# Patient Record
Sex: FEMALE | Race: WHITE | NOT HISPANIC OR LATINO | Employment: OTHER | RURAL
[De-identification: names, ages, dates, MRNs, and addresses within clinical notes are randomized per-mention and may not be internally consistent; named-entity substitution may affect disease eponyms.]

---

## 2020-06-30 ENCOUNTER — HISTORICAL (OUTPATIENT)
Dept: ADMINISTRATIVE | Facility: HOSPITAL | Age: 50
End: 2020-06-30

## 2020-06-30 LAB
ALBUMIN SERPL BCP-MCNC: 3.4 G/DL (ref 3.5–5)
ALBUMIN/GLOB SERPL: 1.1 {RATIO}
ALP SERPL-CCNC: 93 U/L (ref 39–100)
ALT SERPL W P-5'-P-CCNC: 33 U/L (ref 13–56)
AST SERPL W P-5'-P-CCNC: 15 U/L (ref 15–37)
BASOPHILS # BLD AUTO: 0.05 X10E3/UL (ref 0–0.2)
BASOPHILS NFR BLD AUTO: 0.6 % (ref 0–1)
BILIRUB SERPL-MCNC: 0.3 MG/DL (ref 0–1.2)
BILIRUB UR QL STRIP: NEGATIVE MG/DL
BUN SERPL-MCNC: 12 MG/DL (ref 7–18)
BUN/CREAT SERPL: 12.6
CALCIUM SERPL-MCNC: 8.4 MG/DL (ref 8.5–10.1)
CHLORIDE SERPL-SCNC: 98 MMOL/L (ref 98–107)
CLARITY UR: CLEAR
CO2 SERPL-SCNC: 27 MMOL/L (ref 21–32)
COLOR UR: ABNORMAL
CREAT SERPL-MCNC: 0.95 MG/DL (ref 0.55–1.02)
EOSINOPHIL # BLD AUTO: 0.02 X10E3/UL (ref 0–0.5)
EOSINOPHIL NFR BLD AUTO: 0.2 % (ref 1–4)
ERYTHROCYTE [DISTWIDTH] IN BLOOD BY AUTOMATED COUNT: 12.7 % (ref 11.5–14.5)
GLOBULIN SER-MCNC: 3.2 G/DL (ref 2–4)
GLUCOSE SERPL-MCNC: 255 MG/DL (ref 70–105)
GLUCOSE SERPL-MCNC: 407 MG/DL (ref 60–95)
GLUCOSE SERPL-MCNC: 447 MG/DL (ref 74–106)
GLUCOSE SERPL-MCNC: 459 MG/DL (ref 70–105)
GLUCOSE UR STRIP-MCNC: >=1000 MG/DL
HCT VFR BLD AUTO: 37.6 % (ref 38–47)
HGB BLD-MCNC: 12.9 G/DL (ref 12–16)
IMM GRANULOCYTES # BLD AUTO: 0.02 X10E3/UL (ref 0–0.04)
IMM GRANULOCYTES NFR BLD: 0.2 % (ref 0–0.4)
KETONES UR STRIP-SCNC: NEGATIVE MG/DL
KETONES UR STRIP-SCNC: NEGATIVE MMOL/L
LDH SERPL L TO P-CCNC: 1.8 MMOL/L (ref 0.3–1.2)
LEUKOCYTE ESTERASE UR QL STRIP: NEGATIVE LEU/UL
LYMPHOCYTES # BLD AUTO: 2.72 X10E3/UL (ref 1–4.8)
LYMPHOCYTES NFR BLD AUTO: 33.5 % (ref 27–41)
MCH RBC QN AUTO: 30.2 PG (ref 27–31)
MCHC RBC AUTO-ENTMCNC: 34.3 G/DL (ref 32–36)
MCV RBC AUTO: 88.1 FL (ref 80–96)
MONOCYTES # BLD AUTO: 0.43 X10E3/UL (ref 0–0.8)
MONOCYTES NFR BLD AUTO: 5.3 % (ref 2–6)
MPC BLD CALC-MCNC: 10.6 FL (ref 9.4–12.4)
NEUTROPHILS # BLD AUTO: 4.88 X10E3/UL (ref 1.8–7.7)
NEUTROPHILS NFR BLD AUTO: 60.2 % (ref 53–65)
NITRITE UR QL STRIP: NEGATIVE
NRBC # BLD AUTO: 0 X10E3/UL (ref 0–0)
NRBC, AUTO (.00): 0 /100 (ref 0–0)
PH UR STRIP: 6 PH UNITS (ref 5–8)
PLATELET # BLD AUTO: 179 X10E3/UL (ref 150–400)
POC BASE EXCESS: 1.3 MMOL/L (ref -2–3)
POC HCO3 VENOUS: 26 MMOL/L (ref 24–28)
POC HCT: 38 % (ref 35–51)
POC IONIZED CALCIUM: 1.17 MMOL/L (ref 1.15–1.35)
POC PCO2 VENOUS: 40 MM HG (ref 41–51)
POC PH VENOUS: 7.42 PH UNITS (ref 7.32–7.42)
POC PO2 VENOUS: 70 MM HG (ref 25–40)
POC SATURATED O2 VENOUS: 94 % (ref 40–70)
POTASSIUM SERPL-SCNC: 3.8 MMOL/L (ref 3.4–4.5)
POTASSIUM SERPL-SCNC: 3.8 MMOL/L (ref 3.5–5.1)
PROT SERPL-MCNC: 6.6 G/DL (ref 6.4–8.2)
PROT UR QL STRIP: NEGATIVE MG/DL
RBC # BLD AUTO: 4.27 X10E6/UL (ref 4.2–5.4)
RBC # UR STRIP: ABNORMAL ERY/UL
SODIUM SERPL-SCNC: 132 MMOL/L (ref 136–145)
SODIUM SERPL-SCNC: 133 MMOL/L (ref 136–145)
SP GR UR STRIP: 1.01 (ref 1–1.03)
UROBILINOGEN UR STRIP-ACNC: 0.2 EU/DL
WBC # BLD AUTO: 8.12 X10E3/UL (ref 4.5–11)

## 2020-09-18 ENCOUNTER — HISTORICAL (OUTPATIENT)
Dept: ADMINISTRATIVE | Facility: HOSPITAL | Age: 50
End: 2020-09-18

## 2020-09-18 LAB
ALBUMIN SERPL BCP-MCNC: 3.7 G/DL (ref 3.4–5)
ALBUMIN/GLOB SERPL: 1 {RATIO}
ALP SERPL-CCNC: 84 U/L (ref 50–136)
ALT SERPL W P-5'-P-CCNC: 31 U/L (ref 12–78)
ANION GAP SERPL CALCULATED.3IONS-SCNC: 12 MMOL/L
AST SERPL W P-5'-P-CCNC: 24 U/L (ref 15–37)
BASOPHILS # BLD AUTO: 0.05 X10E3/UL (ref 0–0.2)
BASOPHILS NFR BLD AUTO: 0.5 % (ref 0–1)
BILIRUB SERPL-MCNC: 0.2 MG/DL (ref 0.2–1)
BUN SERPL-MCNC: 12 MG/DL (ref 7–18)
CALCIUM SERPL-MCNC: 9.4 MG/DL (ref 8.5–10.1)
CHLORIDE SERPL-SCNC: 104 MMOL/L (ref 101–111)
CO2 SERPL-SCNC: 30 MMOL/L (ref 21–32)
CREAT SERPL-MCNC: 1.2 MG/DL (ref 0.6–1.3)
D DIMER PPP FEU-MCNC: 0.37 UG/ML (ref 0–0.47)
EOSINOPHIL # BLD AUTO: 0.16 X10E3/UL (ref 0–0.5)
EOSINOPHIL NFR BLD AUTO: 1.5 % (ref 1–4)
ERYTHROCYTE [DISTWIDTH] IN BLOOD BY AUTOMATED COUNT: 13.5 % (ref 11.5–14.5)
ETHANOL SERPL-MCNC: 0 MG/DL (ref 0–10)
GLOBULIN SER-MCNC: 3.6 G/DL
GLUCOSE SERPL-MCNC: 159 MG/DL (ref 74–106)
HCT VFR BLD AUTO: 40.6 % (ref 38–47)
HGB BLD-MCNC: 13.6 G/DL (ref 12–16)
LYMPHOCYTES # BLD AUTO: 3.28 X10E3/UL (ref 1–4.8)
LYMPHOCYTES NFR BLD AUTO: 31.5 % (ref 27–41)
MAGNESIUM SERPL-MCNC: 2.1 MG/DL (ref 1.8–2.4)
MCH RBC QN AUTO: 30.4 PG (ref 27–31)
MCHC RBC AUTO-ENTMCNC: 33.5 G/DL (ref 32–36)
MCV RBC AUTO: 91 FL (ref 80–96)
MONOCYTES # BLD AUTO: 0.57 X10E3/UL (ref 0–0.8)
MONOCYTES NFR BLD AUTO: 5.5 % (ref 2–6)
MPC BLD CALC-MCNC: 10.3 FL (ref 9.4–12.4)
NEUTROPHILS # BLD AUTO: 6.34 X10E3/UL (ref 1.8–7.7)
NEUTROPHILS NFR BLD AUTO: 61 % (ref 53–65)
NT-PROBNP SERPL-MCNC: 133 PG/ML (ref 5–125)
PLATELET # BLD AUTO: 209 X10E3/UL (ref 150–400)
POTASSIUM SERPL-SCNC: 3.6 MMOL/L (ref 3.6–5)
PROT SERPL-MCNC: 7.3 G/DL (ref 6.4–8.2)
RBC # BLD AUTO: 4.47 X10E6/UL (ref 4.2–5.4)
SARS-COV+SARS-COV-2 AG RESP QL IA.RAPID: NEGATIVE
SODIUM SERPL-SCNC: 142 MMOL/L (ref 135–145)
TROPONIN I SERPL-MCNC: 0 NG/ML (ref 0–0.06)
WBC # BLD AUTO: 10.4 X10E3/UL (ref 4.5–11)

## 2020-09-19 ENCOUNTER — HISTORICAL (OUTPATIENT)
Dept: ADMINISTRATIVE | Facility: HOSPITAL | Age: 50
End: 2020-09-19

## 2020-09-19 LAB
ANION GAP SERPL CALCULATED.3IONS-SCNC: 9 MMOL/L
APTT PPP: 25.8 SECONDS (ref 25.2–37.3)
BUN SERPL-MCNC: 15 MG/DL (ref 7–18)
CALCIUM SERPL-MCNC: 8.9 MG/DL (ref 8.5–10.1)
CHLORIDE SERPL-SCNC: 104 MMOL/L (ref 98–107)
CK MB SERPL-MCNC: 1.5 NG/ML (ref 1–3.6)
CK SERPL-CCNC: 121 U/L (ref 26–192)
CO2 SERPL-SCNC: 32 MMOL/L (ref 21–32)
CREAT SERPL-MCNC: 1.03 MG/DL (ref 0.55–1.02)
GLUCOSE SERPL-MCNC: 178 MG/DL (ref 74–106)
HCT VFR BLD AUTO: 41 % (ref 38–47)
HGB BLD-MCNC: 13.6 G/DL (ref 12–16)
INR BLD: 0.99 (ref 0–3.3)
POTASSIUM SERPL-SCNC: 3.7 MMOL/L (ref 3.5–5.1)
PROTHROMBIN TIME: 12.6 SECONDS (ref 11.7–14.7)
SODIUM SERPL-SCNC: 141 MMOL/L (ref 136–145)
T4 SERPL-MCNC: 7.4 UG/DL (ref 4.8–13.9)
TROPONIN I SERPL-MCNC: <0.017 NG/ML (ref 0–0.06)
TSH SERPL DL<=0.005 MIU/L-ACNC: 1.12 UIU/ML (ref 0.36–3.74)

## 2020-09-20 ENCOUNTER — HISTORICAL (OUTPATIENT)
Dept: ADMINISTRATIVE | Facility: HOSPITAL | Age: 50
End: 2020-09-20

## 2020-09-20 LAB
CHOLEST SERPL-MCNC: 192 MG/DL
HDLC SERPL-MCNC: 21 MG/DL
LDLC SERPL CALC-MCNC: NORMAL MG/DL
TRIGL SERPL-MCNC: 728 MG/DL
TROPONIN I SERPL-MCNC: 0.03 NG/ML (ref 0–0.06)
VLDLC SERPL-MCNC: NORMAL MG/DL

## 2021-01-18 ENCOUNTER — HISTORICAL (OUTPATIENT)
Dept: ADMINISTRATIVE | Facility: HOSPITAL | Age: 51
End: 2021-01-18

## 2021-01-18 LAB
ALBUMIN SERPL BCP-MCNC: 3.6 G/DL (ref 3.4–5)
ALBUMIN/GLOB SERPL: 1 {RATIO}
ALP SERPL-CCNC: 145 U/L (ref 50–136)
ALT SERPL W P-5'-P-CCNC: 38 U/L (ref 12–78)
AMPHET UR QL SCN: NEGATIVE
AMYLASE SERPL-CCNC: 31 U/L (ref 25–115)
ANION GAP SERPL CALCULATED.3IONS-SCNC: 12 MMOL/L
APTT PPP: 23.8 SECONDS (ref 25.2–37.3)
AST SERPL W P-5'-P-CCNC: 17 U/L (ref 15–37)
BARBITURATES UR QL SCN: NEGATIVE
BASOPHILS # BLD AUTO: 0.05 X10E3/UL (ref 0–0.2)
BASOPHILS NFR BLD AUTO: 0.6 % (ref 0–1)
BENZODIAZ METAB UR QL SCN: NEGATIVE
BILIRUB SERPL-MCNC: 0.3 MG/DL (ref 0.2–1)
BILIRUB UR QL STRIP: NEGATIVE MG/DL
BUN SERPL-MCNC: 18 MG/DL (ref 7–18)
CALCIUM SERPL-MCNC: 8.6 MG/DL (ref 8.5–10.1)
CANNABINOIDS UR QL SCN: NEGATIVE
CHLORIDE SERPL-SCNC: 93 MMOL/L (ref 101–111)
CLARITY UR: CLEAR
CO2 SERPL-SCNC: 30 MMOL/L (ref 21–32)
COCAINE UR QL SCN: NEGATIVE
COLOR UR: YELLOW
CREAT SERPL-MCNC: 1.2 MG/DL (ref 0.6–1.3)
EOSINOPHIL # BLD AUTO: 0.33 X10E3/UL (ref 0–0.5)
EOSINOPHIL NFR BLD AUTO: 3.7 % (ref 1–4)
ERYTHROCYTE [DISTWIDTH] IN BLOOD BY AUTOMATED COUNT: 13.5 % (ref 11.5–14.5)
FLUAV AG UPPER RESP QL IA.RAPID: NEGATIVE
FLUBV AG UPPER RESP QL IA.RAPID: NEGATIVE
GLOBULIN SER-MCNC: 3.6 G/DL
GLUCOSE SERPL-MCNC: 226 MG/DL (ref 70–105)
GLUCOSE SERPL-MCNC: 569 MG/DL (ref 74–106)
GLUCOSE UR STRIP-MCNC: 500 MG/DL
HCG UR QL IA.RAPID: NEGATIVE
HCT VFR BLD AUTO: 39.5 % (ref 38–47)
HGB BLD-MCNC: 13.4 G/DL (ref 12–16)
INR BLD: 0.85 (ref 0–3.3)
KETONES UR STRIP-SCNC: NEGATIVE MG/DL
LACTATE SERPL-SCNC: 1.9 MMOL/L (ref 0.4–2)
LEUKOCYTE ESTERASE UR QL STRIP: NEGATIVE LEU/UL
LIPASE SERPL-CCNC: 180 U/L (ref 73–393)
LYMPHOCYTES # BLD AUTO: 2.53 X10E3/UL (ref 1–4.8)
LYMPHOCYTES NFR BLD AUTO: 28.7 % (ref 27–41)
MCH RBC QN AUTO: 30.2 PG (ref 27–31)
MCHC RBC AUTO-ENTMCNC: 33.9 G/DL (ref 32–36)
MCV RBC AUTO: 89 FL (ref 80–96)
MONOCYTES # BLD AUTO: 0.5 X10E3/UL (ref 0–0.8)
MONOCYTES NFR BLD AUTO: 5.7 % (ref 2–6)
MPC BLD CALC-MCNC: 10.2 FL (ref 9.4–12.4)
NEUTROPHILS # BLD AUTO: 5.41 X10E3/UL (ref 1.8–7.7)
NEUTROPHILS NFR BLD AUTO: 61.3 % (ref 53–65)
NITRITE UR QL STRIP: NEGATIVE
OPIATES UR QL SCN: NEGATIVE
PCP UR QL SCN: NEGATIVE
PH UR STRIP: 6 PH UNITS (ref 5–8)
PLATELET # BLD AUTO: 228 X10E3/UL (ref 150–400)
POTASSIUM SERPL-SCNC: 4.4 MMOL/L (ref 3.6–5)
PROT SERPL-MCNC: 7.2 G/DL (ref 6.4–8.2)
PROT UR QL STRIP: NEGATIVE MG/DL
PROTHROMBIN TIME: 11.8 SECONDS (ref 11.7–14.7)
RAPID GROUP A STREP: NEGATIVE
RBC # BLD AUTO: 4.44 X10E6/UL (ref 4.2–5.4)
RBC # UR STRIP: ABNORMAL ERY/UL
SARS-COV+SARS-COV-2 AG RESP QL IA.RAPID: NEGATIVE
SODIUM SERPL-SCNC: 131 MMOL/L (ref 135–145)
SP GR UR STRIP: 1.01 (ref 1–1.03)
TROPONIN I SERPL-MCNC: 0 NG/ML (ref 0–0.06)
UROBILINOGEN UR STRIP-ACNC: 0.2 MG/DL
WBC # BLD AUTO: 8.82 X10E3/UL (ref 4.5–11)

## 2021-01-20 LAB
HCO3 UR-SCNC: 29 MMOL/L (ref 21–28)
PCO2 BLDA: 41 MM HG (ref 35–48)
PH SMN: 7.45 PH UNITS (ref 7.35–7.45)
PO2 BLDA: 73 MM HG (ref 83–108)
POC BASE EXCESS ARTERIAL: 4 MMOL/L (ref -2–3)
POC SATURATED O2: 95 % (ref 95–98)

## 2021-01-21 LAB
REPORT: NORMAL

## 2021-01-22 LAB — GLUCOSE SERPL-MCNC: >600 MG/DL (ref 70–105)

## 2021-04-15 ENCOUNTER — OFFICE VISIT (OUTPATIENT)
Dept: CARDIOLOGY | Facility: CLINIC | Age: 51
End: 2021-04-15
Payer: MEDICAID

## 2021-04-15 ENCOUNTER — HOSPITAL ENCOUNTER (OUTPATIENT)
Dept: RADIOLOGY | Facility: HOSPITAL | Age: 51
Discharge: HOME | End: 2021-04-15
Attending: INTERNAL MEDICINE
Payer: MEDICAID

## 2021-04-15 VITALS
SYSTOLIC BLOOD PRESSURE: 110 MMHG | RESPIRATION RATE: 16 BRPM | OXYGEN SATURATION: 96 % | WEIGHT: 173 LBS | HEART RATE: 96 BPM | BODY MASS INDEX: 35.34 KG/M2 | TEMPERATURE: 97 F | OXYGEN SATURATION: 99 % | BODY MASS INDEX: 33.96 KG/M2 | DIASTOLIC BLOOD PRESSURE: 80 MMHG | WEIGHT: 180 LBS | SYSTOLIC BLOOD PRESSURE: 110 MMHG | HEIGHT: 60 IN | HEIGHT: 60 IN | DIASTOLIC BLOOD PRESSURE: 70 MMHG | HEART RATE: 84 BPM

## 2021-04-15 DIAGNOSIS — R07.89 STERNAL PAIN: ICD-10-CM

## 2021-04-15 DIAGNOSIS — I73.9 CLAUDICATION: ICD-10-CM

## 2021-04-15 DIAGNOSIS — I10 HYPERTENSION, UNSPECIFIED TYPE: Primary | ICD-10-CM

## 2021-04-15 DIAGNOSIS — I25.110 ATHEROSCLEROSIS OF NATIVE CORONARY ARTERY OF NATIVE HEART WITH UNSTABLE ANGINA PECTORIS: ICD-10-CM

## 2021-04-15 DIAGNOSIS — R07.9 CHEST PAIN, UNSPECIFIED TYPE: ICD-10-CM

## 2021-04-15 PROCEDURE — 93005 ELECTROCARDIOGRAM TRACING: CPT | Mod: PBBFAC | Performed by: INTERNAL MEDICINE

## 2021-04-15 PROCEDURE — 71130 X-RAY STRENOCLAVIC JT 3/>VWS: CPT | Mod: TC

## 2021-04-15 PROCEDURE — 71130 XR STERNOCLAVICULAR JOINTS MIN 3 VIEW: ICD-10-PCS | Mod: 26,,, | Performed by: RADIOLOGY

## 2021-04-15 PROCEDURE — 99215 OFFICE O/P EST HI 40 MIN: CPT | Mod: PBBFAC,25 | Performed by: INTERNAL MEDICINE

## 2021-04-15 PROCEDURE — 99214 OFFICE O/P EST MOD 30 MIN: CPT | Mod: S$PBB,,, | Performed by: INTERNAL MEDICINE

## 2021-04-15 PROCEDURE — 93010 EKG 12-LEAD: ICD-10-PCS | Mod: S$PBB,,, | Performed by: INTERNAL MEDICINE

## 2021-04-15 PROCEDURE — 99999 PR PBB SHADOW E&M-EST. PATIENT-LVL V: CPT | Mod: PBBFAC,,, | Performed by: INTERNAL MEDICINE

## 2021-04-15 PROCEDURE — 99214 PR OFFICE/OUTPT VISIT, EST, LEVL IV, 30-39 MIN: ICD-10-PCS | Mod: S$PBB,,, | Performed by: INTERNAL MEDICINE

## 2021-04-15 PROCEDURE — 93010 ELECTROCARDIOGRAM REPORT: CPT | Mod: S$PBB,,, | Performed by: INTERNAL MEDICINE

## 2021-04-15 PROCEDURE — 71130 X-RAY STRENOCLAVIC JT 3/>VWS: CPT | Mod: 26,,, | Performed by: RADIOLOGY

## 2021-04-15 PROCEDURE — 99999 PR PBB SHADOW E&M-EST. PATIENT-LVL V: ICD-10-PCS | Mod: PBBFAC,,, | Performed by: INTERNAL MEDICINE

## 2021-04-15 RX ORDER — INSULIN GLARGINE 100 [IU]/ML
INJECTION, SOLUTION SUBCUTANEOUS
COMMUNITY
Start: 2021-04-06 | End: 2021-07-07 | Stop reason: SDUPTHER

## 2021-04-15 RX ORDER — BUSPIRONE HYDROCHLORIDE 15 MG/1
TABLET ORAL
COMMUNITY
Start: 2021-03-17 | End: 2021-10-04 | Stop reason: SDUPTHER

## 2021-04-15 RX ORDER — TOPIRAMATE 25 MG/1
25 TABLET ORAL DAILY
COMMUNITY
Start: 2021-02-01 | End: 2021-08-02 | Stop reason: SDUPTHER

## 2021-04-15 RX ORDER — METOPROLOL TARTRATE 25 MG/1
12.5 TABLET, FILM COATED ORAL DAILY
COMMUNITY
Start: 2021-04-01 | End: 2021-04-15 | Stop reason: ALTCHOICE

## 2021-04-15 RX ORDER — ATORVASTATIN CALCIUM 40 MG/1
40 TABLET, FILM COATED ORAL DAILY
Qty: 90 TABLET | Refills: 3 | Status: SHIPPED | OUTPATIENT
Start: 2021-04-15 | End: 2021-07-23 | Stop reason: SDUPTHER

## 2021-04-15 RX ORDER — GABAPENTIN 300 MG/1
CAPSULE ORAL
COMMUNITY
Start: 2021-03-04 | End: 2021-07-14 | Stop reason: SDUPTHER

## 2021-04-15 RX ORDER — ATORVASTATIN CALCIUM 40 MG/1
40 TABLET, FILM COATED ORAL DAILY
COMMUNITY
End: 2021-04-15 | Stop reason: ALTCHOICE

## 2021-04-15 RX ORDER — IBUPROFEN 200 MG
TABLET ORAL
COMMUNITY
Start: 2021-03-04 | End: 2021-10-04 | Stop reason: SDUPTHER

## 2021-04-15 RX ORDER — METOPROLOL SUCCINATE 25 MG/1
25 TABLET, EXTENDED RELEASE ORAL DAILY
Qty: 30 TABLET | Refills: 11 | Status: SHIPPED | OUTPATIENT
Start: 2021-04-15 | End: 2021-05-11

## 2021-04-15 RX ORDER — DAPAGLIFLOZIN 10 MG/1
10 TABLET, FILM COATED ORAL DAILY
COMMUNITY
Start: 2021-04-01 | End: 2021-08-03 | Stop reason: SDUPTHER

## 2021-04-15 RX ORDER — MIRTAZAPINE 30 MG/1
30 TABLET, FILM COATED ORAL DAILY
COMMUNITY
Start: 2021-02-10 | End: 2021-05-20

## 2021-04-15 RX ORDER — HYDROXYCHLOROQUINE SULFATE 200 MG/1
200 TABLET, FILM COATED ORAL 2 TIMES DAILY
COMMUNITY
Start: 2021-03-04 | End: 2021-07-06 | Stop reason: SDUPTHER

## 2021-04-15 RX ORDER — ARFORMOTEROL TARTRATE 15 UG/2ML
SOLUTION RESPIRATORY (INHALATION)
Status: ON HOLD | COMMUNITY
Start: 2021-02-11 | End: 2021-11-09

## 2021-04-15 RX ORDER — PROMETHAZINE HYDROCHLORIDE 12.5 MG/1
12.5 TABLET ORAL EVERY 6 HOURS PRN
COMMUNITY
Start: 2021-01-20 | End: 2021-09-10 | Stop reason: SDUPTHER

## 2021-04-19 ENCOUNTER — PATIENT MESSAGE (OUTPATIENT)
Dept: CARDIOLOGY | Facility: CLINIC | Age: 51
End: 2021-04-19

## 2021-04-28 ENCOUNTER — HOSPITAL ENCOUNTER (OUTPATIENT)
Dept: RADIOLOGY | Facility: HOSPITAL | Age: 51
Discharge: HOME | End: 2021-04-28
Attending: INTERNAL MEDICINE
Payer: MEDICAID

## 2021-04-28 DIAGNOSIS — I73.9 CLAUDICATION: ICD-10-CM

## 2021-04-28 PROCEDURE — 93924 LWR XTR VASC STDY BILAT: CPT | Mod: TC

## 2021-04-30 ENCOUNTER — HOSPITAL ENCOUNTER (OUTPATIENT)
Dept: RADIOLOGY | Facility: HOSPITAL | Age: 51
Discharge: HOME | End: 2021-04-30
Attending: INTERNAL MEDICINE
Payer: MEDICAID

## 2021-04-30 ENCOUNTER — HOSPITAL ENCOUNTER (OUTPATIENT)
Dept: CARDIOLOGY | Facility: HOSPITAL | Age: 51
Discharge: HOME | End: 2021-04-30
Attending: INTERNAL MEDICINE
Payer: MEDICAID

## 2021-04-30 DIAGNOSIS — R07.9 CHEST PAIN, UNSPECIFIED TYPE: ICD-10-CM

## 2021-04-30 PROCEDURE — 78452 HT MUSCLE IMAGE SPECT MULT: CPT | Mod: 26,,, | Performed by: INTERNAL MEDICINE

## 2021-04-30 PROCEDURE — A9500 TC99M SESTAMIBI: HCPCS

## 2021-04-30 PROCEDURE — 78452 HT MUSCLE IMAGE SPECT MULT: CPT | Mod: TC

## 2021-04-30 PROCEDURE — 93018 CV STRESS TEST I&R ONLY: CPT | Mod: ,,, | Performed by: INTERNAL MEDICINE

## 2021-04-30 PROCEDURE — 93017 CV STRESS TEST TRACING ONLY: CPT

## 2021-04-30 PROCEDURE — 78452 NM MYOCARDIAL PERFUSION SPECT MULTI PHARM: ICD-10-PCS | Mod: 26,,, | Performed by: INTERNAL MEDICINE

## 2021-04-30 PROCEDURE — 93018 NUCLEAR STRESS TEST (CUPID ONLY): ICD-10-PCS | Mod: ,,, | Performed by: INTERNAL MEDICINE

## 2021-04-30 PROCEDURE — 93016 CV STRESS TEST SUPVJ ONLY: CPT | Mod: ,,, | Performed by: NURSE PRACTITIONER

## 2021-04-30 PROCEDURE — 93016 NUCLEAR STRESS TEST (CUPID ONLY): ICD-10-PCS | Mod: ,,, | Performed by: NURSE PRACTITIONER

## 2021-04-30 PROCEDURE — 63600175 PHARM REV CODE 636 W HCPCS: Performed by: INTERNAL MEDICINE

## 2021-04-30 RX ORDER — ALBUTEROL SULFATE 1.25 MG/3ML
1.25 SOLUTION RESPIRATORY (INHALATION) EVERY 6 HOURS PRN
COMMUNITY
End: 2021-09-08 | Stop reason: SDUPTHER

## 2021-04-30 RX ORDER — ASPIRIN 81 MG/1
1 TABLET ORAL DAILY
COMMUNITY
Start: 2020-11-30

## 2021-04-30 RX ORDER — ZIPRASIDONE HYDROCHLORIDE 20 MG/1
20 CAPSULE ORAL 2 TIMES DAILY
COMMUNITY
Start: 2020-05-14 | End: 2021-10-04 | Stop reason: ALTCHOICE

## 2021-04-30 RX ORDER — ONDANSETRON 4 MG/1
4 TABLET, ORALLY DISINTEGRATING ORAL
COMMUNITY
End: 2021-05-20

## 2021-04-30 RX ORDER — REGADENOSON 0.08 MG/ML
0.4 INJECTION, SOLUTION INTRAVENOUS ONCE
Status: COMPLETED | OUTPATIENT
Start: 2021-04-30 | End: 2021-04-30

## 2021-04-30 RX ADMIN — REGADENOSON 0.4 MG: 0.08 INJECTION, SOLUTION INTRAVENOUS at 10:04

## 2021-05-03 ENCOUNTER — PATIENT MESSAGE (OUTPATIENT)
Dept: CARDIOLOGY | Facility: CLINIC | Age: 51
End: 2021-05-03

## 2021-05-05 LAB
CV STRESS BASE HR: 83 BPM
DIASTOLIC BLOOD PRESSURE: 74 MMHG
OHS CV CPX 1 MINUTE RECOVERY HEART RATE: 110 BPM
OHS CV CPX 85 PERCENT MAX PREDICTED HEART RATE MALE: 138
OHS CV CPX MAX PREDICTED HEART RATE: 163
OHS CV CPX PATIENT IS FEMALE: 1
OHS CV CPX PATIENT IS MALE: 0
OHS CV CPX PEAK DIASTOLIC BLOOD PRESSURE: 82 MMHG
OHS CV CPX PEAK HEAR RATE: 112 BPM
OHS CV CPX PEAK RATE PRESSURE PRODUCT: NORMAL
OHS CV CPX PEAK SYSTOLIC BLOOD PRESSURE: 129 MMHG
OHS CV CPX PERCENT MAX PREDICTED HEART RATE ACHIEVED: 69
OHS CV CPX RATE PRESSURE PRODUCT PRESENTING: 9628
SYSTOLIC BLOOD PRESSURE: 116 MMHG

## 2021-05-06 ENCOUNTER — PATIENT MESSAGE (OUTPATIENT)
Dept: CARDIOLOGY | Facility: CLINIC | Age: 51
End: 2021-05-06

## 2021-05-07 ENCOUNTER — PATIENT MESSAGE (OUTPATIENT)
Dept: CARDIOLOGY | Facility: CLINIC | Age: 51
End: 2021-05-07

## 2021-05-11 ENCOUNTER — OFFICE VISIT (OUTPATIENT)
Dept: CARDIOLOGY | Facility: CLINIC | Age: 51
End: 2021-05-11
Payer: MEDICAID

## 2021-05-11 VITALS
DIASTOLIC BLOOD PRESSURE: 75 MMHG | OXYGEN SATURATION: 96 % | HEIGHT: 60 IN | WEIGHT: 186 LBS | HEART RATE: 94 BPM | BODY MASS INDEX: 36.52 KG/M2 | SYSTOLIC BLOOD PRESSURE: 139 MMHG

## 2021-05-11 DIAGNOSIS — I25.110 ATHEROSCLEROSIS OF NATIVE CORONARY ARTERY OF NATIVE HEART WITH UNSTABLE ANGINA PECTORIS: Primary | ICD-10-CM

## 2021-05-11 DIAGNOSIS — R00.2 PALPITATIONS: ICD-10-CM

## 2021-05-11 PROCEDURE — 99215 OFFICE O/P EST HI 40 MIN: CPT | Mod: PBBFAC,,, | Performed by: INTERNAL MEDICINE

## 2021-05-11 PROCEDURE — 99215 OFFICE O/P EST HI 40 MIN: CPT | Mod: PBBFAC | Performed by: INTERNAL MEDICINE

## 2021-05-11 PROCEDURE — 99214 OFFICE O/P EST MOD 30 MIN: CPT | Mod: S$PBB,,, | Performed by: INTERNAL MEDICINE

## 2021-05-11 PROCEDURE — 99214 PR OFFICE/OUTPT VISIT, EST, LEVL IV, 30-39 MIN: ICD-10-PCS | Mod: S$PBB,,, | Performed by: INTERNAL MEDICINE

## 2021-05-11 PROCEDURE — 99215 HC OFFICE/OUTPT VISIT, EST, LEVL V, 40-54 MIN: ICD-10-PCS | Mod: PBBFAC,,, | Performed by: INTERNAL MEDICINE

## 2021-05-11 RX ORDER — METOPROLOL SUCCINATE 50 MG/1
50 TABLET, EXTENDED RELEASE ORAL DAILY
Qty: 30 TABLET | Refills: 11 | Status: SHIPPED | OUTPATIENT
Start: 2021-05-11 | End: 2021-07-23 | Stop reason: SDUPTHER

## 2021-05-12 ENCOUNTER — PATIENT MESSAGE (OUTPATIENT)
Dept: CARDIOLOGY | Facility: CLINIC | Age: 51
End: 2021-05-12

## 2021-05-12 DIAGNOSIS — R00.0 SINUS TACHYCARDIA: Primary | ICD-10-CM

## 2021-05-13 ENCOUNTER — HOSPITAL ENCOUNTER (OUTPATIENT)
Dept: CARDIOLOGY | Facility: HOSPITAL | Age: 51
Discharge: HOME | End: 2021-05-13
Attending: INTERNAL MEDICINE
Payer: MEDICAID

## 2021-05-13 DIAGNOSIS — R00.0 SINUS TACHYCARDIA: ICD-10-CM

## 2021-05-13 PROCEDURE — 93226 XTRNL ECG REC<48 HR SCAN A/R: CPT

## 2021-05-20 ENCOUNTER — OFFICE VISIT (OUTPATIENT)
Dept: FAMILY MEDICINE | Facility: CLINIC | Age: 51
End: 2021-05-20
Payer: MEDICAID

## 2021-05-20 VITALS
HEIGHT: 60 IN | DIASTOLIC BLOOD PRESSURE: 76 MMHG | HEART RATE: 91 BPM | TEMPERATURE: 97 F | BODY MASS INDEX: 33.77 KG/M2 | WEIGHT: 172 LBS | RESPIRATION RATE: 20 BRPM | SYSTOLIC BLOOD PRESSURE: 122 MMHG | OXYGEN SATURATION: 87 %

## 2021-05-20 DIAGNOSIS — R06.02 SHORTNESS OF BREATH: ICD-10-CM

## 2021-05-20 DIAGNOSIS — J06.9 UPPER RESPIRATORY TRACT INFECTION, UNSPECIFIED TYPE: ICD-10-CM

## 2021-05-20 DIAGNOSIS — R05.9 COUGH: Primary | ICD-10-CM

## 2021-05-20 DIAGNOSIS — E11.65 TYPE 2 DIABETES MELLITUS WITH HYPERGLYCEMIA, WITHOUT LONG-TERM CURRENT USE OF INSULIN: ICD-10-CM

## 2021-05-20 DIAGNOSIS — J44.9 CHRONIC OBSTRUCTIVE PULMONARY DISEASE, UNSPECIFIED COPD TYPE: ICD-10-CM

## 2021-05-20 DIAGNOSIS — R00.2 PALPITATIONS: ICD-10-CM

## 2021-05-20 DIAGNOSIS — I25.110 ATHEROSCLEROSIS OF NATIVE CORONARY ARTERY OF NATIVE HEART WITH UNSTABLE ANGINA PECTORIS: ICD-10-CM

## 2021-05-20 LAB
CTP QC/QA: YES
CTP QC/QA: YES
FLUAV AG NPH QL: NEGATIVE
FLUBV AG NPH QL: NEGATIVE
S PYO RRNA THROAT QL PROBE: NEGATIVE
SARS-COV-2 AG RESP QL IA.RAPID: NEGATIVE

## 2021-05-20 PROCEDURE — 87880 STREP A ASSAY W/OPTIC: CPT | Mod: RHCUB | Performed by: FAMILY MEDICINE

## 2021-05-20 PROCEDURE — 87428 SARSCOV & INF VIR A&B AG IA: CPT | Mod: RHCUB | Performed by: FAMILY MEDICINE

## 2021-05-20 PROCEDURE — 99214 PR OFFICE/OUTPT VISIT, EST, LEVL IV, 30-39 MIN: ICD-10-PCS | Mod: ,,, | Performed by: FAMILY MEDICINE

## 2021-05-20 PROCEDURE — 99214 OFFICE O/P EST MOD 30 MIN: CPT | Mod: ,,, | Performed by: FAMILY MEDICINE

## 2021-05-20 RX ORDER — IPRATROPIUM BROMIDE 21 UG/1
2 SPRAY, METERED NASAL 3 TIMES DAILY
Qty: 30 ML | Refills: 1 | Status: SHIPPED | OUTPATIENT
Start: 2021-05-20 | End: 2022-05-02

## 2021-05-20 RX ORDER — MIRTAZAPINE 15 MG/1
15 TABLET, FILM COATED ORAL DAILY
COMMUNITY
Start: 2021-05-12 | End: 2022-03-15 | Stop reason: SDUPTHER

## 2021-05-20 RX ORDER — GUAIFENESIN AND DEXTROMETHORPHAN HYDROBROMIDE 600; 30 MG/1; MG/1
1 TABLET, EXTENDED RELEASE ORAL 2 TIMES DAILY
Qty: 20 TABLET | Refills: 0 | Status: SHIPPED | OUTPATIENT
Start: 2021-05-20 | End: 2021-05-30

## 2021-05-20 RX ORDER — DOXYCYCLINE 100 MG/1
100 CAPSULE ORAL EVERY 12 HOURS
Qty: 14 CAPSULE | Refills: 0 | Status: SHIPPED | OUTPATIENT
Start: 2021-05-20 | End: 2021-05-27

## 2021-05-21 ENCOUNTER — TELEPHONE (OUTPATIENT)
Dept: FAMILY MEDICINE | Facility: CLINIC | Age: 51
End: 2021-05-21

## 2021-06-02 LAB
OHS CV EVENT MONITOR DAY: 0
OHS CV HOLTER LENGTH DECIMAL HOURS: 24
OHS CV HOLTER LENGTH HOURS: 24
OHS CV HOLTER LENGTH MINUTES: 0

## 2021-06-02 PROCEDURE — 93227 XTRNL ECG REC<48 HR R&I: CPT | Mod: ,,, | Performed by: INTERNAL MEDICINE

## 2021-06-02 PROCEDURE — 93227 HOLTER MONITOR - 24 HOUR (CUPID ONLY): ICD-10-PCS | Mod: ,,, | Performed by: INTERNAL MEDICINE

## 2021-06-14 ENCOUNTER — OFFICE VISIT (OUTPATIENT)
Dept: FAMILY MEDICINE | Facility: CLINIC | Age: 51
End: 2021-06-14
Payer: MEDICAID

## 2021-06-14 VITALS
HEIGHT: 60 IN | TEMPERATURE: 97 F | BODY MASS INDEX: 36.16 KG/M2 | OXYGEN SATURATION: 95 % | WEIGHT: 184.19 LBS | DIASTOLIC BLOOD PRESSURE: 74 MMHG | HEART RATE: 74 BPM | SYSTOLIC BLOOD PRESSURE: 111 MMHG | RESPIRATION RATE: 20 BRPM

## 2021-06-14 DIAGNOSIS — E11.9 TYPE 2 DIABETES MELLITUS WITHOUT COMPLICATION, WITHOUT LONG-TERM CURRENT USE OF INSULIN: Primary | ICD-10-CM

## 2021-06-14 PROCEDURE — 99214 PR OFFICE/OUTPT VISIT, EST, LEVL IV, 30-39 MIN: ICD-10-PCS | Mod: ,,, | Performed by: FAMILY MEDICINE

## 2021-06-14 PROCEDURE — 99214 OFFICE O/P EST MOD 30 MIN: CPT | Mod: ,,, | Performed by: FAMILY MEDICINE

## 2021-07-06 RX ORDER — HYDROXYCHLOROQUINE SULFATE 200 MG/1
200 TABLET, FILM COATED ORAL 2 TIMES DAILY
Qty: 180 TABLET | Refills: 1 | Status: SHIPPED | OUTPATIENT
Start: 2021-07-06 | End: 2021-12-27 | Stop reason: SDUPTHER

## 2021-07-12 RX ORDER — INSULIN GLARGINE 100 [IU]/ML
20 INJECTION, SOLUTION SUBCUTANEOUS 2 TIMES DAILY
Qty: 12 ML | Refills: 5 | Status: SHIPPED | OUTPATIENT
Start: 2021-07-12 | End: 2021-07-14 | Stop reason: SDUPTHER

## 2021-07-14 ENCOUNTER — OFFICE VISIT (OUTPATIENT)
Dept: FAMILY MEDICINE | Facility: CLINIC | Age: 51
End: 2021-07-14
Payer: MEDICAID

## 2021-07-14 VITALS
RESPIRATION RATE: 20 BRPM | HEIGHT: 60 IN | DIASTOLIC BLOOD PRESSURE: 74 MMHG | WEIGHT: 188.81 LBS | BODY MASS INDEX: 37.07 KG/M2 | OXYGEN SATURATION: 98 % | HEART RATE: 81 BPM | TEMPERATURE: 98 F | SYSTOLIC BLOOD PRESSURE: 118 MMHG

## 2021-07-14 DIAGNOSIS — E11.59 TYPE 2 DIABETES MELLITUS WITH OTHER CIRCULATORY COMPLICATION, WITHOUT LONG-TERM CURRENT USE OF INSULIN: ICD-10-CM

## 2021-07-14 DIAGNOSIS — K59.00 CONSTIPATION, UNSPECIFIED CONSTIPATION TYPE: Primary | ICD-10-CM

## 2021-07-14 DIAGNOSIS — Z79.4 TYPE 2 DIABETES MELLITUS WITH HYPERGLYCEMIA, WITH LONG-TERM CURRENT USE OF INSULIN: ICD-10-CM

## 2021-07-14 DIAGNOSIS — F31.81 BIPOLAR 2 DISORDER, MAJOR DEPRESSIVE EPISODE: ICD-10-CM

## 2021-07-14 DIAGNOSIS — D64.9 ANEMIA, UNSPECIFIED TYPE: ICD-10-CM

## 2021-07-14 DIAGNOSIS — E11.65 TYPE 2 DIABETES MELLITUS WITH HYPERGLYCEMIA, WITH LONG-TERM CURRENT USE OF INSULIN: ICD-10-CM

## 2021-07-14 LAB
ALBUMIN SERPL BCP-MCNC: 4 G/DL (ref 3.5–5)
ALBUMIN/GLOB SERPL: 1 {RATIO}
ALP SERPL-CCNC: 107 U/L (ref 39–100)
ALT SERPL W P-5'-P-CCNC: 46 U/L (ref 13–56)
ANION GAP SERPL CALCULATED.3IONS-SCNC: 7 MMOL/L (ref 7–16)
AST SERPL W P-5'-P-CCNC: 21 U/L (ref 15–37)
BASOPHILS # BLD AUTO: 0.09 K/UL (ref 0–0.2)
BASOPHILS NFR BLD AUTO: 0.9 % (ref 0–1)
BILIRUB SERPL-MCNC: 0.4 MG/DL (ref 0–1.2)
BUN SERPL-MCNC: 13 MG/DL (ref 7–18)
BUN/CREAT SERPL: 15 (ref 6–20)
CALCIUM SERPL-MCNC: 9.4 MG/DL (ref 8.5–10.1)
CHLORIDE SERPL-SCNC: 104 MMOL/L (ref 98–107)
CHOLEST SERPL-MCNC: 128 MG/DL (ref 0–200)
CHOLEST/HDLC SERPL: 3.8 {RATIO}
CO2 SERPL-SCNC: 30 MMOL/L (ref 21–32)
CREAT SERPL-MCNC: 0.87 MG/DL (ref 0.55–1.02)
CRP SERPL-MCNC: 0.87 MG/DL (ref 0–0.8)
DIFFERENTIAL METHOD BLD: NORMAL
EOSINOPHIL # BLD AUTO: 0.1 K/UL (ref 0–0.5)
EOSINOPHIL NFR BLD AUTO: 1 % (ref 1–4)
ERYTHROCYTE [DISTWIDTH] IN BLOOD BY AUTOMATED COUNT: 13.8 % (ref 11.5–14.5)
ERYTHROCYTE [SEDIMENTATION RATE] IN BLOOD BY WESTERGREN METHOD: 12 MM/HR (ref 0–20)
EST. AVERAGE GLUCOSE BLD GHB EST-MCNC: 234 MG/DL
GLOBULIN SER-MCNC: 4 G/DL (ref 2–4)
GLUCOSE SERPL-MCNC: 139 MG/DL (ref 74–106)
HBA1C MFR BLD HPLC: 9.6 % (ref 4.5–6.6)
HCT VFR BLD AUTO: 44.9 % (ref 38–47)
HDLC SERPL-MCNC: 34 MG/DL (ref 40–60)
HGB BLD-MCNC: 14.8 G/DL (ref 12–16)
IMM GRANULOCYTES # BLD AUTO: 0.04 K/UL (ref 0–0.04)
IMM GRANULOCYTES NFR BLD: 0.4 % (ref 0–0.4)
LDLC SERPL CALC-MCNC: 42 MG/DL
LDLC/HDLC SERPL: 1.2 {RATIO}
LYMPHOCYTES # BLD AUTO: 4.01 K/UL (ref 1–4.8)
LYMPHOCYTES NFR BLD AUTO: 38.4 % (ref 27–41)
MCH RBC QN AUTO: 29.7 PG (ref 27–31)
MCHC RBC AUTO-ENTMCNC: 33 G/DL (ref 32–36)
MCV RBC AUTO: 90.2 FL (ref 80–96)
MONOCYTES # BLD AUTO: 0.56 K/UL (ref 0–0.8)
MONOCYTES NFR BLD AUTO: 5.4 % (ref 2–6)
MPC BLD CALC-MCNC: 10.8 FL (ref 9.4–12.4)
NEUTROPHILS # BLD AUTO: 5.63 K/UL (ref 1.8–7.7)
NEUTROPHILS NFR BLD AUTO: 53.9 % (ref 53–65)
NONHDLC SERPL-MCNC: 94 MG/DL
NRBC # BLD AUTO: 0 X10E3/UL
NRBC, AUTO (.00): 0 %
PLATELET # BLD AUTO: 222 K/UL (ref 150–400)
POTASSIUM SERPL-SCNC: 4 MMOL/L (ref 3.5–5.1)
PROT SERPL-MCNC: 8 G/DL (ref 6.4–8.2)
RBC # BLD AUTO: 4.98 M/UL (ref 4.2–5.4)
SODIUM SERPL-SCNC: 137 MMOL/L (ref 136–145)
T4 SERPL-MCNC: 10 ΜG/DL (ref 4.8–13.9)
TRIGL SERPL-MCNC: 259 MG/DL (ref 35–150)
TSH SERPL DL<=0.005 MIU/L-ACNC: 1.16 UIU/ML (ref 0.36–3.74)
VLDLC SERPL-MCNC: 52 MG/DL
WBC # BLD AUTO: 10.43 K/UL (ref 4.5–11)

## 2021-07-14 PROCEDURE — 83036 HEMOGLOBIN GLYCOSYLATED A1C: CPT | Mod: ,,, | Performed by: CLINICAL MEDICAL LABORATORY

## 2021-07-14 PROCEDURE — 86140 C-REACTIVE PROTEIN: ICD-10-PCS | Mod: ,,, | Performed by: CLINICAL MEDICAL LABORATORY

## 2021-07-14 PROCEDURE — 85025 COMPLETE CBC W/AUTO DIFF WBC: CPT | Mod: ,,, | Performed by: CLINICAL MEDICAL LABORATORY

## 2021-07-14 PROCEDURE — 86140 C-REACTIVE PROTEIN: CPT | Mod: ,,, | Performed by: CLINICAL MEDICAL LABORATORY

## 2021-07-14 PROCEDURE — 85651 SEDIMENTATION RATE, AUTOMATED: ICD-10-PCS | Mod: ,,, | Performed by: CLINICAL MEDICAL LABORATORY

## 2021-07-14 PROCEDURE — 80061 LIPID PANEL: ICD-10-PCS | Mod: ,,, | Performed by: CLINICAL MEDICAL LABORATORY

## 2021-07-14 PROCEDURE — 84436 T4: ICD-10-PCS | Mod: ,,, | Performed by: CLINICAL MEDICAL LABORATORY

## 2021-07-14 PROCEDURE — 99214 PR OFFICE/OUTPT VISIT, EST, LEVL IV, 30-39 MIN: ICD-10-PCS | Mod: ,,, | Performed by: FAMILY MEDICINE

## 2021-07-14 PROCEDURE — 83036 HEMOGLOBIN A1C: ICD-10-PCS | Mod: ,,, | Performed by: CLINICAL MEDICAL LABORATORY

## 2021-07-14 PROCEDURE — 84436 ASSAY OF TOTAL THYROXINE: CPT | Mod: ,,, | Performed by: CLINICAL MEDICAL LABORATORY

## 2021-07-14 PROCEDURE — 99214 OFFICE O/P EST MOD 30 MIN: CPT | Mod: ,,, | Performed by: FAMILY MEDICINE

## 2021-07-14 PROCEDURE — 85025 CBC WITH DIFFERENTIAL: ICD-10-PCS | Mod: ,,, | Performed by: CLINICAL MEDICAL LABORATORY

## 2021-07-14 PROCEDURE — 80061 LIPID PANEL: CPT | Mod: ,,, | Performed by: CLINICAL MEDICAL LABORATORY

## 2021-07-14 PROCEDURE — 85651 RBC SED RATE NONAUTOMATED: CPT | Mod: ,,, | Performed by: CLINICAL MEDICAL LABORATORY

## 2021-07-14 RX ORDER — ALBUTEROL SULFATE 0.83 MG/ML
SOLUTION RESPIRATORY (INHALATION)
Status: ON HOLD | COMMUNITY
Start: 2021-07-06 | End: 2021-11-09

## 2021-07-14 RX ORDER — INSULIN GLARGINE 100 [IU]/ML
25 INJECTION, SOLUTION SUBCUTANEOUS 2 TIMES DAILY
Qty: 12 ML | Refills: 5 | Status: ON HOLD | OUTPATIENT
Start: 2021-07-14 | End: 2021-11-09

## 2021-07-14 RX ORDER — UBIDECARENONE 75 MG
500 CAPSULE ORAL DAILY
Status: ON HOLD | COMMUNITY
End: 2021-11-09

## 2021-07-14 RX ORDER — GABAPENTIN 300 MG/1
CAPSULE ORAL
Qty: 90 CAPSULE | Refills: 5 | Status: ON HOLD | OUTPATIENT
Start: 2021-07-14 | End: 2021-11-09

## 2021-07-23 RX ORDER — METOPROLOL SUCCINATE 50 MG/1
50 TABLET, EXTENDED RELEASE ORAL DAILY
Qty: 30 TABLET | Refills: 11 | Status: CANCELLED | OUTPATIENT
Start: 2021-07-23 | End: 2022-07-23

## 2021-07-26 ENCOUNTER — PATIENT MESSAGE (OUTPATIENT)
Dept: CARDIOLOGY | Facility: CLINIC | Age: 51
End: 2021-07-26

## 2021-07-28 ENCOUNTER — OFFICE VISIT (OUTPATIENT)
Dept: FAMILY MEDICINE | Facility: CLINIC | Age: 51
End: 2021-07-28
Payer: MEDICAID

## 2021-07-28 VITALS
HEART RATE: 92 BPM | HEIGHT: 60 IN | WEIGHT: 185.38 LBS | RESPIRATION RATE: 20 BRPM | OXYGEN SATURATION: 97 % | DIASTOLIC BLOOD PRESSURE: 72 MMHG | SYSTOLIC BLOOD PRESSURE: 108 MMHG | BODY MASS INDEX: 36.4 KG/M2 | TEMPERATURE: 98 F

## 2021-07-28 DIAGNOSIS — J44.9 CHRONIC OBSTRUCTIVE PULMONARY DISEASE, UNSPECIFIED COPD TYPE: ICD-10-CM

## 2021-07-28 DIAGNOSIS — F31.81 BIPOLAR 2 DISORDER, MAJOR DEPRESSIVE EPISODE: Primary | ICD-10-CM

## 2021-07-28 DIAGNOSIS — E11.65 TYPE 2 DIABETES MELLITUS WITH HYPERGLYCEMIA, WITHOUT LONG-TERM CURRENT USE OF INSULIN: ICD-10-CM

## 2021-07-28 PROCEDURE — 99214 OFFICE O/P EST MOD 30 MIN: CPT | Mod: ,,, | Performed by: FAMILY MEDICINE

## 2021-07-28 PROCEDURE — 99214 PR OFFICE/OUTPT VISIT, EST, LEVL IV, 30-39 MIN: ICD-10-PCS | Mod: ,,, | Performed by: FAMILY MEDICINE

## 2021-08-02 DIAGNOSIS — E11.65 TYPE 2 DIABETES MELLITUS WITH HYPERGLYCEMIA, WITHOUT LONG-TERM CURRENT USE OF INSULIN: ICD-10-CM

## 2021-08-02 DIAGNOSIS — G43.909 MIGRAINE WITHOUT STATUS MIGRAINOSUS, NOT INTRACTABLE, UNSPECIFIED MIGRAINE TYPE: Primary | ICD-10-CM

## 2021-08-03 RX ORDER — TOPIRAMATE 25 MG/1
25 TABLET ORAL DAILY
Qty: 90 TABLET | Refills: 1 | Status: SHIPPED | OUTPATIENT
Start: 2021-08-03 | End: 2021-12-27 | Stop reason: SDUPTHER

## 2021-08-03 RX ORDER — DAPAGLIFLOZIN 10 MG/1
10 TABLET, FILM COATED ORAL DAILY
Qty: 30 TABLET | Refills: 3 | Status: SHIPPED | OUTPATIENT
Start: 2021-08-03 | End: 2021-10-03 | Stop reason: SDUPTHER

## 2021-09-08 DIAGNOSIS — F31.81 BIPOLAR 2 DISORDER, MAJOR DEPRESSIVE EPISODE: Primary | ICD-10-CM

## 2021-09-08 DIAGNOSIS — J44.9 CHRONIC OBSTRUCTIVE PULMONARY DISEASE, UNSPECIFIED COPD TYPE: ICD-10-CM

## 2021-09-08 RX ORDER — ALBUTEROL SULFATE 1.25 MG/3ML
1.25 SOLUTION RESPIRATORY (INHALATION) EVERY 6 HOURS PRN
Qty: 1 BOX | Refills: 1 | Status: SHIPPED | OUTPATIENT
Start: 2021-09-08 | End: 2021-09-17 | Stop reason: SDUPTHER

## 2021-09-10 DIAGNOSIS — R11.0 NAUSEA: Primary | ICD-10-CM

## 2021-09-10 RX ORDER — PROMETHAZINE HYDROCHLORIDE 12.5 MG/1
12.5 TABLET ORAL EVERY 6 HOURS PRN
Qty: 30 TABLET | Refills: 0 | Status: SHIPPED | OUTPATIENT
Start: 2021-09-10 | End: 2021-10-29 | Stop reason: SDUPTHER

## 2021-09-17 DIAGNOSIS — J44.9 CHRONIC OBSTRUCTIVE PULMONARY DISEASE, UNSPECIFIED COPD TYPE: ICD-10-CM

## 2021-09-20 RX ORDER — ALBUTEROL SULFATE 1.25 MG/3ML
1.25 SOLUTION RESPIRATORY (INHALATION) EVERY 6 HOURS PRN
Qty: 12 BOX | Refills: 3 | Status: ON HOLD | OUTPATIENT
Start: 2021-09-20 | End: 2021-11-09

## 2021-10-03 DIAGNOSIS — E11.65 TYPE 2 DIABETES MELLITUS WITH HYPERGLYCEMIA, WITHOUT LONG-TERM CURRENT USE OF INSULIN: ICD-10-CM

## 2021-10-04 ENCOUNTER — OFFICE VISIT (OUTPATIENT)
Dept: FAMILY MEDICINE | Facility: CLINIC | Age: 51
End: 2021-10-04
Payer: MEDICAID

## 2021-10-04 VITALS
DIASTOLIC BLOOD PRESSURE: 74 MMHG | HEIGHT: 60 IN | HEART RATE: 78 BPM | TEMPERATURE: 98 F | RESPIRATION RATE: 20 BRPM | OXYGEN SATURATION: 97 % | SYSTOLIC BLOOD PRESSURE: 98 MMHG | WEIGHT: 181 LBS | BODY MASS INDEX: 35.53 KG/M2

## 2021-10-04 DIAGNOSIS — F41.9 ANXIETY: Primary | ICD-10-CM

## 2021-10-04 DIAGNOSIS — F31.81 BIPOLAR 2 DISORDER, MAJOR DEPRESSIVE EPISODE: ICD-10-CM

## 2021-10-04 DIAGNOSIS — J44.9 CHRONIC OBSTRUCTIVE PULMONARY DISEASE, UNSPECIFIED COPD TYPE: ICD-10-CM

## 2021-10-04 DIAGNOSIS — F17.200 TOBACCO DEPENDENCE: ICD-10-CM

## 2021-10-04 DIAGNOSIS — E11.65 TYPE 2 DIABETES MELLITUS WITH HYPERGLYCEMIA, WITHOUT LONG-TERM CURRENT USE OF INSULIN: ICD-10-CM

## 2021-10-04 LAB
ANION GAP SERPL CALCULATED.3IONS-SCNC: 9 MMOL/L (ref 7–16)
BUN SERPL-MCNC: 12 MG/DL (ref 7–18)
BUN/CREAT SERPL: 12 (ref 6–20)
CALCIUM SERPL-MCNC: 10.2 MG/DL (ref 8.5–10.1)
CHLORIDE SERPL-SCNC: 106 MMOL/L (ref 98–107)
CO2 SERPL-SCNC: 27 MMOL/L (ref 21–32)
CREAT SERPL-MCNC: 1.04 MG/DL (ref 0.55–1.02)
EST. AVERAGE GLUCOSE BLD GHB EST-MCNC: 234 MG/DL
GLUCOSE SERPL-MCNC: 133 MG/DL (ref 74–106)
HBA1C MFR BLD HPLC: 9.6 % (ref 4.5–6.6)
POTASSIUM SERPL-SCNC: 4 MMOL/L (ref 3.5–5.1)
SODIUM SERPL-SCNC: 138 MMOL/L (ref 136–145)

## 2021-10-04 PROCEDURE — 80048 BASIC METABOLIC PNL TOTAL CA: CPT | Mod: ,,, | Performed by: CLINICAL MEDICAL LABORATORY

## 2021-10-04 PROCEDURE — 83036 HEMOGLOBIN A1C: ICD-10-PCS | Mod: ,,, | Performed by: CLINICAL MEDICAL LABORATORY

## 2021-10-04 PROCEDURE — 80048 BASIC METABOLIC PANEL: ICD-10-PCS | Mod: ,,, | Performed by: CLINICAL MEDICAL LABORATORY

## 2021-10-04 PROCEDURE — 83036 HEMOGLOBIN GLYCOSYLATED A1C: CPT | Mod: ,,, | Performed by: CLINICAL MEDICAL LABORATORY

## 2021-10-04 PROCEDURE — 99214 OFFICE O/P EST MOD 30 MIN: CPT | Mod: ,,, | Performed by: FAMILY MEDICINE

## 2021-10-04 PROCEDURE — 99214 PR OFFICE/OUTPT VISIT, EST, LEVL IV, 30-39 MIN: ICD-10-PCS | Mod: ,,, | Performed by: FAMILY MEDICINE

## 2021-10-04 RX ORDER — BUSPIRONE HYDROCHLORIDE 15 MG/1
TABLET ORAL
Qty: 180 TABLET | Refills: 1 | Status: ON HOLD | OUTPATIENT
Start: 2021-10-04 | End: 2021-11-09

## 2021-10-04 RX ORDER — DAPAGLIFLOZIN 10 MG/1
10 TABLET, FILM COATED ORAL DAILY
Qty: 90 TABLET | Refills: 1 | Status: ON HOLD | OUTPATIENT
Start: 2021-10-04 | End: 2021-11-09

## 2021-10-04 RX ORDER — DAPAGLIFLOZIN 10 MG/1
10 TABLET, FILM COATED ORAL DAILY
Qty: 30 TABLET | Refills: 3 | Status: SHIPPED | OUTPATIENT
Start: 2021-10-04 | End: 2021-10-04 | Stop reason: SDUPTHER

## 2021-10-04 RX ORDER — IBUPROFEN 200 MG
TABLET ORAL
Qty: 90 PATCH | Refills: 1 | Status: SHIPPED | OUTPATIENT
Start: 2021-10-04 | End: 2021-11-16 | Stop reason: SDUPTHER

## 2021-10-29 DIAGNOSIS — R11.0 NAUSEA: ICD-10-CM

## 2021-10-29 RX ORDER — PROMETHAZINE HYDROCHLORIDE 12.5 MG/1
12.5 TABLET ORAL EVERY 6 HOURS PRN
Qty: 30 TABLET | Refills: 0 | Status: SHIPPED | OUTPATIENT
Start: 2021-10-29 | End: 2021-11-01 | Stop reason: SDUPTHER

## 2021-11-01 ENCOUNTER — OFFICE VISIT (OUTPATIENT)
Dept: FAMILY MEDICINE | Facility: CLINIC | Age: 51
End: 2021-11-01
Payer: MEDICAID

## 2021-11-01 VITALS
WEIGHT: 179.81 LBS | RESPIRATION RATE: 20 BRPM | HEIGHT: 60 IN | HEART RATE: 84 BPM | OXYGEN SATURATION: 97 % | BODY MASS INDEX: 35.3 KG/M2 | TEMPERATURE: 98 F | DIASTOLIC BLOOD PRESSURE: 68 MMHG | SYSTOLIC BLOOD PRESSURE: 132 MMHG

## 2021-11-01 DIAGNOSIS — G25.81 RESTLESS LEG SYNDROME: Primary | ICD-10-CM

## 2021-11-01 DIAGNOSIS — R11.0 NAUSEA: ICD-10-CM

## 2021-11-01 PROCEDURE — 99214 OFFICE O/P EST MOD 30 MIN: CPT | Mod: ,,, | Performed by: FAMILY MEDICINE

## 2021-11-01 PROCEDURE — 99214 PR OFFICE/OUTPT VISIT, EST, LEVL IV, 30-39 MIN: ICD-10-PCS | Mod: ,,, | Performed by: FAMILY MEDICINE

## 2021-11-01 RX ORDER — PROMETHAZINE HYDROCHLORIDE 12.5 MG/1
12.5 TABLET ORAL EVERY 6 HOURS PRN
Qty: 30 TABLET | Refills: 0 | Status: SHIPPED | OUTPATIENT
Start: 2021-11-01 | End: 2021-12-27 | Stop reason: SDUPTHER

## 2021-11-01 RX ORDER — ROPINIROLE 0.25 MG/1
0.25 TABLET, FILM COATED ORAL 3 TIMES DAILY
Qty: 90 TABLET | Refills: 11 | Status: SHIPPED | OUTPATIENT
Start: 2021-11-01 | End: 2022-05-02 | Stop reason: SDUPTHER

## 2021-11-03 ENCOUNTER — OFFICE VISIT (OUTPATIENT)
Dept: CARDIOLOGY | Facility: CLINIC | Age: 51
End: 2021-11-03
Payer: MEDICAID

## 2021-11-03 VITALS
BODY MASS INDEX: 35.73 KG/M2 | OXYGEN SATURATION: 98 % | HEART RATE: 82 BPM | SYSTOLIC BLOOD PRESSURE: 98 MMHG | HEIGHT: 60 IN | WEIGHT: 182 LBS | DIASTOLIC BLOOD PRESSURE: 60 MMHG

## 2021-11-03 DIAGNOSIS — R00.2 PALPITATIONS: ICD-10-CM

## 2021-11-03 DIAGNOSIS — I25.110 ATHEROSCLEROSIS OF NATIVE CORONARY ARTERY OF NATIVE HEART WITH UNSTABLE ANGINA PECTORIS: ICD-10-CM

## 2021-11-03 DIAGNOSIS — R07.9 CHEST PAIN, UNSPECIFIED TYPE: Primary | ICD-10-CM

## 2021-11-03 PROCEDURE — 99214 PR OFFICE/OUTPT VISIT, EST, LEVL IV, 30-39 MIN: ICD-10-PCS | Mod: S$PBB,,, | Performed by: INTERNAL MEDICINE

## 2021-11-03 PROCEDURE — 93005 ELECTROCARDIOGRAM TRACING: CPT | Mod: PBBFAC | Performed by: INTERNAL MEDICINE

## 2021-11-03 PROCEDURE — 93010 EKG 12-LEAD: ICD-10-PCS | Mod: S$PBB,,, | Performed by: INTERNAL MEDICINE

## 2021-11-03 PROCEDURE — 99214 OFFICE O/P EST MOD 30 MIN: CPT | Mod: S$PBB,,, | Performed by: INTERNAL MEDICINE

## 2021-11-03 PROCEDURE — 93010 ELECTROCARDIOGRAM REPORT: CPT | Mod: S$PBB,,, | Performed by: INTERNAL MEDICINE

## 2021-11-03 PROCEDURE — 99215 OFFICE O/P EST HI 40 MIN: CPT | Mod: PBBFAC | Performed by: INTERNAL MEDICINE

## 2021-11-03 RX ORDER — RANOLAZINE 500 MG/1
500 TABLET, EXTENDED RELEASE ORAL 2 TIMES DAILY
Qty: 60 TABLET | Refills: 11 | Status: SHIPPED | OUTPATIENT
Start: 2021-11-03 | End: 2022-03-15

## 2021-11-05 DIAGNOSIS — R07.9 CHEST PAIN, UNSPECIFIED TYPE: Primary | ICD-10-CM

## 2021-11-05 DIAGNOSIS — Z11.52 ENCOUNTER FOR PREPROCEDURE SCREENING LABORATORY TESTING FOR COVID-19: ICD-10-CM

## 2021-11-05 DIAGNOSIS — Z01.812 ENCOUNTER FOR PREPROCEDURAL LABORATORY EXAMINATION: ICD-10-CM

## 2021-11-05 DIAGNOSIS — R94.39 ABNORMAL CARDIOVASCULAR STRESS TEST: ICD-10-CM

## 2021-11-05 DIAGNOSIS — Z01.812 ENCOUNTER FOR PREPROCEDURE SCREENING LABORATORY TESTING FOR COVID-19: ICD-10-CM

## 2021-11-05 RX ORDER — SODIUM CHLORIDE 9 MG/ML
INJECTION, SOLUTION INTRAVENOUS ONCE
Status: CANCELLED | OUTPATIENT
Start: 2021-11-09

## 2021-11-05 RX ORDER — SODIUM CHLORIDE 0.9 % (FLUSH) 0.9 %
10 SYRINGE (ML) INJECTION
Status: CANCELLED | OUTPATIENT
Start: 2021-11-09

## 2021-11-09 ENCOUNTER — HOSPITAL ENCOUNTER (OUTPATIENT)
Facility: HOSPITAL | Age: 51
Discharge: HOME | End: 2021-11-10
Attending: INTERNAL MEDICINE | Admitting: INTERNAL MEDICINE
Payer: MEDICAID

## 2021-11-09 DIAGNOSIS — R94.39 ABNORMAL CARDIOVASCULAR STRESS TEST: ICD-10-CM

## 2021-11-09 DIAGNOSIS — F41.9 ANXIETY: ICD-10-CM

## 2021-11-09 DIAGNOSIS — I25.110 ATHEROSCLEROSIS OF NATIVE CORONARY ARTERY OF NATIVE HEART WITH UNSTABLE ANGINA PECTORIS: Primary | ICD-10-CM

## 2021-11-09 DIAGNOSIS — J44.9 CHRONIC OBSTRUCTIVE PULMONARY DISEASE, UNSPECIFIED COPD TYPE: ICD-10-CM

## 2021-11-09 DIAGNOSIS — F31.81 BIPOLAR 2 DISORDER, MAJOR DEPRESSIVE EPISODE: ICD-10-CM

## 2021-11-09 DIAGNOSIS — R07.9 CHEST PAIN, UNSPECIFIED TYPE: ICD-10-CM

## 2021-11-09 DIAGNOSIS — E11.65 TYPE 2 DIABETES MELLITUS WITH HYPERGLYCEMIA, WITH LONG-TERM CURRENT USE OF INSULIN: ICD-10-CM

## 2021-11-09 DIAGNOSIS — Z98.890 STATUS POST LEFT HEART CATHETERIZATION: ICD-10-CM

## 2021-11-09 DIAGNOSIS — E11.65 TYPE 2 DIABETES MELLITUS WITH HYPERGLYCEMIA, WITHOUT LONG-TERM CURRENT USE OF INSULIN: ICD-10-CM

## 2021-11-09 DIAGNOSIS — K59.00 CONSTIPATION, UNSPECIFIED CONSTIPATION TYPE: ICD-10-CM

## 2021-11-09 DIAGNOSIS — Z79.4 TYPE 2 DIABETES MELLITUS WITH HYPERGLYCEMIA, WITH LONG-TERM CURRENT USE OF INSULIN: ICD-10-CM

## 2021-11-09 LAB
GLUCOSE SERPL-MCNC: 120 MG/DL (ref 70–105)
GLUCOSE SERPL-MCNC: 151 MG/DL (ref 70–105)
GLUCOSE SERPL-MCNC: 189 MG/DL (ref 70–105)
HCT VFR BLD AUTO: 37.5 % (ref 38–47)
HGB BLD-MCNC: 13.1 G/DL (ref 12–16)

## 2021-11-09 PROCEDURE — 93455 CORONARY ART/GRFT ANGIO S&I: CPT | Mod: 59 | Performed by: INTERNAL MEDICINE

## 2021-11-09 PROCEDURE — C1874 STENT, COATED/COV W/DEL SYS: HCPCS | Performed by: INTERNAL MEDICINE

## 2021-11-09 PROCEDURE — 92928 PRQ TCAT PLMT NTRAC ST 1 LES: CPT | Mod: LD,,, | Performed by: INTERNAL MEDICINE

## 2021-11-09 PROCEDURE — 82962 GLUCOSE BLOOD TEST: CPT

## 2021-11-09 PROCEDURE — G0378 HOSPITAL OBSERVATION PER HR: HCPCS

## 2021-11-09 PROCEDURE — 92928 PR STENT: ICD-10-PCS | Mod: LD,,, | Performed by: INTERNAL MEDICINE

## 2021-11-09 PROCEDURE — C1769 GUIDE WIRE: HCPCS | Performed by: INTERNAL MEDICINE

## 2021-11-09 PROCEDURE — C9600 PERC DRUG-EL COR STENT SING: HCPCS | Mod: LD | Performed by: INTERNAL MEDICINE

## 2021-11-09 PROCEDURE — 99153 MOD SED SAME PHYS/QHP EA: CPT | Performed by: INTERNAL MEDICINE

## 2021-11-09 PROCEDURE — 27800903 OPTIME MED/SURG SUP & DEVICES OTHER IMPLANTS: Performed by: INTERNAL MEDICINE

## 2021-11-09 PROCEDURE — 63600175 PHARM REV CODE 636 W HCPCS: Performed by: INTERNAL MEDICINE

## 2021-11-09 PROCEDURE — 93455 PR CATH PLACE/CORONARY ANGIO, IMG SUPER/INTERP, BYPASS ANGIO: ICD-10-PCS | Mod: 26,XU,, | Performed by: INTERNAL MEDICINE

## 2021-11-09 PROCEDURE — S4991 NICOTINE PATCH NONLEGEND: HCPCS | Performed by: INTERNAL MEDICINE

## 2021-11-09 PROCEDURE — 94761 N-INVAS EAR/PLS OXIMETRY MLT: CPT

## 2021-11-09 PROCEDURE — 36415 COLL VENOUS BLD VENIPUNCTURE: CPT | Performed by: INTERNAL MEDICINE

## 2021-11-09 PROCEDURE — 93455 CORONARY ART/GRFT ANGIO S&I: CPT | Mod: 26,XU,, | Performed by: INTERNAL MEDICINE

## 2021-11-09 PROCEDURE — C1894 INTRO/SHEATH, NON-LASER: HCPCS | Performed by: INTERNAL MEDICINE

## 2021-11-09 PROCEDURE — 25000003 PHARM REV CODE 250: Performed by: INTERNAL MEDICINE

## 2021-11-09 PROCEDURE — 27000080 OPTIME MED/SURG SUP & DEVICES GENERAL CLASSIFICATION: Performed by: INTERNAL MEDICINE

## 2021-11-09 PROCEDURE — 25500020 PHARM REV CODE 255: Performed by: INTERNAL MEDICINE

## 2021-11-09 PROCEDURE — 99152 MOD SED SAME PHYS/QHP 5/>YRS: CPT | Performed by: INTERNAL MEDICINE

## 2021-11-09 PROCEDURE — 27201423 OPTIME MED/SURG SUP & DEVICES STERILE SUPPLY: Performed by: INTERNAL MEDICINE

## 2021-11-09 PROCEDURE — C1760 CLOSURE DEV, VASC: HCPCS | Performed by: INTERNAL MEDICINE

## 2021-11-09 PROCEDURE — C1887 CATHETER, GUIDING: HCPCS | Performed by: INTERNAL MEDICINE

## 2021-11-09 PROCEDURE — 85018 HEMOGLOBIN: CPT | Performed by: INTERNAL MEDICINE

## 2021-11-09 PROCEDURE — 27100168 OPTIME MED/SURG SUP & DEVICES NON-STERILE SUPPLY: Performed by: INTERNAL MEDICINE

## 2021-11-09 PROCEDURE — 85014 HEMATOCRIT: CPT | Performed by: INTERNAL MEDICINE

## 2021-11-09 PROCEDURE — C1725 CATH, TRANSLUMIN NON-LASER: HCPCS | Performed by: INTERNAL MEDICINE

## 2021-11-09 DEVICE — XIENCE SKYPOINT™ EVEROLIMUS ELUTING CORONARY STENT SYSTEM 2.25 MM X 15 MM / RAPID-EXCHANGE
Type: IMPLANTABLE DEVICE | Site: CORONARY | Status: FUNCTIONAL
Brand: XIENCE SKYPOINT™

## 2021-11-09 RX ORDER — TOPIRAMATE 25 MG/1
25 TABLET ORAL DAILY
Status: DISCONTINUED | OUTPATIENT
Start: 2021-11-09 | End: 2021-11-10 | Stop reason: HOSPADM

## 2021-11-09 RX ORDER — HEPARIN SOD,PORCINE/0.9 % NACL 1000/500ML
INTRAVENOUS SOLUTION INTRAVENOUS
Status: DISCONTINUED | OUTPATIENT
Start: 2021-11-09 | End: 2021-11-09 | Stop reason: HOSPADM

## 2021-11-09 RX ORDER — HEPARIN SODIUM 1000 [USP'U]/ML
INJECTION, SOLUTION INTRAVENOUS; SUBCUTANEOUS
Status: DISCONTINUED | OUTPATIENT
Start: 2021-11-09 | End: 2021-11-09 | Stop reason: HOSPADM

## 2021-11-09 RX ORDER — ATORVASTATIN CALCIUM 40 MG/1
40 TABLET, FILM COATED ORAL DAILY
Status: DISCONTINUED | OUTPATIENT
Start: 2021-11-09 | End: 2021-11-10 | Stop reason: HOSPADM

## 2021-11-09 RX ORDER — PROMETHAZINE HYDROCHLORIDE 12.5 MG/1
12.5 TABLET ORAL EVERY 6 HOURS PRN
Status: DISCONTINUED | OUTPATIENT
Start: 2021-11-09 | End: 2021-11-10 | Stop reason: HOSPADM

## 2021-11-09 RX ORDER — MIRTAZAPINE 15 MG/1
15 TABLET, FILM COATED ORAL DAILY
Status: DISCONTINUED | OUTPATIENT
Start: 2021-11-09 | End: 2021-11-10 | Stop reason: HOSPADM

## 2021-11-09 RX ORDER — GLUCAGON 1 MG
1 KIT INJECTION
Status: DISCONTINUED | OUTPATIENT
Start: 2021-11-09 | End: 2021-11-10 | Stop reason: HOSPADM

## 2021-11-09 RX ORDER — ASPIRIN 81 MG/1
81 TABLET ORAL DAILY
Status: DISCONTINUED | OUTPATIENT
Start: 2021-11-09 | End: 2021-11-10 | Stop reason: HOSPADM

## 2021-11-09 RX ORDER — SODIUM CHLORIDE 9 MG/ML
INJECTION, SOLUTION INTRAVENOUS ONCE
Status: DISCONTINUED | OUTPATIENT
Start: 2021-11-09 | End: 2021-11-09 | Stop reason: HOSPADM

## 2021-11-09 RX ORDER — RANOLAZINE 500 MG/1
500 TABLET, EXTENDED RELEASE ORAL 2 TIMES DAILY
Status: DISCONTINUED | OUTPATIENT
Start: 2021-11-09 | End: 2021-11-10 | Stop reason: HOSPADM

## 2021-11-09 RX ORDER — ACETAMINOPHEN 325 MG/1
650 TABLET ORAL EVERY 4 HOURS PRN
Status: DISCONTINUED | OUTPATIENT
Start: 2021-11-09 | End: 2021-11-10 | Stop reason: HOSPADM

## 2021-11-09 RX ORDER — ROPINIROLE 0.25 MG/1
0.25 TABLET, FILM COATED ORAL 3 TIMES DAILY
Status: DISCONTINUED | OUTPATIENT
Start: 2021-11-09 | End: 2021-11-10 | Stop reason: HOSPADM

## 2021-11-09 RX ORDER — IPRATROPIUM BROMIDE 21 UG/1
2 SPRAY, METERED NASAL 3 TIMES DAILY
Status: DISCONTINUED | OUTPATIENT
Start: 2021-11-09 | End: 2021-11-09 | Stop reason: RX

## 2021-11-09 RX ORDER — INSULIN ASPART 100 [IU]/ML
0-5 INJECTION, SOLUTION INTRAVENOUS; SUBCUTANEOUS
Status: DISCONTINUED | OUTPATIENT
Start: 2021-11-09 | End: 2021-11-10 | Stop reason: HOSPADM

## 2021-11-09 RX ORDER — SODIUM CHLORIDE 0.9 % (FLUSH) 0.9 %
10 SYRINGE (ML) INJECTION
Status: DISCONTINUED | OUTPATIENT
Start: 2021-11-09 | End: 2021-11-10 | Stop reason: HOSPADM

## 2021-11-09 RX ORDER — FENTANYL CITRATE 50 UG/ML
INJECTION, SOLUTION INTRAMUSCULAR; INTRAVENOUS
Status: DISCONTINUED | OUTPATIENT
Start: 2021-11-09 | End: 2021-11-09 | Stop reason: HOSPADM

## 2021-11-09 RX ORDER — HYDROXYCHLOROQUINE SULFATE 200 MG/1
200 TABLET, FILM COATED ORAL 2 TIMES DAILY
Status: DISCONTINUED | OUTPATIENT
Start: 2021-11-09 | End: 2021-11-10 | Stop reason: HOSPADM

## 2021-11-09 RX ORDER — SODIUM CHLORIDE 450 MG/100ML
INJECTION, SOLUTION INTRAVENOUS
Status: DISCONTINUED | OUTPATIENT
Start: 2021-11-09 | End: 2021-11-10 | Stop reason: HOSPADM

## 2021-11-09 RX ORDER — ONDANSETRON 2 MG/ML
INJECTION INTRAMUSCULAR; INTRAVENOUS
Status: DISCONTINUED | OUTPATIENT
Start: 2021-11-09 | End: 2021-11-09 | Stop reason: HOSPADM

## 2021-11-09 RX ORDER — IBUPROFEN 200 MG
1 TABLET ORAL DAILY
Status: DISCONTINUED | OUTPATIENT
Start: 2021-11-09 | End: 2021-11-10 | Stop reason: HOSPADM

## 2021-11-09 RX ORDER — DIAZEPAM 5 MG/1
5 TABLET ORAL ONCE
Status: COMPLETED | OUTPATIENT
Start: 2021-11-09 | End: 2021-11-09

## 2021-11-09 RX ORDER — ASPIRIN 81 MG/1
81 TABLET ORAL DAILY
Status: DISCONTINUED | OUTPATIENT
Start: 2021-11-10 | End: 2021-11-09

## 2021-11-09 RX ORDER — CLOPIDOGREL 300 MG/1
TABLET, FILM COATED ORAL
Status: DISCONTINUED | OUTPATIENT
Start: 2021-11-09 | End: 2021-11-09 | Stop reason: HOSPADM

## 2021-11-09 RX ORDER — CLOPIDOGREL BISULFATE 75 MG/1
75 TABLET ORAL DAILY
Status: DISCONTINUED | OUTPATIENT
Start: 2021-11-09 | End: 2021-11-10

## 2021-11-09 RX ORDER — SODIUM CHLORIDE 9 MG/ML
INJECTION, SOLUTION INTRAVENOUS CONTINUOUS
Status: DISCONTINUED | OUTPATIENT
Start: 2021-11-09 | End: 2021-11-09

## 2021-11-09 RX ORDER — CLOPIDOGREL 300 MG/1
600 TABLET, FILM COATED ORAL ONCE
Status: DISCONTINUED | OUTPATIENT
Start: 2021-11-09 | End: 2021-11-10 | Stop reason: HOSPADM

## 2021-11-09 RX ORDER — ONDANSETRON 4 MG/1
8 TABLET, ORALLY DISINTEGRATING ORAL EVERY 8 HOURS PRN
Status: DISCONTINUED | OUTPATIENT
Start: 2021-11-09 | End: 2021-11-10 | Stop reason: HOSPADM

## 2021-11-09 RX ORDER — LIDOCAINE HYDROCHLORIDE 10 MG/ML
INJECTION INFILTRATION; PERINEURAL
Status: DISCONTINUED | OUTPATIENT
Start: 2021-11-09 | End: 2021-11-09 | Stop reason: HOSPADM

## 2021-11-09 RX ORDER — MIDAZOLAM HYDROCHLORIDE 1 MG/ML
INJECTION INTRAMUSCULAR; INTRAVENOUS
Status: DISCONTINUED | OUTPATIENT
Start: 2021-11-09 | End: 2021-11-09 | Stop reason: HOSPADM

## 2021-11-09 RX ORDER — IBUPROFEN 200 MG
16 TABLET ORAL
Status: DISCONTINUED | OUTPATIENT
Start: 2021-11-09 | End: 2021-11-10 | Stop reason: HOSPADM

## 2021-11-09 RX ADMIN — ROPINIROLE 0.25 MG: 0.25 TABLET, FILM COATED ORAL at 03:11

## 2021-11-09 RX ADMIN — HYDROXYCHLOROQUINE SULFATE 200 MG: 200 TABLET, FILM COATED ORAL at 10:11

## 2021-11-09 RX ADMIN — ROPINIROLE 0.25 MG: 0.25 TABLET, FILM COATED ORAL at 10:11

## 2021-11-09 RX ADMIN — ONDANSETRON 8 MG: 4 TABLET, ORALLY DISINTEGRATING ORAL at 03:11

## 2021-11-09 RX ADMIN — MIRTAZAPINE 15 MG: 15 TABLET, FILM COATED ORAL at 03:11

## 2021-11-09 RX ADMIN — TOPIRAMATE 25 MG: 25 TABLET, FILM COATED ORAL at 03:11

## 2021-11-09 RX ADMIN — ATORVASTATIN CALCIUM 40 MG: 40 TABLET, FILM COATED ORAL at 03:11

## 2021-11-09 RX ADMIN — CLOPIDOGREL 75 MG: 75 TABLET, FILM COATED ORAL at 03:11

## 2021-11-09 RX ADMIN — RANOLAZINE 500 MG: 500 TABLET, EXTENDED RELEASE ORAL at 10:11

## 2021-11-09 RX ADMIN — NICOTINE 1 PATCH: 14 PATCH, EXTENDED RELEASE TRANSDERMAL at 03:11

## 2021-11-09 RX ADMIN — SODIUM CHLORIDE: 9 INJECTION, SOLUTION INTRAVENOUS at 01:11

## 2021-11-09 RX ADMIN — DIAZEPAM 5 MG: 5 TABLET ORAL at 09:11

## 2021-11-10 VITALS
BODY MASS INDEX: 34.71 KG/M2 | WEIGHT: 176.81 LBS | SYSTOLIC BLOOD PRESSURE: 110 MMHG | TEMPERATURE: 98 F | DIASTOLIC BLOOD PRESSURE: 60 MMHG | HEIGHT: 60 IN | RESPIRATION RATE: 18 BRPM | HEART RATE: 77 BPM | OXYGEN SATURATION: 97 %

## 2021-11-10 LAB
ANION GAP SERPL CALCULATED.3IONS-SCNC: 10 MMOL/L (ref 7–16)
BASOPHILS # BLD AUTO: 0.05 K/UL (ref 0–0.2)
BASOPHILS NFR BLD AUTO: 0.7 % (ref 0–1)
BSA FOR ECHO PROCEDURE: 1.84 M2
BUN SERPL-MCNC: 14 MG/DL (ref 7–18)
BUN/CREAT SERPL: 15 (ref 6–20)
CALCIUM SERPL-MCNC: 7.9 MG/DL (ref 8.5–10.1)
CHLORIDE SERPL-SCNC: 108 MMOL/L (ref 98–107)
CO2 SERPL-SCNC: 28 MMOL/L (ref 21–32)
CREAT SERPL-MCNC: 0.95 MG/DL (ref 0.55–1.02)
DIFFERENTIAL METHOD BLD: ABNORMAL
EJECTION FRACTION: 50 %
EOSINOPHIL # BLD AUTO: 0.17 K/UL (ref 0–0.5)
EOSINOPHIL NFR BLD AUTO: 2.5 % (ref 1–4)
ERYTHROCYTE [DISTWIDTH] IN BLOOD BY AUTOMATED COUNT: 13.3 % (ref 11.5–14.5)
GLUCOSE SERPL-MCNC: 130 MG/DL (ref 70–105)
GLUCOSE SERPL-MCNC: 143 MG/DL (ref 74–106)
GLUCOSE SERPL-MCNC: 207 MG/DL (ref 70–105)
HCT VFR BLD AUTO: 35.8 % (ref 38–47)
HGB BLD-MCNC: 12.5 G/DL (ref 12–16)
IMM GRANULOCYTES # BLD AUTO: 0.02 K/UL (ref 0–0.04)
IMM GRANULOCYTES NFR BLD: 0.3 % (ref 0–0.4)
LYMPHOCYTES # BLD AUTO: 2.43 K/UL (ref 1–4.8)
LYMPHOCYTES NFR BLD AUTO: 36.2 % (ref 27–41)
MCH RBC QN AUTO: 30 PG (ref 27–31)
MCHC RBC AUTO-ENTMCNC: 34.9 G/DL (ref 32–36)
MCV RBC AUTO: 86.1 FL (ref 80–96)
MONOCYTES # BLD AUTO: 0.49 K/UL (ref 0–0.8)
MONOCYTES NFR BLD AUTO: 7.3 % (ref 2–6)
MPC BLD CALC-MCNC: 10.7 FL (ref 9.4–12.4)
NEUTROPHILS # BLD AUTO: 3.56 K/UL (ref 1.8–7.7)
NEUTROPHILS NFR BLD AUTO: 53 % (ref 53–65)
NRBC # BLD AUTO: 0 X10E3/UL
NRBC, AUTO (.00): 0 %
PLATELET # BLD AUTO: 167 K/UL (ref 150–400)
POTASSIUM SERPL-SCNC: 3.7 MMOL/L (ref 3.5–5.1)
RBC # BLD AUTO: 4.16 M/UL (ref 4.2–5.4)
SODIUM SERPL-SCNC: 142 MMOL/L (ref 136–145)
WBC # BLD AUTO: 6.72 K/UL (ref 4.5–11)

## 2021-11-10 PROCEDURE — 36415 COLL VENOUS BLD VENIPUNCTURE: CPT | Performed by: INTERNAL MEDICINE

## 2021-11-10 PROCEDURE — 25000003 PHARM REV CODE 250: Performed by: INTERNAL MEDICINE

## 2021-11-10 PROCEDURE — G0378 HOSPITAL OBSERVATION PER HR: HCPCS

## 2021-11-10 PROCEDURE — 99213 OFFICE O/P EST LOW 20 MIN: CPT | Mod: ,,, | Performed by: STUDENT IN AN ORGANIZED HEALTH CARE EDUCATION/TRAINING PROGRAM

## 2021-11-10 PROCEDURE — S4991 NICOTINE PATCH NONLEGEND: HCPCS | Performed by: INTERNAL MEDICINE

## 2021-11-10 PROCEDURE — 85025 COMPLETE CBC W/AUTO DIFF WBC: CPT | Performed by: INTERNAL MEDICINE

## 2021-11-10 PROCEDURE — 80048 BASIC METABOLIC PNL TOTAL CA: CPT | Performed by: INTERNAL MEDICINE

## 2021-11-10 PROCEDURE — 82962 GLUCOSE BLOOD TEST: CPT

## 2021-11-10 PROCEDURE — 99213 PR OFFICE/OUTPT VISIT, EST, LEVL III, 20-29 MIN: ICD-10-PCS | Mod: ,,, | Performed by: STUDENT IN AN ORGANIZED HEALTH CARE EDUCATION/TRAINING PROGRAM

## 2021-11-10 RX ORDER — GABAPENTIN 300 MG/1
CAPSULE ORAL
Qty: 90 CAPSULE | Refills: 5 | Status: SHIPPED | OUTPATIENT
Start: 2021-11-10 | End: 2022-05-02 | Stop reason: SDUPTHER

## 2021-11-10 RX ORDER — METOPROLOL SUCCINATE 50 MG/1
50 TABLET, EXTENDED RELEASE ORAL DAILY
Qty: 90 TABLET | Refills: 3 | Status: SHIPPED | OUTPATIENT
Start: 2021-11-10 | End: 2022-05-24 | Stop reason: SDUPTHER

## 2021-11-10 RX ORDER — ALBUTEROL SULFATE 1.25 MG/3ML
1.25 SOLUTION RESPIRATORY (INHALATION) EVERY 6 HOURS PRN
Qty: 12 EACH | Refills: 3 | Status: SHIPPED | OUTPATIENT
Start: 2021-11-10 | End: 2022-02-09 | Stop reason: SDUPTHER

## 2021-11-10 RX ORDER — DAPAGLIFLOZIN 10 MG/1
10 TABLET, FILM COATED ORAL DAILY
Qty: 90 TABLET | Refills: 1 | Status: SHIPPED | OUTPATIENT
Start: 2021-11-10 | End: 2022-05-02 | Stop reason: SDUPTHER

## 2021-11-10 RX ORDER — INSULIN GLARGINE 100 [IU]/ML
25 INJECTION, SOLUTION SUBCUTANEOUS 2 TIMES DAILY
Qty: 12 ML | Refills: 5 | Status: SHIPPED | OUTPATIENT
Start: 2021-11-10 | End: 2022-05-02 | Stop reason: SDUPTHER

## 2021-11-10 RX ORDER — BUSPIRONE HYDROCHLORIDE 15 MG/1
TABLET ORAL
Qty: 180 TABLET | Refills: 1 | Status: SHIPPED | OUTPATIENT
Start: 2021-11-10 | End: 2022-05-02 | Stop reason: SDUPTHER

## 2021-11-10 RX ORDER — UBIDECARENONE 75 MG
500 CAPSULE ORAL DAILY
Qty: 30 TABLET | Refills: 0 | Status: SHIPPED | OUTPATIENT
Start: 2021-11-10 | End: 2022-03-15

## 2021-11-10 RX ORDER — METOPROLOL SUCCINATE 25 MG/1
25 TABLET, EXTENDED RELEASE ORAL DAILY
Status: DISCONTINUED | OUTPATIENT
Start: 2021-11-10 | End: 2021-11-10 | Stop reason: HOSPADM

## 2021-11-10 RX ORDER — ARFORMOTEROL TARTRATE 15 UG/2ML
SOLUTION RESPIRATORY (INHALATION)
Qty: 1 EACH | Refills: 0 | Status: SHIPPED | OUTPATIENT
Start: 2021-11-10 | End: 2022-02-09 | Stop reason: SDUPTHER

## 2021-11-10 RX ORDER — CLOPIDOGREL BISULFATE 75 MG/1
75 TABLET ORAL DAILY
Qty: 30 TABLET | Refills: 11 | Status: SHIPPED | OUTPATIENT
Start: 2021-11-11 | End: 2022-05-24 | Stop reason: SDUPTHER

## 2021-11-10 RX ORDER — ALBUTEROL SULFATE 0.83 MG/ML
SOLUTION RESPIRATORY (INHALATION)
Qty: 1 EACH | Refills: 0 | Status: SHIPPED | OUTPATIENT
Start: 2021-11-10 | End: 2022-05-02 | Stop reason: SDUPTHER

## 2021-11-10 RX ORDER — CLOPIDOGREL BISULFATE 75 MG/1
75 TABLET ORAL DAILY
Status: DISCONTINUED | OUTPATIENT
Start: 2021-11-10 | End: 2021-11-10 | Stop reason: HOSPADM

## 2021-11-10 RX ADMIN — NICOTINE 1 PATCH: 14 PATCH, EXTENDED RELEASE TRANSDERMAL at 08:11

## 2021-11-10 RX ADMIN — HYDROXYCHLOROQUINE SULFATE 200 MG: 200 TABLET, FILM COATED ORAL at 08:11

## 2021-11-10 RX ADMIN — ACETAMINOPHEN 650 MG: 325 TABLET ORAL at 08:11

## 2021-11-10 RX ADMIN — CLOPIDOGREL 75 MG: 75 TABLET, FILM COATED ORAL at 08:11

## 2021-11-10 RX ADMIN — ASPIRIN 81 MG: 81 TABLET, COATED ORAL at 08:11

## 2021-11-10 RX ADMIN — ATORVASTATIN CALCIUM 40 MG: 40 TABLET, FILM COATED ORAL at 08:11

## 2021-11-10 RX ADMIN — ROPINIROLE 0.25 MG: 0.25 TABLET, FILM COATED ORAL at 08:11

## 2021-11-10 RX ADMIN — RANOLAZINE 500 MG: 500 TABLET, EXTENDED RELEASE ORAL at 08:11

## 2021-11-10 RX ADMIN — TOPIRAMATE 25 MG: 25 TABLET, FILM COATED ORAL at 08:11

## 2021-11-11 ENCOUNTER — TELEPHONE (OUTPATIENT)
Dept: FAMILY MEDICINE | Facility: CLINIC | Age: 51
End: 2021-11-11
Payer: MEDICAID

## 2021-11-11 ENCOUNTER — PATIENT MESSAGE (OUTPATIENT)
Dept: FAMILY MEDICINE | Facility: CLINIC | Age: 51
End: 2021-11-11
Payer: MEDICAID

## 2021-11-16 ENCOUNTER — PATIENT MESSAGE (OUTPATIENT)
Dept: CARDIOLOGY | Facility: CLINIC | Age: 51
End: 2021-11-16
Payer: MEDICAID

## 2021-11-16 DIAGNOSIS — F17.200 TOBACCO DEPENDENCE: ICD-10-CM

## 2021-11-16 RX ORDER — IBUPROFEN 200 MG
TABLET ORAL
Qty: 90 PATCH | Refills: 1 | Status: SHIPPED | OUTPATIENT
Start: 2021-11-16 | End: 2021-12-09

## 2021-11-19 ENCOUNTER — PATIENT MESSAGE (OUTPATIENT)
Dept: CARDIOLOGY | Facility: CLINIC | Age: 51
End: 2021-11-19
Payer: MEDICAID

## 2021-12-09 ENCOUNTER — OFFICE VISIT (OUTPATIENT)
Dept: CARDIOLOGY | Facility: CLINIC | Age: 51
End: 2021-12-09
Payer: MEDICAID

## 2021-12-09 VITALS
WEIGHT: 180 LBS | BODY MASS INDEX: 35.34 KG/M2 | DIASTOLIC BLOOD PRESSURE: 70 MMHG | HEART RATE: 96 BPM | HEIGHT: 60 IN | SYSTOLIC BLOOD PRESSURE: 110 MMHG | OXYGEN SATURATION: 95 %

## 2021-12-09 DIAGNOSIS — I25.110 ATHEROSCLEROSIS OF NATIVE CORONARY ARTERY OF NATIVE HEART WITH UNSTABLE ANGINA PECTORIS: Primary | ICD-10-CM

## 2021-12-09 PROCEDURE — 99215 OFFICE O/P EST HI 40 MIN: CPT | Mod: PBBFAC | Performed by: INTERNAL MEDICINE

## 2021-12-09 PROCEDURE — 99214 OFFICE O/P EST MOD 30 MIN: CPT | Mod: S$PBB,,, | Performed by: INTERNAL MEDICINE

## 2021-12-09 PROCEDURE — 99214 PR OFFICE/OUTPT VISIT, EST, LEVL IV, 30-39 MIN: ICD-10-PCS | Mod: S$PBB,,, | Performed by: INTERNAL MEDICINE

## 2021-12-09 RX ORDER — IBUPROFEN 200 MG
1 TABLET ORAL DAILY
Qty: 30 PATCH | Refills: 1 | Status: SHIPPED | OUTPATIENT
Start: 2021-12-09 | End: 2022-05-02

## 2021-12-17 ENCOUNTER — OFFICE VISIT (OUTPATIENT)
Dept: FAMILY MEDICINE | Facility: CLINIC | Age: 51
End: 2021-12-17
Payer: MEDICAID

## 2021-12-17 VITALS
SYSTOLIC BLOOD PRESSURE: 122 MMHG | DIASTOLIC BLOOD PRESSURE: 82 MMHG | TEMPERATURE: 97 F | RESPIRATION RATE: 20 BRPM | OXYGEN SATURATION: 100 % | BODY MASS INDEX: 35.37 KG/M2 | WEIGHT: 180.19 LBS | HEART RATE: 78 BPM | HEIGHT: 60 IN

## 2021-12-17 DIAGNOSIS — N23 RENAL COLIC, BILATERAL: ICD-10-CM

## 2021-12-17 DIAGNOSIS — R10.31 RIGHT LOWER QUADRANT ABDOMINAL PAIN: ICD-10-CM

## 2021-12-17 DIAGNOSIS — R30.0 DYSURIA: Primary | ICD-10-CM

## 2021-12-17 LAB
ALBUMIN SERPL BCP-MCNC: 4.1 G/DL (ref 3.5–5)
ALBUMIN/GLOB SERPL: 1 {RATIO}
ALP SERPL-CCNC: 100 U/L (ref 39–100)
ALT SERPL W P-5'-P-CCNC: 40 U/L (ref 13–56)
ANION GAP SERPL CALCULATED.3IONS-SCNC: 6 MMOL/L (ref 7–16)
AST SERPL W P-5'-P-CCNC: 23 U/L (ref 15–37)
BASOPHILS # BLD AUTO: 0.07 K/UL (ref 0–0.2)
BASOPHILS NFR BLD AUTO: 0.6 % (ref 0–1)
BILIRUB SERPL-MCNC: 0.3 MG/DL (ref 0–1.2)
BILIRUB SERPL-MCNC: NEGATIVE MG/DL
BLOOD URINE, POC: NORMAL
BUN SERPL-MCNC: 13 MG/DL (ref 7–18)
BUN/CREAT SERPL: 13 (ref 6–20)
CALCIUM SERPL-MCNC: 9.7 MG/DL (ref 8.5–10.1)
CHLORIDE SERPL-SCNC: 104 MMOL/L (ref 98–107)
CO2 SERPL-SCNC: 32 MMOL/L (ref 21–32)
COLOR, POC UA: YELLOW
CREAT SERPL-MCNC: 0.98 MG/DL (ref 0.55–1.02)
DIFFERENTIAL METHOD BLD: ABNORMAL
EOSINOPHIL # BLD AUTO: 0.08 K/UL (ref 0–0.5)
EOSINOPHIL NFR BLD AUTO: 0.7 % (ref 1–4)
ERYTHROCYTE [DISTWIDTH] IN BLOOD BY AUTOMATED COUNT: 13.7 % (ref 11.5–14.5)
GLOBULIN SER-MCNC: 4 G/DL (ref 2–4)
GLUCOSE SERPL-MCNC: 130 MG/DL (ref 74–106)
GLUCOSE UR QL STRIP: 500
HCT VFR BLD AUTO: 45.8 % (ref 38–47)
HGB BLD-MCNC: 15 G/DL (ref 12–16)
IMM GRANULOCYTES # BLD AUTO: 0.03 K/UL (ref 0–0.04)
IMM GRANULOCYTES NFR BLD: 0.3 % (ref 0–0.4)
KETONES UR QL STRIP: NEGATIVE
LEUKOCYTE ESTERASE URINE, POC: NEGATIVE
LYMPHOCYTES # BLD AUTO: 3.72 K/UL (ref 1–4.8)
LYMPHOCYTES NFR BLD AUTO: 32.9 % (ref 27–41)
MCH RBC QN AUTO: 30.3 PG (ref 27–31)
MCHC RBC AUTO-ENTMCNC: 32.8 G/DL (ref 32–36)
MCV RBC AUTO: 92.5 FL (ref 80–96)
MONOCYTES # BLD AUTO: 0.69 K/UL (ref 0–0.8)
MONOCYTES NFR BLD AUTO: 6.1 % (ref 2–6)
MPC BLD CALC-MCNC: 10.5 FL (ref 9.4–12.4)
NEUTROPHILS # BLD AUTO: 6.72 K/UL (ref 1.8–7.7)
NEUTROPHILS NFR BLD AUTO: 59.4 % (ref 53–65)
NITRITE, POC UA: NEGATIVE
NRBC # BLD AUTO: 0 X10E3/UL
NRBC, AUTO (.00): 0 %
PH, POC UA: 6.5
PLATELET # BLD AUTO: 252 K/UL (ref 150–400)
POTASSIUM SERPL-SCNC: 4.6 MMOL/L (ref 3.5–5.1)
PROT SERPL-MCNC: 8.1 G/DL (ref 6.4–8.2)
PROTEIN, POC: NEGATIVE
RBC # BLD AUTO: 4.95 M/UL (ref 4.2–5.4)
SODIUM SERPL-SCNC: 137 MMOL/L (ref 136–145)
SPECIFIC GRAVITY, POC UA: 1.03
UROBILINOGEN, POC UA: 0.2
WBC # BLD AUTO: 11.31 K/UL (ref 4.5–11)

## 2021-12-17 PROCEDURE — 81003 URINALYSIS AUTO W/O SCOPE: CPT | Mod: RHCUB | Performed by: FAMILY MEDICINE

## 2021-12-17 PROCEDURE — 80053 COMPREHEN METABOLIC PANEL: CPT | Mod: ,,, | Performed by: CLINICAL MEDICAL LABORATORY

## 2021-12-17 PROCEDURE — 80053 COMPREHENSIVE METABOLIC PANEL: ICD-10-PCS | Mod: ,,, | Performed by: CLINICAL MEDICAL LABORATORY

## 2021-12-17 PROCEDURE — 85025 CBC WITH DIFFERENTIAL: ICD-10-PCS | Mod: ,,, | Performed by: CLINICAL MEDICAL LABORATORY

## 2021-12-17 PROCEDURE — 99214 OFFICE O/P EST MOD 30 MIN: CPT | Mod: ,,, | Performed by: FAMILY MEDICINE

## 2021-12-17 PROCEDURE — 99214 PR OFFICE/OUTPT VISIT, EST, LEVL IV, 30-39 MIN: ICD-10-PCS | Mod: ,,, | Performed by: FAMILY MEDICINE

## 2021-12-17 PROCEDURE — 85025 COMPLETE CBC W/AUTO DIFF WBC: CPT | Mod: ,,, | Performed by: CLINICAL MEDICAL LABORATORY

## 2021-12-17 RX ORDER — HYDROMORPHONE HYDROCHLORIDE 2 MG/1
2 TABLET ORAL EVERY 4 HOURS PRN
Qty: 10 TABLET | Refills: 0 | Status: SHIPPED | OUTPATIENT
Start: 2021-12-17 | End: 2022-03-15

## 2021-12-27 DIAGNOSIS — G43.909 MIGRAINE WITHOUT STATUS MIGRAINOSUS, NOT INTRACTABLE, UNSPECIFIED MIGRAINE TYPE: ICD-10-CM

## 2021-12-27 DIAGNOSIS — R11.0 NAUSEA: ICD-10-CM

## 2021-12-27 RX ORDER — PROMETHAZINE HYDROCHLORIDE 12.5 MG/1
12.5 TABLET ORAL EVERY 6 HOURS PRN
Qty: 30 TABLET | Refills: 0 | Status: SHIPPED | OUTPATIENT
Start: 2021-12-27 | End: 2021-12-28 | Stop reason: SDUPTHER

## 2021-12-27 RX ORDER — TOPIRAMATE 25 MG/1
25 TABLET ORAL DAILY
Qty: 90 TABLET | Refills: 1 | Status: SHIPPED | OUTPATIENT
Start: 2021-12-27 | End: 2021-12-28 | Stop reason: SDUPTHER

## 2021-12-28 DIAGNOSIS — G43.909 MIGRAINE WITHOUT STATUS MIGRAINOSUS, NOT INTRACTABLE, UNSPECIFIED MIGRAINE TYPE: ICD-10-CM

## 2021-12-28 DIAGNOSIS — R11.0 NAUSEA: ICD-10-CM

## 2021-12-28 RX ORDER — TOPIRAMATE 25 MG/1
25 TABLET ORAL DAILY
Qty: 90 TABLET | Refills: 1 | Status: SHIPPED | OUTPATIENT
Start: 2021-12-28 | End: 2022-05-02 | Stop reason: SDUPTHER

## 2021-12-28 RX ORDER — PROMETHAZINE HYDROCHLORIDE 12.5 MG/1
12.5 TABLET ORAL EVERY 6 HOURS PRN
Qty: 30 TABLET | Refills: 0 | Status: SHIPPED | OUTPATIENT
Start: 2021-12-28 | End: 2022-10-03 | Stop reason: SDUPTHER

## 2021-12-29 RX ORDER — HYDROXYCHLOROQUINE SULFATE 200 MG/1
200 TABLET, FILM COATED ORAL 2 TIMES DAILY
Qty: 180 TABLET | Refills: 1 | OUTPATIENT
Start: 2021-12-29 | End: 2022-06-27

## 2021-12-29 RX ORDER — HYDROXYCHLOROQUINE SULFATE 200 MG/1
200 TABLET, FILM COATED ORAL 2 TIMES DAILY
Qty: 180 TABLET | Refills: 1 | Status: SHIPPED | OUTPATIENT
Start: 2021-12-29 | End: 2022-03-15 | Stop reason: SDUPTHER

## 2021-12-29 RX ORDER — HYDROXYCHLOROQUINE SULFATE 200 MG/1
200 TABLET, FILM COATED ORAL 2 TIMES DAILY
Qty: 180 TABLET | Refills: 1 | Status: SHIPPED | OUTPATIENT
Start: 2021-12-29 | End: 2022-04-29 | Stop reason: SDUPTHER

## 2022-02-09 DIAGNOSIS — J44.9 CHRONIC OBSTRUCTIVE PULMONARY DISEASE, UNSPECIFIED COPD TYPE: ICD-10-CM

## 2022-02-09 RX ORDER — ARFORMOTEROL TARTRATE 15 UG/2ML
15 SOLUTION RESPIRATORY (INHALATION) 2 TIMES DAILY
Qty: 60 EACH | Refills: 2 | Status: SHIPPED | OUTPATIENT
Start: 2022-02-09 | End: 2022-02-16 | Stop reason: SDUPTHER

## 2022-02-09 RX ORDER — ALBUTEROL SULFATE 1.25 MG/3ML
1.25 SOLUTION RESPIRATORY (INHALATION) EVERY 6 HOURS PRN
Qty: 120 EACH | Refills: 2 | Status: SHIPPED | OUTPATIENT
Start: 2022-02-09 | End: 2022-02-16 | Stop reason: SDUPTHER

## 2022-02-16 DIAGNOSIS — J44.9 CHRONIC OBSTRUCTIVE PULMONARY DISEASE, UNSPECIFIED COPD TYPE: ICD-10-CM

## 2022-02-17 RX ORDER — ARFORMOTEROL TARTRATE 15 UG/2ML
15 SOLUTION RESPIRATORY (INHALATION) 2 TIMES DAILY
Qty: 60 EACH | Refills: 2 | Status: SHIPPED | OUTPATIENT
Start: 2022-02-17 | End: 2022-04-29 | Stop reason: SDUPTHER

## 2022-02-17 RX ORDER — ALBUTEROL SULFATE 1.25 MG/3ML
1.25 SOLUTION RESPIRATORY (INHALATION) EVERY 6 HOURS PRN
Qty: 120 EACH | Refills: 2 | Status: SHIPPED | OUTPATIENT
Start: 2022-02-17 | End: 2022-05-02 | Stop reason: ALTCHOICE

## 2022-03-15 ENCOUNTER — OFFICE VISIT (OUTPATIENT)
Dept: FAMILY MEDICINE | Facility: CLINIC | Age: 52
End: 2022-03-15
Payer: MEDICAID

## 2022-03-15 VITALS
TEMPERATURE: 99 F | HEART RATE: 76 BPM | HEIGHT: 60 IN | OXYGEN SATURATION: 96 % | BODY MASS INDEX: 38.28 KG/M2 | WEIGHT: 195 LBS | RESPIRATION RATE: 18 BRPM | DIASTOLIC BLOOD PRESSURE: 71 MMHG | SYSTOLIC BLOOD PRESSURE: 112 MMHG

## 2022-03-15 DIAGNOSIS — F41.9 ANXIETY: Primary | ICD-10-CM

## 2022-03-15 DIAGNOSIS — F31.81 BIPOLAR 2 DISORDER, MAJOR DEPRESSIVE EPISODE: ICD-10-CM

## 2022-03-15 DIAGNOSIS — G47.00 INSOMNIA, UNSPECIFIED TYPE: ICD-10-CM

## 2022-03-15 PROCEDURE — 3074F PR MOST RECENT SYSTOLIC BLOOD PRESSURE < 130 MM HG: ICD-10-PCS | Mod: CPTII,,, | Performed by: NURSE PRACTITIONER

## 2022-03-15 PROCEDURE — 1159F PR MEDICATION LIST DOCUMENTED IN MEDICAL RECORD: ICD-10-PCS | Mod: CPTII,,, | Performed by: NURSE PRACTITIONER

## 2022-03-15 PROCEDURE — 1159F MED LIST DOCD IN RCRD: CPT | Mod: CPTII,,, | Performed by: NURSE PRACTITIONER

## 2022-03-15 PROCEDURE — 3078F DIAST BP <80 MM HG: CPT | Mod: CPTII,,, | Performed by: NURSE PRACTITIONER

## 2022-03-15 PROCEDURE — 3078F PR MOST RECENT DIASTOLIC BLOOD PRESSURE < 80 MM HG: ICD-10-PCS | Mod: CPTII,,, | Performed by: NURSE PRACTITIONER

## 2022-03-15 PROCEDURE — 99214 PR OFFICE/OUTPT VISIT, EST, LEVL IV, 30-39 MIN: ICD-10-PCS | Mod: ,,, | Performed by: NURSE PRACTITIONER

## 2022-03-15 PROCEDURE — 1160F PR REVIEW ALL MEDS BY PRESCRIBER/CLIN PHARMACIST DOCUMENTED: ICD-10-PCS | Mod: CPTII,,, | Performed by: NURSE PRACTITIONER

## 2022-03-15 PROCEDURE — 99214 OFFICE O/P EST MOD 30 MIN: CPT | Mod: ,,, | Performed by: NURSE PRACTITIONER

## 2022-03-15 PROCEDURE — 3074F SYST BP LT 130 MM HG: CPT | Mod: CPTII,,, | Performed by: NURSE PRACTITIONER

## 2022-03-15 PROCEDURE — 3008F BODY MASS INDEX DOCD: CPT | Mod: CPTII,,, | Performed by: NURSE PRACTITIONER

## 2022-03-15 PROCEDURE — 3008F PR BODY MASS INDEX (BMI) DOCUMENTED: ICD-10-PCS | Mod: CPTII,,, | Performed by: NURSE PRACTITIONER

## 2022-03-15 PROCEDURE — 1160F RVW MEDS BY RX/DR IN RCRD: CPT | Mod: CPTII,,, | Performed by: NURSE PRACTITIONER

## 2022-03-15 RX ORDER — MIRTAZAPINE 15 MG/1
30 TABLET, FILM COATED ORAL NIGHTLY
Qty: 30 TABLET | Refills: 0 | Status: SHIPPED | OUTPATIENT
Start: 2022-03-15 | End: 2022-05-02 | Stop reason: SDUPTHER

## 2022-03-15 NOTE — PATIENT INSTRUCTIONS
Take Remeron 30mg QHS as needed while experiencing insomnia and anxiety at night, situational due to recent loss of her mother. She is in close contact with her  at [place]. RTC if meds over sedating or other issues arise.

## 2022-03-15 NOTE — PROGRESS NOTES
"  Billing Provider: OPAL Levin  Level of Service: [place] OFFICE/OUTPT VISIT, EST, LEVL IV, 30-39 MIN  Patient PCP Information     Provider PCP Type    Oren Gage DO General          Reason for Visit / Chief Complaint: Establish Care and Anxiety (Having anxiety due to mother passsing away couldn't get in with [facility] till April . States her nerves are shot)       Update PCP  Update Chief Complaint         History of Present Illness / Problem Focused Workflow     Rosemarie Lane presents to the clinic with Establish Care and Anxiety (Having anxiety due to mother passsing away couldn't get in with [facility] till April . States her nerves are shot)     Ms. Lane presents for further evaluation of depression she is experiencing do to the recent loss of her mother. She reports issues with sleep. She states she has not been sleeping well because "every time I close my eyes I see my mom's face." She is followed at [facility] due to her h/o bipolar. She attempted to get an appointment there, but was told she could could not get an appointment until June. She reports taking Xanax in the past and it made her "sleep all the time." She reports being switched from Xanax to Buspar and has done well on her current regimen until her mother passed.       Review of Systems     Review of Systems   Constitutional: Negative for activity change and unexpected weight change.   HENT: Negative for hearing loss, rhinorrhea and trouble swallowing.    Eyes: Negative for discharge and visual disturbance.   Respiratory: Negative for chest tightness and wheezing.    Cardiovascular: Negative for chest pain and palpitations.   Gastrointestinal: Negative for blood in stool, constipation, diarrhea and vomiting.   Endocrine: Negative for polydipsia and polyuria.   Genitourinary: Negative for difficulty urinating, dysuria, hematuria and menstrual problem.   Musculoskeletal: Negative for arthralgias, joint swelling and neck pain.   Neurological: " Negative for weakness and headaches.   Psychiatric/Behavioral: Negative for confusion, dysphoric mood and suicidal ideas.        Medical / Social / Family History     Past Medical History:   Diagnosis Date    Anxiety     Arthritis     Asthma     Cancer     CHF (congestive heart failure)     Chronic obstructive lung disease     Coronary artery disease     Diabetes mellitus     Encounter for blood transfusion     History of kidney stones     Hyperlipidemia     Hypertension     Lupus     Renal disorder     Seizures     Thyroid disease     Transfusion reaction        Past Surgical History:   Procedure Laterality Date    CARDIAC CATHETERIZATION Left 09/19/2020    Proximal left main stenosis; IVUS in the LAD and left main    CORONARY ARTERY BYPASS GRAFT  09/30/2020    INCISION AND DRAINAGE  06/02/2020    LEFT HEART CATHETERIZATION Left 11/9/2021    Procedure: Left heart cath;  Surgeon: Aureliano Giron MD;  Location: [facility] CATH LAB;  Service: Cardiology;  Laterality: Left;    TOTAL ABDOMINAL HYSTERECTOMY W/ BILATERAL SALPINGOOPHORECTOMY  2008    KIRSTY Lala        Social History  Ms.  reports that she has been smoking cigarettes. She has a 15.00 pack-year smoking history. She has never used smokeless tobacco. She reports previous alcohol use. She reports that she does not use drugs.    Family History  Ms.'s family history includes Bone cancer in her father; Cancer in her mother; Diabetes in her brother, mother, and sister; Heart disease in her mother; Hypertension in her mother; Lung cancer in her father.    Medications and Allergies     Medications  Outpatient Medications Marked as Taking for the 3/15/22 encounter (Office Visit) with OPAL Levin   Medication Sig Dispense Refill    albuterol (ACCUNEB) 1.25 mg/3 mL Nebu Take 3 mLs (1.25 mg total) by nebulization every 6 (six) hours as needed (shortness of breath). Rescue 120 each 2    albuterol (PROVENTIL) 2.5 mg /3 mL (0.083 %)  nebulizer solution USE 1 VIAL IN NEBULIZER EVERY 4 TO 6 HOURS AS NEEDED 1 each 0    arformoteroL (BROVANA) 15 mcg/2 mL Nebu Take 2 mLs (15 mcg total) by nebulization 2 (two) times a day. 60 each 2    aspirin (ECOTRIN) 81 MG EC tablet Take 1 tablet by mouth once daily.      atorvastatin (LIPITOR) 40 MG tablet Take 1 tablet (40 mg total) by mouth once daily. 90 tablet 3    busPIRone (BUSPAR) 15 MG tablet TAKE 1 TABLET BY MOUTH 2 TIMES A DAY AS DIRECTED 180 tablet 1    cariprazine (VRAYLAR) 3 mg Cap Take 1 capsule (3 mg total) by mouth once daily. 30 capsule 3    clopidogreL (PLAVIX) 75 mg tablet Take 1 tablet (75 mg total) by mouth once daily. 30 tablet 11    FARXIGA 10 mg tablet Take 1 tablet (10 mg total) by mouth once daily. 90 tablet 1    gabapentin (NEURONTIN) 300 MG capsule TAKE ONE CAPSULE BY MOUTH EVERY MORNING & TAKE TWO CAPSULES BY MOUTH DAILY AT BEDTIME 90 capsule 5    hydrOXYchloroQUINE (PLAQUENIL) 200 mg tablet Take 1 tablet (200 mg total) by mouth 2 (two) times daily. 180 tablet 1    ipratropium (ATROVENT) 21 mcg (0.03 %) nasal spray 2 sprays by Nasal route 3 (three) times daily. 30 mL 1    LANTUS SOLOSTAR U-100 INSULIN glargine 100 units/mL (3mL) SubQ pen Inject 25 Units into the skin 2 (two) times a day. 12 mL 5    linaCLOtide (LINZESS) 145 mcg Cap capsule Take 1 capsule (145 mcg total) by mouth daily as needed. 90 capsule 1    metoprolol succinate (TOPROL-XL) 50 MG 24 hr tablet Take 1 tablet (50 mg total) by mouth once daily. 90 tablet 3    nicotine (NICODERM CQ) 14 mg/24 hr Place 1 patch onto the skin once daily. 30 patch 1    promethazine (PHENERGAN) 12.5 MG Tab Take 1 tablet (12.5 mg total) by mouth every 6 (six) hours as needed (nausea). 30 tablet 0    rOPINIRole (REQUIP) 0.25 MG tablet Take 1 tablet (0.25 mg total) by mouth 3 (three) times daily. 90 tablet 11    topiramate (TOPAMAX) 25 MG tablet Take 1 tablet (25 mg total) by mouth once daily. 90 tablet 1    [DISCONTINUED]  hydrOXYchloroQUINE (PLAQUENIL) 200 mg tablet Take 1 tablet (200 mg total) by mouth 2 (two) times daily. 180 tablet 1    [DISCONTINUED] mirtazapine (REMERON) 15 MG tablet Take 15 mg by mouth once daily. 1 tablet every evening         Allergies  Review of patient's allergies indicates:   Allergen Reactions    Bee pollens     Fish containing products     Opioids - morphine analogues      Change in behavior    Penicillins     Toradol [ketorolac] Hives    Tramadol Hives    Wasp venom     Zithromax [azithromycin]     Bactrim ds [sulfamethoxazole-trimethoprim] Nausea And Vomiting     Facial flush    Latex, natural rubber Rash       Physical Examination     Vitals:    03/15/22 1034   BP: 112/71   Pulse: 76   Resp: 18   Temp: 98.6 °F (37 °C)     Physical Exam  Constitutional:       Appearance: She is obese.   HENT:      Head: Normocephalic and atraumatic.      Right Ear: External ear normal.      Left Ear: External ear normal.      Nose: Nose normal.      Mouth/Throat:      Mouth: Mucous membranes are moist.   Eyes:      Pupils: Pupils are equal, round, and reactive to light.   Cardiovascular:      Rate and Rhythm: Normal rate and regular rhythm.      Pulses: Normal pulses.      Heart sounds: Normal heart sounds.   Pulmonary:      Effort: Pulmonary effort is normal.      Breath sounds: Normal breath sounds.   Abdominal:      General: Bowel sounds are normal.      Palpations: Abdomen is soft.   Musculoskeletal:         General: Normal range of motion.      Cervical back: Normal range of motion and neck supple.   Skin:     General: Skin is warm and dry.      Capillary Refill: Capillary refill takes less than 2 seconds.   Neurological:      Mental Status: She is alert and oriented to person, place, and time.   Psychiatric:         Attention and Perception: Attention and perception normal.         Mood and Affect: Mood is depressed. Affect is tearful.         Speech: Speech normal.         Behavior: Behavior normal.  Behavior is cooperative.         Thought Content: Thought content normal. Thought content does not include suicidal ideation.          Assessment and Plan (including Health Maintenance)      Problem List  Smart Sets  Document Outside HM   :    Plan:   Anxiety    Bipolar 2 disorder, major depressive episode    Insomnia, unspecified type    Other orders  -     mirtazapine (REMERON) 15 MG tablet; Take 2 tablets (30 mg total) by mouth every evening. 1 tablet every evening  Dispense: 30 tablet; Refill: 0           Health Maintenance Due   Topic Date Due    Hepatitis C Screening  Never done    Mammogram  Never done    Colorectal Cancer Screening  Never done    Hemoglobin A1c  01/04/2022       Problem List Items Addressed This Visit        Psychiatric    Bipolar 2 disorder, major depressive episode    Anxiety - Primary      Other Visit Diagnoses     Insomnia, unspecified type              Health Maintenance Topics with due status: Not Due       Topic Last Completion Date    Lipid Panel 07/14/2021    High Dose Statin 03/15/2022       Future Appointments   Date Time Provider Department Center   4/14/2022  9:15 AM OPAL Levin [facility]   6/14/2022  3:30 PM Aureliano Giron MD [facility]        There are no Patient Instructions on file for this visit.  No follow-ups on file.     Signature:  OPAL Levin      Date of encounter: 3/15/22

## 2022-03-17 DIAGNOSIS — G43.909 MIGRAINE WITHOUT STATUS MIGRAINOSUS, NOT INTRACTABLE, UNSPECIFIED MIGRAINE TYPE: ICD-10-CM

## 2022-03-22 RX ORDER — TOPIRAMATE 25 MG/1
25 TABLET ORAL DAILY
Qty: 90 TABLET | Refills: 1 | OUTPATIENT
Start: 2022-03-22

## 2022-03-28 ENCOUNTER — OFFICE VISIT (OUTPATIENT)
Dept: FAMILY MEDICINE | Facility: CLINIC | Age: 52
End: 2022-03-28
Payer: MEDICAID

## 2022-03-28 ENCOUNTER — PATIENT MESSAGE (OUTPATIENT)
Dept: CARDIOLOGY | Facility: CLINIC | Age: 52
End: 2022-03-28
Payer: MEDICAID

## 2022-03-28 VITALS
HEIGHT: 60 IN | SYSTOLIC BLOOD PRESSURE: 130 MMHG | HEART RATE: 84 BPM | RESPIRATION RATE: 22 BRPM | DIASTOLIC BLOOD PRESSURE: 80 MMHG | TEMPERATURE: 98 F | WEIGHT: 175 LBS | BODY MASS INDEX: 34.36 KG/M2 | OXYGEN SATURATION: 97 %

## 2022-03-28 DIAGNOSIS — J44.9 CHRONIC OBSTRUCTIVE PULMONARY DISEASE, UNSPECIFIED COPD TYPE: Primary | ICD-10-CM

## 2022-03-28 DIAGNOSIS — E11.65 TYPE 2 DIABETES MELLITUS WITH HYPERGLYCEMIA, WITHOUT LONG-TERM CURRENT USE OF INSULIN: ICD-10-CM

## 2022-03-28 DIAGNOSIS — F31.81 BIPOLAR 2 DISORDER, MAJOR DEPRESSIVE EPISODE: ICD-10-CM

## 2022-03-28 LAB
ANION GAP SERPL CALCULATED.3IONS-SCNC: 7 MMOL/L (ref 7–16)
BUN SERPL-MCNC: 12 MG/DL (ref 7–18)
BUN/CREAT SERPL: 12 (ref 6–20)
CALCIUM SERPL-MCNC: 9.5 MG/DL (ref 8.5–10.1)
CHLORIDE SERPL-SCNC: 106 MMOL/L (ref 98–107)
CHOLEST SERPL-MCNC: 123 MG/DL (ref 0–200)
CHOLEST/HDLC SERPL: 3.5 {RATIO}
CO2 SERPL-SCNC: 29 MMOL/L (ref 21–32)
CREAT SERPL-MCNC: 1.04 MG/DL (ref 0.55–1.02)
CREAT UR-MCNC: <13 MG/DL (ref 28–219)
EST. AVERAGE GLUCOSE BLD GHB EST-MCNC: 120 MG/DL
GLUCOSE SERPL-MCNC: 87 MG/DL (ref 74–106)
HBA1C MFR BLD HPLC: 6.2 % (ref 4.5–6.6)
HDLC SERPL-MCNC: 35 MG/DL (ref 40–60)
LDLC SERPL CALC-MCNC: 41 MG/DL
LDLC/HDLC SERPL: 1.2 {RATIO}
MICROALBUMIN UR-MCNC: <0.5 MG/DL (ref 0–2.8)
MICROALBUMIN/CREAT RATIO PNL UR: ABNORMAL
NONHDLC SERPL-MCNC: 88 MG/DL
POTASSIUM SERPL-SCNC: 4 MMOL/L (ref 3.5–5.1)
SODIUM SERPL-SCNC: 138 MMOL/L (ref 136–145)
TRIGL SERPL-MCNC: 236 MG/DL (ref 35–150)
VLDLC SERPL-MCNC: 47 MG/DL

## 2022-03-28 PROCEDURE — 80061 LIPID PANEL: CPT | Mod: ,,, | Performed by: CLINICAL MEDICAL LABORATORY

## 2022-03-28 PROCEDURE — 80048 BASIC METABOLIC PNL TOTAL CA: CPT | Mod: ,,, | Performed by: CLINICAL MEDICAL LABORATORY

## 2022-03-28 PROCEDURE — 3079F PR MOST RECENT DIASTOLIC BLOOD PRESSURE 80-89 MM HG: ICD-10-PCS | Mod: CPTII,,, | Performed by: FAMILY MEDICINE

## 2022-03-28 PROCEDURE — 82043 MICROALBUMIN / CREATININE RATIO URINE: ICD-10-PCS | Mod: ,,, | Performed by: CLINICAL MEDICAL LABORATORY

## 2022-03-28 PROCEDURE — 1159F PR MEDICATION LIST DOCUMENTED IN MEDICAL RECORD: ICD-10-PCS | Mod: CPTII,,, | Performed by: FAMILY MEDICINE

## 2022-03-28 PROCEDURE — 82043 UR ALBUMIN QUANTITATIVE: CPT | Mod: ,,, | Performed by: CLINICAL MEDICAL LABORATORY

## 2022-03-28 PROCEDURE — 3075F PR MOST RECENT SYSTOLIC BLOOD PRESS GE 130-139MM HG: ICD-10-PCS | Mod: CPTII,,, | Performed by: FAMILY MEDICINE

## 2022-03-28 PROCEDURE — 82570 ASSAY OF URINE CREATININE: CPT | Mod: ,,, | Performed by: CLINICAL MEDICAL LABORATORY

## 2022-03-28 PROCEDURE — 82570 MICROALBUMIN / CREATININE RATIO URINE: ICD-10-PCS | Mod: ,,, | Performed by: CLINICAL MEDICAL LABORATORY

## 2022-03-28 PROCEDURE — 3044F HG A1C LEVEL LT 7.0%: CPT | Mod: CPTII,,, | Performed by: FAMILY MEDICINE

## 2022-03-28 PROCEDURE — 3008F BODY MASS INDEX DOCD: CPT | Mod: CPTII,,, | Performed by: FAMILY MEDICINE

## 2022-03-28 PROCEDURE — 3044F PR MOST RECENT HEMOGLOBIN A1C LEVEL <7.0%: ICD-10-PCS | Mod: CPTII,,, | Performed by: FAMILY MEDICINE

## 2022-03-28 PROCEDURE — 3008F PR BODY MASS INDEX (BMI) DOCUMENTED: ICD-10-PCS | Mod: CPTII,,, | Performed by: FAMILY MEDICINE

## 2022-03-28 PROCEDURE — 99214 PR OFFICE/OUTPT VISIT, EST, LEVL IV, 30-39 MIN: ICD-10-PCS | Mod: ,,, | Performed by: FAMILY MEDICINE

## 2022-03-28 PROCEDURE — 3079F DIAST BP 80-89 MM HG: CPT | Mod: CPTII,,, | Performed by: FAMILY MEDICINE

## 2022-03-28 PROCEDURE — 99214 OFFICE O/P EST MOD 30 MIN: CPT | Mod: ,,, | Performed by: FAMILY MEDICINE

## 2022-03-28 PROCEDURE — 1159F MED LIST DOCD IN RCRD: CPT | Mod: CPTII,,, | Performed by: FAMILY MEDICINE

## 2022-03-28 PROCEDURE — 80048 BASIC METABOLIC PANEL: ICD-10-PCS | Mod: ,,, | Performed by: CLINICAL MEDICAL LABORATORY

## 2022-03-28 PROCEDURE — 83036 HEMOGLOBIN A1C: ICD-10-PCS | Mod: ,,, | Performed by: CLINICAL MEDICAL LABORATORY

## 2022-03-28 PROCEDURE — 3075F SYST BP GE 130 - 139MM HG: CPT | Mod: CPTII,,, | Performed by: FAMILY MEDICINE

## 2022-03-28 PROCEDURE — 80061 LIPID PANEL: ICD-10-PCS | Mod: ,,, | Performed by: CLINICAL MEDICAL LABORATORY

## 2022-03-28 PROCEDURE — 83036 HEMOGLOBIN GLYCOSYLATED A1C: CPT | Mod: ,,, | Performed by: CLINICAL MEDICAL LABORATORY

## 2022-03-28 RX ORDER — ATORVASTATIN CALCIUM 40 MG/1
40 TABLET, FILM COATED ORAL DAILY
Qty: 90 TABLET | Refills: 1 | Status: CANCELLED | OUTPATIENT
Start: 2022-03-28 | End: 2022-09-24

## 2022-03-30 NOTE — PROGRESS NOTES
Oren Gage DO   [address] [place]  [address]      PATIENT NAME: Rosemarie Lane  : 1970  DATE: 3/28/22  MRN: [id number]      Billing Provider: Oren Gage DO  Level of Service:   Patient PCP Information     Provider PCP Type    Oren Gage DO General          Reason for Visit / Chief Complaint: Depression (Patient requesting something for depression. ) and Letter for School/Work (Patient is requesting letter for her . She is requesting someone to continue to live with her for help with ADL's.)       Update PCP  Update Chief Complaint         History of Present Illness / Problem Focused Workflow     Rosemarie Lane presents to the clinic with Depression (Patient requesting something for depression. ) and Letter for School/Work (Patient is requesting letter for her . She is requesting someone to continue to live with her for help with ADL's.)     Patient has had some worsening of her depression recently.  She states she is more anxious than she normally is.  She denies any suicidal or homicidal ideation.  Patient is taking her medicines as prescribed.  She states that she is more agitated than usual.  She also has noticed her blood sugars have been elevated recently.  She denies any chest pain shortness of breath palpitations PND or orthopnea.    DepressionPatient is not experiencing: confusion, decreased concentration, nervousness/anxiety, palpitations, shortness of breath and suicidal ideas.        Review of Systems     Review of Systems   Constitutional: Positive for appetite change. Negative for activity change, chills, fatigue, fever and unexpected weight change.   HENT: Negative for nasal congestion, ear discharge, ear pain, hearing loss, mouth dryness, mouth sores, postnasal drip, rhinorrhea, sinus pressure/congestion, sore throat, trouble swallowing and voice change.    Eyes: Negative for pain, discharge, redness,  itching and visual disturbance.   Respiratory: Negative for apnea, cough, chest tightness, shortness of breath and wheezing.    Cardiovascular: Negative for chest pain, palpitations and leg swelling.   Gastrointestinal: Negative for abdominal distention, abdominal pain, anal bleeding, blood in stool, change in bowel habit, constipation, diarrhea, nausea, vomiting, reflux and change in bowel habit.   Endocrine: Negative for cold intolerance, heat intolerance, polydipsia, polyphagia and polyuria.   Genitourinary: Negative for difficulty urinating, dysuria, enuresis, frequency, genital sores, hematuria, hot flashes, menstrual irregularity, menstrual problem, urgency and vaginal dryness.   Musculoskeletal: Negative for arthralgias, back pain, gait problem, joint swelling, leg pain, myalgias and neck pain.   Integumentary:  Negative for rash, mole/lesion, breast mass, breast discharge and breast tenderness.   Allergic/Immunologic: Negative for environmental allergies and food allergies.   Neurological: Negative for dizziness, vertigo, tremors, seizures, syncope, facial asymmetry, speech difficulty, weakness, light-headedness, numbness, headaches, disturbances in coordination, memory loss and coordination difficulties.   Hematological: Negative for adenopathy. Does not bruise/bleed easily.   Psychiatric/Behavioral: Positive for depression and dysphoric mood. Negative for agitation, behavioral problems, confusion, decreased concentration, hallucinations, self-injury, sleep disturbance and suicidal ideas. The patient is not nervous/anxious and is not hyperactive.    Breast: Negative for mass and tenderness      Medical / Social / Family History     Past Medical History:   Diagnosis Date    Anxiety     Arthritis     Asthma     Cancer     CHF (congestive heart failure)     Chronic obstructive lung disease     Coronary artery disease     Depression     Diabetes mellitus     Diabetes mellitus, type 2     Encounter  for blood transfusion     History of kidney stones     Hyperlipidemia     Hypertension     Lupus     Renal disorder     Seizures     Thyroid disease     Transfusion reaction        Past Surgical History:   Procedure Laterality Date    CARDIAC CATHETERIZATION Left 09/19/2020    Proximal left main stenosis; IVUS in the LAD and left main    CORONARY ARTERY BYPASS GRAFT  09/30/2020    CORONARY STENT PLACEMENT  11/09/2021    Everolimus Eluting Coronary Stent-MLAD;LAD    INCISION AND DRAINAGE  06/02/2020    LEFT HEART CATHETERIZATION Left 11/9/2021    Procedure: Left heart cath;  Surgeon: Aureliano Giron MD;  Location: [facility] CATH LAB;  Service: Cardiology;  Laterality: Left;    TOTAL ABDOMINAL HYSTERECTOMY W/ BILATERAL SALPINGOOPHORECTOMY  2008    KIRSTY Lala        Social History  Ms.  reports that she has been smoking cigarettes. She started smoking about 35 years ago. She has been smoking about 1.00 pack per day. She has never used smokeless tobacco. She reports previous alcohol use. She reports that she does not use drugs.    Family History  Ms.'s family history includes Bone cancer in her father; Cancer in her mother; Diabetes in her brother, mother, and sister; Heart disease in her mother; Hypertension in her mother; Lung cancer in her father.    Medications and Allergies     Medications  Outpatient Medications Marked as Taking for the 3/28/22 encounter (Office Visit) with Oren Gage, DO   Medication Sig Dispense Refill    albuterol (PROVENTIL) 2.5 mg /3 mL (0.083 %) nebulizer solution USE 1 VIAL IN NEBULIZER EVERY 4 TO 6 HOURS AS NEEDED 1 each 0    arformoteroL (BROVANA) 15 mcg/2 mL Nebu Take 2 mLs (15 mcg total) by nebulization 2 (two) times a day. 60 each 2    aspirin (ECOTRIN) 81 MG EC tablet Take 1 tablet by mouth once daily.      atorvastatin (LIPITOR) 40 MG tablet Take 1 tablet (40 mg total) by mouth once daily. 90 tablet 1    busPIRone (BUSPAR) 15 MG tablet TAKE 1 TABLET BY  MOUTH 2 TIMES A DAY AS DIRECTED 180 tablet 1    cariprazine (VRAYLAR) 3 mg Cap Take 1 capsule (3 mg total) by mouth once daily. 30 capsule 3    clopidogreL (PLAVIX) 75 mg tablet Take 1 tablet (75 mg total) by mouth once daily. 30 tablet 11    FARXIGA 10 mg tablet Take 1 tablet (10 mg total) by mouth once daily. 90 tablet 1    gabapentin (NEURONTIN) 300 MG capsule TAKE ONE CAPSULE BY MOUTH EVERY MORNING & TAKE TWO CAPSULES BY MOUTH DAILY AT BEDTIME 90 capsule 5    hydrOXYchloroQUINE (PLAQUENIL) 200 mg tablet Take 1 tablet (200 mg total) by mouth 2 (two) times daily. 180 tablet 1    LANTUS SOLOSTAR U-100 INSULIN glargine 100 units/mL (3mL) SubQ pen Inject 25 Units into the skin 2 (two) times a day. 12 mL 5    linaCLOtide (LINZESS) 145 mcg Cap capsule Take 1 capsule (145 mcg total) by mouth daily as needed. 90 capsule 1    metoprolol succinate (TOPROL-XL) 50 MG 24 hr tablet Take 1 tablet (50 mg total) by mouth once daily. 90 tablet 3    mirtazapine (REMERON) 15 MG tablet Take 2 tablets (30 mg total) by mouth every evening. 1 tablet every evening (Patient taking differently: Take 30 mg by mouth every evening.) 30 tablet 0    promethazine (PHENERGAN) 12.5 MG Tab Take 1 tablet (12.5 mg total) by mouth every 6 (six) hours as needed (nausea). 30 tablet 0    rOPINIRole (REQUIP) 0.25 MG tablet Take 1 tablet (0.25 mg total) by mouth 3 (three) times daily. 90 tablet 11    topiramate (TOPAMAX) 25 MG tablet Take 1 tablet (25 mg total) by mouth once daily. 90 tablet 1       Allergies  Review of patient's allergies indicates:   Allergen Reactions    Fish containing products Hives and Swelling    Penicillins Swelling     Localized swelling     Wasp venom Anaphylaxis    Opioids - morphine analogues      Change in behavior    Toradol [ketorolac] Hives    Tramadol Hives    Zithromax [azithromycin] Other (See Comments)     Patient cannot remember what kind of reaction she had to this medication    Bactrim ds  [sulfamethoxazole-trimethoprim] Nausea And Vomiting     Facial flush    Latex, natural rubber Rash       Physical Examination     Vitals:    03/28/22 1318   BP: 130/80   Pulse: 84   Resp: (!) 22   Temp: 98.3 °F (36.8 °C)     Physical Exam  Vitals reviewed.   Constitutional:       General: She is not in acute distress.     Appearance: Normal appearance. She is obese. She is not toxic-appearing.   HENT:      Head: Normocephalic and atraumatic.      Nose: Nose normal.      Mouth/Throat:      Mouth: Mucous membranes are moist.      Pharynx: Oropharynx is clear. No oropharyngeal exudate or posterior oropharyngeal erythema.   Eyes:      General: No scleral icterus.     Extraocular Movements: Extraocular movements intact.      Conjunctiva/sclera: Conjunctivae normal.      Pupils: Pupils are equal, round, and reactive to light.   Neck:      Vascular: No carotid bruit.   Cardiovascular:      Rate and Rhythm: Normal rate and regular rhythm.      Pulses: Normal pulses.      Heart sounds: Normal heart sounds.     No gallop.   Pulmonary:      Effort: Pulmonary effort is normal.      Breath sounds: Normal breath sounds. No wheezing.   Abdominal:      General: Abdomen is flat. Bowel sounds are normal.      Palpations: Abdomen is soft.      Tenderness: There is no abdominal tenderness.   Musculoskeletal:         General: Normal range of motion.      Cervical back: Normal range of motion and neck supple.      Right lower leg: No edema.      Left lower leg: No edema.   Lymphadenopathy:      Cervical: No cervical adenopathy.   Skin:     General: Skin is warm and dry.      Capillary Refill: Capillary refill takes less than 2 seconds.      Coloration: Skin is not jaundiced.      Findings: No lesion or rash.   Neurological:      General: No focal deficit present.      Mental Status: She is alert and oriented to person, place, and time. Mental status is at baseline.      Cranial Nerves: No cranial nerve deficit.      Sensory: No sensory  deficit.      Motor: No weakness.      Coordination: Coordination normal.      Gait: Gait normal.      Deep Tendon Reflexes: Reflexes normal.   Psychiatric:         Mood and Affect: Mood normal.         Behavior: Behavior normal.         Thought Content: Thought content normal.         Judgment: Judgment normal.               Lab Results   Component Value Date    WBC 11.31 (H) 12/17/2021    HGB 15.0 12/17/2021    HCT 45.8 12/17/2021    MCV 92.5 12/17/2021     12/17/2021          Sodium   Date Value Ref Range Status   03/28/2022 138 136 - 145 mmol/L Final     Potassium   Date Value Ref Range Status   03/28/2022 4.0 3.5 - 5.1 mmol/L Final     Chloride   Date Value Ref Range Status   03/28/2022 106 98 - 107 mmol/L Final     CO2   Date Value Ref Range Status   03/28/2022 29 21 - 32 mmol/L Final     Glucose   Date Value Ref Range Status   03/28/2022 87 74 - 106 mg/dL Final     BUN   Date Value Ref Range Status   03/28/2022 12 7 - 18 mg/dL Final     Creatinine   Date Value Ref Range Status   03/28/2022 1.04 (H) 0.55 - 1.02 mg/dL Final     Calcium   Date Value Ref Range Status   03/28/2022 9.5 8.5 - 10.1 mg/dL Final     Total Protein   Date Value Ref Range Status   12/17/2021 8.1 6.4 - 8.2 g/dL Final     Albumin   Date Value Ref Range Status   12/17/2021 4.1 3.5 - 5.0 g/dL Final     Bilirubin, Total   Date Value Ref Range Status   12/17/2021 0.3 0.0 - 1.2 mg/dL Final     Alk Phos   Date Value Ref Range Status   12/17/2021 100 39 - 100 U/L Final     AST   Date Value Ref Range Status   12/17/2021 23 15 - 37 U/L Final     ALT   Date Value Ref Range Status   12/17/2021 40 13 - 56 U/L Final     Anion Gap   Date Value Ref Range Status   03/28/2022 7 7 - 16 mmol/L Final     eGFR    Date Value Ref Range Status   01/18/2021 62       eGFR   Date Value Ref Range Status   03/28/2022 60 >=60 mL/min/1.73m² Final      Echo  · Trivial pericardial effusion.  · Low normal systolic function.  · The estimated ejection  fraction is 50%.  · Normal left ventricular diastolic function.  · Normal right ventricular size with normal right ventricular systolic   function.     Limited study to rule out pericardial effusion post procedure.   Cardiac catheterization  · The Mid LM lesion was 50% stenosed.  · The Mid LAD lesion was 70% stenosed.  · The Dist LAD lesion was 90% stenosed with 0% stenosis post-intervention.  · The Mid Cx to Dist Cx lesion was 90% stenosed.  · The Prox Cx lesion was 90% stenosed.  · The estimated blood loss was none.  · There was three vessel coronary artery disease. There was left main   disease.  · A STENT PAVITHRA XIENCE SKYPOINT 2.25 X 15 stent was successfully placed at 8   WILIAN for 19 sec in the distal LAD, distal to the LIMA-LAD anastamosis.  · Patent LIMA to LAD  · Patent SVG to OM  · Native RCA has mild disease     The procedure log was documented by Documenter: RT Heriberto and   verified by Aureliano Giron MD.    Date: 11/9/2021  Time: 11:54 AM    Right CFA access under US guidance. Noted to have right SFA ostial 70%.  Used an RENEA catheter to engage the LIMA. Next used a JL4 and JR4. Next we   had to perform at aortogram to find the SVG to OM. We engaged it later   with a JR4. We then used an XB 3.5 guide with sideholes to engage the LM   which had significant dampening with other catheters. WE then used a run   through to wire the distal LAD. 2.0 balloon was used for predilatation of   the distal LAD stenosis. Next we used a [id number] Xience stent in the   distal LAD. We also predilated the prox LAD with a 2.0 balloon to allow   stent advancement. We then removed the sheath and used a Mynx for closure   given proximity to SFA stenosis and high bifurcation.     Procedures   Assessment and Plan (including Health Maintenance)      Problem List  Smart Sets  Document Outside HM   :    Plan:         Health Maintenance Due   Topic Date Due    Hepatitis C Screening  Never done    Mammogram  Never done     Colorectal Cancer Screening  Never done       Problem List Items Addressed This Visit        Psychiatric    Bipolar 2 disorder, major depressive episode    Current Assessment & Plan     Bipolar disease that is poorly controlled.  She states her anxiety is the worsening.  We will increase her Vraylar to 6 mg daily.  She is to follow-up in 2 weeks or p.r.n..              Pulmonary    Chronic obstructive lung disease - Primary    Current Assessment & Plan     COPD is currently well controlled.  No change in medication at this time.  Follow-up every 3 months.              Endocrine    Diabetes mellitus    Current Assessment & Plan     Diabetes mellitus that the has not been well controlled.  Will check a he had hemoglobin A1c BMP lipid panel as well as a albumin creatinine ratio.  She is to follow-up every 3 months.  No change in her insulin at this time but will get her A1c back.  Continue the Lantus as is.           Relevant Orders    Basic Metabolic Panel (Completed)    Hemoglobin A1C (Completed)    Lipid Panel (Completed)    Microalbumin/Creatinine Ratio, Urine (Completed)          Health Maintenance Topics with due status: Not Due       Topic Last Completion Date    High Dose Statin 03/28/2022    Lipid Panel 03/28/2022    Hemoglobin A1c 03/28/2022       Future Appointments   Date Time Provider Department Center   4/26/2022  8:45 AM Aureliano Giron MD [facility]   6/14/2022  3:30 PM Aureliano Giron MD [facility]   6[facility]30[facility]2022 10:30 [facility]        Follow up in about 4 weeks (around 4/25/2022).     Signature:  DO Joesph Moul[place]atur [address] [place]  [address]    Date of encounter: 3/28/22

## 2022-03-30 NOTE — ASSESSMENT & PLAN NOTE
Bipolar disease that is poorly controlled.  She states her anxiety is the worsening.  We will increase her Vraylar to 6 mg daily.  She is to follow-up in 2 weeks or p.r.n..

## 2022-03-30 NOTE — ASSESSMENT & PLAN NOTE
Diabetes mellitus that the has not been well controlled.  Will check a he had hemoglobin A1c BMP lipid panel as well as a albumin creatinine ratio.  She is to follow-up every 3 months.  No change in her insulin at this time but will get her A1c back.  Continue the Lantus as is.

## 2022-03-30 NOTE — ASSESSMENT & PLAN NOTE
COPD is currently well controlled.  No change in medication at this time.  Follow-up every 3 months.

## 2022-04-02 DIAGNOSIS — J44.9 CHRONIC OBSTRUCTIVE PULMONARY DISEASE, UNSPECIFIED COPD TYPE: ICD-10-CM

## 2022-04-04 DIAGNOSIS — R11.0 NAUSEA: ICD-10-CM

## 2022-04-04 DIAGNOSIS — J44.9 CHRONIC OBSTRUCTIVE PULMONARY DISEASE, UNSPECIFIED COPD TYPE: ICD-10-CM

## 2022-04-05 ENCOUNTER — TELEPHONE (OUTPATIENT)
Dept: CARDIOLOGY | Facility: CLINIC | Age: 52
End: 2022-04-05
Payer: MEDICAID

## 2022-04-05 RX ORDER — ALBUTEROL SULFATE 1.25 MG/3ML
1.25 SOLUTION RESPIRATORY (INHALATION) EVERY 6 HOURS PRN
Qty: 120 EACH | Refills: 2 | OUTPATIENT
Start: 2022-04-05

## 2022-04-05 RX ORDER — PROMETHAZINE HYDROCHLORIDE 12.5 MG/1
12.5 TABLET ORAL EVERY 6 HOURS PRN
Qty: 30 TABLET | Refills: 0 | OUTPATIENT
Start: 2022-04-05

## 2022-04-05 NOTE — TELEPHONE ENCOUNTER
Called left a message for Ms Lane to call clinic. I received a message she wanted help with the number of slots she had for her medicaid.   I called medicaid and they show no claims of medications for this year for her.  Need clarification on her insurance. Did she possibly have a recent change?

## 2022-04-26 DIAGNOSIS — R11.0 NAUSEA: ICD-10-CM

## 2022-04-26 DIAGNOSIS — F31.81 BIPOLAR 2 DISORDER, MAJOR DEPRESSIVE EPISODE: ICD-10-CM

## 2022-04-26 RX ORDER — MIRTAZAPINE 15 MG/1
30 TABLET, FILM COATED ORAL NIGHTLY
Qty: 30 TABLET | Refills: 0 | OUTPATIENT
Start: 2022-04-26

## 2022-04-29 DIAGNOSIS — R11.0 NAUSEA: ICD-10-CM

## 2022-04-29 DIAGNOSIS — G43.909 MIGRAINE WITHOUT STATUS MIGRAINOSUS, NOT INTRACTABLE, UNSPECIFIED MIGRAINE TYPE: ICD-10-CM

## 2022-04-29 RX ORDER — PROMETHAZINE HYDROCHLORIDE 12.5 MG/1
12.5 TABLET ORAL EVERY 6 HOURS PRN
Qty: 30 TABLET | Refills: 0 | Status: CANCELLED | OUTPATIENT
Start: 2022-04-29

## 2022-05-02 RX ORDER — TOPIRAMATE 25 MG/1
25 TABLET ORAL DAILY
Qty: 90 TABLET | Refills: 1 | OUTPATIENT
Start: 2022-05-02

## 2022-05-02 RX ORDER — PROMETHAZINE HYDROCHLORIDE 12.5 MG/1
12.5 TABLET ORAL EVERY 6 HOURS PRN
Qty: 30 TABLET | Refills: 0 | OUTPATIENT
Start: 2022-05-02

## 2022-05-02 RX ORDER — MIRTAZAPINE 15 MG/1
30 TABLET, FILM COATED ORAL NIGHTLY
Qty: 30 TABLET | Refills: 5 | OUTPATIENT
Start: 2022-05-02

## 2022-05-02 RX ORDER — ATORVASTATIN CALCIUM 40 MG/1
40 TABLET, FILM COATED ORAL DAILY
Qty: 90 TABLET | Refills: 1 | Status: SHIPPED | OUTPATIENT
Start: 2022-05-02 | End: 2022-05-24 | Stop reason: SDUPTHER

## 2022-05-03 ENCOUNTER — PATIENT MESSAGE (OUTPATIENT)
Dept: FAMILY MEDICINE | Facility: CLINIC | Age: 52
End: 2022-05-03
Payer: MEDICAID

## 2022-05-08 DIAGNOSIS — R11.0 NAUSEA: ICD-10-CM

## 2022-05-09 RX ORDER — PROMETHAZINE HYDROCHLORIDE 12.5 MG/1
12.5 TABLET ORAL EVERY 6 HOURS PRN
Qty: 30 TABLET | Refills: 0 | OUTPATIENT
Start: 2022-05-09

## 2022-05-11 DIAGNOSIS — R11.0 NAUSEA: ICD-10-CM

## 2022-05-11 RX ORDER — PROMETHAZINE HYDROCHLORIDE 12.5 MG/1
12.5 TABLET ORAL EVERY 6 HOURS PRN
Qty: 30 TABLET | Refills: 0 | OUTPATIENT
Start: 2022-05-11

## 2022-05-16 DIAGNOSIS — R11.0 NAUSEA: ICD-10-CM

## 2022-05-16 RX ORDER — PROMETHAZINE HYDROCHLORIDE 12.5 MG/1
12.5 TABLET ORAL EVERY 6 HOURS PRN
Qty: 30 TABLET | Refills: 0 | OUTPATIENT
Start: 2022-05-16

## 2022-05-24 ENCOUNTER — PATIENT MESSAGE (OUTPATIENT)
Dept: FAMILY MEDICINE | Facility: CLINIC | Age: 52
End: 2022-05-24
Payer: MEDICAID

## 2022-05-24 DIAGNOSIS — I25.10 CORONARY ARTERY DISEASE, UNSPECIFIED VESSEL OR LESION TYPE, UNSPECIFIED WHETHER ANGINA PRESENT, UNSPECIFIED WHETHER NATIVE OR TRANSPLANTED HEART: ICD-10-CM

## 2022-05-24 DIAGNOSIS — I10 HYPERTENSION, UNSPECIFIED TYPE: ICD-10-CM

## 2022-05-24 DIAGNOSIS — E78.5 HYPERLIPIDEMIA, UNSPECIFIED HYPERLIPIDEMIA TYPE: Primary | ICD-10-CM

## 2022-05-25 RX ORDER — ATORVASTATIN CALCIUM 40 MG/1
40 TABLET, FILM COATED ORAL DAILY
Qty: 90 TABLET | Refills: 1 | Status: SHIPPED | OUTPATIENT
Start: 2022-05-25 | End: 2022-12-29 | Stop reason: SDUPTHER

## 2022-05-25 RX ORDER — METOPROLOL SUCCINATE 50 MG/1
50 TABLET, EXTENDED RELEASE ORAL DAILY
Qty: 90 TABLET | Refills: 3 | Status: SHIPPED | OUTPATIENT
Start: 2022-05-25 | End: 2023-07-03 | Stop reason: SDUPTHER

## 2022-05-25 RX ORDER — CLOPIDOGREL BISULFATE 75 MG/1
75 TABLET ORAL DAILY
Qty: 90 TABLET | Refills: 1 | Status: SHIPPED | OUTPATIENT
Start: 2022-05-25 | End: 2022-12-29 | Stop reason: SDUPTHER

## 2022-06-01 ENCOUNTER — TELEPHONE (OUTPATIENT)
Dept: CARDIOLOGY | Facility: CLINIC | Age: 52
End: 2022-06-01
Payer: MEDICAID

## 2022-07-08 ENCOUNTER — OFFICE VISIT (OUTPATIENT)
Dept: FAMILY MEDICINE | Facility: CLINIC | Age: 52
End: 2022-07-08
Payer: MEDICAID

## 2022-07-08 VITALS
RESPIRATION RATE: 18 BRPM | HEIGHT: 60 IN | BODY MASS INDEX: 33.38 KG/M2 | OXYGEN SATURATION: 97 % | SYSTOLIC BLOOD PRESSURE: 108 MMHG | HEART RATE: 74 BPM | DIASTOLIC BLOOD PRESSURE: 70 MMHG | WEIGHT: 170 LBS | TEMPERATURE: 98 F

## 2022-07-08 DIAGNOSIS — Z12.11 COLON CANCER SCREENING: ICD-10-CM

## 2022-07-08 DIAGNOSIS — K59.04 CHRONIC IDIOPATHIC CONSTIPATION: ICD-10-CM

## 2022-07-08 DIAGNOSIS — J44.9 CHRONIC OBSTRUCTIVE PULMONARY DISEASE, UNSPECIFIED COPD TYPE: ICD-10-CM

## 2022-07-08 DIAGNOSIS — F31.81 BIPOLAR 2 DISORDER, MAJOR DEPRESSIVE EPISODE: ICD-10-CM

## 2022-07-08 DIAGNOSIS — E11.65 TYPE 2 DIABETES MELLITUS WITH HYPERGLYCEMIA, WITHOUT LONG-TERM CURRENT USE OF INSULIN: ICD-10-CM

## 2022-07-08 DIAGNOSIS — Z79.899 LONG-TERM USE OF PLAQUENIL: ICD-10-CM

## 2022-07-08 DIAGNOSIS — Z12.39 ENCOUNTER FOR SCREENING FOR MALIGNANT NEOPLASM OF BREAST, UNSPECIFIED SCREENING MODALITY: ICD-10-CM

## 2022-07-08 DIAGNOSIS — Z79.4 TYPE 2 DIABETES MELLITUS WITH HYPERGLYCEMIA, WITH LONG-TERM CURRENT USE OF INSULIN: ICD-10-CM

## 2022-07-08 DIAGNOSIS — Z00.00 ROUTINE GENERAL MEDICAL EXAMINATION AT A HEALTH CARE FACILITY: Primary | ICD-10-CM

## 2022-07-08 DIAGNOSIS — M32.9 LUPUS: ICD-10-CM

## 2022-07-08 DIAGNOSIS — E11.65 TYPE 2 DIABETES MELLITUS WITH HYPERGLYCEMIA, WITH LONG-TERM CURRENT USE OF INSULIN: ICD-10-CM

## 2022-07-08 LAB
ALBUMIN SERPL BCP-MCNC: 4.1 G/DL (ref 3.5–5)
ALBUMIN/GLOB SERPL: 1.1 {RATIO}
ALP SERPL-CCNC: 109 U/L (ref 41–108)
ALT SERPL W P-5'-P-CCNC: 26 U/L (ref 13–56)
ANION GAP SERPL CALCULATED.3IONS-SCNC: 13 MMOL/L (ref 7–16)
AST SERPL W P-5'-P-CCNC: 17 U/L (ref 15–37)
BASOPHILS # BLD AUTO: 0.07 K/UL (ref 0–0.2)
BASOPHILS NFR BLD AUTO: 0.7 % (ref 0–1)
BILIRUB SERPL-MCNC: 0.3 MG/DL (ref 0–1.2)
BUN SERPL-MCNC: 10 MG/DL (ref 7–18)
BUN/CREAT SERPL: 11 (ref 6–20)
CALCIUM SERPL-MCNC: 9.8 MG/DL (ref 8.5–10.1)
CHLORIDE SERPL-SCNC: 103 MMOL/L (ref 98–107)
CHOLEST SERPL-MCNC: 118 MG/DL (ref 0–200)
CHOLEST/HDLC SERPL: 3.1 {RATIO}
CO2 SERPL-SCNC: 29 MMOL/L (ref 21–32)
CREAT SERPL-MCNC: 0.9 MG/DL (ref 0.55–1.02)
CRP SERPL-MCNC: 0.77 MG/DL (ref 0–0.8)
DIFFERENTIAL METHOD BLD: ABNORMAL
EOSINOPHIL # BLD AUTO: 0 K/UL (ref 0–0.5)
EOSINOPHIL NFR BLD AUTO: 0 % (ref 1–4)
ERYTHROCYTE [DISTWIDTH] IN BLOOD BY AUTOMATED COUNT: 13.3 % (ref 11.5–14.5)
ERYTHROCYTE [SEDIMENTATION RATE] IN BLOOD BY WESTERGREN METHOD: 22 MM/HR (ref 0–30)
EST. AVERAGE GLUCOSE BLD GHB EST-MCNC: 110 MG/DL
GLOBULIN SER-MCNC: 3.9 G/DL (ref 2–4)
GLUCOSE SERPL-MCNC: 113 MG/DL (ref 70–110)
GLUCOSE SERPL-MCNC: 89 MG/DL (ref 74–106)
HBA1C MFR BLD HPLC: 5.9 % (ref 4.5–6.6)
HCT VFR BLD AUTO: 44 % (ref 38–47)
HDLC SERPL-MCNC: 38 MG/DL (ref 40–60)
HGB BLD-MCNC: 14.7 G/DL (ref 12–16)
IMM GRANULOCYTES # BLD AUTO: 0.02 K/UL (ref 0–0.04)
IMM GRANULOCYTES NFR BLD: 0.2 % (ref 0–0.4)
LDLC SERPL CALC-MCNC: 41 MG/DL
LDLC/HDLC SERPL: 1.1 {RATIO}
LYMPHOCYTES # BLD AUTO: 3.03 K/UL (ref 1–4.8)
LYMPHOCYTES NFR BLD AUTO: 29.2 % (ref 27–41)
MCH RBC QN AUTO: 30.1 PG (ref 27–31)
MCHC RBC AUTO-ENTMCNC: 33.4 G/DL (ref 32–36)
MCV RBC AUTO: 90.2 FL (ref 80–96)
MONOCYTES # BLD AUTO: 0.51 K/UL (ref 0–0.8)
MONOCYTES NFR BLD AUTO: 4.9 % (ref 2–6)
MPC BLD CALC-MCNC: 10.7 FL (ref 9.4–12.4)
NEUTROPHILS # BLD AUTO: 6.76 K/UL (ref 1.8–7.7)
NEUTROPHILS NFR BLD AUTO: 65 % (ref 53–65)
NONHDLC SERPL-MCNC: 80 MG/DL
NRBC # BLD AUTO: 0 X10E3/UL
NRBC, AUTO (.00): 0 %
PLATELET # BLD AUTO: 216 K/UL (ref 150–400)
POTASSIUM SERPL-SCNC: 4.5 MMOL/L (ref 3.5–5.1)
PROT SERPL-MCNC: 8 G/DL (ref 6.4–8.2)
RBC # BLD AUTO: 4.88 M/UL (ref 4.2–5.4)
RHEUMATOID FACT SER NEPH-ACNC: <10 IU/ML (ref 0–15)
SODIUM SERPL-SCNC: 140 MMOL/L (ref 136–145)
T4 SERPL-MCNC: 9.6 ΜG/DL (ref 4.8–13.9)
TRIGL SERPL-MCNC: 194 MG/DL (ref 35–150)
TSH SERPL DL<=0.005 MIU/L-ACNC: 1.45 UIU/ML (ref 0.36–3.74)
VLDLC SERPL-MCNC: 39 MG/DL
WBC # BLD AUTO: 10.39 K/UL (ref 4.5–11)

## 2022-07-08 PROCEDURE — 84436 T4: ICD-10-PCS | Mod: ,,, | Performed by: CLINICAL MEDICAL LABORATORY

## 2022-07-08 PROCEDURE — 86431 RHEUMATOID QUANTITATIVE: ICD-10-PCS | Mod: ,,, | Performed by: CLINICAL MEDICAL LABORATORY

## 2022-07-08 PROCEDURE — 84443 ASSAY THYROID STIM HORMONE: CPT | Mod: ,,, | Performed by: CLINICAL MEDICAL LABORATORY

## 2022-07-08 PROCEDURE — 86038 ANA EIA W/REFLEX DSDNA/ENA: ICD-10-PCS | Mod: ,,, | Performed by: CLINICAL MEDICAL LABORATORY

## 2022-07-08 PROCEDURE — 85651 SEDIMENTATION RATE, AUTOMATED: ICD-10-PCS | Mod: ,,, | Performed by: CLINICAL MEDICAL LABORATORY

## 2022-07-08 PROCEDURE — 3008F PR BODY MASS INDEX (BMI) DOCUMENTED: ICD-10-PCS | Mod: CPTII,,, | Performed by: FAMILY MEDICINE

## 2022-07-08 PROCEDURE — 86140 C-REACTIVE PROTEIN: CPT | Mod: ,,, | Performed by: CLINICAL MEDICAL LABORATORY

## 2022-07-08 PROCEDURE — 85025 CBC WITH DIFFERENTIAL: ICD-10-PCS | Mod: ,,, | Performed by: CLINICAL MEDICAL LABORATORY

## 2022-07-08 PROCEDURE — 1160F RVW MEDS BY RX/DR IN RCRD: CPT | Mod: CPTII,,, | Performed by: FAMILY MEDICINE

## 2022-07-08 PROCEDURE — 80061 LIPID PANEL: CPT | Mod: ,,, | Performed by: CLINICAL MEDICAL LABORATORY

## 2022-07-08 PROCEDURE — 99396 PR PREVENTIVE VISIT,EST,40-64: ICD-10-PCS | Mod: ,,, | Performed by: FAMILY MEDICINE

## 2022-07-08 PROCEDURE — 99396 PREV VISIT EST AGE 40-64: CPT | Mod: ,,, | Performed by: FAMILY MEDICINE

## 2022-07-08 PROCEDURE — 3074F PR MOST RECENT SYSTOLIC BLOOD PRESSURE < 130 MM HG: ICD-10-PCS | Mod: CPTII,,, | Performed by: FAMILY MEDICINE

## 2022-07-08 PROCEDURE — 80061 LIPID PANEL: ICD-10-PCS | Mod: ,,, | Performed by: CLINICAL MEDICAL LABORATORY

## 2022-07-08 PROCEDURE — 1159F PR MEDICATION LIST DOCUMENTED IN MEDICAL RECORD: ICD-10-PCS | Mod: CPTII,,, | Performed by: FAMILY MEDICINE

## 2022-07-08 PROCEDURE — 1159F MED LIST DOCD IN RCRD: CPT | Mod: CPTII,,, | Performed by: FAMILY MEDICINE

## 2022-07-08 PROCEDURE — 3078F PR MOST RECENT DIASTOLIC BLOOD PRESSURE < 80 MM HG: ICD-10-PCS | Mod: CPTII,,, | Performed by: FAMILY MEDICINE

## 2022-07-08 PROCEDURE — 85651 RBC SED RATE NONAUTOMATED: CPT | Mod: ,,, | Performed by: CLINICAL MEDICAL LABORATORY

## 2022-07-08 PROCEDURE — 3044F PR MOST RECENT HEMOGLOBIN A1C LEVEL <7.0%: ICD-10-PCS | Mod: CPTII,,, | Performed by: FAMILY MEDICINE

## 2022-07-08 PROCEDURE — 80053 COMPREHEN METABOLIC PANEL: CPT | Mod: ,,, | Performed by: CLINICAL MEDICAL LABORATORY

## 2022-07-08 PROCEDURE — 86431 RHEUMATOID FACTOR QUANT: CPT | Mod: ,,, | Performed by: CLINICAL MEDICAL LABORATORY

## 2022-07-08 PROCEDURE — 86038 ANTINUCLEAR ANTIBODIES: CPT | Mod: ,,, | Performed by: CLINICAL MEDICAL LABORATORY

## 2022-07-08 PROCEDURE — 85025 COMPLETE CBC W/AUTO DIFF WBC: CPT | Mod: ,,, | Performed by: CLINICAL MEDICAL LABORATORY

## 2022-07-08 PROCEDURE — 1160F PR REVIEW ALL MEDS BY PRESCRIBER/CLIN PHARMACIST DOCUMENTED: ICD-10-PCS | Mod: CPTII,,, | Performed by: FAMILY MEDICINE

## 2022-07-08 PROCEDURE — 80053 COMPREHENSIVE METABOLIC PANEL: ICD-10-PCS | Mod: ,,, | Performed by: CLINICAL MEDICAL LABORATORY

## 2022-07-08 PROCEDURE — 83036 HEMOGLOBIN GLYCOSYLATED A1C: CPT | Mod: ,,, | Performed by: CLINICAL MEDICAL LABORATORY

## 2022-07-08 PROCEDURE — 83036 HEMOGLOBIN A1C: ICD-10-PCS | Mod: ,,, | Performed by: CLINICAL MEDICAL LABORATORY

## 2022-07-08 PROCEDURE — 3078F DIAST BP <80 MM HG: CPT | Mod: CPTII,,, | Performed by: FAMILY MEDICINE

## 2022-07-08 PROCEDURE — 86140 C-REACTIVE PROTEIN: ICD-10-PCS | Mod: ,,, | Performed by: CLINICAL MEDICAL LABORATORY

## 2022-07-08 PROCEDURE — 3074F SYST BP LT 130 MM HG: CPT | Mod: CPTII,,, | Performed by: FAMILY MEDICINE

## 2022-07-08 PROCEDURE — 84436 ASSAY OF TOTAL THYROXINE: CPT | Mod: ,,, | Performed by: CLINICAL MEDICAL LABORATORY

## 2022-07-08 PROCEDURE — 3044F HG A1C LEVEL LT 7.0%: CPT | Mod: CPTII,,, | Performed by: FAMILY MEDICINE

## 2022-07-08 PROCEDURE — 3008F BODY MASS INDEX DOCD: CPT | Mod: CPTII,,, | Performed by: FAMILY MEDICINE

## 2022-07-08 PROCEDURE — 84443 TSH: ICD-10-PCS | Mod: ,,, | Performed by: CLINICAL MEDICAL LABORATORY

## 2022-07-08 RX ORDER — MIRTAZAPINE 30 MG/1
30 TABLET, FILM COATED ORAL DAILY
COMMUNITY
Start: 2022-05-25 | End: 2023-08-24 | Stop reason: SDUPTHER

## 2022-07-08 RX ORDER — PREDNISONE 10 MG/1
10 TABLET ORAL DAILY
Qty: 30 TABLET | Refills: 0 | Status: SHIPPED | OUTPATIENT
Start: 2022-07-08 | End: 2022-09-22 | Stop reason: ALTCHOICE

## 2022-07-08 RX ORDER — INSULIN GLARGINE 100 [IU]/ML
30 INJECTION, SOLUTION SUBCUTANEOUS 2 TIMES DAILY
Qty: 15 ML | Refills: 2
Start: 2022-07-08 | End: 2022-12-29 | Stop reason: SDUPTHER

## 2022-07-08 NOTE — ASSESSMENT & PLAN NOTE
History of chronic constipation.  Will prescribed Linzess 145 mg daily as needed for constipation.  Recommend that she take it daily.  Follow-up in 3 months.  GI referral for colonoscopy.

## 2022-07-08 NOTE — PROGRESS NOTES
Oren Gage DO   [place] [address] [facility] [address]  [place]  [address]      PATIENT NAME: Rosemarie Lane  : 1970  DATE: 22  MRN: [id number]      Billing Provider: Oren Gage DO  Level of Service: [place] PREVENTIVE VISIT,EST,40-64  Patient PCP Information     Provider PCP Type    Oren Gage DO General          Reason for Visit / Chief Complaint: Annual Exam ([employer] wellness )       Update PCP  Update Chief Complaint         History of Present Illness / Problem Focused Workflow     Rosemarie Lane presents to the clinic with Annual Exam ([employer] wellness )     Patient presents today for her wellness examination.  She has history of lupus COPD as well as the thyroid disease.  She also has history of diabetes bipolar disease and atherosclerotic right ache coronary artery disease.  She denies any chest pain shortness of breath palpitations.  She does have worsening over arthritis symptoms.  She has not had an eye exam in a while.  She has continued to take Plaquenil in states that her symptoms of lupus are worse.  She has not seen rheumatology and well over 2 years.  Patient also has not seen an eye doctor in over 2 years.  Patient's blood sugars are well controlled per her but she does not check them frequently.      Review of Systems     Review of Systems   Constitutional: Positive for activity change and fatigue. Negative for appetite change, chills and fever.   HENT: Negative for nasal congestion, ear discharge, ear pain, mouth dryness, mouth sores, postnasal drip, sinus pressure/congestion, sore throat and voice change.    Eyes: Negative for pain, discharge, redness, itching and visual disturbance.   Respiratory: Positive for shortness of breath. Negative for apnea, cough, chest tightness and wheezing.    Cardiovascular: Negative for chest pain, palpitations and leg swelling.   Gastrointestinal: Positive for constipation. Negative for abdominal distention, abdominal pain,  anal bleeding, blood in stool, change in bowel habit, diarrhea, nausea, vomiting, reflux and change in bowel habit.   Endocrine: Negative for cold intolerance, heat intolerance, polydipsia, polyphagia and polyuria.   Genitourinary: Negative for difficulty urinating, enuresis, frequency, genital sores, hematuria, hot flashes, menstrual irregularity, urgency and vaginal dryness.   Musculoskeletal: Positive for arthralgias, joint swelling and joint deformity. Negative for back pain, gait problem, leg pain, myalgias and neck pain.   Integumentary:  Negative for rash, mole/lesion, breast mass, breast discharge and breast tenderness.   Allergic/Immunologic: Negative for environmental allergies and food allergies.   Neurological: Negative for dizziness, vertigo, tremors, seizures, syncope, facial asymmetry, speech difficulty, weakness, light-headedness, numbness, headaches, disturbances in coordination, memory loss and coordination difficulties.   Hematological: Negative for adenopathy. Does not bruise/bleed easily.   Psychiatric/Behavioral: Positive for decreased concentration and sleep disturbance. Negative for agitation, behavioral problems, confusion, dysphoric mood, hallucinations, self-injury and suicidal ideas. The patient is nervous/anxious. The patient is not hyperactive.    Breast: Negative for mass and tenderness      Medical / Social / Family History     Past Medical History:   Diagnosis Date    Anxiety     Arthritis     Asthma     Cancer     CHF (congestive heart failure)     Chronic obstructive lung disease     Coronary artery disease     Depression     Diabetes mellitus     Diabetes mellitus, type 2     Encounter for blood transfusion     History of kidney stones     Hyperlipidemia     Hypertension     Lupus     Renal disorder     Seizures     Thyroid disease     Transfusion reaction        Past Surgical History:   Procedure Laterality Date    CARDIAC CATHETERIZATION Left 09/19/2020     Proximal left main stenosis; IVUS in the LAD and left main    CORONARY ARTERY BYPASS GRAFT  09/30/2020    CORONARY STENT PLACEMENT  11/09/2021    Everolimus Eluting Coronary Stent-MLAD;LAD    INCISION AND DRAINAGE  06/02/2020    LEFT HEART CATHETERIZATION Left 11/9/2021    Procedure: Left heart cath;  Surgeon: Aureliano Giron MD;  Location: [facility];  Service: Cardiology;  Laterality: Left;    TOTAL ABDOMINAL HYSTERECTOMY W/ BILATERAL SALPINGOOPHORECTOMY  2008    KIRSTY Lala        Social History  Ms.  reports that she has been smoking cigarettes. She started smoking about 35 years ago. She has been smoking about 1.00 pack per day. She has never used smokeless tobacco. She reports previous alcohol use. She reports that she does not use drugs.    Family History  Ms.'s family history includes Bone cancer in her father; Cancer in her mother; Diabetes in her brother, mother, and sister; Heart disease in her mother; Hypertension in her mother; Lung cancer in her father.    Medications and Allergies     Medications  Outpatient Medications Marked as Taking for the 7/8/22 encounter (Office Visit) with Oren Gage, DO   Medication Sig Dispense Refill    albuterol (PROVENTIL) 2.5 mg /3 mL (0.083 %) nebulizer solution USE 1 VIAL IN NEBULIZER EVERY 4 TO 6 HOURS AS NEEDED 120 mL 2    arformoteroL (BROVANA) 15 mcg/2 mL Nebu Take 2 mLs (15 mcg total) by nebulization 2 (two) times a day. 60 each 2    aspirin (ECOTRIN) 81 MG EC tablet Take 1 tablet by mouth once daily.      atorvastatin (LIPITOR) 40 MG tablet Take 1 tablet (40 mg total) by mouth once daily. 90 tablet 1    busPIRone (BUSPAR) 15 MG tablet TAKE 1 TABLET BY MOUTH 2 TIMES A DAY AS DIRECTED 60 tablet 2    cariprazine (VRAYLAR) 3 mg Cap Take 1 capsule (3 mg total) by mouth once daily. 30 capsule 2    clopidogreL (PLAVIX) 75 mg tablet Take 1 tablet (75 mg total) by mouth once daily. 90 tablet 1    FARXIGA 10 mg tablet Take 1 tablet (10 mg  total) by mouth once daily. 30 tablet 2    gabapentin (NEURONTIN) 300 MG capsule TAKE ONE CAPSULE BY MOUTH EVERY MORNING & TAKE TWO CAPSULES BY MOUTH DAILY AT BEDTIME 90 capsule 2    hydrOXYchloroQUINE (PLAQUENIL) 200 mg tablet Take 1 tablet (200 mg total) by mouth 2 (two) times daily. 60 tablet 2    linaCLOtide (LINZESS) 145 mcg Cap capsule Take 1 capsule (145 mcg total) by mouth daily as needed (constipation). 30 capsule 2    metoprolol succinate (TOPROL-XL) 50 MG 24 hr tablet Take 1 tablet (50 mg total) by mouth once daily. 90 tablet 3    mirtazapine (REMERON) 30 MG tablet Take 30 mg by mouth once daily.      promethazine (PHENERGAN) 12.5 MG Tab Take 1 tablet (12.5 mg total) by mouth every 6 (six) hours as needed (nausea). 30 tablet 0    rOPINIRole (REQUIP) 0.25 MG tablet Take 1 tablet (0.25 mg total) by mouth 3 (three) times daily. 90 tablet 2    topiramate (TOPAMAX) 25 MG tablet Take 1 tablet (25 mg total) by mouth once daily. 30 tablet 2    [DISCONTINUED] LANTUS SOLOSTAR U-100 INSULIN glargine 100 units/mL (3mL) SubQ pen Inject 25 Units into the skin 2 (two) times a day. 15 mL 2    [DISCONTINUED] mirtazapine (REMERON) 15 MG tablet Take 2 tablets (30 mg total) by mouth every evening. 60 tablet 2       Allergies  Review of patient's allergies indicates:   Allergen Reactions    Fish containing products Hives and Swelling    Penicillins Swelling     Localized swelling     Wasp venom Anaphylaxis    Opioids - morphine analogues      Change in behavior    Toradol [ketorolac] Hives    Tramadol Hives    Zithromax [azithromycin] Other (See Comments)     Patient cannot remember what kind of reaction she had to this medication    Bactrim ds [sulfamethoxazole-trimethoprim] Nausea And Vomiting     Facial flush    Latex, natural rubber Rash       Physical Examination     Vitals:    07/08/22 0941   BP: 108/70   Pulse: 74   Resp: 18   Temp: 97.5 °F (36.4 °C)     Physical Exam  Vitals reviewed.    Constitutional:       General: She is not in acute distress.     Appearance: Normal appearance. She is obese.   HENT:      Head: Normocephalic and atraumatic.      Nose: Nose normal.      Mouth/Throat:      Mouth: Mucous membranes are moist.      Pharynx: Oropharynx is clear. No oropharyngeal exudate or posterior oropharyngeal erythema.   Eyes:      General: No scleral icterus.     Extraocular Movements: Extraocular movements intact.      Conjunctiva/sclera: Conjunctivae normal.      Pupils: Pupils are equal, round, and reactive to light.   Neck:      Vascular: No carotid bruit.   Cardiovascular:      Rate and Rhythm: Normal rate and regular rhythm.      Pulses: Normal pulses.      Heart sounds: Normal heart sounds. No murmur heard.    No gallop.   Pulmonary:      Effort: Pulmonary effort is normal.      Breath sounds: Normal breath sounds. No wheezing.   Abdominal:      General: Abdomen is flat. Bowel sounds are normal.      Palpations: Abdomen is soft.      Tenderness: There is no abdominal tenderness.   Musculoskeletal:         General: Tenderness present. Normal range of motion.      Cervical back: Normal range of motion and neck supple.      Right lower leg: No edema.      Left lower leg: No edema.   Lymphadenopathy:      Cervical: No cervical adenopathy.   Skin:     General: Skin is warm and dry.      Capillary Refill: Capillary refill takes less than 2 seconds.      Coloration: Skin is not jaundiced.      Findings: Rash present. No lesion.      Comments: Patient has excoriations as well as papules on her dorsal forearms bilaterally that have had the tops of the papules scratched off and there is some bleeding.   Neurological:      General: No focal deficit present.      Mental Status: She is alert and oriented to person, place, and time. Mental status is at baseline.      Cranial Nerves: No cranial nerve deficit.      Sensory: No sensory deficit.      Motor: No weakness.      Coordination: Coordination  normal.      Gait: Gait normal.      Deep Tendon Reflexes: Reflexes normal.   Psychiatric:         Mood and Affect: Mood normal.         Behavior: Behavior normal.         Thought Content: Thought content normal.         Judgment: Judgment normal.               Lab Results   Component Value Date    WBC 11.31 (H) 12/17/2021    HGB 15.0 12/17/2021    HCT 45.8 12/17/2021    MCV 92.5 12/17/2021     12/17/2021          Sodium   Date Value Ref Range Status   03/28/2022 138 136 - 145 mmol/L Final     Potassium   Date Value Ref Range Status   03/28/2022 4.0 3.5 - 5.1 mmol/L Final     Chloride   Date Value Ref Range Status   03/28/2022 106 98 - 107 mmol/L Final     CO2   Date Value Ref Range Status   03/28/2022 29 21 - 32 mmol/L Final     Glucose   Date Value Ref Range Status   03/28/2022 87 74 - 106 mg/dL Final     BUN   Date Value Ref Range Status   03/28/2022 12 7 - 18 mg/dL Final     Creatinine   Date Value Ref Range Status   03/28/2022 1.04 (H) 0.55 - 1.02 mg/dL Final     Calcium   Date Value Ref Range Status   03/28/2022 9.5 8.5 - 10.1 mg/dL Final     Total Protein   Date Value Ref Range Status   12/17/2021 8.1 6.4 - 8.2 g/dL Final     Albumin   Date Value Ref Range Status   12/17/2021 4.1 3.5 - 5.0 g/dL Final     Bilirubin, Total   Date Value Ref Range Status   12/17/2021 0.3 0.0 - 1.2 mg/dL Final     Alk Phos   Date Value Ref Range Status   12/17/2021 100 39 - 100 U/L Final     AST   Date Value Ref Range Status   12/17/2021 23 15 - 37 U/L Final     ALT   Date Value Ref Range Status   12/17/2021 40 13 - 56 U/L Final     Anion Gap   Date Value Ref Range Status   03/28/2022 7 7 - 16 mmol/L Final     eGFR    Date Value Ref Range Status   01/18/2021 62       eGFR   Date Value Ref Range Status   03/28/2022 60 >=60 mL/min/1.73m² Final      Echo  · Trivial pericardial effusion.  · Low normal systolic function.  · The estimated ejection fraction is 50%.  · Normal left ventricular diastolic function.  ·  Normal right ventricular size with normal right ventricular systolic   function.     Limited study to rule out pericardial effusion post procedure.   Cardiac catheterization  · The Mid LM lesion was 50% stenosed.  · The Mid LAD lesion was 70% stenosed.  · The Dist LAD lesion was 90% stenosed with 0% stenosis post-intervention.  · The Mid Cx to Dist Cx lesion was 90% stenosed.  · The Prox Cx lesion was 90% stenosed.  · The estimated blood loss was none.  · There was three vessel coronary artery disease. There was left main   disease.  · A STENT PAVITHRA XIENCE SKYPOINT 2.25 X 15 stent was successfully placed at 8   WILIAN for 19 sec in the distal LAD, distal to the LIMA-LAD anastamosis.  · Patent LIMA to LAD  · Patent SVG to OM  · Native RCA has mild disease     The procedure log was documented by Documenter: RT Heriberto and   verified by Aureliano Giron MD.    Date: 11/9/2021  Time: 11:54 AM    Right CFA access under US guidance. Noted to have right SFA ostial 70%.  Used an RENEA catheter to engage the LIMA. Next used a JL4 and JR4. Next we   had to perform at aortogram to find the SVG to OM. We engaged it later   with a JR4. We then used an XB 3.5 guide with sideholes to engage the LM   which had significant dampening with other catheters. WE then used a run   through to wire the distal LAD. 2.0 balloon was used for predilatation of   the distal LAD stenosis. Next we used a [id number] Xience stent in the   distal LAD. We also predilated the prox LAD with a 2.0 balloon to allow   stent advancement. We then removed the sheath and used a Mynx for closure   given proximity to SFA stenosis and high bifurcation.     Procedures   Assessment and Plan (including Health Maintenance)      Problem List  Smart Sets  Document Outside HM   :    Plan:         Health Maintenance Due   Topic Date Due    Hepatitis C Screening  Never done    Mammogram  Never done    Colorectal Cancer Screening  Never done    Shingles Vaccine (1 of 2)  Never done    Hemoglobin A1c  06/28/2022       Problem List Items Addressed This Visit        Psychiatric    Bipolar 2 disorder, major depressive episode    Current Assessment & Plan     No change in her bipolar medicines.  Follow-up with mental health.              Pulmonary    Chronic obstructive lung disease    Current Assessment & Plan     COPD currently stable.  No changes in her medicines.  Follow-up every 3 months.  Recommend that she get all her immunizations to include COVID and flu vaccines              Immunology/Multi System    Lupus    Current Assessment & Plan     Lupus that is not well controlled.  Will check a NICHOLE rheumatoid factor sed rate and CRP.  Will refer her back to Rheumatology.  Patient needs an eye examination done because she is on Plaquenil.  Will check a CBC and CMP on her also.  Because of her worsening symptoms from her lupus will start her on prednisone over a 2 week period.           Relevant Medications    predniSONE (DELTASONE) 10 MG tablet    Other Relevant Orders    CBC Auto Differential    Comprehensive Metabolic Panel    TSH    Sedimentation Rate    C-Reactive Protein    NICHOLE EIA w/ Reflex to dsDNA/EVONNE    Rheumatoid Quantitative    Ambulatory referral/consult to Ophthalmology    Ambulatory referral/consult to Rheumatology       Endocrine    Diabetes mellitus    Current Assessment & Plan     Last hemoglobin A1c was 6.2 in March of this year.  No change in her diabetic medicines.  Follow-up every 3 months.           Relevant Medications    LANTUS SOLOSTAR U-100 INSULIN glargine 100 units/mL SubQ pen    Other Relevant Orders    POCT glucose (Completed)    CBC Auto Differential    Comprehensive Metabolic Panel    TSH    T4    Hemoglobin A1C    Lipid Panel    Ambulatory referral/consult to Ophthalmology       GI    Constipation    Current Assessment & Plan     History of chronic constipation.  Will prescribed Linzess 145 mg daily as needed for constipation.  Recommend that she take  it daily.  Follow-up in 3 months.  GI referral for colonoscopy.           Relevant Orders    Ambulatory referral/consult to Gastroenterology       Other    Routine general medical examination at a health care facility - Primary    Current Assessment & Plan     Patient the has a physical examination however she has worsening of her lupus.  She also has chronic constipation.  Her diabetes in the past is been well controlled but will need to check an A1c on her as well as a lipid panel.  Her bipolar disease has been controlled on current medications.  No changes in medicines.  Follow-up should be on a yearly basis for her physical exam but should be on a every 3 month basis for her chronic medical conditions.  Patient will need yearly eye exams mammograms and she does also need a colonoscopy.             Other Visit Diagnoses     Colon cancer screening        Relevant Orders    Ambulatory referral/consult to Gastroenterology    Encounter for screening for malignant neoplasm of breast, unspecified screening modality        Relevant Orders    Mammo Digital Screening Bilat    Long-term use of Plaquenil        Relevant Orders    Ambulatory referral/consult to Ophthalmology          Health Maintenance Topics with due status: Not Due       Topic Last Completion Date    Lipid Panel 03/28/2022    High Dose Statin 07/08/2022       Future Appointments   Date Time Provider Department Center   10/27/2022 11:00 AM [facility]   11/3/2022  8:45 AM Aureliano Giron MD [facility]   7/12/2023  9:00 AM Oren Gage DO [facility] [place]        Follow up in about 4 weeks (around 8/5/2022).     Signature:  DO [facility]  [address] [place] [address], [place]  [id number]    Date of encounter: 7/8/22

## 2022-07-08 NOTE — ASSESSMENT & PLAN NOTE
Lupus that is not well controlled.  Will check a NICHOLE rheumatoid factor sed rate and CRP.  Will refer her back to Rheumatology.  Patient needs an eye examination done because she is on Plaquenil.  Will check a CBC and CMP on her also.  Because of her worsening symptoms from her lupus will start her on prednisone over a 2 week period.

## 2022-07-08 NOTE — ASSESSMENT & PLAN NOTE
Last hemoglobin A1c was 6.2 in March of this year.  No change in her diabetic medicines.  Follow-up every 3 months.

## 2022-07-08 NOTE — ASSESSMENT & PLAN NOTE
COPD currently stable.  No changes in her medicines.  Follow-up every 3 months.  Recommend that she get all her immunizations to include COVID and flu vaccines

## 2022-07-08 NOTE — ASSESSMENT & PLAN NOTE
Patient the has a physical examination however she has worsening of her lupus.  She also has chronic constipation.  Her diabetes in the past is been well controlled but will need to check an A1c on her as well as a lipid panel.  Her bipolar disease has been controlled on current medications.  No changes in medicines.  Follow-up should be on a yearly basis for her physical exam but should be on a every 3 month basis for her chronic medical conditions.  Patient will need yearly eye exams mammograms and she does also need a colonoscopy.

## 2022-07-12 LAB — ANA SER QL: NEGATIVE

## 2022-08-14 ENCOUNTER — HOSPITAL ENCOUNTER (EMERGENCY)
Facility: HOSPITAL | Age: 52
Discharge: LEFT BEFORE DISCHARGE | End: 2022-08-14
Payer: MEDICAID

## 2022-08-14 VITALS
RESPIRATION RATE: 14 BRPM | BODY MASS INDEX: 33.38 KG/M2 | HEART RATE: 76 BPM | OXYGEN SATURATION: 95 % | SYSTOLIC BLOOD PRESSURE: 111 MMHG | HEIGHT: 60 IN | DIASTOLIC BLOOD PRESSURE: 63 MMHG | TEMPERATURE: 99 F | WEIGHT: 170 LBS

## 2022-08-14 DIAGNOSIS — R07.9 CHEST PAIN, UNSPECIFIED TYPE: Primary | ICD-10-CM

## 2022-08-14 LAB
ALBUMIN SERPL BCP-MCNC: 4.3 G/DL (ref 3.5–5)
ALBUMIN/GLOB SERPL: 1.3 {RATIO}
ALP SERPL-CCNC: 91 U/L (ref 41–108)
ALT SERPL W P-5'-P-CCNC: 33 U/L (ref 13–56)
ANION GAP SERPL CALCULATED.3IONS-SCNC: 14 MMOL/L (ref 7–16)
APTT PPP: 24.7 SECONDS (ref 25.2–37.3)
AST SERPL W P-5'-P-CCNC: 23 U/L (ref 15–37)
BASOPHILS # BLD AUTO: 0.04 K/UL (ref 0–0.2)
BASOPHILS NFR BLD AUTO: 0.3 % (ref 0–1)
BILIRUB SERPL-MCNC: 0.3 MG/DL (ref 0–1.2)
BUN SERPL-MCNC: 18 MG/DL (ref 7–18)
BUN/CREAT SERPL: 18 (ref 6–20)
CALCIUM SERPL-MCNC: 9.5 MG/DL (ref 8.5–10.1)
CHLORIDE SERPL-SCNC: 102 MMOL/L (ref 98–107)
CO2 SERPL-SCNC: 27 MMOL/L (ref 21–32)
CREAT SERPL-MCNC: 1 MG/DL (ref 0.55–1.02)
DIFFERENTIAL METHOD BLD: ABNORMAL
EGFR (NO RACE VARIABLE) (RUSH/TITUS): 69 ML/MIN/1.73M²
EOSINOPHIL # BLD AUTO: 0 K/UL (ref 0–0.5)
EOSINOPHIL NFR BLD AUTO: 0 % (ref 1–4)
ERYTHROCYTE [DISTWIDTH] IN BLOOD BY AUTOMATED COUNT: 13 % (ref 11.5–14.5)
GLOBULIN SER-MCNC: 3.2 G/DL (ref 2–4)
GLUCOSE SERPL-MCNC: 81 MG/DL (ref 74–106)
HCT VFR BLD AUTO: 42.4 % (ref 38–47)
HGB BLD-MCNC: 14.6 G/DL (ref 12–16)
IMM GRANULOCYTES # BLD AUTO: 0.02 K/UL (ref 0–0.04)
IMM GRANULOCYTES NFR BLD: 0.2 % (ref 0–0.4)
INR BLD: 0.99
LYMPHOCYTES # BLD AUTO: 3.3 K/UL (ref 1–4.8)
LYMPHOCYTES NFR BLD AUTO: 27.5 % (ref 27–41)
MAGNESIUM SERPL-MCNC: 2.2 MG/DL (ref 1.7–2.3)
MCH RBC QN AUTO: 30.4 PG (ref 27–31)
MCHC RBC AUTO-ENTMCNC: 34.4 G/DL (ref 32–36)
MCV RBC AUTO: 88.3 FL (ref 80–96)
MONOCYTES # BLD AUTO: 0.72 K/UL (ref 0–0.8)
MONOCYTES NFR BLD AUTO: 6 % (ref 2–6)
MPC BLD CALC-MCNC: 9.9 FL (ref 9.4–12.4)
NEUTROPHILS # BLD AUTO: 7.93 K/UL (ref 1.8–7.7)
NEUTROPHILS NFR BLD AUTO: 66 % (ref 53–65)
NRBC # BLD AUTO: 0 X10E3/UL
NRBC, AUTO (.00): 0 %
NT-PROBNP SERPL-MCNC: 111 PG/ML (ref 1–125)
PLATELET # BLD AUTO: 192 K/UL (ref 150–400)
POTASSIUM SERPL-SCNC: 4.1 MMOL/L (ref 3.5–5.1)
PROT SERPL-MCNC: 7.5 G/DL (ref 6.4–8.2)
PROTHROMBIN TIME: 12.7 SECONDS (ref 11.7–14.7)
RBC # BLD AUTO: 4.8 M/UL (ref 4.2–5.4)
SODIUM SERPL-SCNC: 139 MMOL/L (ref 136–145)
TROPONIN I SERPL HS-MCNC: 4.8 PG/ML
WBC # BLD AUTO: 12.01 K/UL (ref 4.5–11)

## 2022-08-14 PROCEDURE — 99284 EMERGENCY DEPT VISIT MOD MDM: CPT | Mod: ,,, | Performed by: NURSE PRACTITIONER

## 2022-08-14 PROCEDURE — 80053 COMPREHEN METABOLIC PANEL: CPT | Performed by: NURSE PRACTITIONER

## 2022-08-14 PROCEDURE — 93010 EKG 12-LEAD: ICD-10-PCS | Mod: ,,, | Performed by: HOSPITALIST

## 2022-08-14 PROCEDURE — 83880 ASSAY OF NATRIURETIC PEPTIDE: CPT | Performed by: NURSE PRACTITIONER

## 2022-08-14 PROCEDURE — 84484 ASSAY OF TROPONIN QUANT: CPT | Performed by: NURSE PRACTITIONER

## 2022-08-14 PROCEDURE — 85025 COMPLETE CBC W/AUTO DIFF WBC: CPT | Performed by: NURSE PRACTITIONER

## 2022-08-14 PROCEDURE — 83735 ASSAY OF MAGNESIUM: CPT | Performed by: NURSE PRACTITIONER

## 2022-08-14 PROCEDURE — 63600175 PHARM REV CODE 636 W HCPCS: Performed by: NURSE PRACTITIONER

## 2022-08-14 PROCEDURE — 36415 COLL VENOUS BLD VENIPUNCTURE: CPT | Performed by: NURSE PRACTITIONER

## 2022-08-14 PROCEDURE — 99284 PR EMERGENCY DEPT VISIT,LEVEL IV: ICD-10-PCS | Mod: ,,, | Performed by: NURSE PRACTITIONER

## 2022-08-14 PROCEDURE — 96374 THER/PROPH/DIAG INJ IV PUSH: CPT

## 2022-08-14 PROCEDURE — 93010 ELECTROCARDIOGRAM REPORT: CPT | Mod: ,,, | Performed by: HOSPITALIST

## 2022-08-14 PROCEDURE — 93005 ELECTROCARDIOGRAM TRACING: CPT

## 2022-08-14 PROCEDURE — 25000003 PHARM REV CODE 250: Performed by: NURSE PRACTITIONER

## 2022-08-14 PROCEDURE — 99285 EMERGENCY DEPT VISIT HI MDM: CPT | Mod: 25

## 2022-08-14 PROCEDURE — 85730 THROMBOPLASTIN TIME PARTIAL: CPT | Performed by: NURSE PRACTITIONER

## 2022-08-14 RX ORDER — ONDANSETRON 2 MG/ML
4 INJECTION INTRAMUSCULAR; INTRAVENOUS
Status: COMPLETED | OUTPATIENT
Start: 2022-08-14 | End: 2022-08-14

## 2022-08-14 RX ORDER — ACETAMINOPHEN 500 MG
1000 TABLET ORAL
Status: COMPLETED | OUTPATIENT
Start: 2022-08-14 | End: 2022-08-14

## 2022-08-14 RX ADMIN — ACETAMINOPHEN 1000 MG: 500 TABLET ORAL at 03:08

## 2022-08-14 RX ADMIN — ONDANSETRON 4 MG: 2 INJECTION INTRAMUSCULAR; INTRAVENOUS at 01:08

## 2022-08-14 NOTE — LETTER
Patient: Rosemarie Lane  YOB: 1970  Date: 8/14/2022 Time: 3:30 PM  Location: [facility]    Leaving the Hospital Against Medical Advice    Chart #:[id number]    This will certify that I, the undersigned,    ______________________________________________________________________    A patient in the above named medical center, having requested discharge and removal from the medical [place] against the advice of my attending physician(s), hereby release [facility], its physicians, officers and employees, severally and individually, from any and all liability of any nature whatsoever for any injury or harm or complication of any kind that may result directly or indirectly, by reason of my terminating my stay as a patient at [facility] and my departure from [facility], and hereby waive any and all rights of action I may now have or later acquire as a result of my voluntary departure from [facility] and the termination of my stay as a patient therein.    This release is made with the full knowledge of the danger that may result from the action which I am taking.      Date:_______________________                         ___________________________                                                                                    Patient/Legal Representative    Witness:        ____________________________                          ___________________________  Nurse                                                                        Physician

## 2022-08-14 NOTE — LETTER
Patient: Rosemarie Lane  YOB: 1970  Date: 8/14/2022 Time: 3:30 PM  Location: [facility]    Leaving the Hospital Against Medical Advice    Chart #:[id number]    This will certify that I, the undersigned,    ______________________________________________________________________    A patient in the above named medical center, having requested discharge and removal from the medical [place] against the advice of my attending physician(s), hereby release [facility], its physicians, officers and employees, severally and individually, from any and all liability of any nature whatsoever for any injury or harm or complication of any kind that may result directly or indirectly, by reason of my terminating my stay as a patient at [facility] and my departure from [facility], and hereby waive any and all rights of action I may now have or later acquire as a result of my voluntary departure from [facility] and the termination of my stay as a patient therein.    This release is made with the full knowledge of the danger that may result from the action which I am taking.      Date:_______________________                         ___________________________                                                                                    Patient/Legal Representative    Witness:        ____________________________                          ___________________________  Nurse                                                                        Physician

## 2022-08-14 NOTE — ED PROVIDER NOTES
Encounter Date: 8/14/2022       History     Chief Complaint   Patient presents with    Chest Pain     51y/o wf presents per EMS with c/o sharp, mid-sternal chest pain that radiates under both breasts that started approx one hour PTA while sitting at home.  Denies diaphoresis, nausea, vomiting.  Given ASA 325mg and NTG SL by ems with improvement in symptoms.  Hx of heart disease with triple bypass in November 2020 by Dr. Rdz.  Had MI with stent placed x 1 by Dr. Giron on 11/9/21.  Is on Plavix and states is taking as prescribed.  Missed f/u appt with Dr. Giron-has not been seen since stents placed.  Has f/u appt scheduled next month.  Continues to smoke approx one-half ppd.  Was smoking 2ppd since age 15.  Hx of DM and Lupus.  Is on Plaquenil prescribed by pcp.          Review of patient's allergies indicates:   Allergen Reactions    Fish containing products Hives and Swelling    Penicillins Swelling     Localized swelling     Wasp venom Anaphylaxis    Opioids - morphine analogues      Change in behavior    Toradol [ketorolac] Hives    Tramadol Hives    Zithromax [azithromycin] Other (See Comments)     Patient cannot remember what kind of reaction she had to this medication    Bactrim ds [sulfamethoxazole-trimethoprim] Nausea And Vomiting     Facial flush    Latex, natural rubber Rash     Past Medical History:   Diagnosis Date    Anxiety     Arthritis     Asthma     Cancer     CHF (congestive heart failure)     Chronic obstructive lung disease     Coronary artery disease     Depression     Diabetes mellitus     Diabetes mellitus, type 2     Encounter for blood transfusion     History of kidney stones     Hyperlipidemia     Hypertension     Lupus     Renal disorder     Seizures     Thyroid disease     Transfusion reaction      Past Surgical History:   Procedure Laterality Date    CARDIAC CATHETERIZATION Left 09/19/2020    Proximal left main stenosis; IVUS in the LAD and left main     CORONARY ARTERY BYPASS GRAFT  09/30/2020    CORONARY STENT PLACEMENT  11/09/2021    Everolimus Eluting Coronary Stent-MLAD;LAD    INCISION AND DRAINAGE  06/02/2020    LEFT HEART CATHETERIZATION Left 11/9/2021    Procedure: Left heart cath;  Surgeon: Aureliano Giron MD;  Location: [facility] CATH LAB;  Service: Cardiology;  Laterality: Left;    TOTAL ABDOMINAL HYSTERECTOMY W/ BILATERAL SALPINGOOPHORECTOMY  2008    KIRSTY Lala      Family History   Problem Relation Age of Onset    Hypertension Mother     Diabetes Mother     Heart disease Mother     Cancer Mother     Bone cancer Father     Lung cancer Father     Diabetes Sister     Diabetes Brother      Social History     Tobacco Use    Smoking status: Current Every Day Smoker     Packs/day: 1.00     Types: Cigarettes     Start date: 1987    Smokeless tobacco: Never Used   Substance Use Topics    Alcohol use: Not Currently    Drug use: Never     Review of Systems   Constitutional: Negative for chills and fever.   Respiratory: Negative for cough and shortness of breath.    Cardiovascular: Positive for chest pain (See HPI). Negative for palpitations and leg swelling.   Gastrointestinal: Negative for nausea and vomiting.       Physical Exam     Initial Vitals [08/14/22 1300]   BP Pulse Resp Temp SpO2   (!) 149/79 86 17 99.4 °F (37.4 °C) 97 %      MAP       --         Physical Exam    Nursing note and vitals reviewed.  Constitutional: She appears well-developed and well-nourished. No distress.   HENT:   Head: Normocephalic.   Nose: Nose normal.   Mouth/Throat: Oropharynx is clear and moist.   Neck: Neck supple.   Cardiovascular: Normal rate, regular rhythm and normal heart sounds.   No murmur heard.  Mid-sternal scar   Pulmonary/Chest: Breath sounds normal. She is in respiratory distress.   Abdominal: Abdomen is soft. There is no abdominal tenderness.   Musculoskeletal:         General: No edema. Normal range of motion.      Cervical back: Neck supple.       Comments: Chest wall tenderness to palpation.     Neurological: She is alert and oriented to person, place, and time.   Skin: Skin is warm and dry. Capillary refill takes less than 2 seconds.   Psychiatric: She has a normal mood and affect.         Medical Screening Exam   See Full Note    ED Course   Procedures  Labs Reviewed   PT AND PTT - Abnormal; Notable for the following components:       Result Value    PTT 24.7 (*)     All other components within normal limits   CBC WITH DIFFERENTIAL - Abnormal; Notable for the following components:    WBC 12.01 (*)     Neutrophils % 66.0 (*)     Eosinophils % 0.0 (*)     Neutrophils, Abs 7.93 (*)     All other components within normal limits   TROPONIN I - Normal   NT-PRO NATRIURETIC PEPTIDE - Normal   MAGNESIUM - Normal   CBC W/ AUTO DIFFERENTIAL    Narrative:     The following orders were created for panel order CBC auto differential.  Procedure                               Abnormality         Status                     ---------                               -----------         ------                     CBC with Differential[[id number]]        Abnormal            Final result                 Please view results for these tests on the individual orders.   COMPREHENSIVE METABOLIC PANEL          Imaging Results          X-Ray Chest AP Portable (Final result)  Result time 08/14/22 14:08:48    Final result by Humble Macias MD (08/14/22 14:08:48)                 Impression:      No acute process compared to the previous study      Electronically signed by: Humble Macias  Date:    08/14/2022  Time:    14:08             Narrative:    EXAMINATION:  XR CHEST AP PORTABLE    CLINICAL HISTORY:  .  Chest pain, unspecified    COMPARISON:  January 18, 2021    TECHNIQUE:  Chest x-ray AP portable erect    FINDINGS:  Cardiac silhouette is upper normal.  There is no mediastinal mass.  Prior median sternotomy.  There is no pulmonary vascular engorgement.  Lungs and pleural  spaces are generally clear.    Osseous structures are unchanged                                 Medications   ondansetron injection 4 mg (4 mg Intravenous Given 8/14/22 1342)   acetaminophen tablet 1,000 mg (1,000 mg Oral Given 8/14/22 1522)     Medical Decision Making:   Clinical Tests:   Radiological Study: Reviewed  Medical Tests: Reviewed  ED Management:  EKG reviewed by Dr. Lopes  HR 87  Sinus rhythm,  Inferior-posterior infarct age undetermined.  No acute changes, No STEMI.    1530-Results of labs and  Imaging discussed with pt.  Recommended repeat Troponin prior to discharge.  Pt refuses.  AMA form completed.  Will refer to cardiology since Dr. Giron has left.  Other:   I have discussed this case with another health care provider.       <> Summary of the Discussion: Pt discussed with Dr. Lopes.  Pt discussed with Dr. Lopes.  Labs reviewed.  Recommended repeat Troponin.                   Clinical Impression:   Final diagnoses:  [R07.9] Chest pain, unspecified type (Primary)          ED Disposition Condition    AMA               OPAL Mccarty  08/14/22 1530

## 2022-08-14 NOTE — LETTER
Patient: Rosemarie Lane  YOB: 1970  Date: 8/14/2022 Time: 3:29 PM  Location: [facility]    Leaving the Hospital Against Medical Advice    Chart #:[id number]    This will certify that I, the undersigned,    ______________________________________________________________________    A patient in the above named medical center, having requested discharge and removal from the medical [place] against the advice of my attending physician(s), hereby release [facility], its physicians, officers and employees, severally and individually, from any and all liability of any nature whatsoever for any injury or harm or complication of any kind that may result directly or indirectly, by reason of my terminating my stay as a patient at [facility] and my departure from [facility], and hereby waive any and all rights of action I may now have or later acquire as a result of my voluntary departure from [facility] and the termination of my stay as a patient therein.    This release is made with the full knowledge of the danger that may result from the action which I am taking.      Date:_______________________                         ___________________________                                                                                    Patient/Legal Representative    Witness:        ____________________________                          ___________________________  Nurse                                                                        Physician

## 2022-08-14 NOTE — DISCHARGE INSTRUCTIONS
Will make referral to Cardiology since Dr. Giron has moved.  Continue usual home meds as prescribed.  Follow up with pcp this week if not improved.  Return to ER for worsening symptoms or any problems.

## 2022-08-15 ENCOUNTER — TELEPHONE (OUTPATIENT)
Dept: EMERGENCY MEDICINE | Facility: HOSPITAL | Age: 52
End: 2022-08-15

## 2022-08-22 ENCOUNTER — OFFICE VISIT (OUTPATIENT)
Dept: FAMILY MEDICINE | Facility: CLINIC | Age: 52
End: 2022-08-22
Payer: MEDICAID

## 2022-08-22 VITALS
DIASTOLIC BLOOD PRESSURE: 68 MMHG | WEIGHT: 166.38 LBS | BODY MASS INDEX: 32.67 KG/M2 | RESPIRATION RATE: 18 BRPM | SYSTOLIC BLOOD PRESSURE: 110 MMHG | TEMPERATURE: 98 F | HEART RATE: 74 BPM | OXYGEN SATURATION: 99 % | HEIGHT: 60 IN

## 2022-08-22 DIAGNOSIS — E11.65 TYPE 2 DIABETES MELLITUS WITH HYPERGLYCEMIA, WITHOUT LONG-TERM CURRENT USE OF INSULIN: ICD-10-CM

## 2022-08-22 DIAGNOSIS — R30.0 DYSURIA: ICD-10-CM

## 2022-08-22 DIAGNOSIS — F31.81 BIPOLAR 2 DISORDER, MAJOR DEPRESSIVE EPISODE: ICD-10-CM

## 2022-08-22 DIAGNOSIS — J44.9 CHRONIC OBSTRUCTIVE PULMONARY DISEASE, UNSPECIFIED COPD TYPE: ICD-10-CM

## 2022-08-22 DIAGNOSIS — M32.9 LUPUS: ICD-10-CM

## 2022-08-22 DIAGNOSIS — I25.110 ATHEROSCLEROSIS OF NATIVE CORONARY ARTERY OF NATIVE HEART WITH UNSTABLE ANGINA PECTORIS: Primary | ICD-10-CM

## 2022-08-22 DIAGNOSIS — M54.50 ACUTE LOW BACK PAIN WITHOUT SCIATICA, UNSPECIFIED BACK PAIN LATERALITY: ICD-10-CM

## 2022-08-22 LAB
BILIRUB SERPL-MCNC: NEGATIVE MG/DL
BLOOD URINE, POC: ABNORMAL
COLOR, POC UA: YELLOW
GLUCOSE UR QL STRIP: 500
KETONES UR QL STRIP: NEGATIVE
LEUKOCYTE ESTERASE URINE, POC: NEGATIVE
NITRITE, POC UA: NEGATIVE
PH, POC UA: 6.5
PROTEIN, POC: NEGATIVE
SPECIFIC GRAVITY, POC UA: 1
UROBILINOGEN, POC UA: 0.2

## 2022-08-22 PROCEDURE — 3074F PR MOST RECENT SYSTOLIC BLOOD PRESSURE < 130 MM HG: ICD-10-PCS | Mod: CPTII,,, | Performed by: FAMILY MEDICINE

## 2022-08-22 PROCEDURE — 3044F HG A1C LEVEL LT 7.0%: CPT | Mod: CPTII,,, | Performed by: FAMILY MEDICINE

## 2022-08-22 PROCEDURE — 1159F MED LIST DOCD IN RCRD: CPT | Mod: CPTII,,, | Performed by: FAMILY MEDICINE

## 2022-08-22 PROCEDURE — 3008F PR BODY MASS INDEX (BMI) DOCUMENTED: ICD-10-PCS | Mod: CPTII,,, | Performed by: FAMILY MEDICINE

## 2022-08-22 PROCEDURE — 3078F PR MOST RECENT DIASTOLIC BLOOD PRESSURE < 80 MM HG: ICD-10-PCS | Mod: CPTII,,, | Performed by: FAMILY MEDICINE

## 2022-08-22 PROCEDURE — 93000 PR ELECTROCARDIOGRAM, COMPLETE: ICD-10-PCS | Mod: ,,, | Performed by: FAMILY MEDICINE

## 2022-08-22 PROCEDURE — 87086 URINE CULTURE/COLONY COUNT: CPT | Mod: ,,, | Performed by: CLINICAL MEDICAL LABORATORY

## 2022-08-22 PROCEDURE — 87086 CULTURE, URINE: ICD-10-PCS | Mod: ,,, | Performed by: CLINICAL MEDICAL LABORATORY

## 2022-08-22 PROCEDURE — 3008F BODY MASS INDEX DOCD: CPT | Mod: CPTII,,, | Performed by: FAMILY MEDICINE

## 2022-08-22 PROCEDURE — 99214 OFFICE O/P EST MOD 30 MIN: CPT | Mod: ,,, | Performed by: FAMILY MEDICINE

## 2022-08-22 PROCEDURE — 1160F PR REVIEW ALL MEDS BY PRESCRIBER/CLIN PHARMACIST DOCUMENTED: ICD-10-PCS | Mod: CPTII,,, | Performed by: FAMILY MEDICINE

## 2022-08-22 PROCEDURE — 3078F DIAST BP <80 MM HG: CPT | Mod: CPTII,,, | Performed by: FAMILY MEDICINE

## 2022-08-22 PROCEDURE — 1160F RVW MEDS BY RX/DR IN RCRD: CPT | Mod: CPTII,,, | Performed by: FAMILY MEDICINE

## 2022-08-22 PROCEDURE — 3044F PR MOST RECENT HEMOGLOBIN A1C LEVEL <7.0%: ICD-10-PCS | Mod: CPTII,,, | Performed by: FAMILY MEDICINE

## 2022-08-22 PROCEDURE — 93000 ELECTROCARDIOGRAM COMPLETE: CPT | Mod: ,,, | Performed by: FAMILY MEDICINE

## 2022-08-22 PROCEDURE — 99214 PR OFFICE/OUTPT VISIT, EST, LEVL IV, 30-39 MIN: ICD-10-PCS | Mod: ,,, | Performed by: FAMILY MEDICINE

## 2022-08-22 PROCEDURE — 3074F SYST BP LT 130 MM HG: CPT | Mod: CPTII,,, | Performed by: FAMILY MEDICINE

## 2022-08-22 PROCEDURE — 1159F PR MEDICATION LIST DOCUMENTED IN MEDICAL RECORD: ICD-10-PCS | Mod: CPTII,,, | Performed by: FAMILY MEDICINE

## 2022-08-22 PROCEDURE — 81003 URINALYSIS AUTO W/O SCOPE: CPT | Mod: RHCUB | Performed by: FAMILY MEDICINE

## 2022-08-22 RX ORDER — HYDROCODONE BITARTRATE AND ACETAMINOPHEN 7.5; 325 MG/1; MG/1
1 TABLET ORAL EVERY 6 HOURS PRN
Qty: 10 TABLET | Refills: 0 | Status: SHIPPED | OUTPATIENT
Start: 2022-08-22 | End: 2022-12-29 | Stop reason: ALTCHOICE

## 2022-08-22 RX ORDER — NITROFURANTOIN 25; 75 MG/1; MG/1
100 CAPSULE ORAL 2 TIMES DAILY
Qty: 14 CAPSULE | Refills: 0 | Status: SHIPPED | OUTPATIENT
Start: 2022-08-22 | End: 2022-09-22 | Stop reason: ALTCHOICE

## 2022-08-22 RX ORDER — FLUCONAZOLE 100 MG/1
100 TABLET ORAL DAILY
Qty: 10 TABLET | Refills: 0 | Status: SHIPPED | OUTPATIENT
Start: 2022-08-22 | End: 2022-09-01

## 2022-08-22 NOTE — ASSESSMENT & PLAN NOTE
Bipolar disorder currently stable.  Will maintain her on her Vraylar daily.  Follow-up p.r.n. or in 1 month.

## 2022-08-22 NOTE — ASSESSMENT & PLAN NOTE
COPD currently stable.  No change in her medications.  Continue her breathing treatments.  Follow-up every 3 months.

## 2022-08-22 NOTE — ASSESSMENT & PLAN NOTE
A acute low back pain likely secondary to UTI.  Patient does have possible rheumatologic process so will continue her anti-inflammatories and her hydroxychloroquine and will give her Norco 7.5 mg number 10.   pulled and no evidence of abuse.

## 2022-08-22 NOTE — PROGRESS NOTES
Oren Gage DO   [address] [address] [facility] [address]  [place]  [address]      PATIENT NAME: Rosemarie Lane  : 1970  DATE: 22  MRN: [id number]      Billing Provider: Oren Gage DO  Level of Service:   Patient PCP Information     Provider PCP Type    Oren Gage DO General          Reason for Visit / Chief Complaint: Chest Pain (Started having chest pain last  and went to the ER. Has appt with cardiologist in November. Still having some chest pain on and off but not as bad as it was. Reports it is in the center of her chest and sharp. Reports she thinks it has to do more with anxiety than anything. ), Dysuria (Started a couple days ago. ), Back Pain (Lower ), and Abdominal Pain (lower)       Update PCP  Update Chief Complaint         History of Present Illness / Problem Focused Workflow     Rosemarie Lane presents to the clinic with Chest Pain (Started having chest pain last  and went to the ER. Has appt with cardiologist in November. Still having some chest pain on and off but not as bad as it was. Reports it is in the center of her chest and sharp. Reports she thinks it has to do more with anxiety than anything. ), Dysuria (Started a couple days ago. ), Back Pain (Lower ), and Abdominal Pain (lower)     Patient has been to the emergency room for chest pain and was told it was not her cardiac chest pain.  She reports that she has tightness in her chest the last for anywhere from a few seconds up to 10 minutes.  She denies any radiation of pain.  She states is different that when she had her MRI and her CABG.  She denies any fever chills sweats.  She does have history of COPD type symptoms but reports that she is not coughing and has no sputum production.  She denies any wheezing PND or orthopnea.  Patient also has lower abdominal pain and some back pain that goes into her legs.  She does have a history of lupus but most recent tests and 2020 to but showed  normal sed rate CRP as well as negative NICHOLE and rheumatoid factor.  Patient is due to have a Cardiology consult in November.      Review of Systems     Review of Systems   Constitutional: Negative for activity change, appetite change, chills, fatigue, fever and unexpected weight change.   HENT: Negative for nasal congestion, ear discharge, ear pain, hearing loss, mouth dryness, mouth sores, postnasal drip, rhinorrhea, sinus pressure/congestion, sore throat, trouble swallowing and voice change.    Eyes: Negative for pain, discharge, redness, itching and visual disturbance.   Respiratory: Positive for chest tightness. Negative for apnea, cough, shortness of breath and wheezing.    Cardiovascular: Positive for chest pain. Negative for palpitations and leg swelling.   Gastrointestinal: Negative for abdominal distention, abdominal pain, anal bleeding, blood in stool, change in bowel habit, constipation, diarrhea, nausea, vomiting, reflux and change in bowel habit.   Endocrine: Negative for cold intolerance, heat intolerance, polydipsia, polyphagia and polyuria.   Genitourinary: Positive for difficulty urinating and dysuria. Negative for enuresis, frequency, genital sores, hematuria, hot flashes, menstrual irregularity, menstrual problem, urgency and vaginal dryness.   Musculoskeletal: Positive for back pain. Negative for arthralgias, gait problem, joint swelling, leg pain, myalgias and neck pain.   Integumentary:  Negative for rash, mole/lesion, breast mass, breast discharge and breast tenderness.   Allergic/Immunologic: Negative for environmental allergies and food allergies.   Neurological: Negative for dizziness, vertigo, tremors, seizures, syncope, facial asymmetry, speech difficulty, weakness, light-headedness, numbness, headaches, disturbances in coordination, memory loss and coordination difficulties.   Hematological: Negative for adenopathy. Does not bruise/bleed easily.   Psychiatric/Behavioral: Negative for  agitation, behavioral problems, confusion, decreased concentration, dysphoric mood, hallucinations, self-injury, sleep disturbance and suicidal ideas. The patient is not nervous/anxious and is not hyperactive.    Breast: Negative for mass and tenderness      Medical / Social / Family History     Past Medical History:   Diagnosis Date    Anxiety     Arthritis     Asthma     Cancer     CHF (congestive heart failure)     Chronic obstructive lung disease     Coronary artery disease     Depression     Diabetes mellitus     Diabetes mellitus, type 2     Encounter for blood transfusion     History of kidney stones     Hyperlipidemia     Hypertension     Lupus     Renal disorder     Seizures     Thyroid disease     Transfusion reaction        Past Surgical History:   Procedure Laterality Date    CARDIAC CATHETERIZATION Left 09/19/2020    Proximal left main stenosis; IVUS in the LAD and left main    CORONARY ARTERY BYPASS GRAFT  09/30/2020    CORONARY STENT PLACEMENT  11/09/2021    Everolimus Eluting Coronary Stent-MLAD;LAD    HYSTERECTOMY      INCISION AND DRAINAGE  06/02/2020    LEFT HEART CATHETERIZATION Left 11/9/2021    Procedure: Left heart cath;  Surgeon: Aureliano Giron MD;  Location: [facility] CATH LAB;  Service: Cardiology;  Laterality: Left;    TOTAL ABDOMINAL HYSTERECTOMY W/ BILATERAL SALPINGOOPHORECTOMY  2008    KIRSTY Lala        Social History  Ms.  reports that she has been smoking cigarettes. She started smoking about 35 years ago. She has a 15.00 pack-year smoking history. She has never used smokeless tobacco. She reports previous alcohol use. She reports that she does not use drugs.    Family History  Ms.'s family history includes Bone cancer in her father; Cancer in her mother; Diabetes in her brother, mother, and sister; Heart attack in her paternal grandmother; Heart disease in her mother; Hypertension in her mother; Lung cancer in her father; No Known Problems in her  daughter, maternal grandfather, and paternal grandfather; Ovarian cancer in her maternal grandmother.    Medications and Allergies     Medications  Outpatient Medications Marked as Taking for the 8/22/22 encounter (Office Visit) with Oren Gage, DO   Medication Sig Dispense Refill    albuterol (PROVENTIL) 2.5 mg /3 mL (0.083 %) nebulizer solution USE 1 VIAL IN NEBULIZER EVERY 4 TO 6 HOURS AS NEEDED 120 mL 2    arformoteroL (BROVANA) 15 mcg/2 mL Nebu Take 2 mLs (15 mcg total) by nebulization 2 (two) times a day. 60 each 2    aspirin (ECOTRIN) 81 MG EC tablet Take 1 tablet by mouth once daily.      atorvastatin (LIPITOR) 40 MG tablet Take 1 tablet (40 mg total) by mouth once daily. 90 tablet 1    busPIRone (BUSPAR) 15 MG tablet TAKE 1 TABLET BY MOUTH 2 TIMES A DAY AS DIRECTED 60 tablet 2    cariprazine (VRAYLAR) 3 mg Cap Take 1 capsule (3 mg total) by mouth once daily. 30 capsule 2    clopidogreL (PLAVIX) 75 mg tablet Take 1 tablet (75 mg total) by mouth once daily. 90 tablet 1    FARXIGA 10 mg tablet Take 1 tablet (10 mg total) by mouth once daily. 30 tablet 2    gabapentin (NEURONTIN) 300 MG capsule TAKE ONE CAPSULE BY MOUTH EVERY MORNING & TAKE TWO CAPSULES BY MOUTH DAILY AT BEDTIME 90 capsule 2    hydrOXYchloroQUINE (PLAQUENIL) 200 mg tablet Take 1 tablet (200 mg total) by mouth 2 (two) times daily. 60 tablet 2    LANTUS SOLOSTAR U-100 INSULIN glargine 100 units/mL SubQ pen Inject 30 Units into the skin 2 (two) times a day. 15 mL 2    linaCLOtide (LINZESS) 145 mcg Cap capsule Take 1 capsule (145 mcg total) by mouth daily as needed (constipation). 30 capsule 2    metoprolol succinate (TOPROL-XL) 50 MG 24 hr tablet Take 1 tablet (50 mg total) by mouth once daily. 90 tablet 3    mirtazapine (REMERON) 30 MG tablet Take 30 mg by mouth once daily.      predniSONE (DELTASONE) 10 MG tablet Take 1 tablet (10 mg total) by mouth once daily. 30 tablet 0    promethazine (PHENERGAN) 12.5 MG Tab Take 1  tablet (12.5 mg total) by mouth every 6 (six) hours as needed (nausea). 30 tablet 0    rOPINIRole (REQUIP) 0.25 MG tablet Take 1 tablet (0.25 mg total) by mouth 3 (three) times daily. 90 tablet 2    topiramate (TOPAMAX) 25 MG tablet Take 1 tablet (25 mg total) by mouth once daily. 30 tablet 2       Allergies  Review of patient's allergies indicates:   Allergen Reactions    Fish containing products Hives and Swelling    Penicillins Swelling     Localized swelling     Wasp venom Anaphylaxis    Opioids - morphine analogues      Change in behavior    Toradol [ketorolac] Hives    Tramadol Hives    Zithromax [azithromycin] Other (See Comments)     Patient cannot remember what kind of reaction she had to this medication    Bactrim ds [sulfamethoxazole-trimethoprim] Nausea And Vomiting     Facial flush    Latex, natural rubber Rash       Physical Examination     Vitals:    08/22/22 0926   BP: 110/68   Pulse: 74   Resp: 18   Temp: 98.2 °F (36.8 °C)     Physical Exam  Vitals reviewed.   Constitutional:       General: She is not in acute distress.     Appearance: Normal appearance. She is obese.   HENT:      Head: Normocephalic and atraumatic.      Nose: Nose normal.      Mouth/Throat:      Mouth: Mucous membranes are moist.      Pharynx: Oropharynx is clear. No oropharyngeal exudate or posterior oropharyngeal erythema.   Eyes:      General: No scleral icterus.     Extraocular Movements: Extraocular movements intact.      Conjunctiva/sclera: Conjunctivae normal.      Pupils: Pupils are equal, round, and reactive to light.   Neck:      Vascular: No carotid bruit.   Cardiovascular:      Rate and Rhythm: Normal rate and regular rhythm.      Pulses: Normal pulses.      Heart sounds: Normal heart sounds. No murmur heard.    No gallop.   Pulmonary:      Effort: Pulmonary effort is normal.      Breath sounds: Normal breath sounds. No wheezing.   Abdominal:      General: Abdomen is flat. Bowel sounds are normal.       Palpations: Abdomen is soft.      Tenderness: There is abdominal tenderness. There is left CVA tenderness. There is no right CVA tenderness, guarding or rebound.   Musculoskeletal:         General: Tenderness present. No signs of injury. Normal range of motion.      Cervical back: Normal range of motion and neck supple.      Right lower leg: No edema.      Left lower leg: No edema.      Comments: Her tenderness lumbar spine bilaterally with the full range of motion flexion extension.  Negative straight leg raising.  Reflexes are equal bilaterally.  No neurologic changes.   Lymphadenopathy:      Cervical: No cervical adenopathy.   Skin:     General: Skin is warm and dry.      Capillary Refill: Capillary refill takes less than 2 seconds.      Coloration: Skin is not jaundiced.      Findings: No lesion.   Neurological:      General: No focal deficit present.      Mental Status: She is alert and oriented to person, place, and time. Mental status is at baseline.      Cranial Nerves: No cranial nerve deficit.      Sensory: No sensory deficit.      Motor: No weakness.      Coordination: Coordination normal.      Gait: Gait normal.      Deep Tendon Reflexes: Reflexes normal.   Psychiatric:         Mood and Affect: Mood normal.         Behavior: Behavior normal.         Thought Content: Thought content normal.         Judgment: Judgment normal.               Lab Results   Component Value Date    WBC 12.01 (H) 08/14/2022    HGB 14.6 08/14/2022    HCT 42.4 08/14/2022    MCV 88.3 08/14/2022     08/14/2022          Sodium   Date Value Ref Range Status   08/14/2022 139 136 - 145 mmol/L Final     Potassium   Date Value Ref Range Status   08/14/2022 4.1 3.5 - 5.1 mmol/L Final     Chloride   Date Value Ref Range Status   08/14/2022 102 98 - 107 mmol/L Final     CO2   Date Value Ref Range Status   08/14/2022 27 21 - 32 mmol/L Final     Glucose   Date Value Ref Range Status   08/14/2022 81 74 - 106 mg/dL Final     BUN   Date  Value Ref Range Status   08/14/2022 18 7 - 18 mg/dL Final     Creatinine   Date Value Ref Range Status   08/14/2022 1.00 0.55 - 1.02 mg/dL Final     Calcium   Date Value Ref Range Status   08/14/2022 9.5 8.5 - 10.1 mg/dL Final     Total Protein   Date Value Ref Range Status   08/14/2022 7.5 6.4 - 8.2 g/dL Final     Albumin   Date Value Ref Range Status   08/14/2022 4.3 3.5 - 5.0 g/dL Final     Bilirubin, Total   Date Value Ref Range Status   08/14/2022 0.3 0.0 - 1.2 mg/dL Final     Alk Phos   Date Value Ref Range Status   08/14/2022 91 41 - 108 U/L Final     AST   Date Value Ref Range Status   08/14/2022 23 15 - 37 U/L Final     ALT   Date Value Ref Range Status   08/14/2022 33 13 - 56 U/L Final     Anion Gap   Date Value Ref Range Status   08/14/2022 14 7 - 16 mmol/L Final     eGFR    Date Value Ref Range Status   01/18/2021 62       eGFR   Date Value Ref Range Status   07/08/2022 70 >=60 mL/min/1.73m² Final      X-Ray Chest AP Portable  Narrative: EXAMINATION:  XR CHEST AP PORTABLE    CLINICAL HISTORY:  .  Chest pain, unspecified    COMPARISON:  January 18, 2021    TECHNIQUE:  Chest x-ray AP portable erect    FINDINGS:  Cardiac silhouette is upper normal.  There is no mediastinal mass.  Prior median sternotomy.  There is no pulmonary vascular engorgement.  Lungs and pleural spaces are generally clear.    Osseous structures are unchanged  Impression: No acute process compared to the previous study    Electronically signed by: Humble Macias  Date:    08/14/2022  Time:    14:08     Procedures   Assessment and Plan (including Health Maintenance)      Problem List  Smart Sets  Document Outside HM   :    Plan:         Health Maintenance Due   Topic Date Due    Hepatitis C Screening  Never done    Mammogram  Never done    Colorectal Cancer Screening  Never done    Shingles Vaccine (1 of 2) Never done       Problem List Items Addressed This Visit        Psychiatric    Bipolar 2 disorder, major  depressive episode    Current Assessment & Plan     Bipolar disorder currently stable.  Will maintain her on her Vraylar daily.  Follow-up p.r.n. or in 1 month.              Pulmonary    Chronic obstructive lung disease    Current Assessment & Plan     COPD currently stable.  No change in her medications.  Continue her breathing treatments.  Follow-up every 3 months.              Cardiac/Vascular    Atherosclerosis of native coronary artery of native heart with unstable angina pectoris - Primary    Overview     S/p CABG in 9/20           Current Assessment & Plan     Patient having chest pain often on.  Has been seen in the emergency room and sent home.  EKG today shows nonspecific ST T wave changes with flipped T-waves in the septal and lateral leads.  Will refer her for her back to cardiology for possible repeat calf or stress testing.  Patient is appointment is in November however will try to get that moved up 2 within the next 2 weeks.  Patient reports chest tightness and fullness the last for 10-20 minutes at a time.  EKG is nonspecific findings but could indicate some ischemia.           Relevant Orders    POCT EKG 12-LEAD (NOT FOR [facility] USE)    Ambulatory referral/consult to Cardiology       Renal/    Dysuria    Current Assessment & Plan     Patient has some mild dysuria.  She said UTIs in the past.  She does have blood in her urine.  Will start her on Macrobid 1 twice daily for 7 days.  Culture of her urine.           Relevant Medications    nitrofurantoin, macrocrystal-monohydrate, (MACROBID) 100 MG capsule    fluconazole (DIFLUCAN) 100 MG tablet    Other Relevant Orders    POCT URINALYSIS W/O SCOPE (Completed)    Urine culture    Urine culture       Immunology/Multi System    Lupus    Current Assessment & Plan     Most recent CRP was 0.77 and normal sed rate.  Patient's NICHOLE rheumatoid factor were negative.  Patient is currently taking hydroxychloroquine.  Patient will need to see Rheumatology.  No  change in treatment at this time but will monitor liver function test              Endocrine    Diabetes mellitus    Current Assessment & Plan     Less blood A1c was 6.2 in July of 2022.  Good control of her blood sugars.  No change in medicines.  Follow-up in 3 months.           Relevant Orders    Basic Metabolic Panel    Hemoglobin A1C    Lipid Panel       Orthopedic    Acute low back pain without sciatica    Current Assessment & Plan     A acute low back pain likely secondary to UTI.  Patient does have possible rheumatologic process so will continue her anti-inflammatories and her hydroxychloroquine and will give her Norco 7.5 mg number 10.   pulled and no evidence of abuse.           Relevant Medications    HYDROcodone-acetaminophen (NORCO) 7.5-325 mg per tablet    Other Relevant Orders    POCT URINALYSIS W/O SCOPE (Completed)          Health Maintenance Topics with due status: Not Due       Topic Last Completion Date    Lipid Panel 07/08/2022    Hemoglobin A1c 07/08/2022    High Dose Statin 08/22/2022       Future Appointments   Date Time Provider Department Center   8/31/2022  8:15 AM [facility]   9/22/2022  8:45 AM Oren Gage DO [facility] [place]   10/27/2022 11:00 AM [facility] [place] [facility]   11/3/2022  8:45 AM Aureliano Giron MD [facility]   7/12/2023  9:00 AM Oren Gage DO [facility] [place]        Follow up in about 4 weeks (around 9/19/2022), or if symptoms worsen or fail to improve.     Signature:  DO [facility]ice [facility] [address] [address]  [place]  [address]    Date of encounter: 8/22/22

## 2022-08-22 NOTE — ASSESSMENT & PLAN NOTE
Most recent CRP was 0.77 and normal sed rate.  Patient's NICHOLE rheumatoid factor were negative.  Patient is currently taking hydroxychloroquine.  Patient will need to see Rheumatology.  No change in treatment at this time but will monitor liver function test

## 2022-08-22 NOTE — ASSESSMENT & PLAN NOTE
Patient has some mild dysuria.  She said UTIs in the past.  She does have blood in her urine.  Will start her on Macrobid 1 twice daily for 7 days.  Culture of her urine.

## 2022-08-22 NOTE — ASSESSMENT & PLAN NOTE
Less blood A1c was 6.2 in July of 2022.  Good control of her blood sugars.  No change in medicines.  Follow-up in 3 months.

## 2022-08-22 NOTE — ASSESSMENT & PLAN NOTE
Patient having chest pain often on.  Has been seen in the emergency room and sent home.  EKG today shows nonspecific ST T wave changes with flipped T-waves in the septal and lateral leads.  Will refer her for her back to cardiology for possible repeat calf or stress testing.  Patient is appointment is in November however will try to get that moved up 2 within the next 2 weeks.  Patient reports chest tightness and fullness the last for 10-20 minutes at a time.  EKG is nonspecific findings but could indicate some ischemia.

## 2022-08-24 LAB — UA COMPLETE W REFLEX CULTURE PNL UR: NO GROWTH

## 2022-09-20 LAB
EKG 12-LEAD: NORMAL
PR INTERVAL: 142 MS
PRT AXES: NORMAL MS
QRS DURATION: 75 MS
QT/QTC: NORMAL MS
VENTRICULAR RATE: 68 BPM

## 2022-09-22 ENCOUNTER — PATIENT MESSAGE (OUTPATIENT)
Dept: FAMILY MEDICINE | Facility: CLINIC | Age: 52
End: 2022-09-22

## 2022-09-22 ENCOUNTER — OFFICE VISIT (OUTPATIENT)
Dept: FAMILY MEDICINE | Facility: CLINIC | Age: 52
End: 2022-09-22
Payer: MEDICAID

## 2022-09-22 VITALS
BODY MASS INDEX: 32.39 KG/M2 | DIASTOLIC BLOOD PRESSURE: 76 MMHG | SYSTOLIC BLOOD PRESSURE: 122 MMHG | WEIGHT: 165 LBS | TEMPERATURE: 98 F | OXYGEN SATURATION: 97 % | HEART RATE: 76 BPM | HEIGHT: 60 IN | RESPIRATION RATE: 20 BRPM

## 2022-09-22 DIAGNOSIS — I25.110 ATHEROSCLEROSIS OF NATIVE CORONARY ARTERY OF NATIVE HEART WITH UNSTABLE ANGINA PECTORIS: ICD-10-CM

## 2022-09-22 DIAGNOSIS — M32.9 LUPUS: ICD-10-CM

## 2022-09-22 DIAGNOSIS — J41.0 SIMPLE CHRONIC BRONCHITIS: Primary | ICD-10-CM

## 2022-09-22 DIAGNOSIS — E78.2 MIXED HYPERLIPIDEMIA: ICD-10-CM

## 2022-09-22 DIAGNOSIS — E11.65 TYPE 2 DIABETES MELLITUS WITH HYPERGLYCEMIA, WITHOUT LONG-TERM CURRENT USE OF INSULIN: ICD-10-CM

## 2022-09-22 DIAGNOSIS — F31.81 BIPOLAR 2 DISORDER, MAJOR DEPRESSIVE EPISODE: ICD-10-CM

## 2022-09-22 DIAGNOSIS — R53.83 OTHER FATIGUE: ICD-10-CM

## 2022-09-22 LAB
ALBUMIN SERPL BCP-MCNC: 4.8 G/DL (ref 3.5–5)
ALBUMIN/GLOB SERPL: 1.2 {RATIO}
ALP SERPL-CCNC: 112 U/L (ref 41–108)
ALT SERPL W P-5'-P-CCNC: 34 U/L (ref 13–56)
ANION GAP SERPL CALCULATED.3IONS-SCNC: 13 MMOL/L (ref 7–16)
AST SERPL W P-5'-P-CCNC: 21 U/L (ref 15–37)
BASOPHILS # BLD AUTO: 0.05 K/UL (ref 0–0.2)
BASOPHILS NFR BLD AUTO: 0.5 % (ref 0–1)
BILIRUB SERPL-MCNC: 0.4 MG/DL (ref ?–1.2)
BUN SERPL-MCNC: 17 MG/DL (ref 7–18)
BUN/CREAT SERPL: 18 (ref 6–20)
CALCIUM SERPL-MCNC: 9.7 MG/DL (ref 8.5–10.1)
CHLORIDE SERPL-SCNC: 101 MMOL/L (ref 98–107)
CO2 SERPL-SCNC: 28 MMOL/L (ref 21–32)
CREAT SERPL-MCNC: 0.97 MG/DL (ref 0.55–1.02)
CRP SERPL-MCNC: 0.56 MG/DL (ref 0–0.8)
DIFFERENTIAL METHOD BLD: ABNORMAL
EGFR (NO RACE VARIABLE) (RUSH/TITUS): 71 ML/MIN/1.73M²
EOSINOPHIL # BLD AUTO: 0 K/UL (ref 0–0.5)
EOSINOPHIL NFR BLD AUTO: 0 % (ref 1–4)
ERYTHROCYTE [DISTWIDTH] IN BLOOD BY AUTOMATED COUNT: 13.9 % (ref 11.5–14.5)
ERYTHROCYTE [SEDIMENTATION RATE] IN BLOOD BY WESTERGREN METHOD: 2 MM/HR (ref 0–30)
EST. AVERAGE GLUCOSE BLD GHB EST-MCNC: 117 MG/DL
GLOBULIN SER-MCNC: 4 G/DL (ref 2–4)
GLUCOSE SERPL-MCNC: 89 MG/DL (ref 74–106)
HBA1C MFR BLD HPLC: 6.1 % (ref 4.5–6.6)
HCT VFR BLD AUTO: 50.7 % (ref 38–47)
HGB BLD-MCNC: 16 G/DL (ref 12–16)
IMM GRANULOCYTES # BLD AUTO: 0.02 K/UL (ref 0–0.04)
IMM GRANULOCYTES NFR BLD: 0.2 % (ref 0–0.4)
LYMPHOCYTES # BLD AUTO: 2.74 K/UL (ref 1–4.8)
LYMPHOCYTES NFR BLD AUTO: 30 % (ref 27–41)
MCH RBC QN AUTO: 30 PG (ref 27–31)
MCHC RBC AUTO-ENTMCNC: 31.6 G/DL (ref 32–36)
MCV RBC AUTO: 95.1 FL (ref 80–96)
MONOCYTES # BLD AUTO: 0.52 K/UL (ref 0–0.8)
MONOCYTES NFR BLD AUTO: 5.7 % (ref 2–6)
MPC BLD CALC-MCNC: 9.8 FL (ref 9.4–12.4)
NEUTROPHILS # BLD AUTO: 5.8 K/UL (ref 1.8–7.7)
NEUTROPHILS NFR BLD AUTO: 63.6 % (ref 53–65)
NRBC # BLD AUTO: 0 X10E3/UL
NRBC, AUTO (.00): 0 %
PLATELET # BLD AUTO: 235 K/UL (ref 150–400)
POTASSIUM SERPL-SCNC: 4.4 MMOL/L (ref 3.5–5.1)
PROT SERPL-MCNC: 8.8 G/DL (ref 6.4–8.2)
RBC # BLD AUTO: 5.33 M/UL (ref 4.2–5.4)
SODIUM SERPL-SCNC: 138 MMOL/L (ref 136–145)
T4 SERPL-MCNC: 9.3 ΜG/DL (ref 4.8–13.9)
TSH SERPL DL<=0.005 MIU/L-ACNC: 1.56 UIU/ML (ref 0.36–3.74)
URATE SERPL-MCNC: 4.9 MG/DL (ref 2.6–6)
WBC # BLD AUTO: 9.13 K/UL (ref 4.5–11)

## 2022-09-22 PROCEDURE — 85651 RBC SED RATE NONAUTOMATED: CPT | Mod: ,,, | Performed by: CLINICAL MEDICAL LABORATORY

## 2022-09-22 PROCEDURE — 86140 C-REACTIVE PROTEIN: CPT | Mod: ,,, | Performed by: CLINICAL MEDICAL LABORATORY

## 2022-09-22 PROCEDURE — 83036 HEMOGLOBIN A1C: ICD-10-PCS | Mod: ,,, | Performed by: CLINICAL MEDICAL LABORATORY

## 2022-09-22 PROCEDURE — 84443 TSH: ICD-10-PCS | Mod: ,,, | Performed by: CLINICAL MEDICAL LABORATORY

## 2022-09-22 PROCEDURE — 83036 HEMOGLOBIN GLYCOSYLATED A1C: CPT | Mod: ,,, | Performed by: CLINICAL MEDICAL LABORATORY

## 2022-09-22 PROCEDURE — 84550 ASSAY OF BLOOD/URIC ACID: CPT | Mod: ,,, | Performed by: CLINICAL MEDICAL LABORATORY

## 2022-09-22 PROCEDURE — 3074F SYST BP LT 130 MM HG: CPT | Mod: CPTII,,, | Performed by: FAMILY MEDICINE

## 2022-09-22 PROCEDURE — 99215 OFFICE O/P EST HI 40 MIN: CPT | Mod: ,,, | Performed by: FAMILY MEDICINE

## 2022-09-22 PROCEDURE — 1159F MED LIST DOCD IN RCRD: CPT | Mod: CPTII,,, | Performed by: FAMILY MEDICINE

## 2022-09-22 PROCEDURE — 85651 SEDIMENTATION RATE, AUTOMATED: ICD-10-PCS | Mod: ,,, | Performed by: CLINICAL MEDICAL LABORATORY

## 2022-09-22 PROCEDURE — 80053 COMPREHEN METABOLIC PANEL: CPT | Mod: ,,, | Performed by: CLINICAL MEDICAL LABORATORY

## 2022-09-22 PROCEDURE — 3078F DIAST BP <80 MM HG: CPT | Mod: CPTII,,, | Performed by: FAMILY MEDICINE

## 2022-09-22 PROCEDURE — 3044F PR MOST RECENT HEMOGLOBIN A1C LEVEL <7.0%: ICD-10-PCS | Mod: CPTII,,, | Performed by: FAMILY MEDICINE

## 2022-09-22 PROCEDURE — 3044F HG A1C LEVEL LT 7.0%: CPT | Mod: CPTII,,, | Performed by: FAMILY MEDICINE

## 2022-09-22 PROCEDURE — 3008F PR BODY MASS INDEX (BMI) DOCUMENTED: ICD-10-PCS | Mod: CPTII,,, | Performed by: FAMILY MEDICINE

## 2022-09-22 PROCEDURE — 84436 ASSAY OF TOTAL THYROXINE: CPT | Mod: ,,, | Performed by: CLINICAL MEDICAL LABORATORY

## 2022-09-22 PROCEDURE — 99215 PR OFFICE/OUTPT VISIT, EST, LEVL V, 40-54 MIN: ICD-10-PCS | Mod: ,,, | Performed by: FAMILY MEDICINE

## 2022-09-22 PROCEDURE — 85025 CBC WITH DIFFERENTIAL: ICD-10-PCS | Mod: ,,, | Performed by: CLINICAL MEDICAL LABORATORY

## 2022-09-22 PROCEDURE — 3008F BODY MASS INDEX DOCD: CPT | Mod: CPTII,,, | Performed by: FAMILY MEDICINE

## 2022-09-22 PROCEDURE — 84550 URIC ACID: ICD-10-PCS | Mod: ,,, | Performed by: CLINICAL MEDICAL LABORATORY

## 2022-09-22 PROCEDURE — 85025 COMPLETE CBC W/AUTO DIFF WBC: CPT | Mod: ,,, | Performed by: CLINICAL MEDICAL LABORATORY

## 2022-09-22 PROCEDURE — 86140 C-REACTIVE PROTEIN: ICD-10-PCS | Mod: ,,, | Performed by: CLINICAL MEDICAL LABORATORY

## 2022-09-22 PROCEDURE — 3074F PR MOST RECENT SYSTOLIC BLOOD PRESSURE < 130 MM HG: ICD-10-PCS | Mod: CPTII,,, | Performed by: FAMILY MEDICINE

## 2022-09-22 PROCEDURE — 84436 T4: ICD-10-PCS | Mod: ,,, | Performed by: CLINICAL MEDICAL LABORATORY

## 2022-09-22 PROCEDURE — 1159F PR MEDICATION LIST DOCUMENTED IN MEDICAL RECORD: ICD-10-PCS | Mod: CPTII,,, | Performed by: FAMILY MEDICINE

## 2022-09-22 PROCEDURE — 3078F PR MOST RECENT DIASTOLIC BLOOD PRESSURE < 80 MM HG: ICD-10-PCS | Mod: CPTII,,, | Performed by: FAMILY MEDICINE

## 2022-09-22 PROCEDURE — 83516 VASCULITIS PANEL: ICD-10-PCS | Mod: 90,,, | Performed by: CLINICAL MEDICAL LABORATORY

## 2022-09-22 PROCEDURE — 83516 IMMUNOASSAY NONANTIBODY: CPT | Mod: 90,,, | Performed by: CLINICAL MEDICAL LABORATORY

## 2022-09-22 PROCEDURE — 84443 ASSAY THYROID STIM HORMONE: CPT | Mod: ,,, | Performed by: CLINICAL MEDICAL LABORATORY

## 2022-09-22 PROCEDURE — 80053 COMPREHENSIVE METABOLIC PANEL: ICD-10-PCS | Mod: ,,, | Performed by: CLINICAL MEDICAL LABORATORY

## 2022-09-22 NOTE — PROGRESS NOTES
Oren Gage DO   [address] [place]  [address]      PATIENT NAME: Rosemarie Lane  : 1970  DATE: 22  MRN: [id number]      Billing Provider: Oren Gage DO  Level of Service:   Patient PCP Information       Provider PCP Type    Oren Gage DO General            Reason for Visit / Chief Complaint: Hypertension (Routine follow up, Pt is scheduled with Dr. Hawk at [facility] on Monday. Pt states that she has still been having slight chest pain since her last visit. )       Update PCP  Update Chief Complaint         History of Present Illness / Problem Focused Workflow     Rosemarie Lane presents to the clinic with Hypertension (Routine follow up, Pt is scheduled with Dr. Hawk at [facility] on Monday. Pt states that she has still been having slight chest pain since her last visit. )     Patient has a history of some chest wall pain that has continued.  She has followed up with Cardiology.  Patient also has been noted to have tenderness of her chest wall.  She denies any fevers chills or sweats.  She has no history of pulmonary embolus.  Patient the has no PND orthopnea.  Her diabetes and lipids have been well controlled.  She does have chronic lupus and has chronic pain which is controlled with the Plaquenil.      Review of Systems     Review of Systems   Constitutional:  Negative for activity change, appetite change, chills, fatigue, fever and unexpected weight change.   HENT:  Negative for nasal congestion, ear discharge, ear pain, hearing loss, mouth dryness, mouth sores, postnasal drip, rhinorrhea, sinus pressure/congestion, sore throat, trouble swallowing and voice change.    Eyes:  Negative for pain, discharge, redness, itching and visual disturbance.   Respiratory:  Negative for apnea, cough, chest tightness, shortness of breath and wheezing.    Cardiovascular:  Positive for chest pain. Negative for palpitations and leg  swelling.   Gastrointestinal:  Negative for abdominal distention, abdominal pain, anal bleeding, blood in stool, change in bowel habit, constipation, diarrhea, nausea, vomiting, reflux and change in bowel habit.   Endocrine: Negative for cold intolerance, heat intolerance, polydipsia, polyphagia and polyuria.   Genitourinary:  Negative for difficulty urinating, dysuria, enuresis, frequency, genital sores, hematuria, hot flashes, menstrual irregularity, menstrual problem, urgency and vaginal dryness.   Musculoskeletal:  Negative for arthralgias, back pain, gait problem, joint swelling, leg pain, myalgias and neck pain.   Integumentary:  Negative for rash, mole/lesion, breast mass, breast discharge and breast tenderness.   Allergic/Immunologic: Negative for environmental allergies and food allergies.   Neurological:  Negative for dizziness, vertigo, tremors, seizures, syncope, facial asymmetry, speech difficulty, weakness, light-headedness, numbness, headaches, coordination difficulties, memory loss and coordination difficulties.   Hematological:  Negative for adenopathy. Does not bruise/bleed easily.   Psychiatric/Behavioral:  Negative for agitation, behavioral problems, confusion, decreased concentration, dysphoric mood, hallucinations, self-injury, sleep disturbance and suicidal ideas. The patient is not nervous/anxious and is not hyperactive.    Breast: Negative for mass and tenderness    Medical / Social / Family History     Past Medical History:   Diagnosis Date    Anxiety     Arthritis     Asthma     Cancer     CHF (congestive heart failure)     Chronic obstructive lung disease     Coronary artery disease     Depression     Diabetes mellitus     Diabetes mellitus, type 2     Encounter for blood transfusion     History of kidney stones     Hyperlipidemia     Hypertension     Lupus     Renal disorder     Seizures     Thyroid disease     Transfusion reaction        Past Surgical History:   Procedure Laterality  Date    CARDIAC CATHETERIZATION Left 09/19/2020    Proximal left main stenosis; IVUS in the LAD and left main    CHOLECYSTECTOMY      CORONARY ARTERY BYPASS GRAFT  09/30/2020    CORONARY STENT PLACEMENT  11/09/2021    Everolimus Eluting Coronary Stent-MLAD;LAD    HYSTERECTOMY      INCISION AND DRAINAGE  06/02/2020    LEFT HEART CATHETERIZATION Left 11/09/2021    Procedure: Left heart cath;  Surgeon: Aureliano Giron MD;  Location: [facility] CATH LAB;  Service: Cardiology;  Laterality: Left;    TOTAL ABDOMINAL HYSTERECTOMY W/ BILATERAL SALPINGOOPHORECTOMY  2008    KIRSTY Lala     TUBAL LIGATION         Social History  Ms.  reports that she has been smoking cigarettes. She started smoking about 35 years ago. She has a 15.00 pack-year smoking history. She has never used smokeless tobacco. She reports that she does not drink alcohol and does not use drugs.    Family History  Ms.'s family history includes Bone cancer in her father; COPD in her father; Cancer in her father and mother; Diabetes in her brother, mother, and sister; Heart attack in her paternal grandmother; Heart disease in her mother; Hypertension in her mother; Lung cancer in her father; No Known Problems in her daughter, maternal grandfather, and paternal grandfather; Ovarian cancer in her maternal grandmother.    Medications and Allergies     Medications  Outpatient Medications Marked as Taking for the 9/22/22 encounter (Office Visit) with Oren Gage, DO   Medication Sig Dispense Refill    albuterol (PROVENTIL) 2.5 mg /3 mL (0.083 %) nebulizer solution USE 1 VIAL IN NEBULIZER EVERY 4 TO 6 HOURS AS NEEDED 120 mL 2    arformoteroL (BROVANA) 15 mcg/2 mL Nebu Take 2 mLs (15 mcg total) by nebulization 2 (two) times a day. 60 each 2    aspirin (ECOTRIN) 81 MG EC tablet Take 1 tablet by mouth once daily.      atorvastatin (LIPITOR) 40 MG tablet Take 1 tablet (40 mg total) by mouth once daily. 90 tablet 1    busPIRone (BUSPAR) 15 MG tablet TAKE 1 TABLET  BY MOUTH 2 TIMES A DAY AS DIRECTED 60 tablet 2    cariprazine (VRAYLAR) 3 mg Cap Take 1 capsule (3 mg total) by mouth once daily. 30 capsule 2    clopidogreL (PLAVIX) 75 mg tablet Take 1 tablet (75 mg total) by mouth once daily. 90 tablet 1    FARXIGA 10 mg tablet Take 1 tablet (10 mg total) by mouth once daily. 30 tablet 2    gabapentin (NEURONTIN) 300 MG capsule TAKE ONE CAPSULE BY MOUTH EVERY MORNING & TAKE TWO CAPSULES BY MOUTH DAILY AT BEDTIME 90 capsule 2    HYDROcodone-acetaminophen (NORCO) 7.5-325 mg per tablet Take 1 tablet by mouth every 6 (six) hours as needed for Pain. 10 tablet 0    hydrOXYchloroQUINE (PLAQUENIL) 200 mg tablet Take 1 tablet (200 mg total) by mouth 2 (two) times daily. 60 tablet 2    LANTUS SOLOSTAR U-100 INSULIN glargine 100 units/mL SubQ pen Inject 30 Units into the skin 2 (two) times a day. 15 mL 2    linaCLOtide (LINZESS) 145 mcg Cap capsule Take 1 capsule (145 mcg total) by mouth daily as needed (constipation). 30 capsule 2    metoprolol succinate (TOPROL-XL) 50 MG 24 hr tablet Take 1 tablet (50 mg total) by mouth once daily. 90 tablet 3    mirtazapine (REMERON) 30 MG tablet Take 30 mg by mouth once daily.      promethazine (PHENERGAN) 12.5 MG Tab Take 1 tablet (12.5 mg total) by mouth every 6 (six) hours as needed (nausea). 30 tablet 0    rOPINIRole (REQUIP) 0.25 MG tablet Take 1 tablet (0.25 mg total) by mouth 3 (three) times daily. 90 tablet 2    topiramate (TOPAMAX) 25 MG tablet Take 1 tablet (25 mg total) by mouth once daily. 30 tablet 2    [DISCONTINUED] predniSONE (DELTASONE) 10 MG tablet Take 1 tablet (10 mg total) by mouth once daily. 30 tablet 0       Allergies  Review of patient's allergies indicates:   Allergen Reactions    Fish containing products Hives and Swelling    Penicillins Swelling     Localized swelling     Wasp venom Anaphylaxis    Opioids - morphine analogues      Change in behavior    Toradol [ketorolac] Hives    Tramadol Hives    Zithromax [azithromycin]  Other (See Comments)     Patient cannot remember what kind of reaction she had to this medication    Bactrim ds [sulfamethoxazole-trimethoprim] Nausea And Vomiting     Facial flush    Latex, natural rubber Rash       Physical Examination     Vitals:    09/22/22 0852   BP: 122/76   Pulse: 76   Resp: 20   Temp: 98.2 °F (36.8 °C)     Physical Exam  Vitals reviewed.   Constitutional:       General: She is not in acute distress.     Appearance: Normal appearance. She is normal weight.   HENT:      Head: Normocephalic and atraumatic.      Nose: Nose normal.      Mouth/Throat:      Mouth: Mucous membranes are moist.      Pharynx: Oropharynx is clear. No oropharyngeal exudate or posterior oropharyngeal erythema.   Eyes:      General: No scleral icterus.     Extraocular Movements: Extraocular movements intact.      Conjunctiva/sclera: Conjunctivae normal.      Pupils: Pupils are equal, round, and reactive to light.   Neck:      Vascular: No carotid bruit.   Cardiovascular:      Rate and Rhythm: Normal rate and regular rhythm.      Pulses: Normal pulses.      Heart sounds: Normal heart sounds. No murmur heard.    No friction rub. No gallop.   Pulmonary:      Effort: Pulmonary effort is normal.      Breath sounds: Normal breath sounds. No wheezing.   Chest:      Chest wall: Tenderness present.   Abdominal:      General: Abdomen is flat. Bowel sounds are normal.      Palpations: Abdomen is soft.      Tenderness: There is no abdominal tenderness.   Musculoskeletal:         General: Normal range of motion.      Cervical back: Normal range of motion and neck supple.      Right lower leg: No edema.      Left lower leg: No edema.   Lymphadenopathy:      Cervical: No cervical adenopathy.   Skin:     General: Skin is warm and dry.      Capillary Refill: Capillary refill takes less than 2 seconds.      Coloration: Skin is not jaundiced.      Findings: No lesion or rash.   Neurological:      General: No focal deficit present.       Mental Status: She is alert and oriented to person, place, and time. Mental status is at baseline.      Cranial Nerves: No cranial nerve deficit.      Sensory: No sensory deficit.      Motor: No weakness.      Coordination: Coordination normal.      Gait: Gait normal.      Deep Tendon Reflexes: Reflexes normal.   Psychiatric:         Mood and Affect: Mood normal.         Behavior: Behavior normal.         Thought Content: Thought content normal.         Judgment: Judgment normal.             Lab Results   Component Value Date    WBC 9.13 09/22/2022    HGB 16.0 09/22/2022    HCT 50.7 (H) 09/22/2022    MCV 95.1 09/22/2022     09/22/2022          Sodium   Date Value Ref Range Status   09/22/2022 138 136 - 145 mmol/L Final     Potassium   Date Value Ref Range Status   09/22/2022 4.4 3.5 - 5.1 mmol/L Final     Chloride   Date Value Ref Range Status   09/22/2022 101 98 - 107 mmol/L Final     CO2   Date Value Ref Range Status   09/22/2022 28 21 - 32 mmol/L Final     Glucose   Date Value Ref Range Status   09/22/2022 89 74 - 106 mg/dL Final     BUN   Date Value Ref Range Status   09/22/2022 17 7 - 18 mg/dL Final     Creatinine   Date Value Ref Range Status   09/22/2022 0.97 0.55 - 1.02 mg/dL Final     Calcium   Date Value Ref Range Status   09/22/2022 9.7 8.5 - 10.1 mg/dL Final     Total Protein   Date Value Ref Range Status   09/22/2022 8.8 (H) 6.4 - 8.2 g/dL Final     Albumin   Date Value Ref Range Status   09/22/2022 4.8 3.5 - 5.0 g/dL Final     Bilirubin, Total   Date Value Ref Range Status   09/22/2022 0.4 >0.0 - 1.2 mg/dL Final     Alk Phos   Date Value Ref Range Status   09/22/2022 112 (H) 41 - 108 U/L Final     AST   Date Value Ref Range Status   09/22/2022 21 15 - 37 U/L Final     ALT   Date Value Ref Range Status   09/22/2022 34 13 - 56 U/L Final     Anion Gap   Date Value Ref Range Status   09/22/2022 13 7 - 16 mmol/L Final     eGFR    Date Value Ref Range Status   01/18/2021 62       eGFR    Date Value Ref Range Status   07/08/2022 70 >=60 mL/min/1.73m² Final      X-Ray Chest AP Portable  Narrative: EXAMINATION:  XR CHEST AP PORTABLE    CLINICAL HISTORY:  .  Chest pain, unspecified    COMPARISON:  January 18, 2021    TECHNIQUE:  Chest x-ray AP portable erect    FINDINGS:  Cardiac silhouette is upper normal.  There is no mediastinal mass.  Prior median sternotomy.  There is no pulmonary vascular engorgement.  Lungs and pleural spaces are generally clear.    Osseous structures are unchanged  Impression: No acute process compared to the previous study    Electronically signed by: Humble Macias  Date:    08/14/2022  Time:    14:08     Procedures   Assessment and Plan (including Health Maintenance)      Problem List  Smart Sets  Document Outside HM   :    Plan:         Health Maintenance Due   Topic Date Due    Hepatitis C Screening  Never done    Mammogram  Never done    Colorectal Cancer Screening  Never done    Shingles Vaccine (1 of 2) Never done       Problem List Items Addressed This Visit          Psychiatric    Bipolar 2 disorder, major depressive episode    Current Assessment & Plan     Bipolar disease well controlled.  No change in medication.  Follow-up in 3 months.  Recommend counseling.  Maintain the patient on Vraylar and Remeron.            Pulmonary    Chronic obstructive lung disease - Primary    Current Assessment & Plan     Patient's COPD is currently stable and using Brovana.  No change in medicines.  Recommend flu vaccine and COVID vaccines.  Follow-up every 3 months.            Cardiac/Vascular    Atherosclerosis of native coronary artery of native heart with unstable angina pectoris    Overview     S/p CABG in 9/20         Current Assessment & Plan     History of coronary artery disease currently stable.  She is being followed by [facility].  Follow-up should be on a p.r.n. basis with us but every 3 months with Cardiology.           Hyperlipidemia    Current Assessment & Plan      Last cholesterol was 118 and LDL was 41.  No change in her lipid medicines.  Follow-up every 6 months.  Check a lipid panel today.            Immunology/Multi System    Lupus    Current Assessment & Plan     History of lupus and may be contributing to her chest pain i.e. chest wall pain.  Patient is being followed by Cardiology for possible cardiac involvement.  Will check a CBC CMP sed rate CRP on the patient.  Will also check a vasculitis panel.  Follow-up 1 month.  Patient is to maintain her Plaquenil at 200 mg twice daily.  Follow-up rheumatology 3 months         Relevant Orders    C-Reactive Protein (Completed)    Sedimentation Rate (Completed)    Uric Acid (Completed)    Vasculitis Panel (Completed)       Endocrine    Diabetes mellitus    Current Assessment & Plan     Last hemoglobin A1c was 5.9 in July of 2022.  Continue Farxiga and Lantus.  No change in dose of the Lantus at 30 units twice daily.  Follow-up 3 months.         Relevant Orders    Comprehensive Metabolic Panel (Completed)    Hemoglobin A1C (Completed)       Other    Other fatigue    Current Assessment & Plan     Patient has history of lupus and her chronic fatigue is likely related to that.  Will check a sed rate CRP as well as her thyroid functions.  Continue her Plaquenil.  Follow-up with Rheumatology.  Follow-up with us every 3 months.         Relevant Orders    CBC Auto Differential (Completed)    T4 (Completed)    TSH (Completed)    Comprehensive Metabolic Panel (Completed)    Vasculitis Panel (Completed)       Health Maintenance Topics with due status: Not Due       Topic Last Completion Date    Lipid Panel 07/08/2022    High Dose Statin 09/22/2022    Hemoglobin A1c 09/22/2022       Future Appointments   Date Time Provider Department Center   11/3/2022  8:45 AM Aureliano Giron MD [facility]   12/22/2022  8:00 AM Oren Gage DO [facility]   1/9/2023  8:30 AM Melvin Hawk MD [facility]    7/12/2023  9:00 AM Oren Gage DO [facility]        Follow up in about 3 months (around 12/22/2022).     Signature:  DO Bernice Moulton [address] [place]  [address]    Date of encounter: 9/22/22

## 2022-09-24 LAB
MYELOPEROXIDASE IGG SER-ACNC: <0.2 U
PROTEINASE3 IGG SERPL IA-ACNC: <0.2 U

## 2022-10-03 ENCOUNTER — PATIENT MESSAGE (OUTPATIENT)
Dept: CARDIOLOGY | Facility: CLINIC | Age: 52
End: 2022-10-03

## 2022-10-03 DIAGNOSIS — R11.0 NAUSEA: ICD-10-CM

## 2022-10-03 PROBLEM — R53.83 OTHER FATIGUE: Status: ACTIVE | Noted: 2022-10-03

## 2022-10-04 NOTE — ASSESSMENT & PLAN NOTE
Bipolar disease well controlled.  No change in medication.  Follow-up in 3 months.  Recommend counseling.  Maintain the patient on Vraylar and Remeron.

## 2022-10-04 NOTE — ASSESSMENT & PLAN NOTE
History of coronary artery disease currently stable.  She is being followed by [facility].  Follow-up should be on a p.r.n. basis with us but every 3 months with Cardiology.

## 2022-10-04 NOTE — ASSESSMENT & PLAN NOTE
Patient has history of lupus and her chronic fatigue is likely related to that.  Will check a sed rate CRP as well as her thyroid functions.  Continue her Plaquenil.  Follow-up with Rheumatology.  Follow-up with us every 3 months.

## 2022-10-04 NOTE — ASSESSMENT & PLAN NOTE
Patient's COPD is currently stable and using Brovana.  No change in medicines.  Recommend flu vaccine and COVID vaccines.  Follow-up every 3 months.

## 2022-10-04 NOTE — ASSESSMENT & PLAN NOTE
Last hemoglobin A1c was 5.9 in July of 2022.  Continue Farxiga and Lantus.  No change in dose of the Lantus at 30 units twice daily.  Follow-up 3 months.

## 2022-10-04 NOTE — ASSESSMENT & PLAN NOTE
Last cholesterol was 118 and LDL was 41.  No change in her lipid medicines.  Follow-up every 6 months.  Check a lipid panel today.

## 2022-10-06 RX ORDER — PROMETHAZINE HYDROCHLORIDE 12.5 MG/1
12.5 TABLET ORAL EVERY 6 HOURS PRN
Qty: 30 TABLET | Refills: 0 | Status: SHIPPED | OUTPATIENT
Start: 2022-10-06 | End: 2023-06-12

## 2022-10-10 PROBLEM — Z00.00 ROUTINE GENERAL MEDICAL EXAMINATION AT A HEALTH CARE FACILITY: Status: RESOLVED | Noted: 2022-07-08 | Resolved: 2022-10-10

## 2022-11-20 DIAGNOSIS — R11.0 NAUSEA: ICD-10-CM

## 2022-11-21 DIAGNOSIS — R11.0 NAUSEA: ICD-10-CM

## 2022-11-22 RX ORDER — PROMETHAZINE HYDROCHLORIDE 12.5 MG/1
12.5 TABLET ORAL EVERY 6 HOURS PRN
Qty: 30 TABLET | Refills: 0 | OUTPATIENT
Start: 2022-11-22

## 2022-12-01 DIAGNOSIS — Z79.4 TYPE 2 DIABETES MELLITUS WITH HYPERGLYCEMIA, WITH LONG-TERM CURRENT USE OF INSULIN: ICD-10-CM

## 2022-12-01 DIAGNOSIS — E11.65 TYPE 2 DIABETES MELLITUS WITH HYPERGLYCEMIA, WITH LONG-TERM CURRENT USE OF INSULIN: ICD-10-CM

## 2022-12-05 DIAGNOSIS — E11.65 TYPE 2 DIABETES MELLITUS WITH HYPERGLYCEMIA, WITH LONG-TERM CURRENT USE OF INSULIN: ICD-10-CM

## 2022-12-05 DIAGNOSIS — Z79.4 TYPE 2 DIABETES MELLITUS WITH HYPERGLYCEMIA, WITH LONG-TERM CURRENT USE OF INSULIN: ICD-10-CM

## 2022-12-05 RX ORDER — INSULIN GLARGINE 100 [IU]/ML
30 INJECTION, SOLUTION SUBCUTANEOUS 2 TIMES DAILY
Qty: 15 ML | Refills: 2
Start: 2022-12-05

## 2022-12-27 ENCOUNTER — OFFICE VISIT (OUTPATIENT)
Dept: FAMILY MEDICINE | Facility: CLINIC | Age: 52
End: 2022-12-27
Payer: MEDICAID

## 2022-12-27 ENCOUNTER — HOSPITAL ENCOUNTER (EMERGENCY)
Facility: HOSPITAL | Age: 52
Discharge: HOME | End: 2022-12-27
Attending: FAMILY MEDICINE
Payer: MEDICAID

## 2022-12-27 VITALS
HEART RATE: 86 BPM | WEIGHT: 160 LBS | BODY MASS INDEX: 31.41 KG/M2 | RESPIRATION RATE: 22 BRPM | OXYGEN SATURATION: 97 % | HEIGHT: 60 IN | DIASTOLIC BLOOD PRESSURE: 76 MMHG | SYSTOLIC BLOOD PRESSURE: 124 MMHG | TEMPERATURE: 99 F

## 2022-12-27 VITALS
TEMPERATURE: 98 F | HEART RATE: 81 BPM | RESPIRATION RATE: 16 BRPM | DIASTOLIC BLOOD PRESSURE: 83 MMHG | HEIGHT: 60 IN | BODY MASS INDEX: 31.41 KG/M2 | SYSTOLIC BLOOD PRESSURE: 135 MMHG | OXYGEN SATURATION: 98 % | WEIGHT: 160 LBS

## 2022-12-27 DIAGNOSIS — R68.89 FLU-LIKE SYMPTOMS: ICD-10-CM

## 2022-12-27 DIAGNOSIS — R11.0 NAUSEA: ICD-10-CM

## 2022-12-27 DIAGNOSIS — Z20.828 EXPOSURE TO THE FLU: ICD-10-CM

## 2022-12-27 DIAGNOSIS — B34.9 VIRAL ILLNESS: Primary | ICD-10-CM

## 2022-12-27 DIAGNOSIS — R05.1 ACUTE COUGH: Primary | ICD-10-CM

## 2022-12-27 LAB
CTP QC/QA: YES
CTP QC/QA: YES
FLUAV AG NPH QL: NEGATIVE
FLUBV AG NPH QL: NEGATIVE
SARS-COV-2 AG RESP QL IA.RAPID: NEGATIVE

## 2022-12-27 PROCEDURE — 99213 PR OFFICE/OUTPT VISIT, EST, LEVL III, 20-29 MIN: ICD-10-PCS | Mod: 25,,, | Performed by: NURSE PRACTITIONER

## 2022-12-27 PROCEDURE — 99282 EMERGENCY DEPT VISIT SF MDM: CPT

## 2022-12-27 PROCEDURE — 96372 THER/PROPH/DIAG INJ SC/IM: CPT | Mod: ,,, | Performed by: NURSE PRACTITIONER

## 2022-12-27 PROCEDURE — 87804 INFLUENZA ASSAY W/OPTIC: CPT | Mod: 59,QW,RHCUB | Performed by: NURSE PRACTITIONER

## 2022-12-27 PROCEDURE — 96372 PR INJECTION,THERAP/PROPH/DIAG2ST, IM OR SUBCUT: ICD-10-PCS | Mod: ,,, | Performed by: NURSE PRACTITIONER

## 2022-12-27 PROCEDURE — 99282 EMERGENCY DEPT VISIT SF MDM: CPT | Mod: ,,, | Performed by: FAMILY MEDICINE

## 2022-12-27 PROCEDURE — 87426 SARSCOV CORONAVIRUS AG IA: CPT | Mod: QW,RHCUB | Performed by: NURSE PRACTITIONER

## 2022-12-27 PROCEDURE — 99213 OFFICE O/P EST LOW 20 MIN: CPT | Mod: 25,,, | Performed by: NURSE PRACTITIONER

## 2022-12-27 PROCEDURE — 99282 PR EMERGENCY DEPT VISIT,LEVEL II: ICD-10-PCS | Mod: ,,, | Performed by: FAMILY MEDICINE

## 2022-12-27 RX ORDER — OSELTAMIVIR PHOSPHATE 75 MG/1
75 CAPSULE ORAL 2 TIMES DAILY
Qty: 10 CAPSULE | Refills: 0 | Status: SHIPPED | OUTPATIENT
Start: 2022-12-27 | End: 2023-01-01

## 2022-12-27 RX ORDER — ONDANSETRON 4 MG/1
4 TABLET, ORALLY DISINTEGRATING ORAL EVERY 8 HOURS PRN
Qty: 20 TABLET | Refills: 0 | Status: SHIPPED | OUTPATIENT
Start: 2022-12-27 | End: 2023-08-24 | Stop reason: SDUPTHER

## 2022-12-27 RX ORDER — PROMETHAZINE HYDROCHLORIDE 25 MG/ML
25 INJECTION, SOLUTION INTRAMUSCULAR; INTRAVENOUS
Status: COMPLETED | OUTPATIENT
Start: 2022-12-27 | End: 2022-12-27

## 2022-12-27 RX ADMIN — PROMETHAZINE HYDROCHLORIDE 25 MG: 25 INJECTION, SOLUTION INTRAMUSCULAR; INTRAVENOUS at 02:12

## 2022-12-27 NOTE — ED NOTES
Medical screen to [facility] in [place], [place].  Patient is to see rocael mahmood np on 12/27/2022 at 1:45

## 2022-12-27 NOTE — ED PROVIDER NOTES
Encounter Date: 12/27/2022       History     Chief Complaint   Patient presents with    Cough    Vomiting    Diarrhea     Pt here with cough and congestion started 1 day ago. Had vomiting yesterday. Diarrhea today.     The history is provided by the patient.   Review of patient's allergies indicates:   Allergen Reactions    Fish containing products Hives and Swelling    Penicillins Swelling     Localized swelling     Wasp venom Anaphylaxis    Opioids - morphine analogues      Change in behavior    Toradol [ketorolac] Hives    Tramadol Hives    Zithromax [azithromycin] Other (See Comments)     Patient cannot remember what kind of reaction she had to this medication    Bactrim ds [sulfamethoxazole-trimethoprim] Nausea And Vomiting     Facial flush    Latex, natural rubber Rash     Past Medical History:   Diagnosis Date    Anxiety     Arthritis     Asthma     Cancer     CHF (congestive heart failure)     Chronic obstructive lung disease     Coronary artery disease     Depression     Diabetes mellitus     Diabetes mellitus, type 2     Encounter for blood transfusion     History of kidney stones     Hyperlipidemia     Hypertension     Lupus     Renal disorder     Seizures     Thyroid disease     Transfusion reaction      Past Surgical History:   Procedure Laterality Date    CARDIAC CATHETERIZATION Left 09/19/2020    Proximal left main stenosis; IVUS in the LAD and left main    CHOLECYSTECTOMY      CORONARY ARTERY BYPASS GRAFT  09/30/2020    CORONARY STENT PLACEMENT  11/09/2021    Everolimus Eluting Coronary Stent-MLAD;LAD    HYSTERECTOMY      INCISION AND DRAINAGE  06/02/2020    LEFT HEART CATHETERIZATION Left 11/09/2021    Procedure: Left heart cath;  Surgeon: Aureliano Giron MD;  Location: [facility] [place] CATH LAB;  Service: Cardiology;  Laterality: Left;    TOTAL ABDOMINAL HYSTERECTOMY W/ BILATERAL SALPINGOOPHORECTOMY  2008    KIRSTY Lala     TUBAL LIGATION       Family History   Problem Relation Age of Onset     Hypertension Mother     Diabetes Mother     Heart disease Mother     Cancer Mother     Bone cancer Father     Lung cancer Father     Cancer Father     COPD Father     Diabetes Sister     Diabetes Brother     No Known Problems Daughter     Ovarian cancer Maternal Grandmother     No Known Problems Maternal Grandfather     Heart attack Paternal Grandmother     No Known Problems Paternal Grandfather      Social History     Tobacco Use    Smoking status: Every Day     Packs/day: 0.50     Years: 30.00     Pack years: 15.00     Types: Cigarettes     Start date: 1987    Smokeless tobacco: Never   Substance Use Topics    Alcohol use: Never    Drug use: Never     Review of Systems   Respiratory:  Positive for cough.    Gastrointestinal:  Positive for diarrhea, nausea and vomiting.   All other systems reviewed and are negative.    Physical Exam     Initial Vitals [12/27/22 1014]   BP Pulse Resp Temp SpO2   135/83 81 16 98.2 °F (36.8 °C) 98 %      MAP       --         Physical Exam    Constitutional: She appears well-developed and well-nourished.   HENT:   Head: Normocephalic and atraumatic.   Eyes: EOM are normal. Pupils are equal, round, and reactive to light.   Neck: Neck supple.   Normal range of motion.  Cardiovascular:  Normal rate and regular rhythm.           Pulmonary/Chest: She has wheezes.   Abdominal: Abdomen is soft. Bowel sounds are normal.   Musculoskeletal:         General: Normal range of motion.      Cervical back: Normal range of motion and neck supple.     Neurological: She is alert and oriented to person, place, and time.   Psychiatric: She has a normal mood and affect. Her behavior is normal. Judgment and thought content normal.       Medical Screening Exam   See Full Note    ED Course   Procedures  Labs Reviewed - No data to display       Imaging Results    None          Medications - No data to display                    Clinical Impression:   Final diagnoses:  [R05.1] Acute cough (Primary)  [R68.89]  Flu-like symptoms        ED Disposition Condition    Discharge Stable          ED Prescriptions    None       Follow-up Information       Follow up With Specialties Details Why Contact Info    Tish Gonzalez NP Family Medicine Today appt at 1:43 [phone] [address] [place] [address]  [phone]               Char Coronel MD  12/27/22 1048

## 2022-12-27 NOTE — ED TRIAGE NOTES
Pt to ED with c/o cough and vomiting that started yesterday. Pt reports diarrhea this morning. Pt states that she took Dayquil today. No fever present at this time. VS stable.

## 2022-12-27 NOTE — PROGRESS NOTES
Tish Gonzalez NP   [facility]  [address]  [place]  [address]      PATIENT NAME: Rosemarie Lane  : 1970  DATE: 22  MRN: [id number]      Billing Provider: Tish Gonzalez NP  Level of Service: RI OFFICE/OUTPT VISIT, EST, LEVL III, 20-29 MIN  Patient PCP Information       Provider PCP Type    Oren Gage DO General            Reason for Visit / Chief Complaint: Cough (Pt states all symptoms started 2 days ago. ), Shortness of Breath, Nausea, Vomiting, Fever, and Generalized Body Aches       Update PCP  Update Chief Complaint         History of Present Illness / Problem Focused Workflow     Rosemarie Lane presents to the clinic with Cough (Pt states all symptoms started 2 days ago. ), Shortness of Breath, Nausea, Vomiting, Fever, and Generalized Body Aches     52 year old female presents to clinic with complaints of Cough, Shortness of Breath, Nausea, Vomiting, Fever, and Generalized Body Aches that started yesterday. She states her grandchildren have had the flu and she was exposed to them. She denies fever.     Cough  This is a chronic problem. The current episode started yesterday. The problem has been rapidly worsening. The problem occurs every few minutes. The cough is Productive of sputum. Associated symptoms include chills, a fever, headaches, myalgias, nasal congestion, rhinorrhea, a sore throat, shortness of breath and wheezing. Pertinent negatives include no chest pain, ear congestion, ear pain, eye redness, heartburn, hemoptysis, postnasal drip, rash, sweats or weight loss. The symptoms are aggravated by dust and lying down. She has tried OTC cough suppressant for the symptoms. The treatment provided no relief. Her past medical history is significant for asthma and COPD. There is no history of bronchiectasis, bronchitis, emphysema, environmental allergies or pneumonia.     Review of Systems     @Review of Systems   Constitutional:  Positive for chills and  fever. Negative for activity change, appetite change, fatigue and weight loss.   HENT:  Positive for rhinorrhea and sore throat. Negative for nasal congestion, ear pain, postnasal drip and sinus pressure/congestion.    Eyes:  Negative for pain, redness, visual disturbance and eye dryness.   Respiratory:  Positive for cough, shortness of breath and wheezing. Negative for hemoptysis.    Cardiovascular:  Negative for chest pain and leg swelling.   Gastrointestinal:  Negative for abdominal distention, abdominal pain, constipation, diarrhea and heartburn.   Endocrine: Negative for cold intolerance, heat intolerance and polyuria.   Genitourinary:  Negative for bladder incontinence, dysuria, frequency and urgency.   Musculoskeletal:  Positive for myalgias. Negative for arthralgias and gait problem.   Integumentary:  Negative for color change, rash and wound.   Allergic/Immunologic: Negative for environmental allergies and food allergies.   Neurological:  Positive for headaches. Negative for dizziness, weakness and light-headedness.   Psychiatric/Behavioral:  Negative for behavioral problems and sleep disturbance.      Medical / Social / Family History     Past Medical History:   Diagnosis Date    Anxiety     Arthritis     Asthma     Cancer     CHF (congestive heart failure)     Chronic obstructive lung disease     Coronary artery disease     Depression     Diabetes mellitus     Diabetes mellitus, type 2     Encounter for blood transfusion     History of kidney stones     Hyperlipidemia     Hypertension     Lupus     Renal disorder     Seizures     Thyroid disease     Transfusion reaction        Past Surgical History:   Procedure Laterality Date    CARDIAC CATHETERIZATION Left 09/19/2020    Proximal left main stenosis; IVUS in the LAD and left main    CHOLECYSTECTOMY      CORONARY ARTERY BYPASS GRAFT  09/30/2020    CORONARY STENT PLACEMENT  11/09/2021    Everolimus Eluting Coronary Stent-MLAD;LAD    HYSTERECTOMY       INCISION AND DRAINAGE  06/02/2020    LEFT HEART CATHETERIZATION Left 11/09/2021    Procedure: Left heart cath;  Surgeon: Aureliano Giron MD;  Location: [facility] CATH LAB;  Service: Cardiology;  Laterality: Left;    TOTAL ABDOMINAL HYSTERECTOMY W/ BILATERAL SALPINGOOPHORECTOMY  2008    [place], [place]     TUBAL LIGATION         Social History  Ms.  reports that she has been smoking cigarettes. She started smoking about 36 years ago. She has been smoking an average of .5 packs per day. She has been exposed to tobacco smoke. She has never used smokeless tobacco. She reports that she does not drink alcohol and does not use drugs.    Family History  Ms.'s family history includes Bone cancer in her father; COPD in her father; Cancer in her father and mother; Diabetes in her brother, mother, and sister; Heart attack in her paternal grandmother; Heart disease in her mother; Hypertension in her mother; Lung cancer in her father; No Known Problems in her daughter, maternal grandfather, and paternal grandfather; Ovarian cancer in her maternal grandmother.    Medications and Allergies     Medications  Outpatient Medications Marked as Taking for the 12/27/22 encounter (Office Visit) with Tish Gonzalez NP   Medication Sig Dispense Refill    aspirin (ECOTRIN) 81 MG EC tablet Take 1 tablet by mouth once daily.      busPIRone (BUSPAR) 15 MG tablet TAKE 1 TABLET BY MOUTH 2 TIMES A DAY AS DIRECTED 60 tablet 2    linaCLOtide (LINZESS) 145 mcg Cap capsule Take 1 capsule (145 mcg total) by mouth daily as needed (constipation). 30 capsule 2    metoprolol succinate (TOPROL-XL) 50 MG 24 hr tablet Take 1 tablet (50 mg total) by mouth once daily. 90 tablet 3    mirtazapine (REMERON) 30 MG tablet Take 30 mg by mouth once daily.      promethazine (PHENERGAN) 12.5 MG Tab Take 1 tablet (12.5 mg total) by mouth every 6 (six) hours as needed (nausea). 30 tablet 0    [DISCONTINUED] albuterol (PROVENTIL) 2.5 mg /3 mL (0.083 %) nebulizer solution  USE 1 VIAL IN NEBULIZER EVERY 4 TO 6 HOURS AS NEEDED 120 mL 2    [DISCONTINUED] arformoteroL (BROVANA) 15 mcg/2 mL Nebu Take 2 mLs (15 mcg total) by nebulization 2 (two) times a day. 60 each 2    [DISCONTINUED] atorvastatin (LIPITOR) 40 MG tablet Take 1 tablet (40 mg total) by mouth once daily. 90 tablet 1    [DISCONTINUED] cariprazine (VRAYLAR) 3 mg Cap Take 1 capsule (3 mg total) by mouth once daily. 30 capsule 2    [DISCONTINUED] clopidogreL (PLAVIX) 75 mg tablet Take 1 tablet (75 mg total) by mouth once daily. 90 tablet 1    [DISCONTINUED] FARXIGA 10 mg tablet Take 1 tablet (10 mg total) by mouth once daily. 30 tablet 2    [DISCONTINUED] gabapentin (NEURONTIN) 300 MG capsule TAKE ONE CAPSULE BY MOUTH EVERY MORNING & TAKE TWO CAPSULES BY MOUTH DAILY AT BEDTIME 90 capsule 2    [DISCONTINUED] hydrOXYchloroQUINE (PLAQUENIL) 200 mg tablet Take 1 tablet (200 mg total) by mouth 2 (two) times daily. 60 tablet 2    [DISCONTINUED] LANTUS SOLOSTAR U-100 INSULIN glargine 100 units/mL SubQ pen Inject 30 Units into the skin 2 (two) times a day. 15 mL 2    [DISCONTINUED] rOPINIRole (REQUIP) 0.25 MG tablet Take 1 tablet (0.25 mg total) by mouth 3 (three) times daily. 90 tablet 2    [DISCONTINUED] topiramate (TOPAMAX) 25 MG tablet Take 1 tablet (25 mg total) by mouth once daily. 30 tablet 2       Allergies  Review of patient's allergies indicates:   Allergen Reactions    Fish containing products Hives and Swelling    Penicillins Swelling     Localized swelling     Wasp venom Anaphylaxis    Opioids - morphine analogues      Change in behavior    Toradol [ketorolac] Hives    Tramadol Hives    Zithromax [azithromycin] Other (See Comments)     Patient cannot remember what kind of reaction she had to this medication    Bactrim ds [sulfamethoxazole-trimethoprim] Nausea And Vomiting     Facial flush    Latex, natural rubber Rash       Physical Examination     Vitals:    12/27/22 1218   BP: 124/76   Pulse: 86   Resp: (!) 22   Temp: 98.6  °F (37 °C)     Physical Exam  Vitals and nursing note reviewed.   Constitutional:       Appearance: Normal appearance.   HENT:      Head: Normocephalic.      Right Ear: Tympanic membrane normal.      Left Ear: Tympanic membrane normal.      Nose: Congestion and rhinorrhea present. Rhinorrhea is purulent.      Right Turbinates: Pale.      Left Turbinates: Pale.      Right Sinus: Maxillary sinus tenderness and frontal sinus tenderness present.      Left Sinus: Maxillary sinus tenderness and frontal sinus tenderness present.      Mouth/Throat:      Lips: Pink.      Mouth: Mucous membranes are moist.      Pharynx: Oropharyngeal exudate (clear post nasal drainage.) and posterior oropharyngeal erythema present.   Eyes:      Conjunctiva/sclera: Conjunctivae normal.   Cardiovascular:      Rate and Rhythm: Normal rate and regular rhythm.      Pulses: Normal pulses.      Heart sounds: Normal heart sounds.   Pulmonary:      Effort: Pulmonary effort is normal.      Breath sounds: Normal breath sounds. No wheezing or rhonchi.   Abdominal:      General: Abdomen is flat. Bowel sounds are normal. There is no distension.      Palpations: Abdomen is soft.      Tenderness: There is no abdominal tenderness.   Musculoskeletal:         General: Normal range of motion.      Cervical back: Normal range of motion.   Skin:     General: Skin is warm and dry.      Capillary Refill: Capillary refill takes less than 2 seconds.   Neurological:      General: No focal deficit present.      Mental Status: She is alert and oriented to person, place, and time. Mental status is at baseline.   Psychiatric:         Mood and Affect: Mood normal.         Behavior: Behavior normal.             Lab Results   Component Value Date    WBC 7.24 12/29/2022    HGB 15.4 12/29/2022    HCT 46.7 12/29/2022    MCV 94.3 12/29/2022     12/29/2022          Sodium   Date Value Ref Range Status   12/29/2022 140 136 - 145 mmol/L Final     Potassium   Date Value Ref  Range Status   12/29/2022 3.8 3.5 - 5.1 mmol/L Final     Chloride   Date Value Ref Range Status   12/29/2022 105 98 - 107 mmol/L Final     CO2   Date Value Ref Range Status   12/29/2022 26 21 - 32 mmol/L Final     Glucose   Date Value Ref Range Status   12/29/2022 128 (H) 74 - 106 mg/dL Final     BUN   Date Value Ref Range Status   12/29/2022 13 7 - 18 mg/dL Final     Creatinine   Date Value Ref Range Status   12/29/2022 0.90 0.55 - 1.02 mg/dL Final     Calcium   Date Value Ref Range Status   12/29/2022 9.1 8.5 - 10.1 mg/dL Final     Total Protein   Date Value Ref Range Status   12/29/2022 7.7 6.4 - 8.2 g/dL Final     Albumin   Date Value Ref Range Status   12/29/2022 4.1 3.5 - 5.0 g/dL Final     Bilirubin, Total   Date Value Ref Range Status   12/29/2022 0.4 >0.0 - 1.2 mg/dL Final     Alk Phos   Date Value Ref Range Status   12/29/2022 86 41 - 108 U/L Final     AST   Date Value Ref Range Status   12/29/2022 25 15 - 37 U/L Final     ALT   Date Value Ref Range Status   12/29/2022 42 13 - 56 U/L Final     Anion Gap   Date Value Ref Range Status   12/29/2022 13 7 - 16 mmol/L Final     eGFR    Date Value Ref Range Status   01/18/2021 62       eGFR   Date Value Ref Range Status   07/08/2022 70 >=60 mL/min/1.73m² Final      X-Ray Chest AP Portable  Narrative: EXAMINATION:  XR CHEST AP PORTABLE    CLINICAL HISTORY:  .  Chest pain, unspecified    COMPARISON:  January 18, 2021    TECHNIQUE:  Chest x-ray AP portable erect    FINDINGS:  Cardiac silhouette is upper normal.  There is no mediastinal mass.  Prior median sternotomy.  There is no pulmonary vascular engorgement.  Lungs and pleural spaces are generally clear.    Osseous structures are unchanged  Impression: No acute process compared to the previous study    Electronically signed by: Humble Macias  Date:    08/14/2022  Time:    14:08     Procedures   Assessment and Plan (including Health Maintenance)      Problem List  Smart Sets  Document Outside     :    Plan:           Problem List Items Addressed This Visit          ID    Exposure to the flu    Viral illness - Primary    Current Assessment & Plan     Rapid COVID/Flu negative. However due to symptoms and exposure we will treat as flu as it is probable it is too early to show on rapid test.     Discussed treatment would be provided as if they had actual influenza. Reviewed pathology of current symptoms, medication side effects/risk/benefits/directions on taking medications, instructed to alternate OTC Tylenol/Motrin for fever/pain, increase fluid intake, monitor for discussed worsening symptoms that require re-evaluation in clinic or if after hours go to ER. Strict hand hygiene encouraged. Lysol surroundings. Patient is to take the Tamiflu as directed.           Relevant Orders    SARS Coronavirus 2 Antigen, POCT (Completed)    POCT Influenza A/B (Completed)       GI    Nausea    Current Assessment & Plan     Phenergan given IM in clinic.   Zofran as needed for nausea.     Reviewed pathology of current symptoms, medication side effects/risk/benefits/directions on taking medications, instructed to alternate OTC Tylenol/Motrin for fever/pain, clear liquids for the remainder of today and then advance to a BRAT diet as tolerated. Only resume regular diet once symptoms have fully resolved. Return to clinic or if after hours seek emergent medical attention if unable to tolerate PO fluids, fever, severe abdominal pain, if symptoms are not controlled by medication regimen, or other discussed worsening symptoms. If symptoms persist past 24-48 hours return to clinic for further evaluation. Patient verbalized understanding of treatment plan and denies any question. Patient is to use the Zofran as needed.            Relevant Medications    ondansetron (ZOFRAN-ODT) 4 MG TbDL       Health Maintenance Topics with due status: Not Due       Topic Last Completion Date    Lipid Panel 12/29/2022    Hemoglobin A1c 12/29/2022     High Dose Statin 01/06/2023    Aspirin/Antiplatelet Therapy 01/06/2023       Future Appointments   Date Time Provider Department Center   3/29/2023  8:45 AM [facility] G[facility] DO [facility]   7/12/2023  9:00 AM Oren Gage, DO [facility]        Health Maintenance Due   Topic Date Due    Hepatitis C Screening  Never done    COVID-19 Vaccine (1) Never done    Pneumococcal Vaccines (Age 0-64) (1 - PCV) Never done    TETANUS VACCINE  Never done    Mammogram  Never done    Colorectal Cancer Screening  Never done    Shingles Vaccine (1 of 2) Never done        No follow-ups on file.     Signature:  Tish Gonzalez NP  [facility]  [address]  [address]    Date of encounter: 12/27/22

## 2022-12-29 ENCOUNTER — OFFICE VISIT (OUTPATIENT)
Dept: FAMILY MEDICINE | Facility: CLINIC | Age: 52
End: 2022-12-29
Payer: MEDICAID

## 2022-12-29 VITALS
SYSTOLIC BLOOD PRESSURE: 104 MMHG | HEART RATE: 100 BPM | TEMPERATURE: 99 F | HEIGHT: 60 IN | DIASTOLIC BLOOD PRESSURE: 76 MMHG | WEIGHT: 182 LBS | OXYGEN SATURATION: 95 % | RESPIRATION RATE: 24 BRPM | BODY MASS INDEX: 35.73 KG/M2

## 2022-12-29 DIAGNOSIS — I25.10 CORONARY ARTERY DISEASE, UNSPECIFIED VESSEL OR LESION TYPE, UNSPECIFIED WHETHER ANGINA PRESENT, UNSPECIFIED WHETHER NATIVE OR TRANSPLANTED HEART: ICD-10-CM

## 2022-12-29 DIAGNOSIS — F31.81 BIPOLAR 2 DISORDER, MAJOR DEPRESSIVE EPISODE: ICD-10-CM

## 2022-12-29 DIAGNOSIS — E11.65 TYPE 2 DIABETES MELLITUS WITH HYPERGLYCEMIA, WITH LONG-TERM CURRENT USE OF INSULIN: ICD-10-CM

## 2022-12-29 DIAGNOSIS — G25.81 RESTLESS LEG SYNDROME: ICD-10-CM

## 2022-12-29 DIAGNOSIS — G43.909 MIGRAINE WITHOUT STATUS MIGRAINOSUS, NOT INTRACTABLE, UNSPECIFIED MIGRAINE TYPE: ICD-10-CM

## 2022-12-29 DIAGNOSIS — M32.9 SYSTEMIC LUPUS ERYTHEMATOSUS, UNSPECIFIED SLE TYPE, UNSPECIFIED ORGAN INVOLVEMENT STATUS: Primary | ICD-10-CM

## 2022-12-29 DIAGNOSIS — F41.9 ANXIETY: ICD-10-CM

## 2022-12-29 DIAGNOSIS — Z79.4 TYPE 2 DIABETES MELLITUS WITH HYPERGLYCEMIA, WITH LONG-TERM CURRENT USE OF INSULIN: ICD-10-CM

## 2022-12-29 DIAGNOSIS — E78.5 HYPERLIPIDEMIA, UNSPECIFIED HYPERLIPIDEMIA TYPE: ICD-10-CM

## 2022-12-29 DIAGNOSIS — E11.65 TYPE 2 DIABETES MELLITUS WITH HYPERGLYCEMIA, WITHOUT LONG-TERM CURRENT USE OF INSULIN: ICD-10-CM

## 2022-12-29 DIAGNOSIS — J44.9 CHRONIC OBSTRUCTIVE PULMONARY DISEASE, UNSPECIFIED COPD TYPE: ICD-10-CM

## 2022-12-29 LAB
ALBUMIN SERPL BCP-MCNC: 4.1 G/DL (ref 3.5–5)
ALBUMIN/GLOB SERPL: 1.1 {RATIO}
ALP SERPL-CCNC: 86 U/L (ref 41–108)
ALT SERPL W P-5'-P-CCNC: 42 U/L (ref 13–56)
ANION GAP SERPL CALCULATED.3IONS-SCNC: 13 MMOL/L (ref 7–16)
AST SERPL W P-5'-P-CCNC: 25 U/L (ref 15–37)
BASOPHILS # BLD AUTO: 0.06 K/UL (ref 0–0.2)
BASOPHILS NFR BLD AUTO: 0.8 % (ref 0–1)
BILIRUB SERPL-MCNC: 0.4 MG/DL (ref ?–1.2)
BUN SERPL-MCNC: 13 MG/DL (ref 7–18)
BUN/CREAT SERPL: 14 (ref 6–20)
CALCIUM SERPL-MCNC: 9.1 MG/DL (ref 8.5–10.1)
CHLORIDE SERPL-SCNC: 105 MMOL/L (ref 98–107)
CHOLEST SERPL-MCNC: 109 MG/DL (ref 0–200)
CHOLEST/HDLC SERPL: 3.6 {RATIO}
CO2 SERPL-SCNC: 26 MMOL/L (ref 21–32)
CREAT SERPL-MCNC: 0.9 MG/DL (ref 0.55–1.02)
DIFFERENTIAL METHOD BLD: ABNORMAL
EGFR (NO RACE VARIABLE) (RUSH/TITUS): 78 ML/MIN/1.73M²
EOSINOPHIL # BLD AUTO: 0.2 K/UL (ref 0–0.5)
EOSINOPHIL NFR BLD AUTO: 2.8 % (ref 1–4)
ERYTHROCYTE [DISTWIDTH] IN BLOOD BY AUTOMATED COUNT: 13.7 % (ref 11.5–14.5)
EST. AVERAGE GLUCOSE BLD GHB EST-MCNC: 174 MG/DL
GLOBULIN SER-MCNC: 3.6 G/DL (ref 2–4)
GLUCOSE SERPL-MCNC: 128 MG/DL (ref 74–106)
HBA1C MFR BLD HPLC: 7.8 % (ref 4.5–6.6)
HCT VFR BLD AUTO: 46.7 % (ref 38–47)
HDLC SERPL-MCNC: 30 MG/DL (ref 40–60)
HGB BLD-MCNC: 15.4 G/DL (ref 12–16)
IMM GRANULOCYTES # BLD AUTO: 0.05 K/UL (ref 0–0.04)
IMM GRANULOCYTES NFR BLD: 0.7 % (ref 0–0.4)
LDLC SERPL CALC-MCNC: 30 MG/DL
LDLC/HDLC SERPL: 1 {RATIO}
LYMPHOCYTES # BLD AUTO: 2.41 K/UL (ref 1–4.8)
LYMPHOCYTES NFR BLD AUTO: 33.3 % (ref 27–41)
MCH RBC QN AUTO: 31.1 PG (ref 27–31)
MCHC RBC AUTO-ENTMCNC: 33 G/DL (ref 32–36)
MCV RBC AUTO: 94.3 FL (ref 80–96)
MONOCYTES # BLD AUTO: 0.58 K/UL (ref 0–0.8)
MONOCYTES NFR BLD AUTO: 8 % (ref 2–6)
MPC BLD CALC-MCNC: 10.3 FL (ref 9.4–12.4)
NEUTROPHILS # BLD AUTO: 3.94 K/UL (ref 1.8–7.7)
NEUTROPHILS NFR BLD AUTO: 54.4 % (ref 53–65)
NONHDLC SERPL-MCNC: 79 MG/DL
NRBC # BLD AUTO: 0 X10E3/UL
NRBC, AUTO (.00): 0 %
PLATELET # BLD AUTO: 200 K/UL (ref 150–400)
POTASSIUM SERPL-SCNC: 3.8 MMOL/L (ref 3.5–5.1)
PROT SERPL-MCNC: 7.7 G/DL (ref 6.4–8.2)
RBC # BLD AUTO: 4.95 M/UL (ref 4.2–5.4)
SODIUM SERPL-SCNC: 140 MMOL/L (ref 136–145)
T4 SERPL-MCNC: 9.2 ΜG/DL (ref 4.8–13.9)
TRIGL SERPL-MCNC: 245 MG/DL (ref 35–150)
TSH SERPL DL<=0.005 MIU/L-ACNC: 1.21 UIU/ML (ref 0.36–3.74)
VLDLC SERPL-MCNC: 49 MG/DL
WBC # BLD AUTO: 7.24 K/UL (ref 4.5–11)

## 2022-12-29 PROCEDURE — 84436 T4: ICD-10-PCS | Mod: ,,, | Performed by: CLINICAL MEDICAL LABORATORY

## 2022-12-29 PROCEDURE — 99215 OFFICE O/P EST HI 40 MIN: CPT | Mod: ,,, | Performed by: FAMILY MEDICINE

## 2022-12-29 PROCEDURE — 99215 PR OFFICE/OUTPT VISIT, EST, LEVL V, 40-54 MIN: ICD-10-PCS | Mod: ,,, | Performed by: FAMILY MEDICINE

## 2022-12-29 PROCEDURE — 3044F PR MOST RECENT HEMOGLOBIN A1C LEVEL <7.0%: ICD-10-PCS | Mod: CPTII,,, | Performed by: FAMILY MEDICINE

## 2022-12-29 PROCEDURE — 1160F PR REVIEW ALL MEDS BY PRESCRIBER/CLIN PHARMACIST DOCUMENTED: ICD-10-PCS | Mod: CPTII,,, | Performed by: FAMILY MEDICINE

## 2022-12-29 PROCEDURE — 83036 HEMOGLOBIN A1C: ICD-10-PCS | Mod: ,,, | Performed by: CLINICAL MEDICAL LABORATORY

## 2022-12-29 PROCEDURE — 3074F PR MOST RECENT SYSTOLIC BLOOD PRESSURE < 130 MM HG: ICD-10-PCS | Mod: CPTII,,, | Performed by: FAMILY MEDICINE

## 2022-12-29 PROCEDURE — 3078F DIAST BP <80 MM HG: CPT | Mod: CPTII,,, | Performed by: FAMILY MEDICINE

## 2022-12-29 PROCEDURE — 84443 TSH: ICD-10-PCS | Mod: ,,, | Performed by: CLINICAL MEDICAL LABORATORY

## 2022-12-29 PROCEDURE — 3044F HG A1C LEVEL LT 7.0%: CPT | Mod: CPTII,,, | Performed by: FAMILY MEDICINE

## 2022-12-29 PROCEDURE — 3008F PR BODY MASS INDEX (BMI) DOCUMENTED: ICD-10-PCS | Mod: CPTII,,, | Performed by: FAMILY MEDICINE

## 2022-12-29 PROCEDURE — 80061 LIPID PANEL: ICD-10-PCS | Mod: ,,, | Performed by: CLINICAL MEDICAL LABORATORY

## 2022-12-29 PROCEDURE — 3078F PR MOST RECENT DIASTOLIC BLOOD PRESSURE < 80 MM HG: ICD-10-PCS | Mod: CPTII,,, | Performed by: FAMILY MEDICINE

## 2022-12-29 PROCEDURE — 3008F BODY MASS INDEX DOCD: CPT | Mod: CPTII,,, | Performed by: FAMILY MEDICINE

## 2022-12-29 PROCEDURE — 1160F RVW MEDS BY RX/DR IN RCRD: CPT | Mod: CPTII,,, | Performed by: FAMILY MEDICINE

## 2022-12-29 PROCEDURE — 80053 COMPREHEN METABOLIC PANEL: CPT | Mod: ,,, | Performed by: CLINICAL MEDICAL LABORATORY

## 2022-12-29 PROCEDURE — 1159F MED LIST DOCD IN RCRD: CPT | Mod: CPTII,,, | Performed by: FAMILY MEDICINE

## 2022-12-29 PROCEDURE — 80053 COMPREHENSIVE METABOLIC PANEL: ICD-10-PCS | Mod: ,,, | Performed by: CLINICAL MEDICAL LABORATORY

## 2022-12-29 PROCEDURE — 1159F PR MEDICATION LIST DOCUMENTED IN MEDICAL RECORD: ICD-10-PCS | Mod: CPTII,,, | Performed by: FAMILY MEDICINE

## 2022-12-29 PROCEDURE — 80061 LIPID PANEL: CPT | Mod: ,,, | Performed by: CLINICAL MEDICAL LABORATORY

## 2022-12-29 PROCEDURE — 3074F SYST BP LT 130 MM HG: CPT | Mod: CPTII,,, | Performed by: FAMILY MEDICINE

## 2022-12-29 PROCEDURE — 85025 CBC WITH DIFFERENTIAL: ICD-10-PCS | Mod: ,,, | Performed by: CLINICAL MEDICAL LABORATORY

## 2022-12-29 PROCEDURE — 84443 ASSAY THYROID STIM HORMONE: CPT | Mod: ,,, | Performed by: CLINICAL MEDICAL LABORATORY

## 2022-12-29 PROCEDURE — 83036 HEMOGLOBIN GLYCOSYLATED A1C: CPT | Mod: ,,, | Performed by: CLINICAL MEDICAL LABORATORY

## 2022-12-29 PROCEDURE — 84436 ASSAY OF TOTAL THYROXINE: CPT | Mod: ,,, | Performed by: CLINICAL MEDICAL LABORATORY

## 2022-12-29 PROCEDURE — 85025 COMPLETE CBC W/AUTO DIFF WBC: CPT | Mod: ,,, | Performed by: CLINICAL MEDICAL LABORATORY

## 2022-12-29 RX ORDER — ALBUTEROL SULFATE 0.83 MG/ML
SOLUTION RESPIRATORY (INHALATION)
Qty: 180 EACH | Refills: 1 | Status: SHIPPED | OUTPATIENT
Start: 2022-12-29 | End: 2023-08-24 | Stop reason: SDUPTHER

## 2022-12-29 RX ORDER — DAPAGLIFLOZIN 10 MG/1
10 TABLET, FILM COATED ORAL DAILY
Qty: 90 TABLET | Refills: 1 | Status: SHIPPED | OUTPATIENT
Start: 2022-12-29 | End: 2023-07-03 | Stop reason: SDUPTHER

## 2022-12-29 RX ORDER — HYDROXYCHLOROQUINE SULFATE 200 MG/1
200 TABLET, FILM COATED ORAL 2 TIMES DAILY
Qty: 180 TABLET | Refills: 1 | Status: SHIPPED | OUTPATIENT
Start: 2022-12-29 | End: 2023-08-24 | Stop reason: SDUPTHER

## 2022-12-29 RX ORDER — ROPINIROLE 0.25 MG/1
0.25 TABLET, FILM COATED ORAL 3 TIMES DAILY
Qty: 270 TABLET | Refills: 1 | Status: SHIPPED | OUTPATIENT
Start: 2022-12-29 | End: 2023-08-24 | Stop reason: SDUPTHER

## 2022-12-29 RX ORDER — GABAPENTIN 300 MG/1
CAPSULE ORAL
Qty: 270 CAPSULE | Refills: 1 | Status: SHIPPED | OUTPATIENT
Start: 2022-12-29 | End: 2023-06-12

## 2022-12-29 RX ORDER — TOPIRAMATE 25 MG/1
25 TABLET ORAL DAILY
Qty: 90 TABLET | Refills: 1 | Status: SHIPPED | OUTPATIENT
Start: 2022-12-29 | End: 2023-08-24 | Stop reason: SDUPTHER

## 2022-12-29 RX ORDER — ATORVASTATIN CALCIUM 40 MG/1
40 TABLET, FILM COATED ORAL DAILY
Qty: 90 TABLET | Refills: 1 | Status: SHIPPED | OUTPATIENT
Start: 2022-12-29 | End: 2023-06-15 | Stop reason: SDUPTHER

## 2022-12-29 RX ORDER — INSULIN GLARGINE 100 [IU]/ML
30 INJECTION, SOLUTION SUBCUTANEOUS 2 TIMES DAILY
Qty: 51 ML | Refills: 1 | Status: SHIPPED | OUTPATIENT
Start: 2022-12-29 | End: 2023-08-24 | Stop reason: SDUPTHER

## 2022-12-29 RX ORDER — CLOPIDOGREL BISULFATE 75 MG/1
75 TABLET ORAL DAILY
Qty: 90 TABLET | Refills: 1 | Status: SHIPPED | OUTPATIENT
Start: 2022-12-29 | End: 2023-08-24 | Stop reason: SDUPTHER

## 2022-12-29 RX ORDER — ARFORMOTEROL TARTRATE 15 UG/2ML
15 SOLUTION RESPIRATORY (INHALATION) 2 TIMES DAILY
Qty: 180 EACH | Refills: 1 | Status: SHIPPED | OUTPATIENT
Start: 2022-12-29 | End: 2024-03-05 | Stop reason: SDUPTHER

## 2022-12-29 NOTE — ASSESSMENT & PLAN NOTE
COPD currently stable on current medications.  Continue her breathing treatments.  She is to follow-up with us every 3 months.  Recommended influenza vaccine.

## 2022-12-29 NOTE — PROGRESS NOTES
Oren Gage DO   [address] [place]  [address]      PATIENT NAME: Rosemarie Lane  : 1970  DATE: 22  MRN: [id number]      Billing Provider: Oren Gage DO  Level of Service:   Patient PCP Information       Provider PCP Type    Oren Gage DO General            Reason for Visit / Chief Complaint: COPD (Patient requesting refills on medications. She was diagnosed with the flu on 22. She was unable to get tamiflu at pharmacy due to cost.), Diabetes (Patient reports blood sugars have been in the 120's to 130's.), Lupus (Medication refills. Patient also requesting refill on phenergan due to her Lupus medications cause her to be nauseated.), and Seizures (Medication refills.)       Update PCP  Update Chief Complaint         History of Present Illness / Problem Focused Workflow     Rosemarie Lane presents to the clinic with COPD (Patient requesting refills on medications. She was diagnosed with the flu on 22. She was unable to get tamiflu at pharmacy due to cost.), Diabetes (Patient reports blood sugars have been in the 120's to 130's.), Lupus (Medication refills. Patient also requesting refill on phenergan due to her Lupus medications cause her to be nauseated.), and Seizures (Medication refills.)     Patient presents today with the follow-up on her COPD.  She continues to smoke at least 1 pack of cigarettes per day.  She recently been treated for the flu.  She denies any chest pain shortness of breath except that associated with activity from the flu that she is currently being treated for.  She has history of hypertension as well as hyperlipidemia which is been well controlled.  Her diabetes is also been well controlled.  She denies any chest pain shortness breath palpitations PND orthopnea.    COPD  Pertinent negatives include no abdominal pain, arthralgias, change in bowel habit, chest pain, chills, congestion, coughing, fatigue, fever,  headaches, joint swelling, myalgias, nausea, neck pain, numbness, rash, sore throat, vertigo, vomiting or weakness.   Diabetes  Hypoglycemia symptoms include seizures. Pertinent negatives for hypoglycemia include no confusion, dizziness, headaches, nervousness/anxiousness, speech difficulty or tremors. Pertinent negatives for diabetes include no chest pain, no fatigue, no polydipsia, no polyphagia, no polyuria and no weakness.   Lupus  Pertinent negatives include no abdominal pain, arthralgias, change in bowel habit, chest pain, chills, congestion, coughing, fatigue, fever, headaches, joint swelling, myalgias, nausea, neck pain, numbness, rash, sore throat, vertigo, vomiting or weakness.   Seizures   Pertinent negatives include no confusion, no headaches, no speech difficulty, no visual disturbance, no sore throat, no chest pain, no cough, no nausea, no vomiting and no diarrhea. Characteristics do not include apnea.     Review of Systems     Review of Systems   Constitutional:  Negative for activity change, appetite change, chills, fatigue, fever and unexpected weight change.   HENT:  Negative for nasal congestion, ear discharge, ear pain, hearing loss, mouth dryness, mouth sores, postnasal drip, rhinorrhea, sinus pressure/congestion, sore throat, trouble swallowing and voice change.    Eyes:  Negative for pain, discharge, redness, itching and visual disturbance.   Respiratory:  Negative for apnea, cough, chest tightness, shortness of breath and wheezing.    Cardiovascular:  Negative for chest pain, palpitations and leg swelling.   Gastrointestinal:  Negative for abdominal distention, abdominal pain, anal bleeding, blood in stool, change in bowel habit, constipation, diarrhea, nausea, vomiting, reflux and change in bowel habit.   Endocrine: Negative for cold intolerance, heat intolerance, polydipsia, polyphagia and polyuria.   Genitourinary:  Negative for difficulty urinating, dysuria, enuresis, frequency, genital  sores, hematuria, hot flashes, menstrual irregularity, menstrual problem, urgency and vaginal dryness.   Musculoskeletal:  Negative for arthralgias, back pain, gait problem, joint swelling, leg pain, myalgias and neck pain.   Integumentary:  Negative for rash, mole/lesion, breast mass, breast discharge and breast tenderness.   Allergic/Immunologic: Negative for environmental allergies and food allergies.   Neurological:  Positive for seizures. Negative for dizziness, vertigo, tremors, syncope, facial asymmetry, speech difficulty, weakness, light-headedness, numbness, headaches, coordination difficulties, memory loss and coordination difficulties.   Hematological:  Negative for adenopathy. Does not bruise/bleed easily.   Psychiatric/Behavioral:  Negative for agitation, behavioral problems, confusion, decreased concentration, dysphoric mood, hallucinations, self-injury, sleep disturbance and suicidal ideas. The patient is not nervous/anxious and is not hyperactive.    Breast: Negative for mass and tenderness    Medical / Social / Family History     Past Medical History:   Diagnosis Date    Anxiety     Arthritis     Asthma     Cancer     CHF (congestive heart failure)     Chronic obstructive lung disease     Coronary artery disease     Depression     Diabetes mellitus     Diabetes mellitus, type 2     Encounter for blood transfusion     History of kidney stones     Hyperlipidemia     Hypertension     Lupus     Renal disorder     Seizures     Thyroid disease     Transfusion reaction        Past Surgical History:   Procedure Laterality Date    CARDIAC CATHETERIZATION Left 09/19/2020    Proximal left main stenosis; IVUS in the LAD and left main    CHOLECYSTECTOMY      CORONARY ARTERY BYPASS GRAFT  09/30/2020    CORONARY STENT PLACEMENT  11/09/2021    Everolimus Eluting Coronary Stent-MLAD;LAD    HYSTERECTOMY      INCISION AND DRAINAGE  06/02/2020    LEFT HEART CATHETERIZATION Left 11/09/2021    Procedure: Left heart cath;   Surgeon: Aureliano Giron MD;  Location: [facility] CATH LAB;  Service: Cardiology;  Laterality: Left;    TOTAL ABDOMINAL HYSTERECTOMY W/ BILATERAL SALPINGOOPHORECTOMY  2008    KIRSTY Lala     TUBAL LIGATION         Social History  Ms.  reports that she has been smoking cigarettes. She started smoking about 36 years ago. She has been smoking an average of .5 packs per day. She has been exposed to tobacco smoke. She has never used smokeless tobacco. She reports that she does not drink alcohol and does not use drugs.    Family History  Ms.'s family history includes Bone cancer in her father; COPD in her father; Cancer in her father and mother; Diabetes in her brother, mother, and sister; Heart attack in her paternal grandmother; Heart disease in her mother; Hypertension in her mother; Lung cancer in her father; No Known Problems in her daughter, maternal grandfather, and paternal grandfather; Ovarian cancer in her maternal grandmother.    Medications and Allergies     Medications  Outpatient Medications Marked as Taking for the 12/29/22 encounter (Office Visit) with Oren Gage, DO   Medication Sig Dispense Refill    aspirin (ECOTRIN) 81 MG EC tablet Take 1 tablet by mouth once daily.      busPIRone (BUSPAR) 15 MG tablet TAKE 1 TABLET BY MOUTH 2 TIMES A DAY AS DIRECTED 60 tablet 2    linaCLOtide (LINZESS) 145 mcg Cap capsule Take 1 capsule (145 mcg total) by mouth daily as needed (constipation). 30 capsule 2    metoprolol succinate (TOPROL-XL) 50 MG 24 hr tablet Take 1 tablet (50 mg total) by mouth once daily. 90 tablet 3    mirtazapine (REMERON) 30 MG tablet Take 30 mg by mouth once daily.      promethazine (PHENERGAN) 12.5 MG Tab Take 1 tablet (12.5 mg total) by mouth every 6 (six) hours as needed (nausea). 30 tablet 0    [DISCONTINUED] albuterol (PROVENTIL) 2.5 mg /3 mL (0.083 %) nebulizer solution USE 1 VIAL IN NEBULIZER EVERY 4 TO 6 HOURS AS NEEDED 120 mL 2    [DISCONTINUED] arformoteroL (BROVANA) 15  mcg/2 mL Nebu Take 2 mLs (15 mcg total) by nebulization 2 (two) times a day. 60 each 2    [DISCONTINUED] atorvastatin (LIPITOR) 40 MG tablet Take 1 tablet (40 mg total) by mouth once daily. 90 tablet 1    [DISCONTINUED] cariprazine (VRAYLAR) 3 mg Cap Take 1 capsule (3 mg total) by mouth once daily. 30 capsule 2    [DISCONTINUED] clopidogreL (PLAVIX) 75 mg tablet Take 1 tablet (75 mg total) by mouth once daily. 90 tablet 1    [DISCONTINUED] FARXIGA 10 mg tablet Take 1 tablet (10 mg total) by mouth once daily. 30 tablet 2    [DISCONTINUED] gabapentin (NEURONTIN) 300 MG capsule TAKE ONE CAPSULE BY MOUTH EVERY MORNING & TAKE TWO CAPSULES BY MOUTH DAILY AT BEDTIME 90 capsule 2    [DISCONTINUED] hydrOXYchloroQUINE (PLAQUENIL) 200 mg tablet Take 1 tablet (200 mg total) by mouth 2 (two) times daily. 60 tablet 2    [DISCONTINUED] LANTUS SOLOSTAR U-100 INSULIN glargine 100 units/mL SubQ pen Inject 30 Units into the skin 2 (two) times a day. 15 mL 2    [DISCONTINUED] rOPINIRole (REQUIP) 0.25 MG tablet Take 1 tablet (0.25 mg total) by mouth 3 (three) times daily. 90 tablet 2    [DISCONTINUED] topiramate (TOPAMAX) 25 MG tablet Take 1 tablet (25 mg total) by mouth once daily. 30 tablet 2       Allergies  Review of patient's allergies indicates:   Allergen Reactions    Fish containing products Hives and Swelling    Penicillins Swelling     Localized swelling     Wasp venom Anaphylaxis    Opioids - morphine analogues      Change in behavior    Toradol [ketorolac] Hives    Tramadol Hives    Zithromax [azithromycin] Other (See Comments)     Patient cannot remember what kind of reaction she had to this medication    Bactrim ds [sulfamethoxazole-trimethoprim] Nausea And Vomiting     Facial flush    Latex, natural rubber Rash       Physical Examination     Vitals:    12/29/22 0850   BP: 104/76   Pulse: 100   Resp: (!) 24   Temp: 98.5 °F (36.9 °C)     Physical Exam  Vitals reviewed.   Constitutional:       General: She is not in acute  distress.     Appearance: Normal appearance. She is obese.   HENT:      Head: Normocephalic and atraumatic.      Nose: Nose normal.      Mouth/Throat:      Mouth: Mucous membranes are moist.      Pharynx: Oropharynx is clear. No oropharyngeal exudate or posterior oropharyngeal erythema.   Eyes:      General: No scleral icterus.     Extraocular Movements: Extraocular movements intact.      Conjunctiva/sclera: Conjunctivae normal.      Pupils: Pupils are equal, round, and reactive to light.   Neck:      Vascular: No carotid bruit.   Cardiovascular:      Rate and Rhythm: Normal rate and regular rhythm.      Pulses: Normal pulses.      Heart sounds: Normal heart sounds. No murmur heard.    No gallop.   Pulmonary:      Effort: Pulmonary effort is normal.      Breath sounds: Wheezing present.   Abdominal:      General: Abdomen is flat. Bowel sounds are normal.      Palpations: Abdomen is soft.      Tenderness: There is no abdominal tenderness.   Musculoskeletal:         General: Normal range of motion.      Cervical back: Normal range of motion and neck supple.      Right lower leg: No edema.      Left lower leg: No edema.   Lymphadenopathy:      Cervical: No cervical adenopathy.   Skin:     General: Skin is warm and dry.      Capillary Refill: Capillary refill takes less than 2 seconds.      Coloration: Skin is not jaundiced.      Findings: No lesion or rash.   Neurological:      General: No focal deficit present.      Mental Status: She is alert and oriented to person, place, and time. Mental status is at baseline.      Cranial Nerves: No cranial nerve deficit.      Sensory: No sensory deficit.      Motor: No weakness.      Coordination: Coordination normal.      Gait: Gait normal.      Deep Tendon Reflexes: Reflexes normal.   Psychiatric:         Mood and Affect: Mood normal.         Behavior: Behavior normal.         Thought Content: Thought content normal.         Judgment: Judgment normal.             Lab Results    Component Value Date    WBC 9.13 09/22/2022    HGB 16.0 09/22/2022    HCT 50.7 (H) 09/22/2022    MCV 95.1 09/22/2022     09/22/2022          Sodium   Date Value Ref Range Status   09/22/2022 138 136 - 145 mmol/L Final     Potassium   Date Value Ref Range Status   09/22/2022 4.4 3.5 - 5.1 mmol/L Final     Chloride   Date Value Ref Range Status   09/22/2022 101 98 - 107 mmol/L Final     CO2   Date Value Ref Range Status   09/22/2022 28 21 - 32 mmol/L Final     Glucose   Date Value Ref Range Status   09/22/2022 89 74 - 106 mg/dL Final     BUN   Date Value Ref Range Status   09/22/2022 17 7 - 18 mg/dL Final     Creatinine   Date Value Ref Range Status   09/22/2022 0.97 0.55 - 1.02 mg/dL Final     Calcium   Date Value Ref Range Status   09/22/2022 9.7 8.5 - 10.1 mg/dL Final     Total Protein   Date Value Ref Range Status   09/22/2022 8.8 (H) 6.4 - 8.2 g/dL Final     Albumin   Date Value Ref Range Status   09/22/2022 4.8 3.5 - 5.0 g/dL Final     Bilirubin, Total   Date Value Ref Range Status   09/22/2022 0.4 >0.0 - 1.2 mg/dL Final     Alk Phos   Date Value Ref Range Status   09/22/2022 112 (H) 41 - 108 U/L Final     AST   Date Value Ref Range Status   09/22/2022 21 15 - 37 U/L Final     ALT   Date Value Ref Range Status   09/22/2022 34 13 - 56 U/L Final     Anion Gap   Date Value Ref Range Status   09/22/2022 13 7 - 16 mmol/L Final     eGFR    Date Value Ref Range Status   01/18/2021 62       eGFR   Date Value Ref Range Status   07/08/2022 70 >=60 mL/min/1.73m² Final      X-Ray Chest AP Portable  Narrative: EXAMINATION:  XR CHEST AP PORTABLE    CLINICAL HISTORY:  .  Chest pain, unspecified    COMPARISON:  January 18, 2021    TECHNIQUE:  Chest x-ray AP portable erect    FINDINGS:  Cardiac silhouette is upper normal.  There is no mediastinal mass.  Prior median sternotomy.  There is no pulmonary vascular engorgement.  Lungs and pleural spaces are generally clear.    Osseous structures are  unchanged  Impression: No acute process compared to the previous study    Electronically signed by: Humble Macias  Date:    08/14/2022  Time:    14:08     Procedures   Assessment and Plan (including Health Maintenance)      Problem List  Smart Sets  Document Outside HM   :    Plan:         Health Maintenance Due   Topic Date Due    Hepatitis C Screening  Never done    COVID-19 Vaccine (1) Never done    Pneumococcal Vaccines (Age 0-64) (1 - PCV) Never done    TETANUS VACCINE  Never done    Mammogram  Never done    Colorectal Cancer Screening  Never done    Shingles Vaccine (1 of 2) Never done    Hemoglobin A1c  12/22/2022       Problem List Items Addressed This Visit          Neuro    Restless leg syndrome    Current Assessment & Plan     Restless leg currently stable on Requip.  No change in medication.  Follow-up every 3 months.         Relevant Medications    rOPINIRole (REQUIP) 0.25 MG tablet       Psychiatric    Bipolar 2 disorder, major depressive episode    Current Assessment & Plan     Patient has bipolar disease and is currently stable on Vraylar.  Continue Vraylar 3 mg daily.  Follow-up every 3 months.         Relevant Medications    cariprazine (VRAYLAR) 3 mg Cap    Anxiety    Current Assessment & Plan     Anxiety well controlled with BusPar.  Continue Vraylar and BusPar.  Follow-up every 3 months.         Relevant Orders    TSH    T4       Pulmonary    Chronic obstructive lung disease    Current Assessment & Plan     COPD currently stable on current medications.  Continue her breathing treatments.  She is to follow-up with us every 3 months.  Recommended influenza vaccine.         Relevant Medications    arformoteroL (BROVANA) 15 mcg/2 mL Nebu    albuterol (PROVENTIL) 2.5 mg /3 mL (0.083 %) nebulizer solution       Cardiac/Vascular    Hyperlipidemia    Current Assessment & Plan     Last cholesterol was 118 and LDL was 41 on atorvastatin 40 mg daily.  No change in medicines.  Follow-up every 6 months  lipid panel today.         Relevant Medications    atorvastatin (LIPITOR) 40 MG tablet    Other Relevant Orders    Comprehensive Metabolic Panel    CBC Auto Differential    Lipid Panel       Immunology/Multi System    Systemic lupus erythematosus - Primary    Current Assessment & Plan     Systemic lupus currently stable on Plaquenil.  No change in her medication at this time.  She is to follow-up every 3 months.  Rheumatology referral.  Systemic lupus currently stable and the minimal symptoms.          Relevant Medications    hydrOXYchloroQUINE (PLAQUENIL) 200 mg tablet    Other Relevant Orders    Ambulatory referral/consult to Rheumatology       Endocrine    Diabetes mellitus    Current Assessment & Plan     Diabetes currently well controlled.  Last hemoglobin A1c was 5.9 in July of 2022.  Continue her Lantus and her Farxiga.  She is to follow-up every 3 months.         Relevant Medications    gabapentin (NEURONTIN) 300 MG capsule    dapagliflozin (FARXIGA) 10 mg tablet    insulin (LANTUS SOLOSTAR U-100 INSULIN) glargine 100 units/mL SubQ pen    Other Relevant Orders    Lipid Panel    Hemoglobin A1C     Other Visit Diagnoses       Migraine without status migrainosus, not intractable, unspecified migraine type        Relevant Medications    topiramate (TOPAMAX) 25 MG tablet    Coronary artery disease, unspecified vessel or lesion type, unspecified whether angina present, unspecified whether native or transplanted heart        Relevant Medications    clopidogreL (PLAVIX) 75 mg tablet    Other Relevant Orders    T4            Health Maintenance Topics with due status: Not Due       Topic Last Completion Date    Lipid Panel 07/08/2022    High Dose Statin 12/29/2022    Aspirin/Antiplatelet Therapy 12/29/2022       Future Appointments   Date Time Provider Department Center   3/29/2023  8:45 AM Oren Gage DO [facility]   7/12/2023  9:00 AM Oren Gage,  [facility]        Follow  up in about 3 months (around 3/29/2023).     Signature:  Oren Gage, [address] [place]  [address]    Date of encounter: 12/29/22

## 2022-12-29 NOTE — ASSESSMENT & PLAN NOTE
Systemic lupus currently stable on Plaquenil.  No change in her medication at this time.  She is to follow-up every 3 months.  Rheumatology referral.  Systemic lupus currently stable and the minimal symptoms.

## 2022-12-29 NOTE — ASSESSMENT & PLAN NOTE
Last cholesterol was 118 and LDL was 41 on atorvastatin 40 mg daily.  No change in medicines.  Follow-up every 6 months lipid panel today.

## 2022-12-29 NOTE — ASSESSMENT & PLAN NOTE
Detail Level: Detailed Restless leg currently stable on Requip.  No change in medication.  Follow-up every 3 months.

## 2022-12-29 NOTE — ASSESSMENT & PLAN NOTE
Patient has bipolar disease and is currently stable on Vraylar.  Continue Vraylar 3 mg daily.  Follow-up every 3 months.

## 2022-12-29 NOTE — ASSESSMENT & PLAN NOTE
Diabetes currently well controlled.  Last hemoglobin A1c was 5.9 in July of 2022.  Continue her Lantus and her Farxiga.  She is to follow-up every 3 months.

## 2023-01-06 ENCOUNTER — OFFICE VISIT (OUTPATIENT)
Dept: FAMILY MEDICINE | Facility: CLINIC | Age: 53
End: 2023-01-06
Payer: MEDICAID

## 2023-01-06 VITALS
BODY MASS INDEX: 35.93 KG/M2 | HEART RATE: 70 BPM | SYSTOLIC BLOOD PRESSURE: 112 MMHG | RESPIRATION RATE: 20 BRPM | DIASTOLIC BLOOD PRESSURE: 72 MMHG | TEMPERATURE: 98 F | HEIGHT: 60 IN | OXYGEN SATURATION: 96 % | WEIGHT: 183 LBS

## 2023-01-06 DIAGNOSIS — J40 BRONCHITIS: Primary | ICD-10-CM

## 2023-01-06 PROCEDURE — 3078F DIAST BP <80 MM HG: CPT | Mod: CPTII,,, | Performed by: FAMILY MEDICINE

## 2023-01-06 PROCEDURE — 3074F SYST BP LT 130 MM HG: CPT | Mod: CPTII,,, | Performed by: FAMILY MEDICINE

## 2023-01-06 PROCEDURE — 99213 PR OFFICE/OUTPT VISIT, EST, LEVL III, 20-29 MIN: ICD-10-PCS | Mod: 25,,, | Performed by: FAMILY MEDICINE

## 2023-01-06 PROCEDURE — 3078F PR MOST RECENT DIASTOLIC BLOOD PRESSURE < 80 MM HG: ICD-10-PCS | Mod: CPTII,,, | Performed by: FAMILY MEDICINE

## 2023-01-06 PROCEDURE — 1159F PR MEDICATION LIST DOCUMENTED IN MEDICAL RECORD: ICD-10-PCS | Mod: CPTII,,, | Performed by: FAMILY MEDICINE

## 2023-01-06 PROCEDURE — 96372 PR INJECTION,THERAP/PROPH/DIAG2ST, IM OR SUBCUT: ICD-10-PCS | Mod: ,,, | Performed by: FAMILY MEDICINE

## 2023-01-06 PROCEDURE — 99213 OFFICE O/P EST LOW 20 MIN: CPT | Mod: 25,,, | Performed by: FAMILY MEDICINE

## 2023-01-06 PROCEDURE — 96372 THER/PROPH/DIAG INJ SC/IM: CPT | Mod: ,,, | Performed by: FAMILY MEDICINE

## 2023-01-06 PROCEDURE — 1159F MED LIST DOCD IN RCRD: CPT | Mod: CPTII,,, | Performed by: FAMILY MEDICINE

## 2023-01-06 PROCEDURE — 3008F BODY MASS INDEX DOCD: CPT | Mod: CPTII,,, | Performed by: FAMILY MEDICINE

## 2023-01-06 PROCEDURE — 3008F PR BODY MASS INDEX (BMI) DOCUMENTED: ICD-10-PCS | Mod: CPTII,,, | Performed by: FAMILY MEDICINE

## 2023-01-06 PROCEDURE — 3074F PR MOST RECENT SYSTOLIC BLOOD PRESSURE < 130 MM HG: ICD-10-PCS | Mod: CPTII,,, | Performed by: FAMILY MEDICINE

## 2023-01-06 RX ORDER — PROMETHAZINE HYDROCHLORIDE AND DEXTROMETHORPHAN HYDROBROMIDE 6.25; 15 MG/5ML; MG/5ML
5 SYRUP ORAL EVERY 4 HOURS PRN
Qty: 120 ML | Refills: 1 | Status: SHIPPED | OUTPATIENT
Start: 2023-01-06 | End: 2023-01-16

## 2023-01-06 RX ORDER — METHYLPREDNISOLONE ACETATE 40 MG/ML
40 INJECTION, SUSPENSION INTRA-ARTICULAR; INTRALESIONAL; INTRAMUSCULAR; SOFT TISSUE
Status: COMPLETED | OUTPATIENT
Start: 2023-01-06 | End: 2023-01-06

## 2023-01-06 RX ORDER — DOXYCYCLINE 100 MG/1
100 CAPSULE ORAL 2 TIMES DAILY
Qty: 14 CAPSULE | Refills: 0 | Status: SHIPPED | OUTPATIENT
Start: 2023-01-06 | End: 2023-03-20 | Stop reason: ALTCHOICE

## 2023-01-06 RX ORDER — CEFTRIAXONE 1 G/1
1 INJECTION, POWDER, FOR SOLUTION INTRAMUSCULAR; INTRAVENOUS
Status: COMPLETED | OUTPATIENT
Start: 2023-01-06 | End: 2023-01-06

## 2023-01-06 RX ADMIN — CEFTRIAXONE 1 G: 1 INJECTION, POWDER, FOR SOLUTION INTRAMUSCULAR; INTRAVENOUS at 03:01

## 2023-01-06 RX ADMIN — METHYLPREDNISOLONE ACETATE 40 MG: 40 INJECTION, SUSPENSION INTRA-ARTICULAR; INTRALESIONAL; INTRAMUSCULAR; SOFT TISSUE at 03:01

## 2023-01-06 NOTE — PROGRESS NOTES
Oren Gage DO   [address] [place]  [address]      PATIENT NAME: Rosemarie Lane  : 1970  DATE: 23  MRN: [id number]      Billing Provider: Oren Gage DO  Level of Service:   Patient PCP Information       Provider PCP Type    Oren Gage DO General            Reason for Visit / Chief Complaint: Nasal Congestion (Pt c/o sinus congestion and drng, states that it started more than 2 weeks ago when she was diagnosed with Flu but it has not improved at all. ), Cough (Pt c/o sometimes productive cough X 2 weeks.), Shortness of Breath (Occasional SOB.), Fever (Pt felt feverish yesterday.), and Otalgia (Pt c/o right ear pain X 2 days)       Update PCP  Update Chief Complaint         History of Present Illness / Problem Focused Workflow     Rosemarie Lane presents to the clinic with Nasal Congestion (Pt c/o sinus congestion and drng, states that it started more than 2 weeks ago when she was diagnosed with Flu but it has not improved at all. ), Cough (Pt c/o sometimes productive cough X 2 weeks.), Shortness of Breath (Occasional SOB.), Fever (Pt felt feverish yesterday.), and Otalgia (Pt c/o right ear pain X 2 days)     Patient repeat ports cough with congestion that is recurrent.  She is had similar episodes in the last 2 weeks.  She denies any nausea vomiting diarrhea.  She does report some yellow sputum production.  She also reports some pain in her right ear.  She denies any skin rashes or lesions.    Cough  This is a recurrent problem. The current episode started in the past 7 days. The problem has been unchanged. The problem occurs hourly. The cough is Productive of sputum and productive of brown sputum. Associated symptoms include ear congestion, ear pain, headaches, nasal congestion and wheezing. Pertinent negatives include no chest pain, chills, eye redness, fever, heartburn, hemoptysis, myalgias, postnasal drip, rash, rhinorrhea, sore throat,  shortness of breath, sweats or weight loss. The symptoms are aggravated by lying down. She has tried OTC cough suppressant, prescription cough suppressant and rest for the symptoms. The treatment provided mild relief. Her past medical history is significant for asthma, bronchitis and COPD. There is no history of bronchiectasis, emphysema, environmental allergies or pneumonia.     Review of Systems     Review of Systems   Constitutional:  Negative for activity change, appetite change, chills, fatigue, fever and weight loss.   HENT:  Positive for ear pain. Negative for nasal congestion, ear discharge, mouth dryness, mouth sores, postnasal drip, rhinorrhea, sinus pressure/congestion, sore throat and voice change.    Eyes:  Negative for pain, discharge, redness, itching and visual disturbance.   Respiratory:  Positive for cough and wheezing. Negative for apnea, hemoptysis, chest tightness and shortness of breath.    Cardiovascular:  Negative for chest pain, palpitations and leg swelling.   Gastrointestinal:  Negative for abdominal distention, abdominal pain, anal bleeding, blood in stool, change in bowel habit, constipation, diarrhea, heartburn, nausea, vomiting, reflux and change in bowel habit.   Endocrine: Negative for cold intolerance, heat intolerance, polydipsia, polyphagia and polyuria.   Genitourinary:  Negative for difficulty urinating, enuresis, frequency, genital sores, hematuria, hot flashes, menstrual irregularity, urgency and vaginal dryness.   Musculoskeletal:  Negative for arthralgias, back pain, gait problem, leg pain, myalgias and neck pain.   Integumentary:  Negative for rash, mole/lesion, breast mass, breast discharge and breast tenderness.   Allergic/Immunologic: Negative for environmental allergies and food allergies.   Neurological:  Positive for headaches. Negative for dizziness, vertigo, tremors, seizures, syncope, facial asymmetry, speech difficulty, weakness, light-headedness, numbness,  coordination difficulties, memory loss and coordination difficulties.   Hematological:  Negative for adenopathy. Does not bruise/bleed easily.   Psychiatric/Behavioral:  Negative for agitation, behavioral problems, confusion, decreased concentration, dysphoric mood, hallucinations, self-injury, sleep disturbance and suicidal ideas. The patient is not nervous/anxious and is not hyperactive.    Breast: Negative for mass and tenderness    Medical / Social / Family History     Past Medical History:   Diagnosis Date    Anxiety     Arthritis     Asthma     Cancer     CHF (congestive heart failure)     Chronic obstructive lung disease     Coronary artery disease     Depression     Diabetes mellitus     Diabetes mellitus, type 2     Encounter for blood transfusion     History of kidney stones     Hyperlipidemia     Hypertension     Lupus     Renal disorder     Seizures     Thyroid disease     Transfusion reaction        Past Surgical History:   Procedure Laterality Date    CARDIAC CATHETERIZATION Left 09/19/2020    Proximal left main stenosis; IVUS in the LAD and left main    CHOLECYSTECTOMY      CORONARY ARTERY BYPASS GRAFT  09/30/2020    CORONARY STENT PLACEMENT  11/09/2021    Everolimus Eluting Coronary Stent-MLAD;LAD    HYSTERECTOMY      INCISION AND DRAINAGE  06/02/2020    LEFT HEART CATHETERIZATION Left 11/09/2021    Procedure: Left heart cath;  Surgeon: Aureliano Giron MD;  Location: [facility] CATH LAB;  Service: Cardiology;  Laterality: Left;    TOTAL ABDOMINAL HYSTERECTOMY W/ BILATERAL SALPINGOOPHORECTOMY  2008    [place], [place]     TUBAL LIGATION         Social History  Ms.  reports that she has been smoking cigarettes. She started smoking about 36 years ago. She has been smoking an average of .5 packs per day. She has been exposed to tobacco smoke. She has never used smokeless tobacco. She reports that she does not drink alcohol and does not use drugs.    Family History  Ms.'s family history includes Bone cancer  in her father; COPD in her father; Cancer in her father and mother; Diabetes in her brother, mother, and sister; Heart attack in her paternal grandmother; Heart disease in her mother; Hypertension in her mother; Lung cancer in her father; No Known Problems in her daughter, maternal grandfather, and paternal grandfather; Ovarian cancer in her maternal grandmother.    Medications and Allergies     Medications  Outpatient Medications Marked as Taking for the 1/6/23 encounter (Office Visit) with Oren Gage, DO   Medication Sig Dispense Refill    albuterol (PROVENTIL) 2.5 mg /3 mL (0.083 %) nebulizer solution USE 1 VIAL IN NEBULIZER EVERY 4 TO 6 HOURS AS NEEDED 180 each 1    arformoteroL (BROVANA) 15 mcg/2 mL Nebu Take 2 mLs (15 mcg total) by nebulization 2 (two) times a day. 180 each 1    aspirin (ECOTRIN) 81 MG EC tablet Take 1 tablet by mouth once daily.      atorvastatin (LIPITOR) 40 MG tablet Take 1 tablet (40 mg total) by mouth once daily. 90 tablet 1    busPIRone (BUSPAR) 15 MG tablet TAKE 1 TABLET BY MOUTH 2 TIMES A DAY AS DIRECTED 60 tablet 2    cariprazine (VRAYLAR) 3 mg Cap Take 1 capsule (3 mg total) by mouth once daily. 90 capsule 1    clopidogreL (PLAVIX) 75 mg tablet Take 1 tablet (75 mg total) by mouth once daily. 90 tablet 1    dapagliflozin (FARXIGA) 10 mg tablet Take 1 tablet (10 mg total) by mouth once daily. 90 tablet 1    gabapentin (NEURONTIN) 300 MG capsule TAKE ONE CAPSULE BY MOUTH EVERY MORNING & TAKE TWO CAPSULES BY MOUTH DAILY AT BEDTIME 270 capsule 1    hydrOXYchloroQUINE (PLAQUENIL) 200 mg tablet Take 1 tablet (200 mg total) by mouth 2 (two) times daily. 180 tablet 1    insulin (LANTUS SOLOSTAR U-100 INSULIN) glargine 100 units/mL SubQ pen Inject 30 Units into the skin 2 (two) times a day. 51 mL 1    linaCLOtide (LINZESS) 145 mcg Cap capsule Take 1 capsule (145 mcg total) by mouth daily as needed (constipation). 30 capsule 2    metoprolol succinate (TOPROL-XL) 50 MG 24 hr tablet Take 1  tablet (50 mg total) by mouth once daily. 90 tablet 3    mirtazapine (REMERON) 30 MG tablet Take 30 mg by mouth once daily.      promethazine (PHENERGAN) 12.5 MG Tab Take 1 tablet (12.5 mg total) by mouth every 6 (six) hours as needed (nausea). 30 tablet 0    rOPINIRole (REQUIP) 0.25 MG tablet Take 1 tablet (0.25 mg total) by mouth 3 (three) times daily. 270 tablet 1    topiramate (TOPAMAX) 25 MG tablet Take 1 tablet (25 mg total) by mouth once daily. 90 tablet 1     Current Facility-Administered Medications for the 1/6/23 encounter (Office Visit) with Oren Gage DO   Medication Dose Route Frequency Provider Last Rate Last Admin    [COMPLETED] cefTRIAXone injection 1 g  1 g Intramuscular 1 time in Clinic/HOD Oren Gage DO   1 g at 01/06/23 1520    [COMPLETED] methylPREDNISolone acetate injection 40 mg  40 mg Intramuscular 1 time in Clinic/HOD Oren Gage DO   40 mg at 01/06/23 1520       Allergies  Review of patient's allergies indicates:   Allergen Reactions    Fish containing products Hives and Swelling    Penicillins Swelling     Localized swelling     Wasp venom Anaphylaxis    Opioids - morphine analogues      Change in behavior    Toradol [ketorolac] Hives    Tramadol Hives    Zithromax [azithromycin] Other (See Comments)     Patient cannot remember what kind of reaction she had to this medication    Bactrim ds [sulfamethoxazole-trimethoprim] Nausea And Vomiting     Facial flush    Latex, natural rubber Rash       Physical Examination     Vitals:    01/06/23 1417   BP: 112/72   Pulse: 70   Resp: 20   Temp: 98.4 °F (36.9 °C)     Physical Exam  Vitals reviewed.   Constitutional:       General: She is not in acute distress.     Appearance: Normal appearance. She is obese.   HENT:      Head: Normocephalic and atraumatic.      Nose: Congestion and rhinorrhea present.      Mouth/Throat:      Mouth: Mucous membranes are moist.      Pharynx: Oropharynx is clear. No oropharyngeal exudate or posterior  oropharyngeal erythema.   Eyes:      General: No scleral icterus.     Extraocular Movements: Extraocular movements intact.      Conjunctiva/sclera: Conjunctivae normal.      Pupils: Pupils are equal, round, and reactive to light.   Neck:      Vascular: No carotid bruit.   Cardiovascular:      Rate and Rhythm: Normal rate and regular rhythm.      Pulses: Normal pulses.      Heart sounds: Normal heart sounds. No murmur heard.    No gallop.   Pulmonary:      Effort: Pulmonary effort is normal.      Breath sounds: Wheezing present.   Abdominal:      General: Abdomen is flat. Bowel sounds are normal.      Palpations: Abdomen is soft.      Tenderness: There is no abdominal tenderness.   Musculoskeletal:         General: Normal range of motion.      Cervical back: Normal range of motion and neck supple.      Right lower leg: No edema.      Left lower leg: No edema.   Lymphadenopathy:      Cervical: Cervical adenopathy present.   Skin:     General: Skin is warm and dry.      Capillary Refill: Capillary refill takes less than 2 seconds.      Coloration: Skin is not jaundiced.      Findings: No lesion or rash.   Neurological:      General: No focal deficit present.      Mental Status: She is alert and oriented to person, place, and time. Mental status is at baseline.      Cranial Nerves: No cranial nerve deficit.      Sensory: No sensory deficit.      Motor: No weakness.      Coordination: Coordination normal.      Gait: Gait normal.      Deep Tendon Reflexes: Reflexes normal.   Psychiatric:         Mood and Affect: Mood normal.         Behavior: Behavior normal.         Thought Content: Thought content normal.         Judgment: Judgment normal.             Lab Results   Component Value Date    WBC 7.24 12/29/2022    HGB 15.4 12/29/2022    HCT 46.7 12/29/2022    MCV 94.3 12/29/2022     12/29/2022          Sodium   Date Value Ref Range Status   12/29/2022 140 136 - 145 mmol/L Final     Potassium   Date Value Ref Range  Status   12/29/2022 3.8 3.5 - 5.1 mmol/L Final     Chloride   Date Value Ref Range Status   12/29/2022 105 98 - 107 mmol/L Final     CO2   Date Value Ref Range Status   12/29/2022 26 21 - 32 mmol/L Final     Glucose   Date Value Ref Range Status   12/29/2022 128 (H) 74 - 106 mg/dL Final     BUN   Date Value Ref Range Status   12/29/2022 13 7 - 18 mg/dL Final     Creatinine   Date Value Ref Range Status   12/29/2022 0.90 0.55 - 1.02 mg/dL Final     Calcium   Date Value Ref Range Status   12/29/2022 9.1 8.5 - 10.1 mg/dL Final     Total Protein   Date Value Ref Range Status   12/29/2022 7.7 6.4 - 8.2 g/dL Final     Albumin   Date Value Ref Range Status   12/29/2022 4.1 3.5 - 5.0 g/dL Final     Bilirubin, Total   Date Value Ref Range Status   12/29/2022 0.4 >0.0 - 1.2 mg/dL Final     Alk Phos   Date Value Ref Range Status   12/29/2022 86 41 - 108 U/L Final     AST   Date Value Ref Range Status   12/29/2022 25 15 - 37 U/L Final     ALT   Date Value Ref Range Status   12/29/2022 42 13 - 56 U/L Final     Anion Gap   Date Value Ref Range Status   12/29/2022 13 7 - 16 mmol/L Final     eGFR    Date Value Ref Range Status   01/18/2021 62       eGFR   Date Value Ref Range Status   07/08/2022 70 >=60 mL/min/1.73m² Final      X-Ray Chest AP Portable  Narrative: EXAMINATION:  XR CHEST AP PORTABLE    CLINICAL HISTORY:  .  Chest pain, unspecified    COMPARISON:  January 18, 2021    TECHNIQUE:  Chest x-ray AP portable erect    FINDINGS:  Cardiac silhouette is upper normal.  There is no mediastinal mass.  Prior median sternotomy.  There is no pulmonary vascular engorgement.  Lungs and pleural spaces are generally clear.    Osseous structures are unchanged  Impression: No acute process compared to the previous study    Electronically signed by: Humble Macias  Date:    08/14/2022  Time:    14:08     Procedures   Assessment and Plan (including Health Maintenance)      Problem List  Smart Sets  Document Outside     :    Plan:         Health Maintenance Due   Topic Date Due    Hepatitis C Screening  Never done    COVID-19 Vaccine (1) Never done    Pneumococcal Vaccines (Age 0-64) (1 - PCV) Never done    TETANUS VACCINE  Never done    Mammogram  Never done    Colorectal Cancer Screening  Never done    Shingles Vaccine (1 of 2) Never done       Problem List Items Addressed This Visit          Pulmonary    Bronchitis - Primary    Current Assessment & Plan     Patient has acute bronchitis so we gave her Rocephin 1 g IM and Depo-Medrol 40 IM today.  Started the patient on doxycycline 100 mg twice a day daily for 7 days.  Phenergan DM for cough and congestion.  She is to continue her current home medicines.  She is to follow-up in 1 week or p.r.n. if she is not improved by Monday.         Relevant Medications    methylPREDNISolone acetate injection 40 mg (Completed)    cefTRIAXone injection 1 g (Completed)    doxycycline (VIBRAMYCIN) 100 MG Cap    promethazine-dextromethorphan (PROMETHAZINE-DM) 6.25-15 mg/5 mL Syrp       Health Maintenance Topics with due status: Not Due       Topic Last Completion Date    Lipid Panel 12/29/2022    Hemoglobin A1c 12/29/2022    High Dose Statin 01/06/2023    Aspirin/Antiplatelet Therapy 01/06/2023       Future Appointments   Date Time Provider Department Center   3/29/2023  8:45 AM Oren Gage DO [facility]   7/12/2023  9:00 AM Oren Gage DO [facility]        Follow up in about 4 weeks (around 2/3/2023), or if symptoms worsen or fail to improve.     Signature:  DO [facility]  [address] [place] [address], [place]  [id number]    Date of encounter: 1/6/23

## 2023-01-06 NOTE — ASSESSMENT & PLAN NOTE
Patient has acute bronchitis so we gave her Rocephin 1 g IM and Depo-Medrol 40 IM today.  Started the patient on doxycycline 100 mg twice a day daily for 7 days.  Phenergan DM for cough and congestion.  She is to continue her current home medicines.  She is to follow-up in 1 week or p.r.n. if she is not improved by Monday.

## 2023-01-23 PROBLEM — B34.9 VIRAL ILLNESS: Status: ACTIVE | Noted: 2023-01-23

## 2023-01-23 PROBLEM — Z20.828 EXPOSURE TO THE FLU: Status: ACTIVE | Noted: 2023-01-23

## 2023-01-23 NOTE — ASSESSMENT & PLAN NOTE
Rapid COVID/Flu negative. However due to symptoms and exposure we will treat as flu as it is probable it is too early to show on rapid test.     Discussed treatment would be provided as if they had actual influenza. Reviewed pathology of current symptoms, medication side effects/risk/benefits/directions on taking medications, instructed to alternate OTC Tylenol/Motrin for fever/pain, increase fluid intake, monitor for discussed worsening symptoms that require re-evaluation in clinic or if after hours go to ER. Strict hand hygiene encouraged. Lysol surroundings. Patient is to take the Tamiflu as directed.

## 2023-01-23 NOTE — ASSESSMENT & PLAN NOTE
Phenergan given IM in clinic.   Zofran as needed for nausea.     Reviewed pathology of current symptoms, medication side effects/risk/benefits/directions on taking medications, instructed to alternate OTC Tylenol/Motrin for fever/pain, clear liquids for the remainder of today and then advance to a BRAT diet as tolerated. Only resume regular diet once symptoms have fully resolved. Return to clinic or if after hours seek emergent medical attention if unable to tolerate PO fluids, fever, severe abdominal pain, if symptoms are not controlled by medication regimen, or other discussed worsening symptoms. If symptoms persist past 24-48 hours return to clinic for further evaluation. Patient verbalized understanding of treatment plan and denies any question. Patient is to use the Zofran as needed.

## 2023-02-22 ENCOUNTER — TELEPHONE (OUTPATIENT)
Dept: FAMILY MEDICINE | Facility: CLINIC | Age: 53
End: 2023-02-22
Payer: MEDICAID

## 2023-02-22 DIAGNOSIS — J44.9 CHRONIC OBSTRUCTIVE PULMONARY DISEASE, UNSPECIFIED COPD TYPE: Primary | ICD-10-CM

## 2023-02-22 NOTE — TELEPHONE ENCOUNTER
Discussed re certification with Dr. Gage and received VO for a new Overnight oximetry study to be performed since the Pt's last study was done in 2020 prior to her CABG. Contacted Patty at [facility] in [place] and gave a her telephone order for an overnight oximetry study. She voiced understanding.

## 2023-03-02 NOTE — ASSESSMENT & PLAN NOTE
History of lupus and may be contributing to her chest pain i.e. chest wall pain.  Patient is being followed by Cardiology for possible cardiac involvement.  Will check a CBC CMP sed rate CRP on the patient.  Will also check a vasculitis panel.  Follow-up 1 month.  Patient is to maintain her Plaquenil at 200 mg twice daily.  Follow-up rheumatology 3 months   normal

## 2023-03-20 ENCOUNTER — OFFICE VISIT (OUTPATIENT)
Dept: FAMILY MEDICINE | Facility: CLINIC | Age: 53
End: 2023-03-20
Payer: MEDICAID

## 2023-03-20 VITALS
BODY MASS INDEX: 36.32 KG/M2 | DIASTOLIC BLOOD PRESSURE: 74 MMHG | HEIGHT: 60 IN | WEIGHT: 185 LBS | TEMPERATURE: 98 F | SYSTOLIC BLOOD PRESSURE: 124 MMHG | HEART RATE: 98 BPM | RESPIRATION RATE: 18 BRPM | OXYGEN SATURATION: 97 %

## 2023-03-20 DIAGNOSIS — E11.65 TYPE 2 DIABETES MELLITUS WITH HYPERGLYCEMIA, WITHOUT LONG-TERM CURRENT USE OF INSULIN: ICD-10-CM

## 2023-03-20 DIAGNOSIS — J40 BRONCHITIS: ICD-10-CM

## 2023-03-20 DIAGNOSIS — E78.2 MIXED HYPERLIPIDEMIA: ICD-10-CM

## 2023-03-20 DIAGNOSIS — F31.81 BIPOLAR 2 DISORDER, MAJOR DEPRESSIVE EPISODE: ICD-10-CM

## 2023-03-20 DIAGNOSIS — M32.19 OTHER SYSTEMIC LUPUS ERYTHEMATOSUS WITH OTHER ORGAN INVOLVEMENT: ICD-10-CM

## 2023-03-20 DIAGNOSIS — E07.9 THYROID DISEASE: ICD-10-CM

## 2023-03-20 DIAGNOSIS — J41.0 SIMPLE CHRONIC BRONCHITIS: ICD-10-CM

## 2023-03-20 DIAGNOSIS — G25.81 RESTLESS LEG SYNDROME: Primary | ICD-10-CM

## 2023-03-20 DIAGNOSIS — I50.9 CONGESTIVE HEART FAILURE, UNSPECIFIED HF CHRONICITY, UNSPECIFIED HEART FAILURE TYPE: ICD-10-CM

## 2023-03-20 DIAGNOSIS — I10 PRIMARY HYPERTENSION: ICD-10-CM

## 2023-03-20 DIAGNOSIS — K59.04 CHRONIC IDIOPATHIC CONSTIPATION: ICD-10-CM

## 2023-03-20 DIAGNOSIS — R53.83 OTHER FATIGUE: ICD-10-CM

## 2023-03-20 LAB
BASOPHILS # BLD AUTO: 0.08 K/UL (ref 0–0.2)
BASOPHILS NFR BLD AUTO: 0.8 % (ref 0–1)
DIFFERENTIAL METHOD BLD: ABNORMAL
EOSINOPHIL # BLD AUTO: 0 K/UL (ref 0–0.5)
EOSINOPHIL NFR BLD AUTO: 0 % (ref 1–4)
ERYTHROCYTE [DISTWIDTH] IN BLOOD BY AUTOMATED COUNT: 13.6 % (ref 11.5–14.5)
ERYTHROCYTE [SEDIMENTATION RATE] IN BLOOD BY WESTERGREN METHOD: 5 MM/HR (ref 0–30)
EST. AVERAGE GLUCOSE BLD GHB EST-MCNC: 187 MG/DL
HBA1C MFR BLD HPLC: 8.2 % (ref 4.5–6.6)
HCT VFR BLD AUTO: 49.6 % (ref 38–47)
HGB BLD-MCNC: 16.2 G/DL (ref 12–16)
IMM GRANULOCYTES # BLD AUTO: 0.03 K/UL (ref 0–0.04)
IMM GRANULOCYTES NFR BLD: 0.3 % (ref 0–0.4)
LYMPHOCYTES # BLD AUTO: 3.34 K/UL (ref 1–4.8)
LYMPHOCYTES NFR BLD AUTO: 32.3 % (ref 27–41)
MCH RBC QN AUTO: 31.5 PG (ref 27–31)
MCHC RBC AUTO-ENTMCNC: 32.7 G/DL (ref 32–36)
MCV RBC AUTO: 96.3 FL (ref 80–96)
MONOCYTES # BLD AUTO: 0.77 K/UL (ref 0–0.8)
MONOCYTES NFR BLD AUTO: 7.5 % (ref 2–6)
MPC BLD CALC-MCNC: 10.1 FL (ref 9.4–12.4)
NEUTROPHILS # BLD AUTO: 6.11 K/UL (ref 1.8–7.7)
NEUTROPHILS NFR BLD AUTO: 59.1 % (ref 53–65)
NRBC # BLD AUTO: 0 X10E3/UL
NRBC, AUTO (.00): 0 %
PLATELET # BLD AUTO: 213 K/UL (ref 150–400)
RBC # BLD AUTO: 5.15 M/UL (ref 4.2–5.4)
WBC # BLD AUTO: 10.33 K/UL (ref 4.5–11)

## 2023-03-20 PROCEDURE — 3060F POS MICROALBUMINURIA REV: CPT | Mod: CPTII,,, | Performed by: FAMILY MEDICINE

## 2023-03-20 PROCEDURE — 3074F SYST BP LT 130 MM HG: CPT | Mod: CPTII,,, | Performed by: FAMILY MEDICINE

## 2023-03-20 PROCEDURE — 3074F PR MOST RECENT SYSTOLIC BLOOD PRESSURE < 130 MM HG: ICD-10-PCS | Mod: CPTII,,, | Performed by: FAMILY MEDICINE

## 2023-03-20 PROCEDURE — 96372 PR INJECTION,THERAP/PROPH/DIAG2ST, IM OR SUBCUT: ICD-10-PCS | Mod: ,,, | Performed by: FAMILY MEDICINE

## 2023-03-20 PROCEDURE — 96372 THER/PROPH/DIAG INJ SC/IM: CPT | Mod: ,,, | Performed by: FAMILY MEDICINE

## 2023-03-20 PROCEDURE — 80061 LIPID PANEL: ICD-10-PCS | Mod: ,,, | Performed by: CLINICAL MEDICAL LABORATORY

## 2023-03-20 PROCEDURE — 3060F PR POS MICROALBUMINURIA RESULT DOCUMENTED/REVIEW: ICD-10-PCS | Mod: CPTII,,, | Performed by: FAMILY MEDICINE

## 2023-03-20 PROCEDURE — 83036 HEMOGLOBIN A1C: ICD-10-PCS | Mod: ,,, | Performed by: CLINICAL MEDICAL LABORATORY

## 2023-03-20 PROCEDURE — 3052F PR MOST RECENT HEMOGLOBIN A1C LEVEL 8.0 - < 9.0%: ICD-10-PCS | Mod: CPTII,,, | Performed by: FAMILY MEDICINE

## 2023-03-20 PROCEDURE — 84436 ASSAY OF TOTAL THYROXINE: CPT | Mod: ,,, | Performed by: CLINICAL MEDICAL LABORATORY

## 2023-03-20 PROCEDURE — 1159F MED LIST DOCD IN RCRD: CPT | Mod: CPTII,,, | Performed by: FAMILY MEDICINE

## 2023-03-20 PROCEDURE — 3008F PR BODY MASS INDEX (BMI) DOCUMENTED: ICD-10-PCS | Mod: CPTII,,, | Performed by: FAMILY MEDICINE

## 2023-03-20 PROCEDURE — 82570 ASSAY OF URINE CREATININE: CPT | Mod: ,,, | Performed by: CLINICAL MEDICAL LABORATORY

## 2023-03-20 PROCEDURE — 85025 CBC WITH DIFFERENTIAL: ICD-10-PCS | Mod: ,,, | Performed by: CLINICAL MEDICAL LABORATORY

## 2023-03-20 PROCEDURE — 3052F HG A1C>EQUAL 8.0%<EQUAL 9.0%: CPT | Mod: CPTII,,, | Performed by: FAMILY MEDICINE

## 2023-03-20 PROCEDURE — 84436 T4: ICD-10-PCS | Mod: ,,, | Performed by: CLINICAL MEDICAL LABORATORY

## 2023-03-20 PROCEDURE — 86140 C-REACTIVE PROTEIN: CPT | Mod: ,,, | Performed by: CLINICAL MEDICAL LABORATORY

## 2023-03-20 PROCEDURE — 99215 PR OFFICE/OUTPT VISIT, EST, LEVL V, 40-54 MIN: ICD-10-PCS | Mod: 25,,, | Performed by: FAMILY MEDICINE

## 2023-03-20 PROCEDURE — 83036 HEMOGLOBIN GLYCOSYLATED A1C: CPT | Mod: ,,, | Performed by: CLINICAL MEDICAL LABORATORY

## 2023-03-20 PROCEDURE — 1159F PR MEDICATION LIST DOCUMENTED IN MEDICAL RECORD: ICD-10-PCS | Mod: CPTII,,, | Performed by: FAMILY MEDICINE

## 2023-03-20 PROCEDURE — 3078F PR MOST RECENT DIASTOLIC BLOOD PRESSURE < 80 MM HG: ICD-10-PCS | Mod: CPTII,,, | Performed by: FAMILY MEDICINE

## 2023-03-20 PROCEDURE — 86140 C-REACTIVE PROTEIN: ICD-10-PCS | Mod: ,,, | Performed by: CLINICAL MEDICAL LABORATORY

## 2023-03-20 PROCEDURE — 84550 URIC ACID: ICD-10-PCS | Mod: ,,, | Performed by: CLINICAL MEDICAL LABORATORY

## 2023-03-20 PROCEDURE — 3078F DIAST BP <80 MM HG: CPT | Mod: CPTII,,, | Performed by: FAMILY MEDICINE

## 2023-03-20 PROCEDURE — 84443 TSH: ICD-10-PCS | Mod: ,,, | Performed by: CLINICAL MEDICAL LABORATORY

## 2023-03-20 PROCEDURE — 84443 ASSAY THYROID STIM HORMONE: CPT | Mod: ,,, | Performed by: CLINICAL MEDICAL LABORATORY

## 2023-03-20 PROCEDURE — 85651 RBC SED RATE NONAUTOMATED: CPT | Mod: ,,, | Performed by: CLINICAL MEDICAL LABORATORY

## 2023-03-20 PROCEDURE — 82043 UR ALBUMIN QUANTITATIVE: CPT | Mod: ,,, | Performed by: CLINICAL MEDICAL LABORATORY

## 2023-03-20 PROCEDURE — 82570 MICROALBUMIN / CREATININE RATIO URINE: ICD-10-PCS | Mod: ,,, | Performed by: CLINICAL MEDICAL LABORATORY

## 2023-03-20 PROCEDURE — 99215 OFFICE O/P EST HI 40 MIN: CPT | Mod: 25,,, | Performed by: FAMILY MEDICINE

## 2023-03-20 PROCEDURE — 84550 ASSAY OF BLOOD/URIC ACID: CPT | Mod: ,,, | Performed by: CLINICAL MEDICAL LABORATORY

## 2023-03-20 PROCEDURE — 80053 COMPREHEN METABOLIC PANEL: CPT | Mod: ,,, | Performed by: CLINICAL MEDICAL LABORATORY

## 2023-03-20 PROCEDURE — 3066F PR DOCUMENTATION OF TREATMENT FOR NEPHROPATHY: ICD-10-PCS | Mod: CPTII,,, | Performed by: FAMILY MEDICINE

## 2023-03-20 PROCEDURE — 3008F BODY MASS INDEX DOCD: CPT | Mod: CPTII,,, | Performed by: FAMILY MEDICINE

## 2023-03-20 PROCEDURE — 85651 SEDIMENTATION RATE, AUTOMATED: ICD-10-PCS | Mod: ,,, | Performed by: CLINICAL MEDICAL LABORATORY

## 2023-03-20 PROCEDURE — 85025 COMPLETE CBC W/AUTO DIFF WBC: CPT | Mod: ,,, | Performed by: CLINICAL MEDICAL LABORATORY

## 2023-03-20 PROCEDURE — 3066F NEPHROPATHY DOC TX: CPT | Mod: CPTII,,, | Performed by: FAMILY MEDICINE

## 2023-03-20 PROCEDURE — 80061 LIPID PANEL: CPT | Mod: ,,, | Performed by: CLINICAL MEDICAL LABORATORY

## 2023-03-20 PROCEDURE — 82043 MICROALBUMIN / CREATININE RATIO URINE: ICD-10-PCS | Mod: ,,, | Performed by: CLINICAL MEDICAL LABORATORY

## 2023-03-20 PROCEDURE — 80053 COMPREHENSIVE METABOLIC PANEL: ICD-10-PCS | Mod: ,,, | Performed by: CLINICAL MEDICAL LABORATORY

## 2023-03-20 RX ORDER — CEFTRIAXONE 1 G/1
1 INJECTION, POWDER, FOR SOLUTION INTRAMUSCULAR; INTRAVENOUS
Status: COMPLETED | OUTPATIENT
Start: 2023-03-20 | End: 2023-03-20

## 2023-03-20 RX ORDER — DOXYCYCLINE 100 MG/1
100 CAPSULE ORAL 2 TIMES DAILY
Qty: 30 CAPSULE | Refills: 0 | Status: SHIPPED | OUTPATIENT
Start: 2023-03-20 | End: 2023-06-12 | Stop reason: ALTCHOICE

## 2023-03-20 RX ORDER — BUDESONIDE AND FORMOTEROL FUMARATE DIHYDRATE 160; 4.5 UG/1; UG/1
2 AEROSOL RESPIRATORY (INHALATION) EVERY 12 HOURS
Qty: 18 G | Refills: 5 | Status: SHIPPED | OUTPATIENT
Start: 2023-03-20 | End: 2024-03-19

## 2023-03-20 RX ORDER — METHYLPREDNISOLONE ACETATE 40 MG/ML
40 INJECTION, SUSPENSION INTRA-ARTICULAR; INTRALESIONAL; INTRAMUSCULAR; SOFT TISSUE
Status: COMPLETED | OUTPATIENT
Start: 2023-03-20 | End: 2023-03-20

## 2023-03-20 RX ADMIN — METHYLPREDNISOLONE ACETATE 40 MG: 40 INJECTION, SUSPENSION INTRA-ARTICULAR; INTRALESIONAL; INTRAMUSCULAR; SOFT TISSUE at 10:03

## 2023-03-20 RX ADMIN — CEFTRIAXONE 1 G: 1 INJECTION, POWDER, FOR SOLUTION INTRAMUSCULAR; INTRAVENOUS at 10:03

## 2023-03-21 LAB
ALBUMIN SERPL BCP-MCNC: 4.5 G/DL (ref 3.5–5)
ALBUMIN/GLOB SERPL: 1.1 {RATIO}
ALP SERPL-CCNC: 108 U/L (ref 41–108)
ALT SERPL W P-5'-P-CCNC: 74 U/L (ref 13–56)
ANION GAP SERPL CALCULATED.3IONS-SCNC: 12 MMOL/L (ref 7–16)
AST SERPL W P-5'-P-CCNC: 41 U/L (ref 15–37)
BILIRUB SERPL-MCNC: 0.6 MG/DL (ref ?–1.2)
BUN SERPL-MCNC: 14 MG/DL (ref 7–18)
BUN/CREAT SERPL: 14 (ref 6–20)
CALCIUM SERPL-MCNC: 9.5 MG/DL (ref 8.5–10.1)
CHLORIDE SERPL-SCNC: 103 MMOL/L (ref 98–107)
CHOLEST SERPL-MCNC: 163 MG/DL (ref 0–200)
CHOLEST/HDLC SERPL: 4.2 {RATIO}
CO2 SERPL-SCNC: 25 MMOL/L (ref 21–32)
CREAT SERPL-MCNC: 0.98 MG/DL (ref 0.55–1.02)
CREAT UR-MCNC: 116 MG/DL (ref 28–219)
CRP SERPL-MCNC: 0.54 MG/DL (ref 0–0.8)
EGFR (NO RACE VARIABLE) (RUSH/TITUS): 70 ML/MIN/1.73M²
GLOBULIN SER-MCNC: 4.1 G/DL (ref 2–4)
GLUCOSE SERPL-MCNC: 139 MG/DL (ref 74–106)
HDLC SERPL-MCNC: 39 MG/DL (ref 40–60)
LDLC SERPL CALC-MCNC: 58 MG/DL
LDLC/HDLC SERPL: 1.5 {RATIO}
MICROALBUMIN UR-MCNC: 13 MG/DL (ref 0–2.8)
MICROALBUMIN/CREAT RATIO PNL UR: 112.1 MG/G (ref 0–30)
NONHDLC SERPL-MCNC: 124 MG/DL
POTASSIUM SERPL-SCNC: 4.3 MMOL/L (ref 3.5–5.1)
PROT SERPL-MCNC: 8.6 G/DL (ref 6.4–8.2)
SODIUM SERPL-SCNC: 136 MMOL/L (ref 136–145)
T4 SERPL-MCNC: 10.7 ΜG/DL (ref 4.8–13.9)
TRIGL SERPL-MCNC: 329 MG/DL (ref 35–150)
TSH SERPL DL<=0.005 MIU/L-ACNC: 1.52 UIU/ML (ref 0.36–3.74)
URATE SERPL-MCNC: 4.3 MG/DL (ref 2.6–6)
VLDLC SERPL-MCNC: 66 MG/DL

## 2023-03-22 ENCOUNTER — TELEPHONE (OUTPATIENT)
Dept: FAMILY MEDICINE | Facility: CLINIC | Age: 53
End: 2023-03-22
Payer: MEDICAID

## 2023-03-22 NOTE — TELEPHONE ENCOUNTER
I called and spoke with [facility] and let them know we have received the order for patient's supplies and we are waiting on Dr. Gage to sign the order and his visit note from 03/20. She reports that they deactivated patient's account since they haven't received anything back and that patient would have to call and get it reactivated. I asked her if we could use the same order they have sent and she said yes.     I called and talked with patient to let her know the status of the order and that she will need to call them to reactivate account. Pt reports she has spoken with them already today. I told her we would fax over her order and visit note as soon as Dr. Gage has signed them. She voiced understanding.

## 2023-03-22 NOTE — TELEPHONE ENCOUNTER
----- Message from Cynthia Mills sent at 3/17/2023  9:19 AM CDT -----  [facility] called wanting to check up on papers they have faxed over twice for this pt..... If yall have any questions the best phone number is [phone]

## 2023-03-29 ENCOUNTER — PATIENT MESSAGE (OUTPATIENT)
Dept: FAMILY MEDICINE | Facility: CLINIC | Age: 53
End: 2023-03-29
Payer: MEDICAID

## 2023-03-29 ENCOUNTER — PATIENT MESSAGE (OUTPATIENT)
Dept: CARDIOLOGY | Facility: CLINIC | Age: 53
End: 2023-03-29
Payer: MEDICAID

## 2023-04-10 ENCOUNTER — PATIENT MESSAGE (OUTPATIENT)
Dept: FAMILY MEDICINE | Facility: CLINIC | Age: 53
End: 2023-04-10
Payer: MEDICAID

## 2023-04-13 NOTE — ASSESSMENT & PLAN NOTE
History of congestive heart failure which is currently stable.  No change in her medicines.  Follow-up every 3 months.   4 = completely dependent

## 2023-04-13 NOTE — ASSESSMENT & PLAN NOTE
Hypertension well controlled no change in medication.  Follow-up in 3 months.  Will check CBC and CMP yearly.  Goal is blood pressure 120/70

## 2023-04-13 NOTE — ASSESSMENT & PLAN NOTE
Systemic lupus with patient disabled from chronic pain.  She also has difficulty with her activities of daily living and will require her diapers.  Continue her current hydroxychloroquine.  Did give her Depo-Medrol today to help with her symptomatology.  Follow-up every 3 months.  Labs today.

## 2023-04-13 NOTE — PROGRESS NOTES
Oren Gage DO   [address] [place]  [address]      PATIENT NAME: Rosemarie Lane  : 1970  DATE: 3/20/23  MRN: [id number]      Billing Provider: Oren Gage DO  Level of Service:   Patient PCP Information       Provider PCP Type    Oren Gage DO General            Reason for Visit / Chief Complaint: Cough (Pt c/o dry cough X 2 months and hoarseness. ), Urinary Incontinence (Pt c/o frequent urinary incontinence that has been ongoing for 1 month. ), and Fecal Incontinence (Pt c/o fecal incontinence that has been ongoing for 6 years, states that it is due to the linzess she takes for constipation. )       Update PCP  Update Chief Complaint         History of Present Illness / Problem Focused Workflow     Rosemarie Lane presents to the clinic with Cough (Pt c/o dry cough X 2 months and hoarseness. ), Urinary Incontinence (Pt c/o frequent urinary incontinence that has been ongoing for 1 month. ), and Fecal Incontinence (Pt c/o fecal incontinence that has been ongoing for 6 years, states that it is due to the linzess she takes for constipation. )     Patient is in today because she has cough with congestion is been progressively getting worse.  She does have some hoarseness associated with it.  She denies any PND orthopnea.  She also has loose bowel movements which causes her to have some incontinence.  She does take Linzess which causes the symptoms.  She does need diapers for the incontinence.  She states her sugars have been slightly elevated patient is eating more.  She states her blood pressures been well controlled all she does not check it frequently at home.    Cough  This is a recurrent problem. The current episode started 1 to 4 weeks ago. The problem has been gradually worsening. The problem occurs every few hours. The cough is Productive of sputum. Associated symptoms include ear pain, headaches, myalgias, shortness of breath and wheezing. Pertinent  negatives include no chest pain, chills, ear congestion, eye redness, fever, heartburn, hemoptysis, nasal congestion, postnasal drip, rash, rhinorrhea, sore throat, sweats or weight loss. The symptoms are aggravated by exercise and lying down. Risk factors for lung disease include smoking/tobacco exposure. She has tried OTC cough suppressant for the symptoms. The treatment provided no relief. Her past medical history is significant for asthma, bronchitis and COPD. There is no history of bronchiectasis, emphysema, environmental allergies or pneumonia.     Review of Systems     Review of Systems   Constitutional:  Negative for activity change, appetite change, chills, fatigue, fever and weight loss.   HENT:  Positive for ear pain. Negative for nasal congestion, ear discharge, mouth dryness, mouth sores, postnasal drip, rhinorrhea, sinus pressure/congestion, sore throat and voice change.    Eyes:  Negative for pain, discharge, redness, itching and visual disturbance.   Respiratory:  Positive for cough, shortness of breath and wheezing. Negative for apnea, hemoptysis and chest tightness.    Cardiovascular:  Negative for chest pain, palpitations and leg swelling.   Gastrointestinal:  Negative for abdominal distention, abdominal pain, anal bleeding, blood in stool, change in bowel habit, constipation, diarrhea, heartburn, nausea, vomiting, reflux and change in bowel habit.   Endocrine: Negative for cold intolerance, heat intolerance, polydipsia, polyphagia and polyuria.   Genitourinary:  Negative for difficulty urinating, enuresis, frequency, genital sores, hematuria, hot flashes, menstrual irregularity, urgency and vaginal dryness.   Musculoskeletal:  Positive for myalgias. Negative for arthralgias, back pain, gait problem, leg pain and neck pain.   Integumentary:  Negative for rash, mole/lesion, breast mass, breast discharge and breast tenderness.   Allergic/Immunologic: Negative for environmental allergies and food  allergies.   Neurological:  Positive for headaches. Negative for dizziness, vertigo, tremors, seizures, syncope, facial asymmetry, speech difficulty, weakness, light-headedness, numbness, coordination difficulties, memory loss and coordination difficulties.   Hematological:  Negative for adenopathy. Does not bruise/bleed easily.   Psychiatric/Behavioral:  Negative for agitation, behavioral problems, confusion, decreased concentration, dysphoric mood, hallucinations, self-injury, sleep disturbance and suicidal ideas. The patient is not nervous/anxious and is not hyperactive.    Breast: Negative for mass and tenderness    Medical / Social / Family History     Past Medical History:   Diagnosis Date    Anxiety     Arthritis     Asthma     Cancer     CHF (congestive heart failure)     Chronic obstructive lung disease     Coronary artery disease     Depression     Diabetes mellitus     Diabetes mellitus, type 2     Encounter for blood transfusion     History of kidney stones     Hyperlipidemia     Hypertension     Lupus     Renal disorder     Seizures     Thyroid disease     Transfusion reaction        Past Surgical History:   Procedure Laterality Date    CARDIAC CATHETERIZATION Left 09/19/2020    Proximal left main stenosis; IVUS in the LAD and left main    CHOLECYSTECTOMY      CORONARY ARTERY BYPASS GRAFT  09/30/2020    CORONARY STENT PLACEMENT  11/09/2021    Everolimus Eluting Coronary Stent-MLAD;LAD    HYSTERECTOMY      INCISION AND DRAINAGE  06/02/2020    LEFT HEART CATHETERIZATION Left 11/09/2021    Procedure: Left heart cath;  Surgeon: Aureliano Giron MD;  Location: [facility] CATH LAB;  Service: Cardiology;  Laterality: Left;    TOTAL ABDOMINAL HYSTERECTOMY W/ BILATERAL SALPINGOOPHORECTOMY  2008    KIRSTY Lala     TUBAL LIGATION         Social History  Ms.  reports that she has been smoking cigarettes. She started smoking about 36 years ago. She has been smoking an average of .5 packs per day. She has been  exposed to tobacco smoke. She has never used smokeless tobacco. She reports that she does not drink alcohol and does not use drugs.    Family History  Ms.'s family history includes Bone cancer in her father; COPD in her father; Cancer in her father and mother; Diabetes in her brother, mother, and sister; Heart attack in her paternal grandmother; Heart disease in her mother; Hypertension in her mother; Lung cancer in her father; No Known Problems in her daughter, maternal grandfather, and paternal grandfather; Ovarian cancer in her maternal grandmother.    Medications and Allergies     Medications  Outpatient Medications Marked as Taking for the 3/20/23 encounter (Office Visit) with Oren Gage, DO   Medication Sig Dispense Refill    albuterol (PROVENTIL) 2.5 mg /3 mL (0.083 %) nebulizer solution USE 1 VIAL IN NEBULIZER EVERY 4 TO 6 HOURS AS NEEDED 180 each 1    arformoteroL (BROVANA) 15 mcg/2 mL Nebu Take 2 mLs (15 mcg total) by nebulization 2 (two) times a day. 180 each 1    aspirin (ECOTRIN) 81 MG EC tablet Take 1 tablet by mouth once daily.      atorvastatin (LIPITOR) 40 MG tablet Take 1 tablet (40 mg total) by mouth once daily. 90 tablet 1    busPIRone (BUSPAR) 15 MG tablet TAKE 1 TABLET BY MOUTH 2 TIMES A DAY AS DIRECTED 60 tablet 2    cariprazine (VRAYLAR) 3 mg Cap Take 1 capsule (3 mg total) by mouth once daily. 90 capsule 1    clopidogreL (PLAVIX) 75 mg tablet Take 1 tablet (75 mg total) by mouth once daily. 90 tablet 1    dapagliflozin (FARXIGA) 10 mg tablet Take 1 tablet (10 mg total) by mouth once daily. 90 tablet 1    gabapentin (NEURONTIN) 300 MG capsule TAKE ONE CAPSULE BY MOUTH EVERY MORNING & TAKE TWO CAPSULES BY MOUTH DAILY AT BEDTIME 270 capsule 1    hydrOXYchloroQUINE (PLAQUENIL) 200 mg tablet Take 1 tablet (200 mg total) by mouth 2 (two) times daily. 180 tablet 1    insulin (LANTUS SOLOSTAR U-100 INSULIN) glargine 100 units/mL SubQ pen Inject 30 Units into the skin 2 (two) times a day. 51 mL  1    linaCLOtide (LINZESS) 145 mcg Cap capsule Take 1 capsule (145 mcg total) by mouth daily as needed (constipation). 30 capsule 2    metoprolol succinate (TOPROL-XL) 50 MG 24 hr tablet Take 1 tablet (50 mg total) by mouth once daily. 90 tablet 3    mirtazapine (REMERON) 30 MG tablet Take 30 mg by mouth once daily.      ondansetron (ZOFRAN-ODT) 4 MG TbDL Take 1 tablet (4 mg total) by mouth every 8 (eight) hours as needed (nausea). 20 tablet 0    promethazine (PHENERGAN) 12.5 MG Tab Take 1 tablet (12.5 mg total) by mouth every 6 (six) hours as needed (nausea). 30 tablet 0    rOPINIRole (REQUIP) 0.25 MG tablet Take 1 tablet (0.25 mg total) by mouth 3 (three) times daily. 270 tablet 1    topiramate (TOPAMAX) 25 MG tablet Take 1 tablet (25 mg total) by mouth once daily. 90 tablet 1       Allergies  Review of patient's allergies indicates:   Allergen Reactions    Fish containing products Hives and Swelling    Penicillins Swelling     Localized swelling     Wasp venom Anaphylaxis    Opioids - morphine analogues      Change in behavior    Toradol [ketorolac] Hives    Tramadol Hives    Zithromax [azithromycin] Other (See Comments)     Patient cannot remember what kind of reaction she had to this medication    Bactrim ds [sulfamethoxazole-trimethoprim] Nausea And Vomiting     Facial flush    Latex, natural rubber Rash       Physical Examination     Vitals:    03/20/23 0911   BP: 124/74   Pulse: 98   Resp: 18   Temp: 98.1 °F (36.7 °C)     Physical Exam  Vitals reviewed.   Constitutional:       General: She is not in acute distress.     Appearance: Normal appearance. She is obese.   HENT:      Head: Normocephalic and atraumatic.      Nose: Nose normal.      Mouth/Throat:      Mouth: Mucous membranes are moist.      Pharynx: Oropharynx is clear. No oropharyngeal exudate or posterior oropharyngeal erythema.   Eyes:      General: No scleral icterus.     Extraocular Movements: Extraocular movements intact.       Conjunctiva/sclera: Conjunctivae normal.      Pupils: Pupils are equal, round, and reactive to light.   Neck:      Vascular: No carotid bruit.   Cardiovascular:      Rate and Rhythm: Normal rate and regular rhythm.      Pulses: Normal pulses.      Heart sounds: Normal heart sounds. No murmur heard.    No gallop.   Pulmonary:      Effort: Pulmonary effort is normal.      Breath sounds: Wheezing present.   Abdominal:      General: Abdomen is flat. Bowel sounds are normal.      Palpations: Abdomen is soft.      Tenderness: There is no abdominal tenderness.   Musculoskeletal:         General: Normal range of motion.      Cervical back: Normal range of motion and neck supple.      Right lower leg: No edema.      Left lower leg: No edema.   Lymphadenopathy:      Cervical: No cervical adenopathy.   Skin:     General: Skin is warm and dry.      Capillary Refill: Capillary refill takes less than 2 seconds.      Coloration: Skin is not jaundiced.      Findings: No lesion or rash.   Neurological:      General: No focal deficit present.      Mental Status: She is alert and oriented to person, place, and time. Mental status is at baseline.      Cranial Nerves: No cranial nerve deficit.      Sensory: No sensory deficit.      Motor: No weakness.      Coordination: Coordination normal.      Gait: Gait normal.      Deep Tendon Reflexes: Reflexes normal.   Psychiatric:         Mood and Affect: Mood normal.         Behavior: Behavior normal.         Thought Content: Thought content normal.         Judgment: Judgment normal.             Lab Results   Component Value Date    WBC 10.33 03/20/2023    HGB 16.2 (H) 03/20/2023    HCT 49.6 (H) 03/20/2023    MCV 96.3 (H) 03/20/2023     03/20/2023          Sodium   Date Value Ref Range Status   03/20/2023 136 136 - 145 mmol/L Final     Potassium   Date Value Ref Range Status   03/20/2023 4.3 3.5 - 5.1 mmol/L Final     Chloride   Date Value Ref Range Status   03/20/2023 103 98 - 107  mmol/L Final     CO2   Date Value Ref Range Status   03/20/2023 25 21 - 32 mmol/L Final     Glucose   Date Value Ref Range Status   03/20/2023 139 (H) 74 - 106 mg/dL Final     BUN   Date Value Ref Range Status   03/20/2023 14 7 - 18 mg/dL Final     Creatinine   Date Value Ref Range Status   03/20/2023 0.98 0.55 - 1.02 mg/dL Final     Calcium   Date Value Ref Range Status   03/20/2023 9.5 8.5 - 10.1 mg/dL Final     Total Protein   Date Value Ref Range Status   03/20/2023 8.6 (H) 6.4 - 8.2 g/dL Final     Albumin   Date Value Ref Range Status   03/20/2023 4.5 3.5 - 5.0 g/dL Final     Bilirubin, Total   Date Value Ref Range Status   03/20/2023 0.6 >0.0 - 1.2 mg/dL Final     Alk Phos   Date Value Ref Range Status   03/20/2023 108 41 - 108 U/L Final     AST   Date Value Ref Range Status   03/20/2023 41 (H) 15 - 37 U/L Final     ALT   Date Value Ref Range Status   03/20/2023 74 (H) 13 - 56 U/L Final     Anion Gap   Date Value Ref Range Status   03/20/2023 12 7 - 16 mmol/L Final     eGFR    Date Value Ref Range Status   01/18/2021 62       eGFR   Date Value Ref Range Status   07/08/2022 70 >=60 mL/min/1.73m² Final      X-Ray Chest AP Portable  Narrative: EXAMINATION:  XR CHEST AP PORTABLE    CLINICAL HISTORY:  .  Chest pain, unspecified    COMPARISON:  January 18, 2021    TECHNIQUE:  Chest x-ray AP portable erect    FINDINGS:  Cardiac silhouette is upper normal.  There is no mediastinal mass.  Prior median sternotomy.  There is no pulmonary vascular engorgement.  Lungs and pleural spaces are generally clear.    Osseous structures are unchanged  Impression: No acute process compared to the previous study    Electronically signed by: Humble Macias  Date:    08/14/2022  Time:    14:08     Procedures   Lab Results   Component Value Date    CHOL 163 03/20/2023    CHOL 109 12/29/2022    CHOL 118 07/08/2022     Lab Results   Component Value Date    HDL 39 (L) 03/20/2023    HDL 30 (L) 12/29/2022    HDL 38 (L)  07/08/2022     Lab Results   Component Value Date    LDLCALC 58 03/20/2023    LDLCALC 30 12/29/2022    LDLCALC 41 07/08/2022     Lab Results   Component Value Date    TRIG 329 (H) 03/20/2023    TRIG 245 (H) 12/29/2022    TRIG 194 (H) 07/08/2022     Lab Results   Component Value Date    CHOLHDL 4.2 03/20/2023    CHOLHDL 3.6 12/29/2022    CHOLHDL 3.1 07/08/2022      Lab Results   Component Value Date    HGBA1C 8.2 (H) 03/20/2023      Lab Results   Component Value Date    TSH 1.520 03/20/2023    [id number] 10.7 03/20/2023      Assessment and Plan (including Health Maintenance)      Problem List  Smart Sets  Document Outside HM   :    Plan:         Health Maintenance Due   Topic Date Due    Hepatitis C Screening  Never done    COVID-19 Vaccine (1) Never done    Pneumococcal Vaccines (Age 0-64) (1 - PCV) Never done    TETANUS VACCINE  Never done    Mammogram  Never done    Colorectal Cancer Screening  Never done    Shingles Vaccine (1 of 2) Never done       Problem List Items Addressed This Visit          Neuro    Restless leg syndrome - Primary    Current Assessment & Plan     Patient continues to have some symptoms from her restless leg syndrome.  Will continue her gabapentin as is.  Follow-up every 6 months              Psychiatric    Bipolar 2 disorder, major depressive episode    Current Assessment & Plan     History of bipolar disease currently controlled with Vraylar.  Will continue her current does.  Follow-up every 3 months.  Recommend counseling.              Pulmonary    Chronic obstructive lung disease    Current Assessment & Plan     COPD exacerbation that will treat with the antibiotics to include Rocephin doxycycline and will give her Depo-Medrol 40 IM today.  She is to continue her Symbicort and rescue inhaler.  Follow-up in 3 months.           Relevant Medications    budesonide-formoterol 160-4.5 mcg (SYMBICORT) 160-4.5 mcg/actuation HFAA    doxycycline (VIBRAMYCIN) 100 MG Cap    Bronchitis    Current  Assessment & Plan     Acute bronchitis and will treat with Depo-Medrol 40 IM along with Rocephin 1 g IM.  Doxycycline 100 mg twice daily.  Follow-up on a p.r.n. basis.  Continue her albuterol.  Continue her Symbicort.           Relevant Medications    budesonide-formoterol 160-4.5 mcg (SYMBICORT) 160-4.5 mcg/actuation HFAA    doxycycline (VIBRAMYCIN) 100 MG Cap       Cardiac/Vascular    Hyperlipidemia    Current Assessment & Plan     Last cholesterol was 118 and LDL 41 in December of 2022.  Continue her atorvastatin.  Follow-up every 6 months.           Relevant Orders    Lipid Panel (Completed)    CHF (congestive heart failure)    Current Assessment & Plan     History of congestive heart failure which is currently stable.  No change in her medicines.  Follow-up every 3 months.           Hypertension    Current Assessment & Plan     Hypertension well controlled no change in medication.  Follow-up in 3 months.  Will check CBC and CMP yearly.  Goal is blood pressure 120/70           Relevant Orders    CBC Auto Differential (Completed)    Comprehensive Metabolic Panel (Completed)       Immunology/Multi System    Systemic lupus erythematosus    Current Assessment & Plan     Systemic lupus with patient disabled from chronic pain.  She also has difficulty with her activities of daily living and will require her diapers.  Continue her current hydroxychloroquine.  Did give her Depo-Medrol today to help with her symptomatology.  Follow-up every 3 months.  Labs today.           Relevant Orders    Sedimentation Rate (Completed)    C-Reactive Protein (Completed)    Uric Acid (Completed)       Endocrine    Diabetes mellitus    Current Assessment & Plan     Last A1c was 7.8 in December.  She does admit that she had a been out of control on her eating.  Previous to that she would be in in the 6 range.  We discussed with her her eating habits.  No changes in her insulin.  A1c lipid panel today.           Relevant Orders     Hemoglobin A1C (Completed)    Microalbumin/Creatinine Ratio, Urine (Completed)    Thyroid disease    Current Assessment & Plan     Clinically euthyroid.  Last TSH was within normal limits.  No change in medication.  Follow-up in 6 months.           Relevant Orders    TSH (Completed)    T4 (Completed)       GI    Constipation       Other    Other fatigue    Relevant Orders    Sedimentation Rate (Completed)    C-Reactive Protein (Completed)    Uric Acid (Completed)       Health Maintenance Topics with due status: Not Due       Topic Last Completion Date    High Dose Statin 03/20/2023    Aspirin/Antiplatelet Therapy 03/20/2023    Lipid Panel 03/20/2023    Hemoglobin A1c 03/20/2023       Future Appointments   Date Time Provider Department Center   5/24/2023  1:30 PM Oren Gage DO [facility]   7/12/2023  9:00 AM Oren Gage DO [facility]        Follow up in about 3 months (around 6/20/2023).     Signature:  DO [facility]  [address] [place] [address]  [id number]    Date of encounter: 3/20/23

## 2023-04-13 NOTE — ASSESSMENT & PLAN NOTE
Last A1c was 7.8 in December.  She does admit that she had a been out of control on her eating.  Previous to that she would be in in the 6 range.  We discussed with her her eating habits.  No changes in her insulin.  A1c lipid panel today.

## 2023-04-13 NOTE — ASSESSMENT & PLAN NOTE
COPD exacerbation that will treat with the antibiotics to include Rocephin doxycycline and will give her Depo-Medrol 40 IM today.  She is to continue her Symbicort and rescue inhaler.  Follow-up in 3 months.

## 2023-04-13 NOTE — ASSESSMENT & PLAN NOTE
Last cholesterol was 118 and LDL 41 in December of 2022.  Continue her atorvastatin.  Follow-up every 6 months.

## 2023-04-13 NOTE — ASSESSMENT & PLAN NOTE
Patient continues to have some symptoms from her restless leg syndrome.  Will continue her gabapentin as is.  Follow-up every 6 months

## 2023-06-12 ENCOUNTER — OFFICE VISIT (OUTPATIENT)
Dept: FAMILY MEDICINE | Facility: CLINIC | Age: 53
End: 2023-06-12
Payer: MEDICAID

## 2023-06-12 VITALS
SYSTOLIC BLOOD PRESSURE: 152 MMHG | WEIGHT: 180 LBS | BODY MASS INDEX: 33.99 KG/M2 | TEMPERATURE: 98 F | RESPIRATION RATE: 22 BRPM | HEIGHT: 61 IN | OXYGEN SATURATION: 97 % | HEART RATE: 74 BPM | DIASTOLIC BLOOD PRESSURE: 70 MMHG

## 2023-06-12 DIAGNOSIS — R49.0 HOARSENESS, CHRONIC: ICD-10-CM

## 2023-06-12 DIAGNOSIS — Z79.4 TYPE 2 DIABETES MELLITUS WITH HYPERGLYCEMIA, WITH LONG-TERM CURRENT USE OF INSULIN: ICD-10-CM

## 2023-06-12 DIAGNOSIS — E11.65 TYPE 2 DIABETES MELLITUS WITH HYPERGLYCEMIA, WITH LONG-TERM CURRENT USE OF INSULIN: ICD-10-CM

## 2023-06-12 DIAGNOSIS — F31.81 BIPOLAR 2 DISORDER, MAJOR DEPRESSIVE EPISODE: ICD-10-CM

## 2023-06-12 DIAGNOSIS — I10 PRIMARY HYPERTENSION: ICD-10-CM

## 2023-06-12 DIAGNOSIS — G89.29 OTHER CHRONIC PAIN: ICD-10-CM

## 2023-06-12 DIAGNOSIS — M32.19 OTHER SYSTEMIC LUPUS ERYTHEMATOSUS WITH OTHER ORGAN INVOLVEMENT: Primary | ICD-10-CM

## 2023-06-12 PROCEDURE — 3077F SYST BP >= 140 MM HG: CPT | Mod: CPTII,,, | Performed by: FAMILY MEDICINE

## 2023-06-12 PROCEDURE — 3078F DIAST BP <80 MM HG: CPT | Mod: CPTII,,, | Performed by: FAMILY MEDICINE

## 2023-06-12 PROCEDURE — 3052F PR MOST RECENT HEMOGLOBIN A1C LEVEL 8.0 - < 9.0%: ICD-10-PCS | Mod: CPTII,,, | Performed by: FAMILY MEDICINE

## 2023-06-12 PROCEDURE — 3066F PR DOCUMENTATION OF TREATMENT FOR NEPHROPATHY: ICD-10-PCS | Mod: CPTII,,, | Performed by: FAMILY MEDICINE

## 2023-06-12 PROCEDURE — 3077F PR MOST RECENT SYSTOLIC BLOOD PRESSURE >= 140 MM HG: ICD-10-PCS | Mod: CPTII,,, | Performed by: FAMILY MEDICINE

## 2023-06-12 PROCEDURE — 3052F HG A1C>EQUAL 8.0%<EQUAL 9.0%: CPT | Mod: CPTII,,, | Performed by: FAMILY MEDICINE

## 2023-06-12 PROCEDURE — 3060F POS MICROALBUMINURIA REV: CPT | Mod: CPTII,,, | Performed by: FAMILY MEDICINE

## 2023-06-12 PROCEDURE — 3060F PR POS MICROALBUMINURIA RESULT DOCUMENTED/REVIEW: ICD-10-PCS | Mod: CPTII,,, | Performed by: FAMILY MEDICINE

## 2023-06-12 PROCEDURE — 3078F PR MOST RECENT DIASTOLIC BLOOD PRESSURE < 80 MM HG: ICD-10-PCS | Mod: CPTII,,, | Performed by: FAMILY MEDICINE

## 2023-06-12 PROCEDURE — 99214 PR OFFICE/OUTPT VISIT, EST, LEVL IV, 30-39 MIN: ICD-10-PCS | Mod: ,,, | Performed by: FAMILY MEDICINE

## 2023-06-12 PROCEDURE — 3066F NEPHROPATHY DOC TX: CPT | Mod: CPTII,,, | Performed by: FAMILY MEDICINE

## 2023-06-12 PROCEDURE — 3008F PR BODY MASS INDEX (BMI) DOCUMENTED: ICD-10-PCS | Mod: CPTII,,, | Performed by: FAMILY MEDICINE

## 2023-06-12 PROCEDURE — 1159F PR MEDICATION LIST DOCUMENTED IN MEDICAL RECORD: ICD-10-PCS | Mod: CPTII,,, | Performed by: FAMILY MEDICINE

## 2023-06-12 PROCEDURE — 1159F MED LIST DOCD IN RCRD: CPT | Mod: CPTII,,, | Performed by: FAMILY MEDICINE

## 2023-06-12 PROCEDURE — 1160F PR REVIEW ALL MEDS BY PRESCRIBER/CLIN PHARMACIST DOCUMENTED: ICD-10-PCS | Mod: CPTII,,, | Performed by: FAMILY MEDICINE

## 2023-06-12 PROCEDURE — 3008F BODY MASS INDEX DOCD: CPT | Mod: CPTII,,, | Performed by: FAMILY MEDICINE

## 2023-06-12 PROCEDURE — 99214 OFFICE O/P EST MOD 30 MIN: CPT | Mod: ,,, | Performed by: FAMILY MEDICINE

## 2023-06-12 PROCEDURE — 1160F RVW MEDS BY RX/DR IN RCRD: CPT | Mod: CPTII,,, | Performed by: FAMILY MEDICINE

## 2023-06-12 RX ORDER — GABAPENTIN 300 MG/1
600 CAPSULE ORAL 2 TIMES DAILY
Qty: 360 CAPSULE | Refills: 1
Start: 2023-06-12 | End: 2023-08-24 | Stop reason: SDUPTHER

## 2023-06-12 RX ORDER — CARIPRAZINE 3 MG/1
6 CAPSULE, GELATIN COATED ORAL DAILY
Qty: 60 CAPSULE | Refills: 6 | Status: SHIPPED | OUTPATIENT
Start: 2023-06-12 | End: 2023-08-24 | Stop reason: SDUPTHER

## 2023-06-13 NOTE — ASSESSMENT & PLAN NOTE
Elevated today but patient noticeably anxious and restless. Will not change regimen today but if persistently elevated will need medication adjustments.

## 2023-06-13 NOTE — ASSESSMENT & PLAN NOTE
Having increase in both manic and depressive symptoms. No feelings of harming herself or others. PHQ score of 16. Will increase her Vraylar to 6mg daily and address her chronic pain as that is likely contributing to her mental state.

## 2023-06-13 NOTE — ASSESSMENT & PLAN NOTE
Voice has been hoarse for months. Patient is a smoker. No masses or abnormalities appreciated on exam. Will refer patient to ENT for further evaluation.

## 2023-06-13 NOTE — ASSESSMENT & PLAN NOTE
On hydroxychloriquine. She reports lupus flare and increased pain, primarily in her bilateral lower extremities and hips. Will refer to pain management for assistance. Will also increase gabapentin to 600mg BID.

## 2023-06-13 NOTE — PROGRESS NOTES
Subjective:       Patient ID: Rosemarie Lane is a 53 y.o. female.    Chief Complaint: Lupus (Patient has been having more pain related to lupus recently. She would like to discuss referral to pain treatment. ) and Depression (Patient has 16 on PHQ9 today. She is taking anti depressants as directed. )    54yo F with SLE, bipolar disorder, HTN presents for follow up visit.     She reports that she has been very depressed recently. Has been sleeping more than usual and feels she lacks energy to accomplish tasks. She also has manic symptoms and feels her mind is scattered and she cannot concentrate on individual tasks. Has been taking her medications as directed. PHQ-9 given today with score of 16. She is also having a lupus flare with increased pain which she thinks is contributing to her worsened mood. She is requesting referral to pain management.     She also complains of hoarseness to her voice that has been ongoing for months. She is a smoker. She denies difficulty swallowing. Denies reflux symptoms.        Current Outpatient Medications:     albuterol (PROVENTIL) 2.5 mg /3 mL (0.083 %) nebulizer solution, USE 1 VIAL IN NEBULIZER EVERY 4 TO 6 HOURS AS NEEDED, Disp: 180 each, Rfl: 1    arformoteroL (BROVANA) 15 mcg/2 mL Nebu, Take 2 mLs (15 mcg total) by nebulization 2 (two) times a day., Disp: 180 each, Rfl: 1    aspirin (ECOTRIN) 81 MG EC tablet, Take 1 tablet by mouth once daily., Disp: , Rfl:     atorvastatin (LIPITOR) 40 MG tablet, Take 1 tablet (40 mg total) by mouth once daily., Disp: 90 tablet, Rfl: 1    budesonide-formoterol 160-4.5 mcg (SYMBICORT) 160-4.5 mcg/actuation HFAA, Inhale 2 puffs into the lungs every 12 (twelve) hours. Controller, Disp: 18 g, Rfl: 5    busPIRone (BUSPAR) 15 MG tablet, TAKE 1 TABLET BY MOUTH 2 TIMES A DAY AS DIRECTED, Disp: 60 tablet, Rfl: 2    clopidogreL (PLAVIX) 75 mg tablet, Take 1 tablet (75 mg total) by mouth once daily., Disp: 90 tablet, Rfl: 1    dapagliflozin (FARXIGA)  10 mg tablet, Take 1 tablet (10 mg total) by mouth once daily., Disp: 90 tablet, Rfl: 1    hydrOXYchloroQUINE (PLAQUENIL) 200 mg tablet, Take 1 tablet (200 mg total) by mouth 2 (two) times daily., Disp: 180 tablet, Rfl: 1    insulin (LANTUS SOLOSTAR U-100 INSULIN) glargine 100 units/mL SubQ pen, Inject 30 Units into the skin 2 (two) times a day., Disp: 51 mL, Rfl: 1    linaCLOtide (LINZESS) 145 mcg Cap capsule, Take 1 capsule (145 mcg total) by mouth daily as needed (constipation)., Disp: 30 capsule, Rfl: 2    metoprolol succinate (TOPROL-XL) 50 MG 24 hr tablet, Take 1 tablet (50 mg total) by mouth once daily., Disp: 90 tablet, Rfl: 3    mirtazapine (REMERON) 30 MG tablet, Take 30 mg by mouth once daily., Disp: , Rfl:     ondansetron (ZOFRAN-ODT) 4 MG TbDL, Take 1 tablet (4 mg total) by mouth every 8 (eight) hours as needed (nausea)., Disp: 20 tablet, Rfl: 0    rOPINIRole (REQUIP) 0.25 MG tablet, Take 1 tablet (0.25 mg total) by mouth 3 (three) times daily., Disp: 270 tablet, Rfl: 1    topiramate (TOPAMAX) 25 MG tablet, Take 1 tablet (25 mg total) by mouth once daily., Disp: 90 tablet, Rfl: 1    cariprazine (VRAYLAR) 3 mg Cap, Take 2 capsules (6 mg total) by mouth once daily., Disp: 60 capsule, Rfl: 6    gabapentin (NEURONTIN) 300 MG capsule, Take 2 capsules (600 mg total) by mouth 2 (two) times daily., Disp: 360 capsule, Rfl: 1    Review of patient's allergies indicates:   Allergen Reactions    Fish containing products Hives and Swelling    Penicillins Swelling     Localized swelling     Wasp venom Anaphylaxis    Opioids - morphine analogues      Change in behavior    Toradol [ketorolac] Hives    Tramadol Hives    Zithromax [azithromycin] Other (See Comments)     Patient cannot remember what kind of reaction she had to this medication    Bactrim ds [sulfamethoxazole-trimethoprim] Nausea And Vomiting     Facial flush    Latex, natural rubber Rash       Past Medical History:   Diagnosis Date    Anxiety     Arthritis      Asthma     Cancer     CHF (congestive heart failure)     Chronic obstructive lung disease     Coronary artery disease     Depression     Diabetes mellitus     Diabetes mellitus, type 2     Encounter for blood transfusion     History of kidney stones     Hyperlipidemia     Hypertension     Lupus     Renal disorder     Seizures     Thyroid disease     Transfusion reaction        Past Surgical History:   Procedure Laterality Date    CARDIAC CATHETERIZATION Left 09/19/2020    Proximal left main stenosis; IVUS in the LAD and left main    CHOLECYSTECTOMY      CORONARY ARTERY BYPASS GRAFT  09/30/2020    CORONARY STENT PLACEMENT  11/09/2021    Everolimus Eluting Coronary Stent-MLAD;LAD    HYSTERECTOMY      INCISION AND DRAINAGE  06/02/2020    LEFT HEART CATHETERIZATION Left 11/09/2021    Procedure: Left heart cath;  Surgeon: Aureliano Giron MD;  Location: [facility] CATH LAB;  Service: Cardiology;  Laterality: Left;    TOTAL ABDOMINAL HYSTERECTOMY W/ BILATERAL SALPINGOOPHORECTOMY  2008    KIRSTY Lala     TUBAL LIGATION         Family History   Problem Relation Age of Onset    Hypertension Mother     Diabetes Mother     Heart disease Mother     Cancer Mother     Bone cancer Father     Lung cancer Father     Cancer Father     COPD Father     Diabetes Sister     Heart disease Brother     Diabetes Brother     No Known Problems Daughter     Cushing syndrome Daughter     Ovarian cancer Maternal Grandmother     No Known Problems Maternal Grandfather     Heart attack Paternal Grandmother     No Known Problems Paternal Grandfather     No Known Problems Son        Social History     Tobacco Use    Smoking status: Every Day     Packs/day: 0.50     Types: Cigarettes     Start date: 1987     Passive exposure: Current    Smokeless tobacco: Never    Tobacco comments:     Patient was smoking cigarettes 2 packs per day for 10 years   Substance Use Topics    Alcohol use: Never    Drug use: Never       Review of Systems  "  Constitutional:  Positive for activity change and fatigue. Negative for chills and fever.   HENT:  Positive for voice change. Negative for facial swelling, sore throat and trouble swallowing.    Respiratory:  Negative for cough and shortness of breath.    Cardiovascular:  Negative for chest pain and palpitations.   Gastrointestinal:  Negative for abdominal pain, nausea, vomiting and reflux.   Musculoskeletal:  Positive for arthralgias, back pain, leg pain and myalgias.   Integumentary:  Negative for rash.   Neurological:  Negative for dizziness, light-headedness and headaches.   Psychiatric/Behavioral:  Positive for agitation, decreased concentration, dysphoric mood and sleep disturbance. Negative for confusion and suicidal ideas. The patient is nervous/anxious.          Objective:        Vitals:    06/12/23 1514   BP: (!) 152/70   BP Location: Right arm   Patient Position: Sitting   BP Method: Medium (Manual)   Pulse: 74   Resp: (!) 22   Temp: 97.8 °F (36.6 °C)   TempSrc: Oral   SpO2: 97%   Weight: 81.6 kg (180 lb)   Height: 5' 1" (1.549 m)       Physical Exam  Vitals reviewed.   Constitutional:       General: She is not in acute distress.  HENT:      Head: Normocephalic and atraumatic.      Mouth/Throat:      Mouth: Mucous membranes are moist.      Pharynx: No oropharyngeal exudate or posterior oropharyngeal erythema.   Eyes:      Extraocular Movements: Extraocular movements intact.      Conjunctiva/sclera: Conjunctivae normal.      Pupils: Pupils are equal, round, and reactive to light.   Cardiovascular:      Rate and Rhythm: Normal rate and regular rhythm.      Heart sounds: Normal heart sounds.   Pulmonary:      Effort: Pulmonary effort is normal. No respiratory distress.      Breath sounds: Wheezing present.   Abdominal:      General: Bowel sounds are normal.      Palpations: Abdomen is soft.   Musculoskeletal:         General: Normal range of motion.      Cervical back: No tenderness.      Right lower leg: " No edema.   Lymphadenopathy:      Cervical: No cervical adenopathy.   Skin:     General: Skin is warm and dry.   Neurological:      General: No focal deficit present.      Mental Status: She is alert and oriented to person, place, and time.   Psychiatric:         Mood and Affect: Mood is anxious and depressed.         Speech: Speech normal.         Behavior: Behavior normal. Behavior is cooperative.         Thought Content: Thought content does not include suicidal ideation. Thought content does not include suicidal plan.             Assessment:       1. Other systemic lupus erythematosus with other organ involvement    2. Bipolar 2 disorder, major depressive episode    3. Type 2 diabetes mellitus with hyperglycemia, with long-term current use of insulin    4. Other chronic pain    5. Hoarseness, chronic    6. Primary hypertension        Plan:         Problem List Items Addressed This Visit          Psychiatric    Bipolar 2 disorder, major depressive episode     Having increase in both manic and depressive symptoms. No feelings of harming herself or others. PHQ score of 16. Will increase her Vraylar to 6mg daily and address her chronic pain as that is likely contributing to her mental state.          Relevant Medications    cariprazine (VRAYLAR) 3 mg Cap       ENT    Hoarseness, chronic     Voice has been hoarse for months. Patient is a smoker. No masses or abnormalities appreciated on exam. Will refer patient to ENT for further evaluation.          Relevant Orders    Ambulatory referral/consult to ENT       Cardiac/Vascular    Hypertension     Elevated today but patient noticeably anxious and restless. Will not change regimen today but if persistently elevated will need medication adjustments.             Immunology/Multi System    Systemic lupus erythematosus - Primary     On hydroxychloriquine. She reports lupus flare and increased pain, primarily in her bilateral lower extremities and hips. Will refer to pain  management for assistance. Will also increase gabapentin to 600mg BID.         Relevant Orders    Ambulatory referral/consult to Pain Clinic       Endocrine    Diabetes mellitus    Relevant Medications    gabapentin (NEURONTIN) 300 MG capsule     Other Visit Diagnoses       Other chronic pain        Relevant Orders    Ambulatory referral/consult to Pain Clinic              Follow up in about 1 week (around 6/19/2023).    Jada Colunga MD     Instructed patient that if symptoms fail to improve or worsen patient should seek immediate medical attention or report to the nearest emergency department. Patient expressed verbal agreement and understanding to this plan of care.

## 2023-06-15 ENCOUNTER — PATIENT MESSAGE (OUTPATIENT)
Dept: FAMILY MEDICINE | Facility: CLINIC | Age: 53
End: 2023-06-15
Payer: MEDICAID

## 2023-06-15 DIAGNOSIS — E78.5 HYPERLIPIDEMIA, UNSPECIFIED HYPERLIPIDEMIA TYPE: ICD-10-CM

## 2023-06-21 ENCOUNTER — PATIENT MESSAGE (OUTPATIENT)
Dept: FAMILY MEDICINE | Facility: CLINIC | Age: 53
End: 2023-06-21
Payer: MEDICAID

## 2023-06-21 RX ORDER — ATORVASTATIN CALCIUM 40 MG/1
40 TABLET, FILM COATED ORAL DAILY
Qty: 90 TABLET | Refills: 1 | Status: SHIPPED | OUTPATIENT
Start: 2023-06-21 | End: 2023-08-24 | Stop reason: SDUPTHER

## 2023-06-23 ENCOUNTER — OFFICE VISIT (OUTPATIENT)
Dept: FAMILY MEDICINE | Facility: CLINIC | Age: 53
End: 2023-06-23
Payer: MEDICAID

## 2023-06-23 VITALS
OXYGEN SATURATION: 94 % | BODY MASS INDEX: 33.79 KG/M2 | HEIGHT: 61 IN | DIASTOLIC BLOOD PRESSURE: 70 MMHG | SYSTOLIC BLOOD PRESSURE: 100 MMHG | RESPIRATION RATE: 22 BRPM | TEMPERATURE: 99 F | HEART RATE: 88 BPM | WEIGHT: 179 LBS

## 2023-06-23 DIAGNOSIS — E78.2 MIXED HYPERLIPIDEMIA: ICD-10-CM

## 2023-06-23 DIAGNOSIS — M32.19 OTHER SYSTEMIC LUPUS ERYTHEMATOSUS WITH OTHER ORGAN INVOLVEMENT: ICD-10-CM

## 2023-06-23 DIAGNOSIS — L93.0 DISCOID LUPUS: Primary | ICD-10-CM

## 2023-06-23 DIAGNOSIS — I50.9 CONGESTIVE HEART FAILURE, UNSPECIFIED HF CHRONICITY, UNSPECIFIED HEART FAILURE TYPE: ICD-10-CM

## 2023-06-23 DIAGNOSIS — I10 PRIMARY HYPERTENSION: ICD-10-CM

## 2023-06-23 DIAGNOSIS — R49.0 HOARSENESS, CHRONIC: ICD-10-CM

## 2023-06-23 DIAGNOSIS — R73.9 HYPERGLYCEMIA: ICD-10-CM

## 2023-06-23 LAB
ANION GAP SERPL CALCULATED.3IONS-SCNC: 8 MMOL/L (ref 7–16)
BUN SERPL-MCNC: 12 MG/DL (ref 7–18)
BUN/CREAT SERPL: 11 (ref 6–20)
CALCIUM SERPL-MCNC: 10.2 MG/DL (ref 8.5–10.1)
CHLORIDE SERPL-SCNC: 103 MMOL/L (ref 98–107)
CHOLEST SERPL-MCNC: 135 MG/DL (ref 0–200)
CHOLEST/HDLC SERPL: 4.1 {RATIO}
CO2 SERPL-SCNC: 31 MMOL/L (ref 21–32)
CREAT SERPL-MCNC: 1.05 MG/DL (ref 0.55–1.02)
CRP SERPL-MCNC: 0.83 MG/DL (ref 0–0.8)
EGFR (NO RACE VARIABLE) (RUSH/TITUS): 64 ML/MIN/1.73M2
ERYTHROCYTE [SEDIMENTATION RATE] IN BLOOD BY WESTERGREN METHOD: 10 MM/HR (ref 0–30)
EST. AVERAGE GLUCOSE BLD GHB EST-MCNC: 147 MG/DL
GLUCOSE SERPL-MCNC: 149 MG/DL (ref 74–106)
HBA1C MFR BLD HPLC: 7 % (ref 4.5–6.6)
HDLC SERPL-MCNC: 33 MG/DL (ref 40–60)
LDLC SERPL CALC-MCNC: 55 MG/DL
LDLC/HDLC SERPL: 1.7 {RATIO}
NONHDLC SERPL-MCNC: 102 MG/DL
POTASSIUM SERPL-SCNC: 4 MMOL/L (ref 3.5–5.1)
SODIUM SERPL-SCNC: 138 MMOL/L (ref 136–145)
TRIGL SERPL-MCNC: 237 MG/DL (ref 35–150)
VLDLC SERPL-MCNC: 47 MG/DL

## 2023-06-23 PROCEDURE — 3060F PR POS MICROALBUMINURIA RESULT DOCUMENTED/REVIEW: ICD-10-PCS | Mod: CPTII,,, | Performed by: FAMILY MEDICINE

## 2023-06-23 PROCEDURE — 3074F PR MOST RECENT SYSTOLIC BLOOD PRESSURE < 130 MM HG: ICD-10-PCS | Mod: CPTII,,, | Performed by: FAMILY MEDICINE

## 2023-06-23 PROCEDURE — 3078F DIAST BP <80 MM HG: CPT | Mod: CPTII,,, | Performed by: FAMILY MEDICINE

## 2023-06-23 PROCEDURE — 3008F BODY MASS INDEX DOCD: CPT | Mod: CPTII,,, | Performed by: FAMILY MEDICINE

## 2023-06-23 PROCEDURE — 1159F MED LIST DOCD IN RCRD: CPT | Mod: CPTII,,, | Performed by: FAMILY MEDICINE

## 2023-06-23 PROCEDURE — 83036 HEMOGLOBIN A1C: ICD-10-PCS | Mod: ,,, | Performed by: CLINICAL MEDICAL LABORATORY

## 2023-06-23 PROCEDURE — 83036 HEMOGLOBIN GLYCOSYLATED A1C: CPT | Mod: ,,, | Performed by: CLINICAL MEDICAL LABORATORY

## 2023-06-23 PROCEDURE — 80061 LIPID PANEL: ICD-10-PCS | Mod: ,,, | Performed by: CLINICAL MEDICAL LABORATORY

## 2023-06-23 PROCEDURE — 99214 OFFICE O/P EST MOD 30 MIN: CPT | Mod: ,,, | Performed by: FAMILY MEDICINE

## 2023-06-23 PROCEDURE — 1159F PR MEDICATION LIST DOCUMENTED IN MEDICAL RECORD: ICD-10-PCS | Mod: CPTII,,, | Performed by: FAMILY MEDICINE

## 2023-06-23 PROCEDURE — 80048 BASIC METABOLIC PNL TOTAL CA: CPT | Mod: ,,, | Performed by: CLINICAL MEDICAL LABORATORY

## 2023-06-23 PROCEDURE — 86140 C-REACTIVE PROTEIN: CPT | Mod: ,,, | Performed by: CLINICAL MEDICAL LABORATORY

## 2023-06-23 PROCEDURE — 3066F NEPHROPATHY DOC TX: CPT | Mod: CPTII,,, | Performed by: FAMILY MEDICINE

## 2023-06-23 PROCEDURE — 80048 BASIC METABOLIC PANEL: ICD-10-PCS | Mod: ,,, | Performed by: CLINICAL MEDICAL LABORATORY

## 2023-06-23 PROCEDURE — 80061 LIPID PANEL: CPT | Mod: ,,, | Performed by: CLINICAL MEDICAL LABORATORY

## 2023-06-23 PROCEDURE — 3052F PR MOST RECENT HEMOGLOBIN A1C LEVEL 8.0 - < 9.0%: ICD-10-PCS | Mod: CPTII,,, | Performed by: FAMILY MEDICINE

## 2023-06-23 PROCEDURE — 3060F POS MICROALBUMINURIA REV: CPT | Mod: CPTII,,, | Performed by: FAMILY MEDICINE

## 2023-06-23 PROCEDURE — 86140 C-REACTIVE PROTEIN: ICD-10-PCS | Mod: ,,, | Performed by: CLINICAL MEDICAL LABORATORY

## 2023-06-23 PROCEDURE — 85651 RBC SED RATE NONAUTOMATED: CPT | Mod: ,,, | Performed by: CLINICAL MEDICAL LABORATORY

## 2023-06-23 PROCEDURE — 3078F PR MOST RECENT DIASTOLIC BLOOD PRESSURE < 80 MM HG: ICD-10-PCS | Mod: CPTII,,, | Performed by: FAMILY MEDICINE

## 2023-06-23 PROCEDURE — 85651 SEDIMENTATION RATE, AUTOMATED: ICD-10-PCS | Mod: ,,, | Performed by: CLINICAL MEDICAL LABORATORY

## 2023-06-23 PROCEDURE — 99214 PR OFFICE/OUTPT VISIT, EST, LEVL IV, 30-39 MIN: ICD-10-PCS | Mod: ,,, | Performed by: FAMILY MEDICINE

## 2023-06-23 PROCEDURE — 3066F PR DOCUMENTATION OF TREATMENT FOR NEPHROPATHY: ICD-10-PCS | Mod: CPTII,,, | Performed by: FAMILY MEDICINE

## 2023-06-23 PROCEDURE — 3052F HG A1C>EQUAL 8.0%<EQUAL 9.0%: CPT | Mod: CPTII,,, | Performed by: FAMILY MEDICINE

## 2023-06-23 PROCEDURE — 3074F SYST BP LT 130 MM HG: CPT | Mod: CPTII,,, | Performed by: FAMILY MEDICINE

## 2023-06-23 PROCEDURE — 3008F PR BODY MASS INDEX (BMI) DOCUMENTED: ICD-10-PCS | Mod: CPTII,,, | Performed by: FAMILY MEDICINE

## 2023-06-23 RX ORDER — TRIAMCINOLONE ACETONIDE 1 MG/G
CREAM TOPICAL 2 TIMES DAILY
Qty: 454 G | Refills: 5 | Status: SHIPPED | OUTPATIENT
Start: 2023-06-23 | End: 2023-08-24 | Stop reason: SDUPTHER

## 2023-06-23 RX ORDER — HYDROCODONE BITARTRATE AND ACETAMINOPHEN 7.5; 325 MG/1; MG/1
1 TABLET ORAL EVERY 6 HOURS PRN
Qty: 20 TABLET | Refills: 0 | Status: SHIPPED | OUTPATIENT
Start: 2023-06-23 | End: 2023-08-24

## 2023-06-23 NOTE — ASSESSMENT & PLAN NOTE
Patient has been diagnosed with systemic lupus and does have arthralgias.  She did request pain medication.  I discussed her that she will be to see the pain clinic for long-term pain medications.  We did give her Norco 7 5.  Only 20 given.  Explained we will not write her medication.   was pulled no recent Norco red but she does have gabapentin.

## 2023-06-23 NOTE — ASSESSMENT & PLAN NOTE
Will use triamcinolone cream on her rashes on her arms.  Refer to Dermatology for possible biopsy as I suspicion she is discoid lupus.

## 2023-06-23 NOTE — ASSESSMENT & PLAN NOTE
Congestive heart failure currently stable.  No PND orthopnea.  Continue her Plavix and her metoprolol.  Follow-up every 3 months with us every 6 months with Cardiology.

## 2023-06-23 NOTE — ASSESSMENT & PLAN NOTE
Chronic hoarseness which may be related to her collagen vascular disease however will refer her to ear nose and throat for further evaluation.

## 2023-06-23 NOTE — ASSESSMENT & PLAN NOTE
Last cholesterol was 163 and LDL was 58 in March of 2023.  No change in her medication.  Follow-up every 6 months

## 2023-06-23 NOTE — PROGRESS NOTES
Oren Gage DO   [address] [place]  [address]      PATIENT NAME: Rosemarie Lane  : 1970  DATE: 23  MRN: [id number]      Billing Provider: Oren Gage DO  Level of Service:   Patient PCP Information       Provider PCP Type    Oren Gage DO General            Reason for Visit / Chief Complaint: Follow-up (Patient is here for 1 week follow up. She had increase in her Vraylar and gabapentin at last office visit. She reports that her depression/mood has improved slightly. Her pain is still about the same at recent visit. She has gotten her ENT appointment with Dr. Sloan on . She is waiting on call about her pain treatment referral. [facility] )       Update PCP  Update Chief Complaint         History of Present Illness / Problem Focused Workflow     Rosemarie Lane presents to the clinic with Follow-up (Patient is here for 1 week follow up. She had increase in her Vraylar and gabapentin at last office visit. She reports that her depression/mood has improved slightly. Her pain is still about the same at recent visit. She has gotten her ENT appointment with Dr. Sloan on . She is waiting on call about her pain treatment referral. [facility]. )     Patient is in today for follow-up multiple problems to include her lupus as well as her shortness breath which is much improved.  She is had no PND orthopnea.  She also reports that her lupus is very painful and she requested pain medications.  She denies having any history of substance abuse.  She has been referred to the pain clinic but has not gotten an appointment with them yet.      Review of Systems     Review of Systems   Constitutional:  Negative for activity change, appetite change, chills, fatigue and fever.   HENT:  Negative for nasal congestion, ear discharge, ear pain, mouth dryness, mouth sores, postnasal drip, sinus  pressure/congestion, sore throat and voice change.    Eyes:  Negative for pain, discharge, redness, itching and visual disturbance.   Respiratory:  Positive for shortness of breath. Negative for apnea, cough, chest tightness and wheezing.    Cardiovascular:  Negative for chest pain, palpitations and leg swelling.   Gastrointestinal:  Negative for abdominal distention, abdominal pain, anal bleeding, blood in stool, change in bowel habit, constipation, diarrhea, nausea, vomiting, reflux and change in bowel habit.   Endocrine: Negative for cold intolerance, heat intolerance, polydipsia, polyphagia and polyuria.   Genitourinary:  Negative for difficulty urinating, enuresis, frequency, genital sores, hematuria, hot flashes, menstrual irregularity, urgency and vaginal dryness.   Musculoskeletal:  Positive for arthralgias, back pain, joint swelling, myalgias and joint deformity. Negative for gait problem, leg pain and neck pain.   Integumentary:  Negative for rash, mole/lesion, breast mass, breast discharge and breast tenderness.   Allergic/Immunologic: Negative for environmental allergies and food allergies.   Neurological:  Negative for dizziness, vertigo, tremors, seizures, syncope, facial asymmetry, speech difficulty, weakness, light-headedness, numbness, headaches, coordination difficulties, memory loss and coordination difficulties.   Hematological:  Negative for adenopathy. Does not bruise/bleed easily.   Psychiatric/Behavioral:  Negative for agitation, behavioral problems, confusion, decreased concentration, dysphoric mood, hallucinations, self-injury, sleep disturbance and suicidal ideas. The patient is not nervous/anxious and is not hyperactive.    Breast: Negative for mass and tenderness    Medical / Social / Family History     Past Medical History:   Diagnosis Date    Anxiety     Arthritis     Asthma     Cancer     CHF (congestive heart failure)     Chronic obstructive lung disease     Coronary artery disease      Depression     Diabetes mellitus     Diabetes mellitus, type 2     Encounter for blood transfusion     History of kidney stones     Hyperlipidemia     Hypertension     Lupus     Renal disorder     Seizures     Thyroid disease     Transfusion reaction        Past Surgical History:   Procedure Laterality Date    CARDIAC CATHETERIZATION Left 09/19/2020    Proximal left main stenosis; IVUS in the LAD and left main    CHOLECYSTECTOMY      CORONARY ARTERY BYPASS GRAFT  09/30/2020    CORONARY STENT PLACEMENT  11/09/2021    Everolimus Eluting Coronary Stent-MLAD;LAD    HYSTERECTOMY      INCISION AND DRAINAGE  06/02/2020    LEFT HEART CATHETERIZATION Left 11/09/2021    Procedure: Left heart cath;  Surgeon: Aureliano Giron MD;  Location: [facility] CATH LAB;  Service: Cardiology;  Laterality: Left;    TOTAL ABDOMINAL HYSTERECTOMY W/ BILATERAL SALPINGOOPHORECTOMY  2008    [place]     TUBAL LIGATION         Social History  Ms.  reports that she has been smoking cigarettes. She started smoking about 36 years ago. She has been smoking an average of .5 packs per day. She has been exposed to tobacco smoke. She has never used smokeless tobacco. She reports that she does not drink alcohol and does not use drugs.    Family History  Ms.'s family history includes Bone cancer in her father; COPD in her father; Cancer in her father and mother; Cushing syndrome in her daughter; Diabetes in her brother, mother, and sister; Heart attack in her paternal grandmother; Heart disease in her brother and mother; Hypertension in her mother; Lung cancer in her father; No Known Problems in her daughter, maternal grandfather, paternal grandfather, and son; Ovarian cancer in her maternal grandmother.    Medications and Allergies     Medications  Outpatient Medications Marked as Taking for the 6/23/23 encounter (Office Visit) with Oren Gage,    Medication Sig Dispense Refill    albuterol (PROVENTIL) 2.5 mg /3 mL (0.083 %) nebulizer  solution USE 1 VIAL IN NEBULIZER EVERY 4 TO 6 HOURS AS NEEDED 180 each 1    arformoteroL (BROVANA) 15 mcg/2 mL Nebu Take 2 mLs (15 mcg total) by nebulization 2 (two) times a day. 180 each 1    aspirin (ECOTRIN) 81 MG EC tablet Take 1 tablet by mouth once daily.      atorvastatin (LIPITOR) 40 MG tablet Take 1 tablet (40 mg total) by mouth once daily. 90 tablet 1    budesonide-formoterol 160-4.5 mcg (SYMBICORT) 160-4.5 mcg/actuation HFAA Inhale 2 puffs into the lungs every 12 (twelve) hours. Controller 18 g 5    busPIRone (BUSPAR) 15 MG tablet TAKE 1 TABLET BY MOUTH 2 TIMES A DAY AS DIRECTED 60 tablet 2    cariprazine (VRAYLAR) 3 mg Cap Take 2 capsules (6 mg total) by mouth once daily. 60 capsule 6    clopidogreL (PLAVIX) 75 mg tablet Take 1 tablet (75 mg total) by mouth once daily. 90 tablet 1    dapagliflozin (FARXIGA) 10 mg tablet Take 1 tablet (10 mg total) by mouth once daily. 90 tablet 1    gabapentin (NEURONTIN) 300 MG capsule Take 2 capsules (600 mg total) by mouth 2 (two) times daily. 360 capsule 1    hydrOXYchloroQUINE (PLAQUENIL) 200 mg tablet Take 1 tablet (200 mg total) by mouth 2 (two) times daily. 180 tablet 1    insulin (LANTUS SOLOSTAR U-100 INSULIN) glargine 100 units/mL SubQ pen Inject 30 Units into the skin 2 (two) times a day. 51 mL 1    linaCLOtide (LINZESS) 145 mcg Cap capsule Take 1 capsule (145 mcg total) by mouth daily as needed (constipation). 30 capsule 2    metoprolol succinate (TOPROL-XL) 50 MG 24 hr tablet Take 1 tablet (50 mg total) by mouth once daily. 90 tablet 3    mirtazapine (REMERON) 30 MG tablet Take 30 mg by mouth once daily.      ondansetron (ZOFRAN-ODT) 4 MG TbDL Take 1 tablet (4 mg total) by mouth every 8 (eight) hours as needed (nausea). 20 tablet 0    rOPINIRole (REQUIP) 0.25 MG tablet Take 1 tablet (0.25 mg total) by mouth 3 (three) times daily. 270 tablet 1    topiramate (TOPAMAX) 25 MG tablet Take 1 tablet (25 mg total) by mouth once daily. 90 tablet 1        Allergies  Review of patient's allergies indicates:   Allergen Reactions    Fish containing products Hives and Swelling    Penicillins Swelling     Localized swelling     Wasp venom Anaphylaxis    Opioids - morphine analogues      Change in behavior    Toradol [ketorolac] Hives    Tramadol Hives    Zithromax [azithromycin] Other (See Comments)     Patient cannot remember what kind of reaction she had to this medication    Bactrim ds [sulfamethoxazole-trimethoprim] Nausea And Vomiting     Facial flush    Latex, natural rubber Rash       Physical Examination     Vitals:    06/23/23 1420   BP: 100/70   Pulse: 88   Resp: (!) 22   Temp: 98.7 °F (37.1 °C)     Physical Exam  Vitals reviewed.   Constitutional:       General: She is not in acute distress.     Appearance: Normal appearance. She is obese.   HENT:      Head: Normocephalic and atraumatic.      Nose: Nose normal.      Mouth/Throat:      Mouth: Mucous membranes are moist.      Pharynx: Oropharynx is clear. No oropharyngeal exudate or posterior oropharyngeal erythema.   Eyes:      General: No scleral icterus.     Extraocular Movements: Extraocular movements intact.      Conjunctiva/sclera: Conjunctivae normal.      Pupils: Pupils are equal, round, and reactive to light.   Neck:      Vascular: No carotid bruit.   Cardiovascular:      Rate and Rhythm: Normal rate and regular rhythm.      Pulses: Normal pulses.      Heart sounds: Murmur heard.     No gallop.   Pulmonary:      Effort: Pulmonary effort is normal.      Breath sounds: Normal breath sounds. No wheezing.   Abdominal:      General: Abdomen is flat. Bowel sounds are normal.      Palpations: Abdomen is soft.   Musculoskeletal:         General: Normal range of motion.      Cervical back: Normal range of motion and neck supple.      Right lower leg: No edema.      Left lower leg: No edema.   Lymphadenopathy:      Cervical: No cervical adenopathy.   Skin:     General: Skin is warm and dry.      Capillary  Refill: Capillary refill takes less than 2 seconds.      Coloration: Skin is not jaundiced.      Findings: Rash present. No lesion.   Neurological:      General: No focal deficit present.      Mental Status: She is alert and oriented to person, place, and time. Mental status is at baseline.      Cranial Nerves: No cranial nerve deficit.      Sensory: No sensory deficit.      Motor: No weakness.      Coordination: Coordination normal.      Gait: Gait normal.      Deep Tendon Reflexes: Reflexes normal.   Psychiatric:         Mood and Affect: Mood normal.         Behavior: Behavior normal.         Thought Content: Thought content normal.         Judgment: Judgment normal.             Lab Results   Component Value Date    WBC 10.33 03/20/2023    HGB 16.2 (H) 03/20/2023    HCT 49.6 (H) 03/20/2023    MCV 96.3 (H) 03/20/2023     03/20/2023          Sodium   Date Value Ref Range Status   03/20/2023 136 136 - 145 mmol/L Final     Potassium   Date Value Ref Range Status   03/20/2023 4.3 3.5 - 5.1 mmol/L Final     Chloride   Date Value Ref Range Status   03/20/2023 103 98 - 107 mmol/L Final     CO2   Date Value Ref Range Status   03/20/2023 25 21 - 32 mmol/L Final     Glucose   Date Value Ref Range Status   03/20/2023 139 (H) 74 - 106 mg/dL Final     BUN   Date Value Ref Range Status   03/20/2023 14 7 - 18 mg/dL Final     Creatinine   Date Value Ref Range Status   03/20/2023 0.98 0.55 - 1.02 mg/dL Final     Calcium   Date Value Ref Range Status   03/20/2023 9.5 8.5 - 10.1 mg/dL Final     Total Protein   Date Value Ref Range Status   03/20/2023 8.6 (H) 6.4 - 8.2 g/dL Final     Albumin   Date Value Ref Range Status   03/20/2023 4.5 3.5 - 5.0 g/dL Final     Bilirubin, Total   Date Value Ref Range Status   03/20/2023 0.6 >0.0 - 1.2 mg/dL Final     Alk Phos   Date Value Ref Range Status   03/20/2023 108 41 - 108 U/L Final     AST   Date Value Ref Range Status   03/20/2023 41 (H) 15 - 37 U/L Final     ALT   Date Value Ref  Range Status   03/20/2023 74 (H) 13 - 56 U/L Final     Anion Gap   Date Value Ref Range Status   03/20/2023 12 7 - 16 mmol/L Final     eGFR    Date Value Ref Range Status   01/18/2021 62       eGFR   Date Value Ref Range Status   07/08/2022 70 >=60 mL/min/1.73m² Final      X-Ray Chest AP Portable  Narrative: EXAMINATION:  XR CHEST AP PORTABLE    CLINICAL HISTORY:  .  Chest pain, unspecified    COMPARISON:  January 18, 2021    TECHNIQUE:  Chest x-ray AP portable erect    FINDINGS:  Cardiac silhouette is upper normal.  There is no mediastinal mass.  Prior median sternotomy.  There is no pulmonary vascular engorgement.  Lungs and pleural spaces are generally clear.    Osseous structures are unchanged  Impression: No acute process compared to the previous study    Electronically signed by: Humble Macias  Date:    08/14/2022  Time:    14:08     Procedures   Lab Results   Component Value Date    CHOL 163 03/20/2023    CHOL 109 12/29/2022    CHOL 118 07/08/2022     Lab Results   Component Value Date    HDL 39 (L) 03/20/2023    HDL 30 (L) 12/29/2022    HDL 38 (L) 07/08/2022     Lab Results   Component Value Date    LDLCALC 58 03/20/2023    LDLCALC 30 12/29/2022    LDLCALC 41 07/08/2022     Lab Results   Component Value Date    TRIG 329 (H) 03/20/2023    TRIG 245 (H) 12/29/2022    TRIG 194 (H) 07/08/2022     Lab Results   Component Value Date    CHOLHDL 4.2 03/20/2023    CHOLHDL 3.6 12/29/2022    CHOLHDL 3.1 07/08/2022      Lab Results   Component Value Date    HGBA1C 8.2 (H) 03/20/2023      Lab Results   Component Value Date    TSH 1.520 03/20/2023    Q5TBMRG 10.7 03/20/2023      Assessment and Plan (including Health Maintenance)      Problem List  Smart Sets  Document Outside HM   :    Plan:         Health Maintenance Due   Topic Date Due    Hepatitis C Screening  Never done    COVID-19 Vaccine (1) Never done    Pneumococcal Vaccines (Age 0-64) (1 - PCV) Never done    TETANUS VACCINE  Never done     Mammogram  Never done    Colorectal Cancer Screening  Never done    Shingles Vaccine (1 of 2) Never done    Hemoglobin A1c  06/20/2023       Problem List Items Addressed This Visit          ENT    Hoarseness, chronic    Current Assessment & Plan     Chronic hoarseness which may be related to her collagen vascular disease however will refer her to ear nose and throat for further evaluation.            Derm    Discoid lupus - Primary    Current Assessment & Plan     Will use triamcinolone cream on her rashes on her arms.  Refer to Dermatology for possible biopsy as I suspicion she is discoid lupus.         Relevant Medications    triamcinolone acetonide 0.1% (KENALOG) 0.1 % cream       Cardiac/Vascular    Hyperlipidemia    Current Assessment & Plan     Last cholesterol was 163 and LDL was 58 in March of 2023.  No change in her medication.  Follow-up every 6 months         Relevant Orders    Lipid Panel    CHF (congestive heart failure)    Current Assessment & Plan     Congestive heart failure currently stable.  No PND orthopnea.  Continue her Plavix and her metoprolol.  Follow-up every 3 months with us every 6 months with Cardiology.         Hypertension    Current Assessment & Plan     Hypertension well controlled no change in medication.  Follow-up in 3 months.  Will check CBC and CMP yearly.  Goal is blood pressure 120/70            Immunology/Multi System    Systemic lupus erythematosus    Current Assessment & Plan     Patient has been diagnosed with systemic lupus and does have arthralgias.  She did request pain medication.  I discussed her that she will be to see the pain clinic for long-term pain medications.  We did give her Norco 7 5.  Only 20 given.  Explained we will not write her medication.   was pulled no recent Norco red but she does have gabapentin.         Relevant Medications    triamcinolone acetonide 0.1% (KENALOG) 0.1 % cream    HYDROcodone-acetaminophen (NORCO) 7.5-325 mg per tablet    Other  Relevant Orders    Ambulatory referral/consult to Rheumatology    Sedimentation Rate    C-Reactive Protein       Endocrine    Hyperglycemia    Relevant Orders    Hemoglobin A1C    Basic Metabolic Panel    Lipid Panel       Health Maintenance Topics with due status: Not Due       Topic Last Completion Date    Lipid Panel 03/20/2023    High Dose Statin 06/23/2023    Aspirin/Antiplatelet Therapy 06/23/2023       Future Appointments   Date Time Provider Department Center   6/26/2023  2:10 PM Nazario Sloan MD [facility]   7/12/2023  9:00 AM Oren Gage DO [facility]   8/1/2023 12:30 PM OPAL [facility]        Follow up in about 4 weeks (around 7/21/2023).     Signature:  DO [facility]  [address] [place] [address], [place]  [address]    Date of encounter: 6/23/23

## 2023-06-26 ENCOUNTER — OFFICE VISIT (OUTPATIENT)
Dept: OTOLARYNGOLOGY | Facility: CLINIC | Age: 53
End: 2023-06-26
Payer: MEDICAID

## 2023-06-26 VITALS — WEIGHT: 180 LBS | BODY MASS INDEX: 35.34 KG/M2 | HEIGHT: 60 IN

## 2023-06-26 DIAGNOSIS — J38.3 LEUKOPLAKIA OF VOCAL CORDS: ICD-10-CM

## 2023-06-26 DIAGNOSIS — R49.0 HOARSENESS, CHRONIC: ICD-10-CM

## 2023-06-26 DIAGNOSIS — J38.3 LESION OF VOCAL CORD: Primary | ICD-10-CM

## 2023-06-26 PROCEDURE — 3051F HG A1C>EQUAL 7.0%<8.0%: CPT | Mod: CPTII,,, | Performed by: OTOLARYNGOLOGY

## 2023-06-26 PROCEDURE — 31575 DIAGNOSTIC LARYNGOSCOPY: CPT | Mod: S$PBB,,, | Performed by: OTOLARYNGOLOGY

## 2023-06-26 PROCEDURE — 31575 PR LARYNGOSCOPY, FLEXIBLE; DIAGNOSTIC: ICD-10-PCS | Mod: S$PBB,,, | Performed by: OTOLARYNGOLOGY

## 2023-06-26 PROCEDURE — 99215 OFFICE O/P EST HI 40 MIN: CPT | Mod: PBBFAC,25 | Performed by: OTOLARYNGOLOGY

## 2023-06-26 PROCEDURE — 1160F RVW MEDS BY RX/DR IN RCRD: CPT | Mod: CPTII,,, | Performed by: OTOLARYNGOLOGY

## 2023-06-26 PROCEDURE — 3051F PR MOST RECENT HEMOGLOBIN A1C LEVEL 7.0 - < 8.0%: ICD-10-PCS | Mod: CPTII,,, | Performed by: OTOLARYNGOLOGY

## 2023-06-26 PROCEDURE — 3060F POS MICROALBUMINURIA REV: CPT | Mod: CPTII,,, | Performed by: OTOLARYNGOLOGY

## 2023-06-26 PROCEDURE — 3060F PR POS MICROALBUMINURIA RESULT DOCUMENTED/REVIEW: ICD-10-PCS | Mod: CPTII,,, | Performed by: OTOLARYNGOLOGY

## 2023-06-26 PROCEDURE — 3066F PR DOCUMENTATION OF TREATMENT FOR NEPHROPATHY: ICD-10-PCS | Mod: CPTII,,, | Performed by: OTOLARYNGOLOGY

## 2023-06-26 PROCEDURE — 3008F PR BODY MASS INDEX (BMI) DOCUMENTED: ICD-10-PCS | Mod: CPTII,,, | Performed by: OTOLARYNGOLOGY

## 2023-06-26 PROCEDURE — 31575 DIAGNOSTIC LARYNGOSCOPY: CPT | Mod: PBBFAC | Performed by: OTOLARYNGOLOGY

## 2023-06-26 PROCEDURE — 3066F NEPHROPATHY DOC TX: CPT | Mod: CPTII,,, | Performed by: OTOLARYNGOLOGY

## 2023-06-26 PROCEDURE — 99204 OFFICE O/P NEW MOD 45 MIN: CPT | Mod: 25,S$PBB,, | Performed by: OTOLARYNGOLOGY

## 2023-06-26 PROCEDURE — 1159F MED LIST DOCD IN RCRD: CPT | Mod: CPTII,,, | Performed by: OTOLARYNGOLOGY

## 2023-06-26 PROCEDURE — 99204 PR OFFICE/OUTPT VISIT, NEW, LEVL IV, 45-59 MIN: ICD-10-PCS | Mod: 25,S$PBB,, | Performed by: OTOLARYNGOLOGY

## 2023-06-26 PROCEDURE — 1159F PR MEDICATION LIST DOCUMENTED IN MEDICAL RECORD: ICD-10-PCS | Mod: CPTII,,, | Performed by: OTOLARYNGOLOGY

## 2023-06-26 PROCEDURE — 1160F PR REVIEW ALL MEDS BY PRESCRIBER/CLIN PHARMACIST DOCUMENTED: ICD-10-PCS | Mod: CPTII,,, | Performed by: OTOLARYNGOLOGY

## 2023-06-26 PROCEDURE — 3008F BODY MASS INDEX DOCD: CPT | Mod: CPTII,,, | Performed by: OTOLARYNGOLOGY

## 2023-06-26 NOTE — H&P (VIEW-ONLY)
Subjective:       Patient ID: Rosemarie Lane is a 53 y.o. female.    Chief Complaint: Hoarse (Pt states she has been hoarse x 6 months, at times loses voice to a whisper, also states throat is sore and has dysphagia at times. )  Smoker trying to quit  HPI  Review of Systems   HENT:  Positive for voice change.    All other systems reviewed and are negative.    Objective:      Physical Exam  General: NAD  Head: Normocephalic, atraumatic, no facial asymmetry/normal strength,  Ears: Both auricules normal in appearance, w/o deformities tympanic membranes normal external auditory canals normal  Nose: External nose w/o deformities normal turbinates no drainage or inflammation  Oral Cavity: Lips, gums, floor of mouth, tongue hard palate, and buccal mucosa without mass/lesion  Oropharynx: Mucosa pink and moist, soft palate, posterior pharynx and oropharyngeal wall without mass/lesion  Neck: Supple, symmetric, trachea midline, no palpable mass/lesion, no palpable cervical lymphadenopathy  Skin: Warm and dry, no concerning lesions  Respiratory: Respirations even, unlabored     Nasal/Nasopharyngo/Laryn/Hypopharyngoscopy Procedures   Procedure: Flexible laryngoscopy  In order to fully examine the upper aerodigestive tract, including the larynx, in a patient with a hyperactive gag reflex, flexible endoscopy is required.  After explaining the procedure and obtaining verbal consent, a timeout was performed with the patient's participation according to the universal protocol. Both nasal cavities were anesthetized with 4% Xylocaine spray mixed with Pete-Synephrine. The flexible laryngoscope was inserted into the nasal cavity and advanced to visualize the nasal cavity, nasopharynx, the posterior oropharynx, hypopharynx, and the endolarynx with the above findings noted. The scope was removed and the procedure terminated. The patient tolerated this procedure well without apparent complication.      Procedure:  Diagnostic nasal,  nasopharyngoscopy, laryngoscopy and hypopharyngoscopy.    Routine preparation with local atomizer with 1% neosynephrine and lidocaine . With customary flexible endoscope.     NOSE:   External:  No gross deformity   Intranasal:    Mucosa:  No polyps, ulcers or lesions.    Septum:  No gross deformity.    Turbinates:  Not enlarged.    Nasopharynx:  No lesions.   Mucosa:  No lesions.   Adenoids:  Present.   Posterior Choanae:  Patent.   Eustachian Tubes:  Patent.  Larynx/hypopharynx:   Epiglottis:  No lesions, without edema.   AE Folds:  No lesions.   Vocal cords: Leukoplakia bilaterally  nl mobility   Subglottis: No obvious stenosis   Hypopharynx:  No lesions.   Piriform sinus:  No pooling or lesions.   Post Cricoid:  No edema or erythema   Assessment:       1. Lesion of vocal cord    2. Hoarseness, chronic    3. Leukoplakia of vocal cords        Plan:       Stop smoking DL with excision VC leukoplakia

## 2023-06-30 DIAGNOSIS — E11.65 TYPE 2 DIABETES MELLITUS WITH HYPERGLYCEMIA, WITHOUT LONG-TERM CURRENT USE OF INSULIN: ICD-10-CM

## 2023-07-01 ENCOUNTER — PATIENT MESSAGE (OUTPATIENT)
Dept: FAMILY MEDICINE | Facility: CLINIC | Age: 53
End: 2023-07-01
Payer: MEDICAID

## 2023-07-01 DIAGNOSIS — E11.65 TYPE 2 DIABETES MELLITUS WITH HYPERGLYCEMIA, WITHOUT LONG-TERM CURRENT USE OF INSULIN: ICD-10-CM

## 2023-07-03 ENCOUNTER — OFFICE VISIT (OUTPATIENT)
Dept: FAMILY MEDICINE | Facility: CLINIC | Age: 53
End: 2023-07-03
Payer: MEDICAID

## 2023-07-03 VITALS
DIASTOLIC BLOOD PRESSURE: 80 MMHG | HEIGHT: 60 IN | RESPIRATION RATE: 18 BRPM | WEIGHT: 179 LBS | TEMPERATURE: 98 F | HEART RATE: 81 BPM | SYSTOLIC BLOOD PRESSURE: 120 MMHG | BODY MASS INDEX: 35.14 KG/M2 | OXYGEN SATURATION: 95 %

## 2023-07-03 DIAGNOSIS — R49.0 HOARSENESS, CHRONIC: Primary | ICD-10-CM

## 2023-07-03 DIAGNOSIS — Z72.0 TOBACCO ABUSE: ICD-10-CM

## 2023-07-03 DIAGNOSIS — E11.65 TYPE 2 DIABETES MELLITUS WITH HYPERGLYCEMIA, WITHOUT LONG-TERM CURRENT USE OF INSULIN: ICD-10-CM

## 2023-07-03 DIAGNOSIS — M32.19 OTHER SYSTEMIC LUPUS ERYTHEMATOSUS WITH OTHER ORGAN INVOLVEMENT: ICD-10-CM

## 2023-07-03 DIAGNOSIS — L93.0 DISCOID LUPUS: ICD-10-CM

## 2023-07-03 DIAGNOSIS — E78.2 MIXED HYPERLIPIDEMIA: ICD-10-CM

## 2023-07-03 DIAGNOSIS — I10 HYPERTENSION, UNSPECIFIED TYPE: ICD-10-CM

## 2023-07-03 PROCEDURE — 1159F MED LIST DOCD IN RCRD: CPT | Mod: CPTII,,, | Performed by: FAMILY MEDICINE

## 2023-07-03 PROCEDURE — 3079F DIAST BP 80-89 MM HG: CPT | Mod: CPTII,,, | Performed by: FAMILY MEDICINE

## 2023-07-03 PROCEDURE — 3051F PR MOST RECENT HEMOGLOBIN A1C LEVEL 7.0 - < 8.0%: ICD-10-PCS | Mod: CPTII,,, | Performed by: FAMILY MEDICINE

## 2023-07-03 PROCEDURE — 3060F POS MICROALBUMINURIA REV: CPT | Mod: CPTII,,, | Performed by: FAMILY MEDICINE

## 2023-07-03 PROCEDURE — 99214 PR OFFICE/OUTPT VISIT, EST, LEVL IV, 30-39 MIN: ICD-10-PCS | Mod: ,,, | Performed by: FAMILY MEDICINE

## 2023-07-03 PROCEDURE — 3066F NEPHROPATHY DOC TX: CPT | Mod: CPTII,,, | Performed by: FAMILY MEDICINE

## 2023-07-03 PROCEDURE — 3008F PR BODY MASS INDEX (BMI) DOCUMENTED: ICD-10-PCS | Mod: CPTII,,, | Performed by: FAMILY MEDICINE

## 2023-07-03 PROCEDURE — 3074F PR MOST RECENT SYSTOLIC BLOOD PRESSURE < 130 MM HG: ICD-10-PCS | Mod: CPTII,,, | Performed by: FAMILY MEDICINE

## 2023-07-03 PROCEDURE — 1159F PR MEDICATION LIST DOCUMENTED IN MEDICAL RECORD: ICD-10-PCS | Mod: CPTII,,, | Performed by: FAMILY MEDICINE

## 2023-07-03 PROCEDURE — 3066F PR DOCUMENTATION OF TREATMENT FOR NEPHROPATHY: ICD-10-PCS | Mod: CPTII,,, | Performed by: FAMILY MEDICINE

## 2023-07-03 PROCEDURE — 3008F BODY MASS INDEX DOCD: CPT | Mod: CPTII,,, | Performed by: FAMILY MEDICINE

## 2023-07-03 PROCEDURE — 3074F SYST BP LT 130 MM HG: CPT | Mod: CPTII,,, | Performed by: FAMILY MEDICINE

## 2023-07-03 PROCEDURE — 3079F PR MOST RECENT DIASTOLIC BLOOD PRESSURE 80-89 MM HG: ICD-10-PCS | Mod: CPTII,,, | Performed by: FAMILY MEDICINE

## 2023-07-03 PROCEDURE — 3051F HG A1C>EQUAL 7.0%<8.0%: CPT | Mod: CPTII,,, | Performed by: FAMILY MEDICINE

## 2023-07-03 PROCEDURE — 3060F PR POS MICROALBUMINURIA RESULT DOCUMENTED/REVIEW: ICD-10-PCS | Mod: CPTII,,, | Performed by: FAMILY MEDICINE

## 2023-07-03 PROCEDURE — 99214 OFFICE O/P EST MOD 30 MIN: CPT | Mod: ,,, | Performed by: FAMILY MEDICINE

## 2023-07-03 RX ORDER — METOPROLOL SUCCINATE 50 MG/1
50 TABLET, EXTENDED RELEASE ORAL DAILY
Qty: 90 TABLET | Refills: 3 | Status: SHIPPED | OUTPATIENT
Start: 2023-07-03 | End: 2023-07-05 | Stop reason: SDUPTHER

## 2023-07-03 RX ORDER — VARENICLINE TARTRATE 1 MG/1
1 TABLET, FILM COATED ORAL 2 TIMES DAILY
Qty: 60 TABLET | Refills: 5 | Status: ON HOLD | OUTPATIENT
Start: 2023-07-03 | End: 2023-07-18 | Stop reason: ALTCHOICE

## 2023-07-03 RX ORDER — DAPAGLIFLOZIN 10 MG/1
10 TABLET, FILM COATED ORAL DAILY
Qty: 90 TABLET | Refills: 1 | Status: SHIPPED | OUTPATIENT
Start: 2023-07-03 | End: 2023-08-24 | Stop reason: SDUPTHER

## 2023-07-03 RX ORDER — VARENICLINE TARTRATE 0.5 (11)-1
KIT ORAL
Qty: 1 EACH | Refills: 0 | Status: ON HOLD | OUTPATIENT
Start: 2023-07-03 | End: 2023-07-18 | Stop reason: ALTCHOICE

## 2023-07-03 NOTE — PROGRESS NOTES
Oren Gage DO   [address]  [place]  [address]      PATIENT NAME: Rosemarie Lane  : 1970  DATE: 7/3/23  MRN: [id number]      Billing Provider: Oren Gage DO  Level of Service:   Patient PCP Information       Provider PCP Type    Oren Gage DO General            Reason for Visit / Chief Complaint: Medication Refill and Follow-up (Pt is here for a follow up. Pt states she has precancer. Pt also wants the stop smoking)       Update PCP  Update Chief Complaint         History of Present Illness / Problem Focused Workflow     Rosemarie Lane presents to the clinic with Medication Refill and Follow-up (Pt is here for a follow up. Pt states she has precancer. Pt also wants the stop smoking)     Patient is in today primarily for med refills however she wants to stop smoking as she has some precancerous lesions on her vocal cords.  She is had hoarseness for about a year.  Patient is scheduled for surgery.  No chest pain shortness in breath palpitations.      Review of Systems     Review of Systems   Constitutional:  Negative for activity change, appetite change, chills, fatigue, fever and unexpected weight change.   HENT:  Negative for nasal congestion, ear discharge, ear pain, hearing loss, mouth dryness, mouth sores, postnasal drip, rhinorrhea, sinus pressure/congestion, sore throat, trouble swallowing and voice change.    Eyes:  Negative for pain, discharge, redness, itching and visual disturbance.   Respiratory:  Negative for apnea, cough, chest tightness, shortness of breath and wheezing.    Cardiovascular:  Negative for chest pain, palpitations and leg swelling.   Gastrointestinal:  Negative for abdominal distention, abdominal pain, anal bleeding, blood in stool, change in bowel habit, constipation, diarrhea, nausea, vomiting, reflux and change in bowel habit.   Endocrine: Negative for cold intolerance, heat intolerance, polydipsia, polyphagia and polyuria.    Genitourinary:  Negative for difficulty urinating, dysuria, enuresis, frequency, genital sores, hematuria, hot flashes, menstrual irregularity, menstrual problem, urgency and vaginal dryness.   Musculoskeletal:  Negative for arthralgias, back pain, gait problem, joint swelling, leg pain, myalgias and neck pain.   Integumentary:  Negative for rash, mole/lesion, breast mass, breast discharge and breast tenderness.   Allergic/Immunologic: Negative for environmental allergies and food allergies.   Neurological:  Negative for dizziness, vertigo, tremors, seizures, syncope, facial asymmetry, speech difficulty, weakness, light-headedness, numbness, headaches, coordination difficulties, memory loss and coordination difficulties.   Hematological:  Negative for adenopathy. Does not bruise/bleed easily.   Psychiatric/Behavioral:  Negative for agitation, behavioral problems, confusion, decreased concentration, dysphoric mood, hallucinations, self-injury, sleep disturbance and suicidal ideas. The patient is not nervous/anxious and is not hyperactive.    Breast: Negative for mass and tenderness    Medical / Social / Family History     Past Medical History:   Diagnosis Date    Anxiety     Arthritis     Asthma     Cancer     CHF (congestive heart failure)     Chronic obstructive lung disease     Coronary artery disease     Depression     Diabetes mellitus     Diabetes mellitus, type 2     Encounter for blood transfusion     History of kidney stones     Hyperlipidemia     Hypertension     Lupus     Renal disorder     Seizures     Thyroid disease     Transfusion reaction        Past Surgical History:   Procedure Laterality Date    CARDIAC CATHETERIZATION Left 09/19/2020    Proximal left main stenosis; IVUS in the LAD and left main    CHOLECYSTECTOMY      CORONARY ARTERY BYPASS GRAFT  09/30/2020    CORONARY STENT PLACEMENT  11/09/2021    Everolimus Eluting Coronary Stent-MLAD;LAD    HYSTERECTOMY      INCISION AND DRAINAGE   06/02/2020    LEFT HEART CATHETERIZATION Left 11/09/2021    Procedure: Left heart cath;  Surgeon: Aureliano Giron MD;  Location: [facility] CATH LAB;  Service: Cardiology;  Laterality: Left;    TOTAL ABDOMINAL HYSTERECTOMY W/ BILATERAL SALPINGOOPHORECTOMY  2008    [place], [place]     TUBAL LIGATION         Social History  Ms.  reports that she has been smoking cigarettes. She started smoking about 36 years ago. She has been smoking an average of .5 packs per day. She has been exposed to tobacco smoke. She has never used smokeless tobacco. She reports that she does not drink alcohol and does not use drugs.    Family History  Ms.'s family history includes Bone cancer in her father; COPD in her father; Cancer in her father and mother; Cushing syndrome in her daughter; Diabetes in her brother, mother, and sister; Heart attack in her paternal grandmother; Heart disease in her brother and mother; Hypertension in her mother; Lung cancer in her father; No Known Problems in her daughter, maternal grandfather, paternal grandfather, and son; Ovarian cancer in her maternal grandmother.    Medications and Allergies     Medications  Outpatient Medications Marked as Taking for the 7/3/23 encounter (Office Visit) with Oren Gage, DO   Medication Sig Dispense Refill    albuterol (PROVENTIL) 2.5 mg /3 mL (0.083 %) nebulizer solution USE 1 VIAL IN NEBULIZER EVERY 4 TO 6 HOURS AS NEEDED 180 each 1    arformoteroL (BROVANA) 15 mcg/2 mL Nebu Take 2 mLs (15 mcg total) by nebulization 2 (two) times a day. 180 each 1    aspirin (ECOTRIN) 81 MG EC tablet Take 1 tablet by mouth once daily.      atorvastatin (LIPITOR) 40 MG tablet Take 1 tablet (40 mg total) by mouth once daily. 90 tablet 1    budesonide-formoterol 160-4.5 mcg (SYMBICORT) 160-4.5 mcg/actuation HFAA Inhale 2 puffs into the lungs every 12 (twelve) hours. Controller 18 g 5    busPIRone (BUSPAR) 15 MG tablet TAKE 1 TABLET BY MOUTH 2 TIMES A DAY AS DIRECTED 60 tablet 2     cariprazine (VRAYLAR) 3 mg Cap Take 2 capsules (6 mg total) by mouth once daily. 60 capsule 6    clopidogreL (PLAVIX) 75 mg tablet Take 1 tablet (75 mg total) by mouth once daily. 90 tablet 1    gabapentin (NEURONTIN) 300 MG capsule Take 2 capsules (600 mg total) by mouth 2 (two) times daily. 360 capsule 1    HYDROcodone-acetaminophen (NORCO) 7.5-325 mg per tablet Take 1 tablet by mouth every 6 (six) hours as needed for Pain. 20 tablet 0    hydrOXYchloroQUINE (PLAQUENIL) 200 mg tablet Take 1 tablet (200 mg total) by mouth 2 (two) times daily. 180 tablet 1    insulin (LANTUS SOLOSTAR U-100 INSULIN) glargine 100 units/mL SubQ pen Inject 30 Units into the skin 2 (two) times a day. 51 mL 1    linaCLOtide (LINZESS) 145 mcg Cap capsule Take 1 capsule (145 mcg total) by mouth daily as needed (constipation). 30 capsule 2    mirtazapine (REMERON) 30 MG tablet Take 30 mg by mouth once daily.      ondansetron (ZOFRAN-ODT) 4 MG TbDL Take 1 tablet (4 mg total) by mouth every 8 (eight) hours as needed (nausea). 20 tablet 0    rOPINIRole (REQUIP) 0.25 MG tablet Take 1 tablet (0.25 mg total) by mouth 3 (three) times daily. 270 tablet 1    topiramate (TOPAMAX) 25 MG tablet Take 1 tablet (25 mg total) by mouth once daily. 90 tablet 1    triamcinolone acetonide 0.1% (KENALOG) 0.1 % cream Apply topically 2 (two) times daily. 454 g 5    [DISCONTINUED] dapagliflozin (FARXIGA) 10 mg tablet Take 1 tablet (10 mg total) by mouth once daily. 90 tablet 1    [DISCONTINUED] metoprolol succinate (TOPROL-XL) 50 MG 24 hr tablet Take 1 tablet (50 mg total) by mouth once daily. 90 tablet 3       Allergies  Review of patient's allergies indicates:   Allergen Reactions    Fish containing products Hives and Swelling    Penicillins Swelling     Localized swelling     Wasp venom Anaphylaxis    Opioids - morphine analogues      Change in behavior    Toradol [ketorolac] Hives    Tramadol Hives    Zithromax [azithromycin] Other (See Comments)     Patient  cannot remember what kind of reaction she had to this medication    Bactrim ds [sulfamethoxazole-trimethoprim] Nausea And Vomiting     Facial flush    Latex, natural rubber Rash       Physical Examination     Vitals:    07/03/23 1502   BP: 120/80   Pulse: 81   Resp: 18   Temp: 98.1 °F (36.7 °C)     Physical Exam  Vitals reviewed.   Constitutional:       General: She is not in acute distress.     Appearance: Normal appearance. She is normal weight.   HENT:      Head: Normocephalic and atraumatic.      Nose: Nose normal.      Mouth/Throat:      Mouth: Mucous membranes are moist.      Pharynx: Oropharynx is clear. No oropharyngeal exudate or posterior oropharyngeal erythema.   Eyes:      General: No scleral icterus.     Extraocular Movements: Extraocular movements intact.      Conjunctiva/sclera: Conjunctivae normal.      Pupils: Pupils are equal, round, and reactive to light.   Neck:      Vascular: No carotid bruit.   Cardiovascular:      Rate and Rhythm: Normal rate and regular rhythm.      Pulses: Normal pulses.      Heart sounds: Normal heart sounds. No murmur heard.    No gallop.   Pulmonary:      Effort: Pulmonary effort is normal.      Breath sounds: Normal breath sounds. No wheezing.   Abdominal:      General: Abdomen is flat. Bowel sounds are normal.      Palpations: Abdomen is soft.      Tenderness: There is no abdominal tenderness.   Musculoskeletal:         General: Normal range of motion.      Cervical back: Normal range of motion and neck supple.      Right lower leg: No edema.      Left lower leg: No edema.   Lymphadenopathy:      Cervical: No cervical adenopathy.   Skin:     General: Skin is warm and dry.      Capillary Refill: Capillary refill takes less than 2 seconds.      Coloration: Skin is not jaundiced.      Findings: No lesion.   Neurological:      General: No focal deficit present.      Mental Status: She is alert and oriented to person, place, and time. Mental status is at baseline.       Cranial Nerves: No cranial nerve deficit.      Sensory: No sensory deficit.      Motor: No weakness.      Coordination: Coordination normal.      Gait: Gait normal.      Deep Tendon Reflexes: Reflexes normal.   Psychiatric:         Mood and Affect: Mood normal.         Behavior: Behavior normal.         Thought Content: Thought content normal.         Judgment: Judgment normal.             Lab Results   Component Value Date    WBC 10.33 03/20/2023    HGB 16.2 (H) 03/20/2023    HCT 49.6 (H) 03/20/2023    MCV 96.3 (H) 03/20/2023     03/20/2023          Sodium   Date Value Ref Range Status   06/23/2023 138 136 - 145 mmol/L Final     Potassium   Date Value Ref Range Status   06/23/2023 4.0 3.5 - 5.1 mmol/L Final     Chloride   Date Value Ref Range Status   06/23/2023 103 98 - 107 mmol/L Final     CO2   Date Value Ref Range Status   06/23/2023 31 21 - 32 mmol/L Final     Glucose   Date Value Ref Range Status   06/23/2023 149 (H) 74 - 106 mg/dL Final     BUN   Date Value Ref Range Status   06/23/2023 12 7 - 18 mg/dL Final     Creatinine   Date Value Ref Range Status   06/23/2023 1.05 (H) 0.55 - 1.02 mg/dL Final     Calcium   Date Value Ref Range Status   06/23/2023 10.2 (H) 8.5 - 10.1 mg/dL Final     Total Protein   Date Value Ref Range Status   03/20/2023 8.6 (H) 6.4 - 8.2 g/dL Final     Albumin   Date Value Ref Range Status   03/20/2023 4.5 3.5 - 5.0 g/dL Final     Bilirubin, Total   Date Value Ref Range Status   03/20/2023 0.6 >0.0 - 1.2 mg/dL Final     Alk Phos   Date Value Ref Range Status   03/20/2023 108 41 - 108 U/L Final     AST   Date Value Ref Range Status   03/20/2023 41 (H) 15 - 37 U/L Final     ALT   Date Value Ref Range Status   03/20/2023 74 (H) 13 - 56 U/L Final     Anion Gap   Date Value Ref Range Status   06/23/2023 8 7 - 16 mmol/L Final     eGFR    Date Value Ref Range Status   01/18/2021 62       eGFR   Date Value Ref Range Status   07/08/2022 70 >=60 mL/min/1.73m² Final       X-Ray Chest AP Portable  Narrative: EXAMINATION:  XR CHEST AP PORTABLE    CLINICAL HISTORY:  .  Chest pain, unspecified    COMPARISON:  January 18, 2021    TECHNIQUE:  Chest x-ray AP portable erect    FINDINGS:  Cardiac silhouette is upper normal.  There is no mediastinal mass.  Prior median sternotomy.  There is no pulmonary vascular engorgement.  Lungs and pleural spaces are generally clear.    Osseous structures are unchanged  Impression: No acute process compared to the previous study    Electronically signed by: Humble Macias  Date:    08/14/2022  Time:    14:08     Procedures   Lab Results   Component Value Date    CHOL 135 06/23/2023    CHOL 163 03/20/2023    CHOL 109 12/29/2022     Lab Results   Component Value Date    HDL 33 (L) 06/23/2023    HDL 39 (L) 03/20/2023    HDL 30 (L) 12/29/2022     Lab Results   Component Value Date    LDLCALC 55 06/23/2023    LDLCALC 58 03/20/2023    LDLCALC 30 12/29/2022     Lab Results   Component Value Date    TRIG 237 (H) 06/23/2023    TRIG 329 (H) 03/20/2023    TRIG 245 (H) 12/29/2022     Lab Results   Component Value Date    CHOLHDL 4.1 06/23/2023    CHOLHDL 4.2 03/20/2023    CHOLHDL 3.6 12/29/2022      Lab Results   Component Value Date    HGBA1C 7.0 (H) 06/23/2023      Lab Results   Component Value Date    TSH 1.520 03/20/2023    M3XRNFS 10.7 03/20/2023      Assessment and Plan (including Health Maintenance)      Problem List  Smart Sets  Document Outside HM   :    Plan:         Health Maintenance Due   Topic Date Due    Hepatitis C Screening  Never done    COVID-19 Vaccine (1) Never done    Pneumococcal Vaccines (Age 0-64) (1 - PCV) Never done    TETANUS VACCINE  Never done    Mammogram  Never done    Colorectal Cancer Screening  Never done    Shingles Vaccine (1 of 2) Never done       Problem List Items Addressed This Visit          ENT    Hoarseness, chronic - Primary    Current Assessment & Plan     Chronic hoarseness with the abnormality of her vocal cords.   Patient is scheduled for surgery next month.  Stop smoking program was instituted.            Derm    Discoid lupus    Current Assessment & Plan     Skin rashes much improved with the topical triamcinolone.  Will continue current dosing.  She does not improve or worsens will send her for dermatologic consult.            Cardiac/Vascular    Hyperlipidemia    Current Assessment & Plan     Last cholesterol was 135 and LDL was 55 1 week ago.  No change in her medication.  Follow-up every 6 months.         Hypertension    Current Assessment & Plan     Hypertension well controlled no change in medication.  Follow-up in 3 months.  Will check CBC and CMP yearly.  Goal is blood pressure 120/70         Relevant Medications    metoprolol succinate (TOPROL-XL) 50 MG 24 hr tablet       Immunology/Multi System    Systemic lupus erythematosus    Current Assessment & Plan     Systemic lupus currently controlled with Plaquenil.  No change in her medication.  Continue her pain meds.  Follow-up every 3 months            Endocrine    Diabetes mellitus    Current Assessment & Plan     Last hemoglobin A1c was 7.0 on the 23rd June 2023.  No change in her treatment.  Follow-up every 3 months         Relevant Medications    dapagliflozin propanediol (FARXIGA) 10 mg tablet       Other    Tobacco abuse    Current Assessment & Plan     History of tobacco abuse.  Patient requested Chantix.  Prescription written.  Did give her the stop smoking help line for the state.  Follow-up in 1 month.         Relevant Medications    varenicline (CHANTIX STARTING MONTH BOX) 0.5 mg (11)- 1 mg (42) tablet    varenicline (CHANTIX) 1 mg Tab       Health Maintenance Topics with due status: Not Due       Topic Last Completion Date    Lipid Panel 06/23/2023    Hemoglobin A1c 06/23/2023    High Dose Statin 07/03/2023    Aspirin/Antiplatelet Therapy 07/03/2023       Future Appointments   Date Time Provider Department Center   7/12/2023  9:00 AM Oren Gage DO  [facility] [facility]   7/27/2023  2:10 PM Nazario Sloan MD [facility] ENT [facility]   8/1/2023 12:30 PM OPAL Carr [facility] CARD [facility]        Follow up in about 4 weeks (around 7/31/2023).     Signature:  Oren Gage [address] [place]  [address]    Date of encounter: 7/3/23

## 2023-07-03 NOTE — ASSESSMENT & PLAN NOTE
Chronic hoarseness with the abnormality of her vocal cords.  Patient is scheduled for surgery next month.  Stop smoking program was instituted.

## 2023-07-03 NOTE — ASSESSMENT & PLAN NOTE
History of tobacco abuse.  Patient requested Chantix.  Prescription written.  Did give her the stop smoking help line for the state.  Follow-up in 1 month.

## 2023-07-03 NOTE — ASSESSMENT & PLAN NOTE
Last hemoglobin A1c was 7.0 on the 23rd June 2023.  No change in her treatment.  Follow-up every 3 months

## 2023-07-03 NOTE — ASSESSMENT & PLAN NOTE
Last cholesterol was 135 and LDL was 55 1 week ago.  No change in her medication.  Follow-up every 6 months.

## 2023-07-03 NOTE — ASSESSMENT & PLAN NOTE
Skin rashes much improved with the topical triamcinolone.  Will continue current dosing.  She does not improve or worsens will send her for dermatologic consult.

## 2023-07-03 NOTE — ASSESSMENT & PLAN NOTE
Systemic lupus currently controlled with Plaquenil.  No change in her medication.  Continue her pain meds.  Follow-up every 3 months

## 2023-07-05 ENCOUNTER — PATIENT MESSAGE (OUTPATIENT)
Dept: SURGERY | Facility: HOSPITAL | Age: 53
End: 2023-07-05
Payer: MEDICAID

## 2023-07-05 ENCOUNTER — PATIENT MESSAGE (OUTPATIENT)
Dept: FAMILY MEDICINE | Facility: CLINIC | Age: 53
End: 2023-07-05
Payer: MEDICAID

## 2023-07-05 DIAGNOSIS — E11.65 TYPE 2 DIABETES MELLITUS WITH HYPERGLYCEMIA, WITHOUT LONG-TERM CURRENT USE OF INSULIN: ICD-10-CM

## 2023-07-05 DIAGNOSIS — I10 HYPERTENSION, UNSPECIFIED TYPE: ICD-10-CM

## 2023-07-05 RX ORDER — DAPAGLIFLOZIN 10 MG/1
10 TABLET, FILM COATED ORAL DAILY
Qty: 90 TABLET | Refills: 1 | OUTPATIENT
Start: 2023-07-05

## 2023-07-05 RX ORDER — METOPROLOL SUCCINATE 50 MG/1
50 TABLET, EXTENDED RELEASE ORAL DAILY
Qty: 90 TABLET | Refills: 3 | Status: SHIPPED | OUTPATIENT
Start: 2023-07-05 | End: 2023-08-24 | Stop reason: SDUPTHER

## 2023-07-05 RX ORDER — DAPAGLIFLOZIN 10 MG/1
10 TABLET, FILM COATED ORAL DAILY
Qty: 90 TABLET | Refills: 1 | Status: CANCELLED | OUTPATIENT
Start: 2023-07-05

## 2023-07-06 ENCOUNTER — PATIENT MESSAGE (OUTPATIENT)
Dept: FAMILY MEDICINE | Facility: CLINIC | Age: 53
End: 2023-07-06
Payer: MEDICAID

## 2023-07-10 ENCOUNTER — TELEPHONE (OUTPATIENT)
Dept: FAMILY MEDICINE | Facility: CLINIC | Age: 53
End: 2023-07-10
Payer: MEDICAID

## 2023-07-10 NOTE — TELEPHONE ENCOUNTER
Spoke with patient regarding a change in medication from Chantix to Nicoderm patches for smoking cessation.Patient could not afford Chantix. Explained to patient she may have a copayment with Nicoderm patches that she may have difficulty paying for also. She believes she can afford the patches.Discussed the [place] Quitline with patient. She will contact us if she cannot afford patches cash pay.

## 2023-07-11 ENCOUNTER — PATIENT MESSAGE (OUTPATIENT)
Dept: SURGERY | Facility: HOSPITAL | Age: 53
End: 2023-07-11
Payer: MEDICAID

## 2023-07-12 ENCOUNTER — TELEPHONE (OUTPATIENT)
Dept: OTOLARYNGOLOGY | Facility: CLINIC | Age: 53
End: 2023-07-12
Payer: MEDICAID

## 2023-07-17 ENCOUNTER — ANESTHESIA EVENT (OUTPATIENT)
Dept: SURGERY | Facility: HOSPITAL | Age: 53
End: 2023-07-17
Payer: MEDICAID

## 2023-07-17 NOTE — ANESTHESIA PREPROCEDURE EVALUATION
07/17/2023  Rosemarie Lane is a 53 y.o., female.      Pre-op Assessment    I have reviewed the Patient Summary Reports.     I have reviewed the Nursing Notes. I have reviewed the NPO Status.   I have reviewed the Medications.     Review of Systems  Anesthesia Hx:  No problems with previous Anesthesia  Denies Family Hx of Anesthesia complications.   Denies Personal Hx of Anesthesia complications.   Social:  Smoker, No Alcohol Use    Cardiovascular:   Hypertension CAD asymptomatic CABG/stent  Angina CHF hyperlipidemia ECG has been reviewed.    Pulmonary:   COPD, mild Asthma    Renal/:   Chronic Renal Disease    Neurological:   Seizures    Endocrine:   Diabetes, poorly controlled Hypothyroidism  Obesity / BMI > 30  Psych:   Psychiatric History anxiety depression          Physical Exam  General: Well nourished, Cooperative and Alert    Airway:  Mallampati: II   Mouth Opening: Normal  TM Distance: Normal  Tongue: Normal  Neck ROM: Normal ROM    Chest/Lungs:  Normal Respiratory Rate  Adventitious sounds bilaterally  Heart:  Rate: Normal  Rhythm: Regular Rhythm        Chemistry        Component Value Date/Time     06/23/2023 1448    K 4.0 06/23/2023 1448     06/23/2023 1448    CO2 31 06/23/2023 1448    BUN 12 06/23/2023 1448    CREATININE 1.05 (H) 06/23/2023 1448     (H) 06/23/2023 1448        Component Value Date/Time    CALCIUM 10.2 (H) 06/23/2023 1448    ALKPHOS 108 03/20/2023 1016    AST 41 (H) 03/20/2023 1016    ALT 74 (H) 03/20/2023 1016    BILITOT 0.6 03/20/2023 1016    ESTGFRAFRICA 62 01/18/2021 1345    EGFRNONAA 70 07/08/2022 1044        Lab Results   Component Value Date    WBC 10.33 03/20/2023    HGB 16.2 (H) 03/20/2023    HCT 49.6 (H) 03/20/2023     03/20/2023     Results for orders placed or performed during the hospital encounter of 08/14/22   EKG 12-lead    Collection  Time: 08/14/22 12:58 PM    Narrative    Test Reason : R07.9,    Vent. Rate : 087 BPM     Atrial Rate : 000 BPM     P-R Int : 120 ms          QRS Dur : 080 ms      QT Int : 360 ms       P-R-T Axes : 070 042 014 degrees     QTc Int : 407 ms    Sinus rhythm  Inferior infarct - age undetermined  Possible anterior infarct - age undetermined  Low QRS voltages in precordial leads  Abnormal ECG    Confirmed by Nate Bejarano MD ([id number]) on 8/24/2022 9:45:14 PM    Referred By: RAFAEL   SELF           Confirmed By:Nate Bejarano MD         Anesthesia Plan  Type of Anesthesia, risks & benefits discussed:    Anesthesia Type: Gen ETT  Intra-op Monitoring Plan: Standard ASA Monitors  Post Op Pain Control Plan: multimodal analgesia  Induction:  IV  Airway Plan: Direct, Post-Induction  Informed Consent: Informed consent signed with the Patient and all parties understand the risks and agree with anesthesia plan.  All questions answered.   ASA Score: 3  Day of Surgery Review of History & Physical: H&P Update referred to the surgeon/provider.I have interviewed and examined the patient. I have reviewed the patient's H&P dated: There are no significant changes.   Anesthesia Plan Notes: ASA 3, planned GETA    Valium, dramamine, and duoneb x 1 given in preop    Ready For Surgery From Anesthesia Perspective.     .

## 2023-07-18 ENCOUNTER — ANESTHESIA (OUTPATIENT)
Dept: SURGERY | Facility: HOSPITAL | Age: 53
End: 2023-07-18
Payer: MEDICAID

## 2023-07-18 ENCOUNTER — HOSPITAL ENCOUNTER (OUTPATIENT)
Facility: HOSPITAL | Age: 53
Discharge: HOME | End: 2023-07-18
Attending: OTOLARYNGOLOGY | Admitting: OTOLARYNGOLOGY
Payer: MEDICAID

## 2023-07-18 VITALS
HEIGHT: 60 IN | DIASTOLIC BLOOD PRESSURE: 56 MMHG | WEIGHT: 180 LBS | OXYGEN SATURATION: 96 % | RESPIRATION RATE: 16 BRPM | SYSTOLIC BLOOD PRESSURE: 108 MMHG | HEART RATE: 82 BPM | TEMPERATURE: 98 F | BODY MASS INDEX: 35.34 KG/M2

## 2023-07-18 DIAGNOSIS — J38.3 LESION OF VOCAL CORD: ICD-10-CM

## 2023-07-18 DIAGNOSIS — Q31.8 VOCAL CORD ANOMALY: ICD-10-CM

## 2023-07-18 PROCEDURE — 71000015 HC POSTOP RECOV 1ST HR: Performed by: OTOLARYNGOLOGY

## 2023-07-18 PROCEDURE — 88305 TISSUE EXAM BY PATHOLOGIST: CPT | Mod: TC,SUR | Performed by: OTOLARYNGOLOGY

## 2023-07-18 PROCEDURE — 27000716 HC OXISENSOR PROBE, ANY SIZE: Performed by: ANESTHESIOLOGY

## 2023-07-18 PROCEDURE — 27000689 HC BLADE LARYNGOSCOPE ANY SIZE: Performed by: ANESTHESIOLOGY

## 2023-07-18 PROCEDURE — 94640 AIRWAY INHALATION TREATMENT: CPT | Mod: XB

## 2023-07-18 PROCEDURE — 00320 ANES ALL PX NECK NOS 1YR/>: CPT | Performed by: OTOLARYNGOLOGY

## 2023-07-18 PROCEDURE — 25000003 PHARM REV CODE 250: Performed by: ANESTHESIOLOGY

## 2023-07-18 PROCEDURE — 71000033 HC RECOVERY, INTIAL HOUR: Performed by: OTOLARYNGOLOGY

## 2023-07-18 PROCEDURE — D9220A PRA ANESTHESIA: ICD-10-PCS | Mod: ANES,,, | Performed by: ANESTHESIOLOGY

## 2023-07-18 PROCEDURE — 25000242 PHARM REV CODE 250 ALT 637 W/ HCPCS: Performed by: ANESTHESIOLOGY

## 2023-07-18 PROCEDURE — 27202344 HC EYESHIELD: Performed by: ANESTHESIOLOGY

## 2023-07-18 PROCEDURE — 31541 LARYNSCOP W/TUMR EXC + SCOPE: CPT | Mod: ,,, | Performed by: OTOLARYNGOLOGY

## 2023-07-18 PROCEDURE — 88305 TISSUE EXAM BY PATHOLOGIST: CPT | Mod: 26,,, | Performed by: PATHOLOGY

## 2023-07-18 PROCEDURE — 31541 PR LARYNGOSCOPY,DIRCT,OP SCOP,EXC TUMR: ICD-10-PCS | Mod: ,,, | Performed by: OTOLARYNGOLOGY

## 2023-07-18 PROCEDURE — 71000039 HC RECOVERY, EACH ADD'L HOUR: Performed by: OTOLARYNGOLOGY

## 2023-07-18 PROCEDURE — D9220A PRA ANESTHESIA: Mod: CRNA,,, | Performed by: NURSE ANESTHETIST, CERTIFIED REGISTERED

## 2023-07-18 PROCEDURE — 36000709 HC OR TIME LEV III EA ADD 15 MIN: Performed by: OTOLARYNGOLOGY

## 2023-07-18 PROCEDURE — 88305 SURGICAL PATHOLOGY: ICD-10-PCS | Mod: 26,,, | Performed by: PATHOLOGY

## 2023-07-18 PROCEDURE — 37000008 HC ANESTHESIA 1ST 15 MINUTES: Performed by: OTOLARYNGOLOGY

## 2023-07-18 PROCEDURE — 63600175 PHARM REV CODE 636 W HCPCS: Performed by: NURSE ANESTHETIST, CERTIFIED REGISTERED

## 2023-07-18 PROCEDURE — 27000165 HC TUBE, ETT CUFFED: Performed by: ANESTHESIOLOGY

## 2023-07-18 PROCEDURE — 25000003 PHARM REV CODE 250: Performed by: OTOLARYNGOLOGY

## 2023-07-18 PROCEDURE — 25000003 PHARM REV CODE 250: Performed by: NURSE ANESTHETIST, CERTIFIED REGISTERED

## 2023-07-18 PROCEDURE — D9220A PRA ANESTHESIA: Mod: ANES,,, | Performed by: ANESTHESIOLOGY

## 2023-07-18 PROCEDURE — 94761 N-INVAS EAR/PLS OXIMETRY MLT: CPT

## 2023-07-18 PROCEDURE — 27000655: Performed by: ANESTHESIOLOGY

## 2023-07-18 PROCEDURE — 37000009 HC ANESTHESIA EA ADD 15 MINS: Performed by: OTOLARYNGOLOGY

## 2023-07-18 PROCEDURE — 36000708 HC OR TIME LEV III 1ST 15 MIN: Performed by: OTOLARYNGOLOGY

## 2023-07-18 PROCEDURE — 63600175 PHARM REV CODE 636 W HCPCS: Performed by: ANESTHESIOLOGY

## 2023-07-18 PROCEDURE — D9220A PRA ANESTHESIA: ICD-10-PCS | Mod: CRNA,,, | Performed by: NURSE ANESTHETIST, CERTIFIED REGISTERED

## 2023-07-18 RX ORDER — ONDANSETRON 2 MG/ML
INJECTION INTRAMUSCULAR; INTRAVENOUS
Status: DISCONTINUED | OUTPATIENT
Start: 2023-07-18 | End: 2023-07-18

## 2023-07-18 RX ORDER — SODIUM CHLORIDE 9 MG/ML
INJECTION, SOLUTION INTRAVENOUS CONTINUOUS
Status: DISPENSED | OUTPATIENT
Start: 2023-07-18

## 2023-07-18 RX ORDER — FENTANYL CITRATE 50 UG/ML
INJECTION, SOLUTION INTRAMUSCULAR; INTRAVENOUS
Status: DISCONTINUED | OUTPATIENT
Start: 2023-07-18 | End: 2023-07-18

## 2023-07-18 RX ORDER — MEPERIDINE HYDROCHLORIDE 25 MG/ML
25 INJECTION INTRAMUSCULAR; INTRAVENOUS; SUBCUTANEOUS ONCE AS NEEDED
Status: DISCONTINUED | OUTPATIENT
Start: 2023-07-18 | End: 2023-07-18 | Stop reason: HOSPADM

## 2023-07-18 RX ORDER — DIAZEPAM 5 MG/1
10 TABLET ORAL ONCE
Status: COMPLETED | OUTPATIENT
Start: 2023-07-18 | End: 2023-07-18

## 2023-07-18 RX ORDER — LIDOCAINE HYDROCHLORIDE 20 MG/ML
INJECTION INTRAVENOUS
Status: DISCONTINUED | OUTPATIENT
Start: 2023-07-18 | End: 2023-07-18

## 2023-07-18 RX ORDER — IPRATROPIUM BROMIDE AND ALBUTEROL SULFATE 2.5; .5 MG/3ML; MG/3ML
3 SOLUTION RESPIRATORY (INHALATION) ONCE
Status: COMPLETED | OUTPATIENT
Start: 2023-07-18 | End: 2023-07-18

## 2023-07-18 RX ORDER — ROCURONIUM BROMIDE 10 MG/ML
INJECTION, SOLUTION INTRAVENOUS
Status: DISCONTINUED | OUTPATIENT
Start: 2023-07-18 | End: 2023-07-18

## 2023-07-18 RX ORDER — ONDANSETRON 2 MG/ML
4 INJECTION INTRAMUSCULAR; INTRAVENOUS DAILY PRN
Status: DISCONTINUED | OUTPATIENT
Start: 2023-07-18 | End: 2023-07-18 | Stop reason: HOSPADM

## 2023-07-18 RX ORDER — DIPHENHYDRAMINE HYDROCHLORIDE 50 MG/ML
25 INJECTION INTRAMUSCULAR; INTRAVENOUS EVERY 6 HOURS PRN
Status: DISCONTINUED | OUTPATIENT
Start: 2023-07-18 | End: 2023-07-18 | Stop reason: HOSPADM

## 2023-07-18 RX ORDER — HYDROMORPHONE HYDROCHLORIDE 2 MG/ML
0.5 INJECTION, SOLUTION INTRAMUSCULAR; INTRAVENOUS; SUBCUTANEOUS EVERY 5 MIN PRN
Status: COMPLETED | OUTPATIENT
Start: 2023-07-18 | End: 2023-07-18

## 2023-07-18 RX ORDER — PROPOFOL 10 MG/ML
VIAL (ML) INTRAVENOUS
Status: DISCONTINUED | OUTPATIENT
Start: 2023-07-18 | End: 2023-07-18

## 2023-07-18 RX ORDER — DEXAMETHASONE SODIUM PHOSPHATE 4 MG/ML
INJECTION, SOLUTION INTRA-ARTICULAR; INTRALESIONAL; INTRAMUSCULAR; INTRAVENOUS; SOFT TISSUE
Status: DISCONTINUED | OUTPATIENT
Start: 2023-07-18 | End: 2023-07-18

## 2023-07-18 RX ORDER — DIMENHYDRINATE 50 MG
50 TABLET ORAL ONCE
Status: COMPLETED | OUTPATIENT
Start: 2023-07-18 | End: 2023-07-18

## 2023-07-18 RX ORDER — MIDAZOLAM HYDROCHLORIDE 1 MG/ML
INJECTION INTRAMUSCULAR; INTRAVENOUS
Status: DISCONTINUED | OUTPATIENT
Start: 2023-07-18 | End: 2023-07-18

## 2023-07-18 RX ORDER — PROMETHAZINE HYDROCHLORIDE 25 MG/ML
INJECTION, SOLUTION INTRAMUSCULAR; INTRAVENOUS
Status: DISCONTINUED | OUTPATIENT
Start: 2023-07-18 | End: 2023-07-18

## 2023-07-18 RX ADMIN — PROPOFOL 130 MG: 10 INJECTION, EMULSION INTRAVENOUS at 11:07

## 2023-07-18 RX ADMIN — IPRATROPIUM BROMIDE AND ALBUTEROL SULFATE 3 ML: 2.5; .5 SOLUTION RESPIRATORY (INHALATION) at 09:07

## 2023-07-18 RX ADMIN — HYDROMORPHONE HYDROCHLORIDE 0.5 MG: 2 INJECTION, SOLUTION INTRAMUSCULAR; INTRAVENOUS; SUBCUTANEOUS at 01:07

## 2023-07-18 RX ADMIN — SODIUM CHLORIDE: 9 INJECTION, SOLUTION INTRAVENOUS at 09:07

## 2023-07-18 RX ADMIN — FENTANYL CITRATE 100 MCG: 50 INJECTION INTRAMUSCULAR; INTRAVENOUS at 11:07

## 2023-07-18 RX ADMIN — SUGAMMADEX 200 MG: 100 INJECTION, SOLUTION INTRAVENOUS at 12:07

## 2023-07-18 RX ADMIN — ONDANSETRON 4 MG: 2 INJECTION INTRAMUSCULAR; INTRAVENOUS at 12:07

## 2023-07-18 RX ADMIN — HYDROMORPHONE HYDROCHLORIDE 0.5 MG: 2 INJECTION, SOLUTION INTRAMUSCULAR; INTRAVENOUS; SUBCUTANEOUS at 12:07

## 2023-07-18 RX ADMIN — DEXAMETHASONE SODIUM PHOSPHATE 4 MG: 4 INJECTION, SOLUTION INTRA-ARTICULAR; INTRALESIONAL; INTRAMUSCULAR; INTRAVENOUS; SOFT TISSUE at 12:07

## 2023-07-18 RX ADMIN — RACEPINEPHRINE HYDROCHLORIDE 0.5 ML: 11.25 SOLUTION RESPIRATORY (INHALATION) at 12:07

## 2023-07-18 RX ADMIN — DIAZEPAM 10 MG: 5 TABLET ORAL at 09:07

## 2023-07-18 RX ADMIN — PROMETHAZINE HYDROCHLORIDE 6.25 MG: 25 INJECTION INTRAMUSCULAR; INTRAVENOUS at 12:07

## 2023-07-18 RX ADMIN — LIDOCAINE HYDROCHLORIDE 40 MG: 20 INJECTION, SOLUTION INTRAVENOUS at 11:07

## 2023-07-18 RX ADMIN — ROCURONIUM BROMIDE 30 MG: 10 INJECTION, SOLUTION INTRAVENOUS at 11:07

## 2023-07-18 RX ADMIN — MIDAZOLAM 2 MG: 1 INJECTION INTRAMUSCULAR; INTRAVENOUS at 11:07

## 2023-07-18 RX ADMIN — DIMENHYDRINATE 50 MG: 50 TABLET ORAL at 09:07

## 2023-07-18 NOTE — OP NOTE
Surgery Date: 7/18/2023     Surgeon(s) and Role:     * Nazario Sloan MD - Primary    Assisting Surgeon: None    Pre-op Diagnosis:  Lesion of vocal cord [J38.3]    Post-op Diagnosis:  Post-Op Diagnosis Codes:     * Lesion of vocal cord [J38.3]    Procedure(s) (LRB):  LARYNGOSCOPY, DIRECT (Bilateral)  EXCISION, LESION, VOCAL CORD (Bilateral)  After general ET anesthesia a rigid laryngoscope was inserted atraumatically. With magnification the vocal cords were examined with the majority of leukoplakiaon the left side were removed with biting forceps with care not to damage underlying muscle. The specimens were sent to pathology for permanent section There was no further bleeding the patient was then reversed and taken to RR in stable condition.   Anesthesia: General    Operative Findings: Lesion left VC     Estimated Blood Loss: 2 ml

## 2023-07-18 NOTE — OP NOTE
Surgery Date: 7/18/2023      Surgeon(s) and Role:     * Nazario Sloan MD - Primary     Assisting Surgeon: None     Pre-op Diagnosis:  Lesion of vocal cord [J38.3]     Post-op Diagnosis:  Post-Op Diagnosis Codes:     * Lesion of vocal cord [J38.3]     Procedure(s) (LRB):  LARYNGOSCOPY, DIRECT (Bilateral)  EXCISION, LESION, VOCAL CORD (Bilateral)  After general ET anesthesia a rigid laryngoscope was inserted atraumatically. With magnification the vocal cords were examined with the majority of leukoplakiaon the right side were removed with biting forceps with care not to damage underlying muscle. The specimens were sent to pathology for permanent section There was no further bleeding the patient was then reversed and taken to RR in stable condition.   Anesthesia: General     Operative Findings: Lesion right VC      Estimated Blood Loss: 2 ml

## 2023-07-18 NOTE — BRIEF OP NOTE
[facility] - Orthopedic Periop Services  Brief Operative Note    Surgery Date: 7/18/2023     Surgeon(s) and Role:     * Nazario Sloan MD - Primary    Assisting Surgeon: None    Pre-op Diagnosis:  Lesion of vocal cord [J38.3]    Post-op Diagnosis:  Post-Op Diagnosis Codes:     * Lesion of vocal cord [J38.3]    Procedure(s) (LRB):  LARYNGOSCOPY, DIRECT (Bilateral)  EXCISION, LESION, VOCAL CORD (Bilateral)    Anesthesia: General    Operative Findings: Lesion left VC     Estimated Blood Loss: 2 ml   Specimens:   Specimen (24h ago, onward)       Start     Ordered    07/18/23 1140  Surgical Pathology  RELEASE UPON ORDERING         07/18/23 1140                      Discharge Note    OUTCOME: Patient tolerated treatment/procedure well without complication and is now ready for discharge.    DISPOSITION: Home or Self Care    FINAL DIAGNOSIS: VC lesion  FOLLOWUP: In clinic    DISCHARGE INSTRUCTIONS:  No discharge procedures on file.

## 2023-07-18 NOTE — TRANSFER OF CARE
Anesthesia Transfer of Care Note    Patient: Rosemarie Lane    Procedure(s) Performed: Procedure(s) (LRB):  LARYNGOSCOPY, DIRECT (Bilateral)  EXCISION, LESION, VOCAL CORD (Bilateral)    Patient location: PACU    Anesthesia Type: general    Transport from OR: Transported from OR on 6-10 L/min O2 by face mask with adequate spontaneous ventilation    Post pain: adequate analgesia    Post assessment: no apparent anesthetic complications    Post vital signs: stable    Level of consciousness: awake and alert    Nausea/Vomiting: no nausea/vomiting    Complications: none    Transfer of care protocol was followed      Last vitals:   Visit Vitals  BP (!) 112/56   Pulse 88   Temp 36.6 °C (97.9 °F)   Resp 17   Ht 5' (1.524 m)   Wt 81.6 kg (180 lb)   SpO2 97%   Breastfeeding No   BMI 35.15 kg/m²

## 2023-07-18 NOTE — ANESTHESIA PROCEDURE NOTES
Intubation    Date/Time: 7/18/2023 11:59 AM  Performed by: Jose Olvera CRNA  Authorized by: Alon Zelaya MD     Intubation:     Induction:  Intravenous    Intubated:  Postinduction    Mask Ventilation:  Easy mask    Attempts:  1    Attempted By:  CRNA    Method of Intubation:  Direct    Blade:  Kalia 3    Laryngeal View Grade: Grade I - full view of cords      Difficult Airway Encountered?: No      Airway Device:  Oral endotracheal tube    Airway Device Size:  7.0    Style/Cuff Inflation:  Cuffed    Inflation Amount (mL):  7    Tube secured:  21    Secured at:  The lips    Placement Verified By:  Capnometry    Complicating Factors:  None    Findings Post-Intubation:  Atraumatic/condition of teeth unchanged and BS equal bilateral

## 2023-07-19 LAB
DHEA SERPL-MCNC: NORMAL
ESTROGEN SERPL-MCNC: NORMAL PG/ML
INSULIN SERPL-ACNC: NORMAL U[IU]/ML
LAB AP GROSS DESCRIPTION: NORMAL
LAB AP LABORATORY NOTES: NORMAL
T3RU NFR SERPL: NORMAL %

## 2023-07-20 NOTE — ANESTHESIA POSTPROCEDURE EVALUATION
Anesthesia Post Evaluation    Patient: Rosemarie Lane    Procedure(s) Performed: Procedure(s) (LRB):  LARYNGOSCOPY, DIRECT (Bilateral)  EXCISION, LESION, VOCAL CORD (Bilateral)    Final Anesthesia Type: general      Patient location during evaluation: PACU  Patient participation: Yes- Able to Participate  Level of consciousness: awake and alert  Post-procedure vital signs: reviewed and stable  Pain management: adequate  Airway patency: patent  TRAN mitigation strategies: Multimodal analgesia  PONV status at discharge: No PONV  Anesthetic complications: no      Cardiovascular status: blood pressure returned to baseline  Respiratory status: unassisted  Hydration status: euvolemic  Follow-up not needed.          Vitals Value Taken Time   /56 07/18/23 1337   Temp 36.6 °C (97.9 °F) 07/18/23 1224   Pulse 81 07/18/23 1331   Resp 16 07/18/23 1325   SpO2 94 % 07/18/23 1331   Vitals shown include unvalidated device data.      Event Time   Out of Recovery 13:22:04         Pain/Adal Score: Pain Rating Prior to Med Admin: 6 (7/18/2023  1:05 PM)  Adal Score: 10 (7/18/2023  1:37 PM)  Modified Adal Score: 20 (7/18/2023  1:37 PM)

## 2023-07-27 ENCOUNTER — OFFICE VISIT (OUTPATIENT)
Dept: OTOLARYNGOLOGY | Facility: CLINIC | Age: 53
End: 2023-07-27
Payer: MEDICAID

## 2023-07-27 VITALS — HEIGHT: 60 IN | WEIGHT: 180 LBS | BODY MASS INDEX: 35.34 KG/M2

## 2023-07-27 DIAGNOSIS — J38.3 LEUKOPLAKIA OF VOCAL CORDS: Primary | ICD-10-CM

## 2023-07-27 PROCEDURE — 3008F BODY MASS INDEX DOCD: CPT | Mod: CPTII,,, | Performed by: OTOLARYNGOLOGY

## 2023-07-27 PROCEDURE — 99214 OFFICE O/P EST MOD 30 MIN: CPT | Mod: S$PBB,,, | Performed by: OTOLARYNGOLOGY

## 2023-07-27 PROCEDURE — 3060F PR POS MICROALBUMINURIA RESULT DOCUMENTED/REVIEW: ICD-10-PCS | Mod: CPTII,,, | Performed by: OTOLARYNGOLOGY

## 2023-07-27 PROCEDURE — 3066F NEPHROPATHY DOC TX: CPT | Mod: CPTII,,, | Performed by: OTOLARYNGOLOGY

## 2023-07-27 PROCEDURE — 3051F PR MOST RECENT HEMOGLOBIN A1C LEVEL 7.0 - < 8.0%: ICD-10-PCS | Mod: CPTII,,, | Performed by: OTOLARYNGOLOGY

## 2023-07-27 PROCEDURE — 3051F HG A1C>EQUAL 7.0%<8.0%: CPT | Mod: CPTII,,, | Performed by: OTOLARYNGOLOGY

## 2023-07-27 PROCEDURE — 3060F POS MICROALBUMINURIA REV: CPT | Mod: CPTII,,, | Performed by: OTOLARYNGOLOGY

## 2023-07-27 PROCEDURE — 1159F MED LIST DOCD IN RCRD: CPT | Mod: CPTII,,, | Performed by: OTOLARYNGOLOGY

## 2023-07-27 PROCEDURE — 99214 OFFICE O/P EST MOD 30 MIN: CPT | Mod: PBBFAC | Performed by: OTOLARYNGOLOGY

## 2023-07-27 PROCEDURE — 1160F RVW MEDS BY RX/DR IN RCRD: CPT | Mod: CPTII,,, | Performed by: OTOLARYNGOLOGY

## 2023-07-27 PROCEDURE — 1159F PR MEDICATION LIST DOCUMENTED IN MEDICAL RECORD: ICD-10-PCS | Mod: CPTII,,, | Performed by: OTOLARYNGOLOGY

## 2023-07-27 PROCEDURE — 3066F PR DOCUMENTATION OF TREATMENT FOR NEPHROPATHY: ICD-10-PCS | Mod: CPTII,,, | Performed by: OTOLARYNGOLOGY

## 2023-07-27 PROCEDURE — 1160F PR REVIEW ALL MEDS BY PRESCRIBER/CLIN PHARMACIST DOCUMENTED: ICD-10-PCS | Mod: CPTII,,, | Performed by: OTOLARYNGOLOGY

## 2023-07-27 PROCEDURE — 3008F PR BODY MASS INDEX (BMI) DOCUMENTED: ICD-10-PCS | Mod: CPTII,,, | Performed by: OTOLARYNGOLOGY

## 2023-07-27 PROCEDURE — 99214 PR OFFICE/OUTPT VISIT, EST, LEVL IV, 30-39 MIN: ICD-10-PCS | Mod: S$PBB,,, | Performed by: OTOLARYNGOLOGY

## 2023-07-27 NOTE — PROGRESS NOTES
Subjective:       Patient ID: Rosemarie Lane is a 53 y.o. female.    Chief Complaint: Sore Throat (PO direct laryngoscopy with excision vocal cord. Pt states she is doing good since surgery, no voiced complaints. )    Sore Throat     Review of Systems   HENT:  Positive for sore throat and voice change.    All other systems reviewed and are negative.    Objective:      Physical Exam  General: NAD still hoarse   Head: Normocephalic, atraumatic, no facial asymmetry/normal strength,  Ears: Both auricules normal in appearance, w/o deformities tympanic membranes normal external auditory canals normal  Nose: External nose w/o deformities normal turbinates no drainage or inflammation  Oral Cavity: Lips, gums, floor of mouth, tongue hard palate, and buccal mucosa without mass/lesion  Oropharynx: Mucosa pink and moist, soft palate, posterior pharynx and oropharyngeal wall without mass/lesion  Neck: Supple, symmetric, trachea midline, no palpable mass/lesion, no palpable cervical lymphadenopathy  Skin: Warm and dry, no concerning lesions  Respiratory: Respirations even, unlabored   Assessment:       1. Leukoplakia of vocal cords        Plan:       Discussed pathology   Quit smoking f/u 2 months

## 2023-08-17 DIAGNOSIS — E78.5 HYPERLIPIDEMIA, UNSPECIFIED HYPERLIPIDEMIA TYPE: ICD-10-CM

## 2023-08-17 RX ORDER — ATORVASTATIN CALCIUM 40 MG/1
40 TABLET, FILM COATED ORAL DAILY
Qty: 90 TABLET | Refills: 1 | OUTPATIENT
Start: 2023-08-17 | End: 2024-02-13

## 2023-08-24 ENCOUNTER — OFFICE VISIT (OUTPATIENT)
Dept: FAMILY MEDICINE | Facility: CLINIC | Age: 53
End: 2023-08-24
Payer: MEDICAID

## 2023-08-24 VITALS
HEART RATE: 68 BPM | BODY MASS INDEX: 35.18 KG/M2 | DIASTOLIC BLOOD PRESSURE: 62 MMHG | SYSTOLIC BLOOD PRESSURE: 126 MMHG | WEIGHT: 179.19 LBS | OXYGEN SATURATION: 97 % | TEMPERATURE: 98 F | HEIGHT: 60 IN | RESPIRATION RATE: 18 BRPM

## 2023-08-24 DIAGNOSIS — M32.19 OTHER SYSTEMIC LUPUS ERYTHEMATOSUS WITH OTHER ORGAN INVOLVEMENT: ICD-10-CM

## 2023-08-24 DIAGNOSIS — Z79.4 TYPE 2 DIABETES MELLITUS WITH HYPERGLYCEMIA, WITH LONG-TERM CURRENT USE OF INSULIN: ICD-10-CM

## 2023-08-24 DIAGNOSIS — M32.9 SYSTEMIC LUPUS ERYTHEMATOSUS, UNSPECIFIED SLE TYPE, UNSPECIFIED ORGAN INVOLVEMENT STATUS: ICD-10-CM

## 2023-08-24 DIAGNOSIS — Z12.31 ENCOUNTER FOR SCREENING MAMMOGRAM FOR MALIGNANT NEOPLASM OF BREAST: ICD-10-CM

## 2023-08-24 DIAGNOSIS — Z11.59 SCREENING FOR VIRAL DISEASE: ICD-10-CM

## 2023-08-24 DIAGNOSIS — R11.0 NAUSEA: ICD-10-CM

## 2023-08-24 DIAGNOSIS — F41.9 ANXIETY: ICD-10-CM

## 2023-08-24 DIAGNOSIS — G25.81 RESTLESS LEG SYNDROME: ICD-10-CM

## 2023-08-24 DIAGNOSIS — L93.0 DISCOID LUPUS: ICD-10-CM

## 2023-08-24 DIAGNOSIS — E11.65 TYPE 2 DIABETES MELLITUS WITH HYPERGLYCEMIA, WITHOUT LONG-TERM CURRENT USE OF INSULIN: ICD-10-CM

## 2023-08-24 DIAGNOSIS — Z12.11 SCREEN FOR COLON CANCER: Primary | ICD-10-CM

## 2023-08-24 DIAGNOSIS — E78.5 HYPERLIPIDEMIA, UNSPECIFIED HYPERLIPIDEMIA TYPE: ICD-10-CM

## 2023-08-24 DIAGNOSIS — J41.0 SIMPLE CHRONIC BRONCHITIS: ICD-10-CM

## 2023-08-24 DIAGNOSIS — J44.9 CHRONIC OBSTRUCTIVE PULMONARY DISEASE, UNSPECIFIED COPD TYPE: ICD-10-CM

## 2023-08-24 DIAGNOSIS — E11.65 TYPE 2 DIABETES MELLITUS WITH HYPERGLYCEMIA, WITH LONG-TERM CURRENT USE OF INSULIN: ICD-10-CM

## 2023-08-24 DIAGNOSIS — F31.81 BIPOLAR 2 DISORDER, MAJOR DEPRESSIVE EPISODE: ICD-10-CM

## 2023-08-24 DIAGNOSIS — I25.10 CORONARY ARTERY DISEASE, UNSPECIFIED VESSEL OR LESION TYPE, UNSPECIFIED WHETHER ANGINA PRESENT, UNSPECIFIED WHETHER NATIVE OR TRANSPLANTED HEART: ICD-10-CM

## 2023-08-24 DIAGNOSIS — B95.8 STAPH INFECTION: ICD-10-CM

## 2023-08-24 DIAGNOSIS — I10 HYPERTENSION, UNSPECIFIED TYPE: ICD-10-CM

## 2023-08-24 DIAGNOSIS — K59.00 CONSTIPATION, UNSPECIFIED CONSTIPATION TYPE: ICD-10-CM

## 2023-08-24 DIAGNOSIS — G43.909 MIGRAINE WITHOUT STATUS MIGRAINOSUS, NOT INTRACTABLE, UNSPECIFIED MIGRAINE TYPE: ICD-10-CM

## 2023-08-24 DIAGNOSIS — R56.9 SEIZURES: ICD-10-CM

## 2023-08-24 LAB
ALBUMIN SERPL BCP-MCNC: 3.9 G/DL (ref 3.5–5)
ALBUMIN/GLOB SERPL: 1 {RATIO}
ALP SERPL-CCNC: 107 U/L (ref 41–108)
ALT SERPL W P-5'-P-CCNC: 35 U/L (ref 13–56)
ANION GAP SERPL CALCULATED.3IONS-SCNC: 12 MMOL/L (ref 7–16)
AST SERPL W P-5'-P-CCNC: 23 U/L (ref 15–37)
BASOPHILS # BLD AUTO: 0.05 K/UL (ref 0–0.2)
BASOPHILS NFR BLD AUTO: 0.7 % (ref 0–1)
BILIRUB SERPL-MCNC: 0.4 MG/DL (ref ?–1.2)
BUN SERPL-MCNC: 16 MG/DL (ref 7–18)
BUN/CREAT SERPL: 15 (ref 6–20)
CALCIUM SERPL-MCNC: 9.2 MG/DL (ref 8.5–10.1)
CHLORIDE SERPL-SCNC: 104 MMOL/L (ref 98–107)
CHOLEST SERPL-MCNC: 144 MG/DL (ref 0–200)
CHOLEST/HDLC SERPL: 3.3 {RATIO}
CO2 SERPL-SCNC: 28 MMOL/L (ref 21–32)
CREAT SERPL-MCNC: 1.05 MG/DL (ref 0.55–1.02)
DIFFERENTIAL METHOD BLD: ABNORMAL
EGFR (NO RACE VARIABLE) (RUSH/TITUS): 64 ML/MIN/1.73M2
EOSINOPHIL # BLD AUTO: 0.21 K/UL (ref 0–0.5)
EOSINOPHIL NFR BLD AUTO: 3 % (ref 1–4)
ERYTHROCYTE [DISTWIDTH] IN BLOOD BY AUTOMATED COUNT: 13 % (ref 11.5–14.5)
EST. AVERAGE GLUCOSE BLD GHB EST-MCNC: 194 MG/DL
GLOBULIN SER-MCNC: 4 G/DL (ref 2–4)
GLUCOSE SERPL-MCNC: 204 MG/DL (ref 74–106)
HBA1C MFR BLD HPLC: 8.4 % (ref 4.5–6.6)
HCT VFR BLD AUTO: 46 % (ref 38–47)
HCV AB SER QL: NORMAL
HDLC SERPL-MCNC: 43 MG/DL (ref 40–60)
HGB BLD-MCNC: 15.2 G/DL (ref 12–16)
HIV 1+O+2 AB SERPL QL: NORMAL
IMM GRANULOCYTES # BLD AUTO: 0.04 K/UL (ref 0–0.04)
IMM GRANULOCYTES NFR BLD: 0.6 % (ref 0–0.4)
LDLC SERPL CALC-MCNC: 44 MG/DL
LDLC/HDLC SERPL: 1 {RATIO}
LYMPHOCYTES # BLD AUTO: 2.58 K/UL (ref 1–4.8)
LYMPHOCYTES NFR BLD AUTO: 36.4 % (ref 27–41)
MCH RBC QN AUTO: 31.8 PG (ref 27–31)
MCHC RBC AUTO-ENTMCNC: 33 G/DL (ref 32–36)
MCV RBC AUTO: 96.2 FL (ref 80–96)
MONOCYTES # BLD AUTO: 0.48 K/UL (ref 0–0.8)
MONOCYTES NFR BLD AUTO: 6.8 % (ref 2–6)
MPC BLD CALC-MCNC: 10.6 FL (ref 9.4–12.4)
NEUTROPHILS # BLD AUTO: 3.73 K/UL (ref 1.8–7.7)
NEUTROPHILS NFR BLD AUTO: 52.5 % (ref 53–65)
NONHDLC SERPL-MCNC: 101 MG/DL
NRBC # BLD AUTO: 0 X10E3/UL
NRBC, AUTO (.00): 0 %
PLATELET # BLD AUTO: 197 K/UL (ref 150–400)
POTASSIUM SERPL-SCNC: 4.1 MMOL/L (ref 3.5–5.1)
PROT SERPL-MCNC: 7.9 G/DL (ref 6.4–8.2)
RBC # BLD AUTO: 4.78 M/UL (ref 4.2–5.4)
SODIUM SERPL-SCNC: 140 MMOL/L (ref 136–145)
T4 SERPL-MCNC: 8.3 ΜG/DL (ref 4.8–13.9)
TRIGL SERPL-MCNC: 285 MG/DL (ref 35–150)
TSH SERPL DL<=0.005 MIU/L-ACNC: 1.18 UIU/ML (ref 0.36–3.74)
VLDLC SERPL-MCNC: 57 MG/DL
WBC # BLD AUTO: 7.09 K/UL (ref 4.5–11)

## 2023-08-24 PROCEDURE — 3074F SYST BP LT 130 MM HG: CPT | Mod: CPTII,,, | Performed by: FAMILY MEDICINE

## 2023-08-24 PROCEDURE — 99215 PR OFFICE/OUTPT VISIT, EST, LEVL V, 40-54 MIN: ICD-10-PCS | Mod: ,,, | Performed by: FAMILY MEDICINE

## 2023-08-24 PROCEDURE — 80061 LIPID PANEL: CPT | Mod: ,,, | Performed by: CLINICAL MEDICAL LABORATORY

## 2023-08-24 PROCEDURE — 3008F PR BODY MASS INDEX (BMI) DOCUMENTED: ICD-10-PCS | Mod: CPTII,,, | Performed by: FAMILY MEDICINE

## 2023-08-24 PROCEDURE — 3078F PR MOST RECENT DIASTOLIC BLOOD PRESSURE < 80 MM HG: ICD-10-PCS | Mod: CPTII,,, | Performed by: FAMILY MEDICINE

## 2023-08-24 PROCEDURE — 3074F PR MOST RECENT SYSTOLIC BLOOD PRESSURE < 130 MM HG: ICD-10-PCS | Mod: CPTII,,, | Performed by: FAMILY MEDICINE

## 2023-08-24 PROCEDURE — 1159F PR MEDICATION LIST DOCUMENTED IN MEDICAL RECORD: ICD-10-PCS | Mod: CPTII,,, | Performed by: FAMILY MEDICINE

## 2023-08-24 PROCEDURE — 3008F BODY MASS INDEX DOCD: CPT | Mod: CPTII,,, | Performed by: FAMILY MEDICINE

## 2023-08-24 PROCEDURE — 83036 HEMOGLOBIN GLYCOSYLATED A1C: CPT | Mod: ,,, | Performed by: CLINICAL MEDICAL LABORATORY

## 2023-08-24 PROCEDURE — 87389 HIV-1 AG W/HIV-1&-2 AB AG IA: CPT | Mod: ,,, | Performed by: CLINICAL MEDICAL LABORATORY

## 2023-08-24 PROCEDURE — 87389 HIV 1 / 2 ANTIBODY: ICD-10-PCS | Mod: ,,, | Performed by: CLINICAL MEDICAL LABORATORY

## 2023-08-24 PROCEDURE — 99215 OFFICE O/P EST HI 40 MIN: CPT | Mod: ,,, | Performed by: FAMILY MEDICINE

## 2023-08-24 PROCEDURE — 1160F RVW MEDS BY RX/DR IN RCRD: CPT | Mod: CPTII,,, | Performed by: FAMILY MEDICINE

## 2023-08-24 PROCEDURE — 84436 T4: ICD-10-PCS | Mod: ,,, | Performed by: CLINICAL MEDICAL LABORATORY

## 2023-08-24 PROCEDURE — 3066F NEPHROPATHY DOC TX: CPT | Mod: CPTII,,, | Performed by: FAMILY MEDICINE

## 2023-08-24 PROCEDURE — 3066F PR DOCUMENTATION OF TREATMENT FOR NEPHROPATHY: ICD-10-PCS | Mod: CPTII,,, | Performed by: FAMILY MEDICINE

## 2023-08-24 PROCEDURE — 3051F PR MOST RECENT HEMOGLOBIN A1C LEVEL 7.0 - < 8.0%: ICD-10-PCS | Mod: CPTII,,, | Performed by: FAMILY MEDICINE

## 2023-08-24 PROCEDURE — 1160F PR REVIEW ALL MEDS BY PRESCRIBER/CLIN PHARMACIST DOCUMENTED: ICD-10-PCS | Mod: CPTII,,, | Performed by: FAMILY MEDICINE

## 2023-08-24 PROCEDURE — 80061 LIPID PANEL: ICD-10-PCS | Mod: ,,, | Performed by: CLINICAL MEDICAL LABORATORY

## 2023-08-24 PROCEDURE — 80053 COMPREHENSIVE METABOLIC PANEL: ICD-10-PCS | Mod: ,,, | Performed by: CLINICAL MEDICAL LABORATORY

## 2023-08-24 PROCEDURE — 80053 COMPREHEN METABOLIC PANEL: CPT | Mod: ,,, | Performed by: CLINICAL MEDICAL LABORATORY

## 2023-08-24 PROCEDURE — 3078F DIAST BP <80 MM HG: CPT | Mod: CPTII,,, | Performed by: FAMILY MEDICINE

## 2023-08-24 PROCEDURE — 85025 COMPLETE CBC W/AUTO DIFF WBC: CPT | Mod: ,,, | Performed by: CLINICAL MEDICAL LABORATORY

## 2023-08-24 PROCEDURE — 85025 CBC WITH DIFFERENTIAL: ICD-10-PCS | Mod: ,,, | Performed by: CLINICAL MEDICAL LABORATORY

## 2023-08-24 PROCEDURE — 84436 ASSAY OF TOTAL THYROXINE: CPT | Mod: ,,, | Performed by: CLINICAL MEDICAL LABORATORY

## 2023-08-24 PROCEDURE — 84443 ASSAY THYROID STIM HORMONE: CPT | Mod: ,,, | Performed by: CLINICAL MEDICAL LABORATORY

## 2023-08-24 PROCEDURE — 83036 HEMOGLOBIN A1C: ICD-10-PCS | Mod: ,,, | Performed by: CLINICAL MEDICAL LABORATORY

## 2023-08-24 PROCEDURE — 3060F POS MICROALBUMINURIA REV: CPT | Mod: CPTII,,, | Performed by: FAMILY MEDICINE

## 2023-08-24 PROCEDURE — 84443 TSH: ICD-10-PCS | Mod: ,,, | Performed by: CLINICAL MEDICAL LABORATORY

## 2023-08-24 PROCEDURE — 1159F MED LIST DOCD IN RCRD: CPT | Mod: CPTII,,, | Performed by: FAMILY MEDICINE

## 2023-08-24 PROCEDURE — 3051F HG A1C>EQUAL 7.0%<8.0%: CPT | Mod: CPTII,,, | Performed by: FAMILY MEDICINE

## 2023-08-24 PROCEDURE — 86803 HEPATITIS C AB TEST: CPT | Mod: ,,, | Performed by: CLINICAL MEDICAL LABORATORY

## 2023-08-24 PROCEDURE — 86803 HEPATITIS C ANTIBODY: ICD-10-PCS | Mod: ,,, | Performed by: CLINICAL MEDICAL LABORATORY

## 2023-08-24 PROCEDURE — 3060F PR POS MICROALBUMINURIA RESULT DOCUMENTED/REVIEW: ICD-10-PCS | Mod: CPTII,,, | Performed by: FAMILY MEDICINE

## 2023-08-24 RX ORDER — GABAPENTIN 300 MG/1
600 CAPSULE ORAL 2 TIMES DAILY
Qty: 360 CAPSULE | Refills: 1
Start: 2023-08-24 | End: 2023-10-03 | Stop reason: SDUPTHER

## 2023-08-24 RX ORDER — ATORVASTATIN CALCIUM 40 MG/1
40 TABLET, FILM COATED ORAL DAILY
Qty: 90 TABLET | Refills: 1 | Status: SHIPPED | OUTPATIENT
Start: 2023-08-24 | End: 2023-10-03 | Stop reason: SDUPTHER

## 2023-08-24 RX ORDER — ALBUTEROL SULFATE 0.83 MG/ML
SOLUTION RESPIRATORY (INHALATION)
Qty: 180 EACH | Refills: 1 | Status: SHIPPED | OUTPATIENT
Start: 2023-08-24 | End: 2024-03-05 | Stop reason: SDUPTHER

## 2023-08-24 RX ORDER — TOPIRAMATE 25 MG/1
25 TABLET ORAL DAILY
Qty: 90 TABLET | Refills: 1 | Status: SHIPPED | OUTPATIENT
Start: 2023-08-24 | End: 2023-10-03 | Stop reason: SDUPTHER

## 2023-08-24 RX ORDER — CARIPRAZINE 3 MG/1
6 CAPSULE, GELATIN COATED ORAL DAILY
Qty: 60 CAPSULE | Refills: 6 | Status: SHIPPED | OUTPATIENT
Start: 2023-08-24 | End: 2023-10-03 | Stop reason: SDUPTHER

## 2023-08-24 RX ORDER — TRIAMCINOLONE ACETONIDE 1 MG/G
CREAM TOPICAL 2 TIMES DAILY
Qty: 454 G | Refills: 5 | Status: SHIPPED | OUTPATIENT
Start: 2023-08-24

## 2023-08-24 RX ORDER — ONDANSETRON 4 MG/1
4 TABLET, ORALLY DISINTEGRATING ORAL EVERY 8 HOURS PRN
Qty: 20 TABLET | Refills: 0 | Status: SHIPPED | OUTPATIENT
Start: 2023-08-24 | End: 2023-10-03 | Stop reason: SDUPTHER

## 2023-08-24 RX ORDER — CLINDAMYCIN HYDROCHLORIDE 300 MG/1
300 CAPSULE ORAL 3 TIMES DAILY
COMMUNITY
Start: 2023-07-27

## 2023-08-24 RX ORDER — LEVOFLOXACIN 500 MG/1
500 TABLET, FILM COATED ORAL
COMMUNITY
Start: 2023-07-27 | End: 2023-08-24

## 2023-08-24 RX ORDER — HYDROCODONE BITARTRATE AND ACETAMINOPHEN 7.5; 325 MG/1; MG/1
1 TABLET ORAL EVERY 6 HOURS PRN
Qty: 20 TABLET | Refills: 0 | Status: SHIPPED | OUTPATIENT
Start: 2023-08-24

## 2023-08-24 RX ORDER — MIRTAZAPINE 30 MG/1
30 TABLET, FILM COATED ORAL DAILY
Qty: 90 TABLET | Refills: 1 | Status: SHIPPED | OUTPATIENT
Start: 2023-08-24 | End: 2023-10-03 | Stop reason: SDUPTHER

## 2023-08-24 RX ORDER — HYDROXYCHLOROQUINE SULFATE 200 MG/1
200 TABLET, FILM COATED ORAL 2 TIMES DAILY
Qty: 180 TABLET | Refills: 1 | Status: SHIPPED | OUTPATIENT
Start: 2023-08-24 | End: 2023-10-03 | Stop reason: SDUPTHER

## 2023-08-24 RX ORDER — CLOPIDOGREL BISULFATE 75 MG/1
75 TABLET ORAL DAILY
Qty: 90 TABLET | Refills: 1 | Status: SHIPPED | OUTPATIENT
Start: 2023-08-24 | End: 2023-10-03 | Stop reason: SDUPTHER

## 2023-08-24 RX ORDER — DAPAGLIFLOZIN 10 MG/1
10 TABLET, FILM COATED ORAL DAILY
Qty: 90 TABLET | Refills: 1 | Status: SHIPPED | OUTPATIENT
Start: 2023-08-24 | End: 2023-10-03 | Stop reason: SDUPTHER

## 2023-08-24 RX ORDER — INSULIN GLARGINE 100 [IU]/ML
30 INJECTION, SOLUTION SUBCUTANEOUS 2 TIMES DAILY
Qty: 51 ML | Refills: 1 | Status: SHIPPED | OUTPATIENT
Start: 2023-08-24 | End: 2023-10-03 | Stop reason: SDUPTHER

## 2023-08-24 RX ORDER — METOPROLOL SUCCINATE 50 MG/1
50 TABLET, EXTENDED RELEASE ORAL DAILY
Qty: 90 TABLET | Refills: 3 | Status: SHIPPED | OUTPATIENT
Start: 2023-08-24 | End: 2023-10-03 | Stop reason: SDUPTHER

## 2023-08-24 RX ORDER — BUSPIRONE HYDROCHLORIDE 15 MG/1
TABLET ORAL
Qty: 60 TABLET | Refills: 2 | Status: SHIPPED | OUTPATIENT
Start: 2023-08-24 | End: 2023-10-03 | Stop reason: SDUPTHER

## 2023-08-24 RX ORDER — ROPINIROLE 0.25 MG/1
0.25 TABLET, FILM COATED ORAL 3 TIMES DAILY
Qty: 270 TABLET | Refills: 1 | Status: SHIPPED | OUTPATIENT
Start: 2023-08-24 | End: 2023-10-03 | Stop reason: SDUPTHER

## 2023-08-24 NOTE — ASSESSMENT & PLAN NOTE
COPD currently doing well on her Symbicort.  No recurrence of symptoms.  She does have some shortness breath but is controlled with the inhalers 2-3 times daily.  Follow-up in 3 months.  Recommend she get flu vaccine she refuses COVID and Pneumovax

## 2023-08-24 NOTE — ASSESSMENT & PLAN NOTE
History of seizures no recent activity.  Will check a CBC CMP TSH and T4.  Follow-up every 6 months

## 2023-08-24 NOTE — PROGRESS NOTES
"   Oren Gage DO   [address]  [place]  [address]      PATIENT NAME: Rosemarie Lane  : 1970  DATE: 23  MRN: [id number]      Billing Provider: Oren Gage DO  Level of Service:   Patient PCP Information       Provider PCP Type    rOen Gage DO General            Reason for Visit / Chief Complaint: Medication Refill (States she was out of town and went to another doctor because of a "sore" that came up on her right breast. That physician gave her some antibiotics and it has opened up on its own but she states that the physician told her it was staph.)       Update PCP  Update Chief Complaint         History of Present Illness / Problem Focused Workflow     Rosemarie Lane presents to the clinic with Medication Refill (States she was out of town and went to another doctor because of a "sore" that came up on her right breast. That physician gave her some antibiotics and it has opened up on its own but she states that the physician told her it was staph.)     Last week patient had a sore on her breast and was treated with antibiotics and steroids and she is back in today because her still some pain and tenderness.  No fever chills or sweats.  She has history of hypertension.  She has a history of restless leg syndrome her on chronic pain.  She has had no increasing shortness of breath palpitations PND orthopnea.      Review of Systems     Review of Systems   Constitutional:  Negative for activity change, appetite change, chills, fatigue, fever and unexpected weight change.   HENT:  Negative for nasal congestion, ear discharge, ear pain, hearing loss, mouth dryness, mouth sores, postnasal drip, rhinorrhea, sinus pressure/congestion, sore throat, trouble swallowing and voice change.    Eyes:  Negative for pain, discharge, redness, itching and visual disturbance.   Respiratory:  Negative for apnea, cough, chest tightness, shortness of breath and wheezing.  "   Cardiovascular:  Negative for chest pain, palpitations and leg swelling.   Gastrointestinal:  Negative for abdominal distention, abdominal pain, anal bleeding, blood in stool, change in bowel habit, constipation, diarrhea, nausea, vomiting, reflux and change in bowel habit.   Endocrine: Negative for cold intolerance, heat intolerance, polydipsia, polyphagia and polyuria.   Genitourinary:  Negative for difficulty urinating, dysuria, enuresis, frequency, genital sores, hematuria, hot flashes, menstrual irregularity, menstrual problem, urgency and vaginal dryness.   Musculoskeletal:  Negative for arthralgias, back pain, gait problem, joint swelling, leg pain, myalgias and neck pain.   Integumentary:  Positive for mole/lesion. Negative for rash, breast mass, breast discharge and breast tenderness.   Allergic/Immunologic: Negative for environmental allergies and food allergies.   Neurological:  Negative for dizziness, vertigo, tremors, seizures, syncope, facial asymmetry, speech difficulty, weakness, light-headedness, numbness, headaches, coordination difficulties, memory loss and coordination difficulties.   Hematological:  Negative for adenopathy. Does not bruise/bleed easily.   Psychiatric/Behavioral:  Negative for agitation, behavioral problems, confusion, decreased concentration, dysphoric mood, hallucinations, self-injury, sleep disturbance and suicidal ideas. The patient is not nervous/anxious and is not hyperactive.    Breast: Negative for mass and tenderness      Medical / Social / Family History     Past Medical History:   Diagnosis Date    Anxiety     Arthritis     Asthma     Cancer     CHF (congestive heart failure)     Chronic obstructive lung disease     Coronary artery disease     Depression     Diabetes mellitus     Diabetes mellitus, type 2     Encounter for blood transfusion     History of kidney stones     Hyperlipidemia     Hypertension     Lupus     Renal disorder     Seizures     Thyroid disease      Transfusion reaction        Past Surgical History:   Procedure Laterality Date    CARDIAC CATHETERIZATION Left 09/19/2020    Proximal left main stenosis; IVUS in the LAD and left main    CHOLECYSTECTOMY      CORONARY ARTERY BYPASS GRAFT  09/30/2020    CORONARY STENT PLACEMENT  11/09/2021    Everolimus Eluting Coronary Stent-MLAD;LAD    DIRECT LARYNGOSCOPY Bilateral 7/18/2023    Procedure: LARYNGOSCOPY, DIRECT;  Surgeon: Nazario Sloan MD;  Location: [facility] OR;  Service: ENT;  Laterality: Bilateral;    HYSTERECTOMY      INCISION AND DRAINAGE  06/02/2020    LEFT HEART CATHETERIZATION Left 11/09/2021    Procedure: Left heart cath;  Surgeon: Aureliano Giron MD;  Location: [facility] CATH LAB;  Service: Cardiology;  Laterality: Left;    TOTAL ABDOMINAL HYSTERECTOMY W/ BILATERAL SALPINGOOPHORECTOMY  2008    [place]     TUBAL LIGATION      VOCAL FOLD LESION EXCISION Bilateral 7/18/2023    Procedure: EXCISION, LESION, VOCAL CORD;  Surgeon: Nazario Sloan MD;  Location: [facility] OR;  Service: ENT;  Laterality: Bilateral;       Social History  Ms.  reports that she has been smoking cigarettes. She started smoking about 36 years ago. She has a 18.3 pack-year smoking history. She has been exposed to tobacco smoke. She has never used smokeless tobacco. She reports that she does not drink alcohol and does not use drugs.    Family History  Ms.'s family history includes Bone cancer in her father; COPD in her father; Cancer in her father and mother; Cushing syndrome in her daughter; Diabetes in her brother, mother, and sister; Heart attack in her paternal grandmother; Heart disease in her brother and mother; Hypertension in her mother; Lung cancer in her father; No Known Problems in her daughter, maternal grandfather, paternal grandfather, and son; Ovarian cancer in her maternal grandmother.    Medications and Allergies     Medications  Outpatient Medications Marked as Taking for the 8/24/23 encounter  (Office Visit) with Oren Gage, DO   Medication Sig Dispense Refill    arformoteroL (BROVANA) 15 mcg/2 mL Nebu Take 2 mLs (15 mcg total) by nebulization 2 (two) times a day. 180 each 1    aspirin (ECOTRIN) 81 MG EC tablet Take 1 tablet by mouth once daily.      budesonide-formoterol 160-4.5 mcg (SYMBICORT) 160-4.5 mcg/actuation HFAA Inhale 2 puffs into the lungs every 12 (twelve) hours. Controller 18 g 5    [DISCONTINUED] albuterol (PROVENTIL) 2.5 mg /3 mL (0.083 %) nebulizer solution USE 1 VIAL IN NEBULIZER EVERY 4 TO 6 HOURS AS NEEDED 180 each 1    [DISCONTINUED] atorvastatin (LIPITOR) 40 MG tablet Take 1 tablet (40 mg total) by mouth once daily. 90 tablet 1    [DISCONTINUED] busPIRone (BUSPAR) 15 MG tablet TAKE 1 TABLET BY MOUTH 2 TIMES A DAY AS DIRECTED 60 tablet 2    [DISCONTINUED] cariprazine (VRAYLAR) 3 mg Cap Take 2 capsules (6 mg total) by mouth once daily. 60 capsule 6    [DISCONTINUED] clopidogreL (PLAVIX) 75 mg tablet Take 1 tablet (75 mg total) by mouth once daily. 90 tablet 1    [DISCONTINUED] dapagliflozin propanediol (FARXIGA) 10 mg tablet Take 1 tablet (10 mg total) by mouth once daily. 90 tablet 1    [DISCONTINUED] gabapentin (NEURONTIN) 300 MG capsule Take 2 capsules (600 mg total) by mouth 2 (two) times daily. 360 capsule 1    [DISCONTINUED] hydrOXYchloroQUINE (PLAQUENIL) 200 mg tablet Take 1 tablet (200 mg total) by mouth 2 (two) times daily. 180 tablet 1    [DISCONTINUED] insulin (LANTUS SOLOSTAR U-100 INSULIN) glargine 100 units/mL SubQ pen Inject 30 Units into the skin 2 (two) times a day. 51 mL 1    [DISCONTINUED] linaCLOtide (LINZESS) 145 mcg Cap capsule Take 1 capsule (145 mcg total) by mouth daily as needed (constipation). 30 capsule 2    [DISCONTINUED] metoprolol succinate (TOPROL-XL) 50 MG 24 hr tablet Take 1 tablet (50 mg total) by mouth once daily. 90 tablet 3    [DISCONTINUED] mirtazapine (REMERON) 30 MG tablet Take 30 mg by mouth once daily.      [DISCONTINUED] ondansetron  (ZOFRAN-ODT) 4 MG TbDL Take 1 tablet (4 mg total) by mouth every 8 (eight) hours as needed (nausea). 20 tablet 0    [DISCONTINUED] rOPINIRole (REQUIP) 0.25 MG tablet Take 1 tablet (0.25 mg total) by mouth 3 (three) times daily. 270 tablet 1    [DISCONTINUED] topiramate (TOPAMAX) 25 MG tablet Take 1 tablet (25 mg total) by mouth once daily. 90 tablet 1    [DISCONTINUED] triamcinolone acetonide 0.1% (KENALOG) 0.1 % cream Apply topically 2 (two) times daily. 454 g 5       Allergies  Review of patient's allergies indicates:   Allergen Reactions    Fish containing products Hives and Swelling    Penicillins Swelling     Localized swelling     Wasp venom Anaphylaxis    Opioids - morphine analogues      Change in behavior    Toradol [ketorolac] Hives    Tramadol Hives    Zithromax [azithromycin] Other (See Comments)     Patient cannot remember what kind of reaction she had to this medication    Bactrim ds [sulfamethoxazole-trimethoprim] Nausea And Vomiting     Facial flush    Latex, natural rubber Rash       Physical Examination     Vitals:    08/24/23 0932   BP: 126/62   Pulse: 68   Resp: 18   Temp: 97.8 °F (36.6 °C)     Physical Exam  Vitals reviewed.   Constitutional:       Appearance: Normal appearance. She is normal weight.   HENT:      Head: Normocephalic and atraumatic.      Nose: Nose normal.      Mouth/Throat:      Mouth: Mucous membranes are moist.      Pharynx: Oropharynx is clear. No oropharyngeal exudate or posterior oropharyngeal erythema.   Eyes:      General: No scleral icterus.     Extraocular Movements: Extraocular movements intact.      Conjunctiva/sclera: Conjunctivae normal.      Pupils: Pupils are equal, round, and reactive to light.   Neck:      Vascular: No carotid bruit.   Cardiovascular:      Rate and Rhythm: Normal rate and regular rhythm.      Pulses: Normal pulses.      Heart sounds: Normal heart sounds.      No gallop.   Pulmonary:      Effort: Pulmonary effort is normal.      Breath sounds:  Normal breath sounds.   Abdominal:      General: Abdomen is flat. Bowel sounds are normal.      Palpations: Abdomen is soft.   Musculoskeletal:         General: Normal range of motion.      Cervical back: Normal range of motion and neck supple.      Right lower leg: No edema.      Left lower leg: No edema.   Lymphadenopathy:      Cervical: No cervical adenopathy.   Skin:     General: Skin is warm and dry.      Capillary Refill: Capillary refill takes less than 2 seconds.      Coloration: Skin is not jaundiced.      Findings: Lesion and rash present.             Comments: 2 cm lesion right breast non fluctuant and no drainage from the site   Neurological:      General: No focal deficit present.      Mental Status: She is alert and oriented to person, place, and time. Mental status is at baseline.      Cranial Nerves: No cranial nerve deficit.      Sensory: No sensory deficit.      Motor: No weakness.      Coordination: Coordination normal.      Gait: Gait normal.      Deep Tendon Reflexes: Reflexes normal.   Psychiatric:         Mood and Affect: Mood normal.         Behavior: Behavior normal.         Thought Content: Thought content normal.         Judgment: Judgment normal.               Lab Results   Component Value Date    WBC 10.33 03/20/2023    HGB 16.2 (H) 03/20/2023    HCT 49.6 (H) 03/20/2023    MCV 96.3 (H) 03/20/2023     03/20/2023          Sodium   Date Value Ref Range Status   06/23/2023 138 136 - 145 mmol/L Final     Potassium   Date Value Ref Range Status   06/23/2023 4.0 3.5 - 5.1 mmol/L Final     Chloride   Date Value Ref Range Status   06/23/2023 103 98 - 107 mmol/L Final     CO2   Date Value Ref Range Status   06/23/2023 31 21 - 32 mmol/L Final     Glucose   Date Value Ref Range Status   06/23/2023 149 (H) 74 - 106 mg/dL Final     BUN   Date Value Ref Range Status   06/23/2023 12 7 - 18 mg/dL Final     Creatinine   Date Value Ref Range Status   06/23/2023 1.05 (H) 0.55 - 1.02 mg/dL Final      Calcium   Date Value Ref Range Status   06/23/2023 10.2 (H) 8.5 - 10.1 mg/dL Final     Total Protein   Date Value Ref Range Status   03/20/2023 8.6 (H) 6.4 - 8.2 g/dL Final     Albumin   Date Value Ref Range Status   03/20/2023 4.5 3.5 - 5.0 g/dL Final     Bilirubin, Total   Date Value Ref Range Status   03/20/2023 0.6 >0.0 - 1.2 mg/dL Final     Alk Phos   Date Value Ref Range Status   03/20/2023 108 41 - 108 U/L Final     AST   Date Value Ref Range Status   03/20/2023 41 (H) 15 - 37 U/L Final     ALT   Date Value Ref Range Status   03/20/2023 74 (H) 13 - 56 U/L Final     Anion Gap   Date Value Ref Range Status   06/23/2023 8 7 - 16 mmol/L Final     eGFR    Date Value Ref Range Status   01/18/2021 62       eGFR   Date Value Ref Range Status   07/08/2022 70 >=60 mL/min/1.73m² Final      X-Ray Chest AP Portable  Narrative: EXAMINATION:  XR CHEST AP PORTABLE    CLINICAL HISTORY:  .  Chest pain, unspecified    COMPARISON:  January 18, 2021    TECHNIQUE:  Chest x-ray AP portable erect    FINDINGS:  Cardiac silhouette is upper normal.  There is no mediastinal mass.  Prior median sternotomy.  There is no pulmonary vascular engorgement.  Lungs and pleural spaces are generally clear.    Osseous structures are unchanged  Impression: No acute process compared to the previous study    Electronically signed by: Humble Macias  Date:    08/14/2022  Time:    14:08     Procedures   Lab Results   Component Value Date    CHOL 135 06/23/2023    CHOL 163 03/20/2023    CHOL 109 12/29/2022     Lab Results   Component Value Date    HDL 33 (L) 06/23/2023    HDL 39 (L) 03/20/2023    HDL 30 (L) 12/29/2022     Lab Results   Component Value Date    LDLCALC 55 06/23/2023    LDLCALC 58 03/20/2023    LDLCALC 30 12/29/2022     Lab Results   Component Value Date    TRIG 237 (H) 06/23/2023    TRIG 329 (H) 03/20/2023    TRIG 245 (H) 12/29/2022     Lab Results   Component Value Date    CHOLHDL 4.1 06/23/2023    CHOLHDL 4.2  03/20/2023    CHOLHDL 3.6 12/29/2022      Lab Results   Component Value Date    HGBA1C 7.0 (H) 06/23/2023      Lab Results   Component Value Date    TSH 1.520 03/20/2023    C3LLPCN 10.7 03/20/2023      Assessment and Plan (including Health Maintenance)      Problem List  Smart Sets  Document Outside HM   :    Plan:         Health Maintenance Due   Topic Date Due    Hepatitis C Screening  Never done    COVID-19 Vaccine (1) Never done    Pneumococcal Vaccines (Age 0-64) (1 - PCV) Never done    TETANUS VACCINE  Never done    Mammogram  Never done    Colorectal Cancer Screening  Never done    Shingles Vaccine (1 of 2) Never done    Hemoglobin A1c  09/23/2023       Problem List Items Addressed This Visit          Neuro    Restless leg syndrome    Current Assessment & Plan     History of restlessme currently on medications for same and doing well with that medication.  Follow-up every 6 months.         Relevant Medications    rOPINIRole (REQUIP) 0.25 MG tablet    Seizures    Current Assessment & Plan     History of seizures no recent activity.  Will check a CBC CMP TSH and T4.  Follow-up every 6 months            Psychiatric    Bipolar 2 disorder, major depressive episode    Current Assessment & Plan     Bipolar 2 doing well with current medications.  No change in medicines at this time.  Maintain her Vraylar.  Follow-up psychiatry every 3 months.         Relevant Medications    cariprazine (VRAYLAR) 3 mg Cap    mirtazapine (REMERON) 30 MG tablet    Anxiety    Current Assessment & Plan     History of anxiety and bipolar 2.  Will continue on her Vraylar in her BusPar.  Counseling should continue.  Follow-up on a every 3 months basis.         Relevant Medications    busPIRone (BUSPAR) 15 MG tablet       Derm    Discoid lupus    Current Assessment & Plan     History of discoid lupus currently on Plaquenil.  No recurrence of lesions.  Continue current dosages.  Follow-up every 6 months         Relevant Medications     triamcinolone acetonide 0.1% (KENALOG) 0.1 % cream       Pulmonary    Chronic obstructive lung disease    Current Assessment & Plan     COPD currently doing well on her Symbicort.  No recurrence of symptoms.  She does have some shortness breath but is controlled with the inhalers 2-3 times daily.  Follow-up in 3 months.  Recommend she get flu vaccine she refuses COVID and Pneumovax         Relevant Medications    albuterol (PROVENTIL) 2.5 mg /3 mL (0.083 %) nebulizer solution       Cardiac/Vascular    Hyperlipidemia    Current Assessment & Plan     Last cholesterol was 135 and LDL was 55 in June of 2023.  Continue her current atorvastatin at 40 mg daily.  Follow-up in 6 months.  Continue low-fat low-cholesterol diet.         Relevant Medications    atorvastatin (LIPITOR) 40 MG tablet    Other Relevant Orders    Lipid Panel    Hypertension    Current Assessment & Plan     Hypertension well controlled no change in medication.  Follow-up in 3 months.  Will check CBC and CMP yearly.  Goal is blood pressure 120/70         Relevant Medications    metoprolol succinate (TOPROL-XL) 50 MG 24 hr tablet    Other Relevant Orders    TSH    T4       ID    Staph infection    Current Assessment & Plan     Patient has staph infection underneath her right breast.  It is improved but she still having some pain.  Did give her 20 Norco for the pain.  She is to finish her antibiotics.  Follow-up here in 3 months or p.r.n.            Immunology/Multi System    Systemic lupus erythematosus    Current Assessment & Plan     Lupus is well controlled.  Will check her CBC CMP and thyroid functions.  Follow-up in 3 months.         Relevant Medications    hydrOXYchloroQUINE (PLAQUENIL) 200 mg tablet    triamcinolone acetonide 0.1% (KENALOG) 0.1 % cream    HYDROcodone-acetaminophen (NORCO) 7.5-325 mg per tablet       Endocrine    Diabetes mellitus    Current Assessment & Plan     Last A1c was 7.0 in June of 2023.  She states her blood sugars have  been slightly elevated recently.  Will continue her current treatment to include Farxiga and Lantus.  She is on Lantus 30 units twice daily.         Relevant Medications    dapagliflozin propanediol (FARXIGA) 10 mg tablet    gabapentin (NEURONTIN) 300 MG capsule    insulin (LANTUS SOLOSTAR U-100 INSULIN) glargine 100 units/mL SubQ pen    Other Relevant Orders    Hemoglobin A1C    Hemoglobin A1C       GI    Constipation    Current Assessment & Plan     Chronic constipation controlled with Linzess.  No change in medicines.  Follow-up in 3 months.         Relevant Medications    linaCLOtide (LINZESS) 145 mcg Cap capsule    Nausea    Current Assessment & Plan     Nausea improved but we will give her refill on her Zofran.  Follow-up on a p.r.n. basis.         Relevant Medications    ondansetron (ZOFRAN-ODT) 4 MG TbDL    Other Relevant Orders    CBC Auto Differential    Comprehensive Metabolic Panel     Other Visit Diagnoses       Screen for colon cancer    -  Primary    Relevant Orders    Ambulatory referral/consult to Gastroenterology    Coronary artery disease, unspecified vessel or lesion type, unspecified whether angina present, unspecified whether native or transplanted heart        Relevant Medications    clopidogreL (PLAVIX) 75 mg tablet    Migraine without status migrainosus, not intractable, unspecified migraine type        Relevant Medications    topiramate (TOPAMAX) 25 MG tablet    Screening for viral disease        Relevant Orders    HIV 1/2 Ag/Ab (4th Gen)    Hepatitis C Antibody    Encounter for screening mammogram for malignant neoplasm of breast        Relevant Orders    Mammo Digital Screening Bilat            Health Maintenance Topics with due status: Not Due       Topic Last Completion Date    Lipid Panel 06/23/2023    High Dose Statin 08/24/2023    Aspirin/Antiplatelet Therapy 08/24/2023       Future Appointments   Date Time Provider Department Center   9/5/2023  3:30 PM Megan Power FNP OB  [facility]   9/25/2023  1:30 PM Adriana Kohli MD [facility]        Follow up in about 3 months (around 11/24/2023).     Signature:  Oren Gage [address] [place]  [address]    Date of encounter: 8/24/23

## 2023-08-24 NOTE — ASSESSMENT & PLAN NOTE
History of anxiety and bipolar 2.  Will continue on her Vraylar in her BusPar.  Counseling should continue.  Follow-up on a every 3 months basis.

## 2023-08-24 NOTE — ASSESSMENT & PLAN NOTE
History of discoid lupus currently on Plaquenil.  No recurrence of lesions.  Continue current dosages.  Follow-up every 6 months

## 2023-08-24 NOTE — ASSESSMENT & PLAN NOTE
Last cholesterol was 135 and LDL was 55 in June of 2023.  Continue her current atorvastatin at 40 mg daily.  Follow-up in 6 months.  Continue low-fat low-cholesterol diet.

## 2023-08-24 NOTE — ASSESSMENT & PLAN NOTE
Last A1c was 7.0 in June of 2023.  She states her blood sugars have been slightly elevated recently.  Will continue her current treatment to include Farxiga and Lantus.  She is on Lantus 30 units twice daily.

## 2023-08-24 NOTE — ASSESSMENT & PLAN NOTE
Patient has staph infection underneath her right breast.  It is improved but she still having some pain.  Did give her 20 Norco for the pain.  She is to finish her antibiotics.  Follow-up here in 3 months or p.r.n.

## 2023-08-24 NOTE — ASSESSMENT & PLAN NOTE
Bipolar 2 doing well with current medications.  No change in medicines at this time.  Maintain her Vraylar.  Follow-up psychiatry every 3 months.

## 2023-08-24 NOTE — ASSESSMENT & PLAN NOTE
History of restlessme currently on medications for same and doing well with that medication.  Follow-up every 6 months.

## 2023-09-01 DIAGNOSIS — Z79.4 TYPE 2 DIABETES MELLITUS WITH HYPERGLYCEMIA, WITH LONG-TERM CURRENT USE OF INSULIN: ICD-10-CM

## 2023-09-01 DIAGNOSIS — E11.65 TYPE 2 DIABETES MELLITUS WITH HYPERGLYCEMIA, WITH LONG-TERM CURRENT USE OF INSULIN: ICD-10-CM

## 2023-09-12 RX ORDER — GABAPENTIN 300 MG/1
CAPSULE ORAL
Qty: 270 CAPSULE | Refills: 1 | OUTPATIENT
Start: 2023-09-12

## 2023-10-03 DIAGNOSIS — G43.909 MIGRAINE WITHOUT STATUS MIGRAINOSUS, NOT INTRACTABLE, UNSPECIFIED MIGRAINE TYPE: ICD-10-CM

## 2023-10-03 DIAGNOSIS — F41.9 ANXIETY: ICD-10-CM

## 2023-10-03 DIAGNOSIS — E11.65 TYPE 2 DIABETES MELLITUS WITH HYPERGLYCEMIA, WITHOUT LONG-TERM CURRENT USE OF INSULIN: ICD-10-CM

## 2023-10-03 DIAGNOSIS — F31.81 BIPOLAR 2 DISORDER, MAJOR DEPRESSIVE EPISODE: ICD-10-CM

## 2023-10-03 DIAGNOSIS — Z79.4 TYPE 2 DIABETES MELLITUS WITH HYPERGLYCEMIA, WITH LONG-TERM CURRENT USE OF INSULIN: ICD-10-CM

## 2023-10-03 DIAGNOSIS — R11.0 NAUSEA: ICD-10-CM

## 2023-10-03 DIAGNOSIS — I25.10 CORONARY ARTERY DISEASE, UNSPECIFIED VESSEL OR LESION TYPE, UNSPECIFIED WHETHER ANGINA PRESENT, UNSPECIFIED WHETHER NATIVE OR TRANSPLANTED HEART: ICD-10-CM

## 2023-10-03 DIAGNOSIS — M32.9 SYSTEMIC LUPUS ERYTHEMATOSUS, UNSPECIFIED SLE TYPE, UNSPECIFIED ORGAN INVOLVEMENT STATUS: ICD-10-CM

## 2023-10-03 DIAGNOSIS — I10 HYPERTENSION, UNSPECIFIED TYPE: ICD-10-CM

## 2023-10-03 DIAGNOSIS — E78.5 HYPERLIPIDEMIA, UNSPECIFIED HYPERLIPIDEMIA TYPE: ICD-10-CM

## 2023-10-03 DIAGNOSIS — E11.65 TYPE 2 DIABETES MELLITUS WITH HYPERGLYCEMIA, WITH LONG-TERM CURRENT USE OF INSULIN: ICD-10-CM

## 2023-10-03 DIAGNOSIS — G25.81 RESTLESS LEG SYNDROME: ICD-10-CM

## 2023-10-03 RX ORDER — GABAPENTIN 300 MG/1
600 CAPSULE ORAL 2 TIMES DAILY
Qty: 360 CAPSULE | Refills: 1
Start: 2023-10-03 | End: 2023-11-01 | Stop reason: SDUPTHER

## 2023-10-03 RX ORDER — BUSPIRONE HYDROCHLORIDE 15 MG/1
TABLET ORAL
Qty: 60 TABLET | Refills: 2 | Status: SHIPPED | OUTPATIENT
Start: 2023-10-03 | End: 2024-03-05 | Stop reason: SDUPTHER

## 2023-10-03 RX ORDER — ONDANSETRON 4 MG/1
4 TABLET, ORALLY DISINTEGRATING ORAL EVERY 8 HOURS PRN
Qty: 20 TABLET | Refills: 0 | Status: SHIPPED | OUTPATIENT
Start: 2023-10-03 | End: 2023-12-06 | Stop reason: SDUPTHER

## 2023-10-03 RX ORDER — METOPROLOL SUCCINATE 50 MG/1
50 TABLET, EXTENDED RELEASE ORAL DAILY
Qty: 90 TABLET | Refills: 3 | Status: SHIPPED | OUTPATIENT
Start: 2023-10-03 | End: 2023-12-06 | Stop reason: SDUPTHER

## 2023-10-03 RX ORDER — CARIPRAZINE 3 MG/1
6 CAPSULE, GELATIN COATED ORAL DAILY
Qty: 60 CAPSULE | Refills: 6 | Status: SHIPPED | OUTPATIENT
Start: 2023-10-03 | End: 2024-03-05 | Stop reason: SDUPTHER

## 2023-10-03 RX ORDER — INSULIN GLARGINE 100 [IU]/ML
30 INJECTION, SOLUTION SUBCUTANEOUS 2 TIMES DAILY
Qty: 51 ML | Refills: 1 | Status: SHIPPED | OUTPATIENT
Start: 2023-10-03 | End: 2023-12-06 | Stop reason: SDUPTHER

## 2023-10-03 RX ORDER — HYDROXYCHLOROQUINE SULFATE 200 MG/1
200 TABLET, FILM COATED ORAL 2 TIMES DAILY
Qty: 180 TABLET | Refills: 1 | Status: SHIPPED | OUTPATIENT
Start: 2023-10-03 | End: 2023-12-06 | Stop reason: SDUPTHER

## 2023-10-03 RX ORDER — CLOPIDOGREL BISULFATE 75 MG/1
75 TABLET ORAL DAILY
Qty: 90 TABLET | Refills: 1 | Status: SHIPPED | OUTPATIENT
Start: 2023-10-03 | End: 2023-12-06 | Stop reason: SDUPTHER

## 2023-10-03 RX ORDER — TOPIRAMATE 25 MG/1
25 TABLET ORAL DAILY
Qty: 90 TABLET | Refills: 1 | Status: SHIPPED | OUTPATIENT
Start: 2023-10-03 | End: 2023-12-06 | Stop reason: SDUPTHER

## 2023-10-03 RX ORDER — ROPINIROLE 0.25 MG/1
0.25 TABLET, FILM COATED ORAL 3 TIMES DAILY
Qty: 270 TABLET | Refills: 1 | Status: SHIPPED | OUTPATIENT
Start: 2023-10-03 | End: 2023-12-06 | Stop reason: SDUPTHER

## 2023-10-03 RX ORDER — MIRTAZAPINE 30 MG/1
30 TABLET, FILM COATED ORAL DAILY
Qty: 90 TABLET | Refills: 1 | Status: SHIPPED | OUTPATIENT
Start: 2023-10-03 | End: 2024-03-05 | Stop reason: SDUPTHER

## 2023-10-03 RX ORDER — ATORVASTATIN CALCIUM 40 MG/1
40 TABLET, FILM COATED ORAL DAILY
Qty: 90 TABLET | Refills: 1 | Status: SHIPPED | OUTPATIENT
Start: 2023-10-03 | End: 2023-12-06 | Stop reason: SDUPTHER

## 2023-10-03 RX ORDER — DAPAGLIFLOZIN 10 MG/1
10 TABLET, FILM COATED ORAL DAILY
Qty: 90 TABLET | Refills: 1 | Status: SHIPPED | OUTPATIENT
Start: 2023-10-03 | End: 2023-12-06 | Stop reason: SDUPTHER

## 2023-10-12 ENCOUNTER — HOSPITAL ENCOUNTER (EMERGENCY)
Facility: HOSPITAL | Age: 53
Discharge: HOME | End: 2023-10-12
Payer: MEDICAID

## 2023-10-12 VITALS
HEIGHT: 60 IN | SYSTOLIC BLOOD PRESSURE: 147 MMHG | HEART RATE: 98 BPM | WEIGHT: 171 LBS | DIASTOLIC BLOOD PRESSURE: 83 MMHG | OXYGEN SATURATION: 96 % | TEMPERATURE: 100 F | RESPIRATION RATE: 18 BRPM | BODY MASS INDEX: 33.57 KG/M2

## 2023-10-12 DIAGNOSIS — S05.02XA ABRASION OF LEFT CORNEA, INITIAL ENCOUNTER: Primary | ICD-10-CM

## 2023-10-12 PROCEDURE — 25000003 PHARM REV CODE 250

## 2023-10-12 PROCEDURE — 99283 EMERGENCY DEPT VISIT LOW MDM: CPT

## 2023-10-12 PROCEDURE — 99284 PR EMERGENCY DEPT VISIT,LEVEL IV: ICD-10-PCS | Mod: ,,,

## 2023-10-12 PROCEDURE — 99284 EMERGENCY DEPT VISIT MOD MDM: CPT | Mod: ,,,

## 2023-10-12 RX ORDER — ERYTHROMYCIN 5 MG/G
OINTMENT OPHTHALMIC
Status: COMPLETED | OUTPATIENT
Start: 2023-10-12 | End: 2023-10-12

## 2023-10-12 RX ORDER — TETRACAINE HYDROCHLORIDE 5 MG/ML
2 SOLUTION OPHTHALMIC
Status: COMPLETED | OUTPATIENT
Start: 2023-10-12 | End: 2023-10-12

## 2023-10-12 RX ORDER — ERYTHROMYCIN 5 MG/G
OINTMENT OPHTHALMIC 3 TIMES DAILY
Qty: 1 G | Refills: 0 | Status: SHIPPED | OUTPATIENT
Start: 2023-10-12 | End: 2023-10-19

## 2023-10-12 RX ADMIN — FLUORESCEIN SODIUM 1 EACH: 1 STRIP OPHTHALMIC at 03:10

## 2023-10-12 RX ADMIN — TETRACAINE HYDROCHLORIDE 2 DROP: 5 SOLUTION OPHTHALMIC at 03:10

## 2023-10-12 RX ADMIN — ERYTHROMYCIN: 5 OINTMENT OPHTHALMIC at 04:10

## 2023-10-12 NOTE — DISCHARGE INSTRUCTIONS
Use medication as directed, follow up with pcp as needed if symptoms continue, return to the ER if symptoms worsen.

## 2023-10-12 NOTE — ED TRIAGE NOTES
Presents with pain and watering to left eye.  States she picked up her little dog and his toenail scratched her in the left eye.  States it feels like there is something in it.  Incident occurred at home at approx. 12:30 today

## 2023-10-12 NOTE — ED PROVIDER NOTES
Encounter Date: 10/12/2023       History     Chief Complaint   Patient presents with    Eye Problem     Patient is a53 y/o  female who presents tot he Emergency Department with c/o left eye discomfort possible foreign body. Patient stated that her dog's toe nail scratched left her eye and she believes a piece of may still be in her eye.         Review of patient's allergies indicates:   Allergen Reactions    Fish containing products Hives and Swelling    Penicillins Swelling     Localized swelling     Wasp venom Anaphylaxis    Opioids - morphine analogues      Change in behavior    Toradol [ketorolac] Hives    Tramadol Hives    Zithromax [azithromycin] Other (See Comments)     Patient cannot remember what kind of reaction she had to this medication    Bactrim ds [sulfamethoxazole-trimethoprim] Nausea And Vomiting     Facial flush    Latex, natural rubber Rash     Past Medical History:   Diagnosis Date    Anxiety     Arthritis     Asthma     Cancer     CHF (congestive heart failure)     Chronic obstructive lung disease     Coronary artery disease     Depression     Diabetes mellitus     Diabetes mellitus, type 2     Encounter for blood transfusion     History of kidney stones     Hyperlipidemia     Hypertension     Lupus     Renal disorder     Seizures     Thyroid disease     Transfusion reaction      Past Surgical History:   Procedure Laterality Date    CARDIAC CATHETERIZATION Left 09/19/2020    Proximal left main stenosis; IVUS in the LAD and left main    CHOLECYSTECTOMY      CORONARY ARTERY BYPASS GRAFT  09/30/2020    CORONARY STENT PLACEMENT  11/09/2021    Everolimus Eluting Coronary Stent-MLAD;LAD    DIRECT LARYNGOSCOPY Bilateral 7/18/2023    Procedure: LARYNGOSCOPY, DIRECT;  Surgeon: Nazario Sloan MD;  Location: [facility];  Service: ENT;  Laterality: Bilateral;    HYSTERECTOMY      INCISION AND DRAINAGE  06/02/2020    LEFT HEART CATHETERIZATION Left 11/09/2021    Procedure: Left heart cath;   Surgeon: Aureliano Giron MD;  Location: [facility] CATH LAB;  Service: Cardiology;  Laterality: Left;    TOTAL ABDOMINAL HYSTERECTOMY W/ BILATERAL SALPINGOOPHORECTOMY  2008    KIRSTY Lala     TUBAL LIGATION      VOCAL FOLD LESION EXCISION Bilateral 7/18/2023    Procedure: EXCISION, LESION, VOCAL CORD;  Surgeon: Nazario Sloan MD;  Location: [facility] OR;  Service: ENT;  Laterality: Bilateral;     Family History   Problem Relation Age of Onset    Hypertension Mother     Diabetes Mother     Heart disease Mother     Cancer Mother     Bone cancer Father     Lung cancer Father     Cancer Father     COPD Father     Diabetes Sister     Heart disease Brother     Diabetes Brother     No Known Problems Daughter     Cushing syndrome Daughter     Ovarian cancer Maternal Grandmother     No Known Problems Maternal Grandfather     Heart attack Paternal Grandmother     No Known Problems Paternal Grandfather     No Known Problems Son      Social History     Tobacco Use    Smoking status: Every Day     Current packs/day: 0.50     Average packs/day: 0.5 packs/day for 36.8 years (18.4 ttl pk-yrs)     Types: Cigarettes     Start date: 1987     Passive exposure: Current    Smokeless tobacco: Never    Tobacco comments:     Patient was smoking cigarettes 2 packs per day for 10 years   Substance Use Topics    Alcohol use: Never    Drug use: Never     Review of Systems   Constitutional: Negative.    HENT: Negative.     Eyes:  Positive for pain and redness. Negative for photophobia, discharge, itching and visual disturbance.   Respiratory: Negative.     Cardiovascular: Negative.    Gastrointestinal: Negative.    Endocrine: Negative.    Genitourinary: Negative.    Musculoskeletal: Negative.    Allergic/Immunologic: Negative.    Neurological: Negative.    Hematological: Negative.    Psychiatric/Behavioral: Negative.         Physical Exam     Initial Vitals [10/12/23 1540]   BP Pulse Resp Temp SpO2   (!) 147/83 98 18 99.8 °F (37.7  °C) 96 %      MAP       --         Physical Exam    Nursing note and vitals reviewed.  Constitutional: Vital signs are normal. She appears well-developed and well-nourished. She is cooperative. She appears ill.   HENT:   Head: Normocephalic and atraumatic.   Eyes: EOM and lids are normal. Pupils are equal, round, and reactive to light. Left conjunctiva is injected.       Cardiovascular:  Normal rate, regular rhythm, S1 normal, S2 normal, normal heart sounds, intact distal pulses and normal pulses.     Exam reveals no gallop, no S3, no S4, no distant heart sounds and no friction rub.       No murmur heard.  No systolic murmur is present.  Pulmonary/Chest: Effort normal and breath sounds normal.   Abdominal: Abdomen is soft and flat. Bowel sounds are normal. There is no abdominal tenderness.     Lymphadenopathy:     She has no cervical adenopathy.     She has no axillary adenopathy.   Neurological: She is alert and oriented to person, place, and time. She has normal strength and normal reflexes. She displays normal reflexes. No cranial nerve deficit or sensory deficit. She displays a negative Romberg sign. GCS eye subscore is 4. GCS verbal subscore is 5. GCS motor subscore is 6.   Skin: Skin is warm, dry and intact. Capillary refill takes less than 2 seconds. No rash noted.   Psychiatric: She has a normal mood and affect. Her speech is normal and behavior is normal. Judgment and thought content normal. Cognition and memory are normal.         Medical Screening Exam   See Full Note    ED Course   Procedures  Labs Reviewed - No data to display       Imaging Results    None          Medications   erythromycin 5 mg/gram (0.5 %) ophthalmic ointment (has no administration in time range)   fluorescein ophthalmic strip 1 each (1 each Left Eye Given 10/12/23 0397)   TETRAcaine HCl (PF) 0.5 % Drop 2 drop (2 drops Left Eye Given 10/12/23 3684)     Medical Decision Making  Risk  Prescription drug management.               ED  Course as of 10/12/23 1617   Thu Oct 12, 2023   1607 Discharge instructions given along with strict return precautions, patient verbalizes understanding.   [AC]   1615 Fluoro staining shows left corneal abrasion. [AC]      ED Course User Index  [AC] Boris Flores FNP                    Clinical Impression:   Final diagnoses:  [S05.02XA] Abrasion of left cornea, initial encounter (Primary)               Boris Flores FNP  10/12/23 1619

## 2023-11-01 DIAGNOSIS — Z79.4 TYPE 2 DIABETES MELLITUS WITH HYPERGLYCEMIA, WITH LONG-TERM CURRENT USE OF INSULIN: ICD-10-CM

## 2023-11-01 DIAGNOSIS — E11.65 TYPE 2 DIABETES MELLITUS WITH HYPERGLYCEMIA, WITH LONG-TERM CURRENT USE OF INSULIN: ICD-10-CM

## 2023-11-01 RX ORDER — GABAPENTIN 300 MG/1
600 CAPSULE ORAL 2 TIMES DAILY
Qty: 360 CAPSULE | Refills: 1
Start: 2023-11-01 | End: 2023-11-27 | Stop reason: SDUPTHER

## 2023-11-02 DIAGNOSIS — Z79.4 TYPE 2 DIABETES MELLITUS WITH HYPERGLYCEMIA, WITH LONG-TERM CURRENT USE OF INSULIN: ICD-10-CM

## 2023-11-02 DIAGNOSIS — E11.65 TYPE 2 DIABETES MELLITUS WITH HYPERGLYCEMIA, WITH LONG-TERM CURRENT USE OF INSULIN: ICD-10-CM

## 2023-11-03 RX ORDER — GABAPENTIN 300 MG/1
600 CAPSULE ORAL 2 TIMES DAILY
Qty: 360 CAPSULE | Refills: 1
Start: 2023-11-03

## 2023-11-05 DIAGNOSIS — E11.65 TYPE 2 DIABETES MELLITUS WITH HYPERGLYCEMIA, WITH LONG-TERM CURRENT USE OF INSULIN: ICD-10-CM

## 2023-11-05 DIAGNOSIS — Z79.4 TYPE 2 DIABETES MELLITUS WITH HYPERGLYCEMIA, WITH LONG-TERM CURRENT USE OF INSULIN: ICD-10-CM

## 2023-11-05 DIAGNOSIS — R11.0 NAUSEA: ICD-10-CM

## 2023-11-05 DIAGNOSIS — F31.81 BIPOLAR 2 DISORDER, MAJOR DEPRESSIVE EPISODE: ICD-10-CM

## 2023-11-06 RX ORDER — GABAPENTIN 300 MG/1
600 CAPSULE ORAL 2 TIMES DAILY
Qty: 360 CAPSULE | Refills: 1
Start: 2023-11-06

## 2023-11-06 RX ORDER — ONDANSETRON 4 MG/1
4 TABLET, ORALLY DISINTEGRATING ORAL EVERY 8 HOURS PRN
Qty: 20 TABLET | Refills: 0 | OUTPATIENT
Start: 2023-11-06

## 2023-11-06 RX ORDER — MIRTAZAPINE 30 MG/1
30 TABLET, FILM COATED ORAL DAILY
Qty: 90 TABLET | Refills: 1 | OUTPATIENT
Start: 2023-11-06

## 2023-11-27 DIAGNOSIS — E11.65 TYPE 2 DIABETES MELLITUS WITH HYPERGLYCEMIA, WITH LONG-TERM CURRENT USE OF INSULIN: ICD-10-CM

## 2023-11-27 DIAGNOSIS — Z79.4 TYPE 2 DIABETES MELLITUS WITH HYPERGLYCEMIA, WITH LONG-TERM CURRENT USE OF INSULIN: ICD-10-CM

## 2023-11-27 RX ORDER — GABAPENTIN 300 MG/1
600 CAPSULE ORAL 2 TIMES DAILY
Qty: 360 CAPSULE | Refills: 1
Start: 2023-11-27 | End: 2023-11-28 | Stop reason: SDUPTHER

## 2023-11-27 NOTE — TELEPHONE ENCOUNTER
----- Message from Meenakshi De Los Santos sent at 11/27/2023  9:32 AM CST -----  Pt called wanting to get her gabapentin (NEURONTIN) 300 MG capsule called into [facility].    It looks like it was called in on 11/1/23 but it doesn't show what pharmacy it was sent to.        Thanks

## 2023-11-28 DIAGNOSIS — E11.65 TYPE 2 DIABETES MELLITUS WITH HYPERGLYCEMIA, WITH LONG-TERM CURRENT USE OF INSULIN: ICD-10-CM

## 2023-11-28 DIAGNOSIS — Z79.4 TYPE 2 DIABETES MELLITUS WITH HYPERGLYCEMIA, WITH LONG-TERM CURRENT USE OF INSULIN: ICD-10-CM

## 2023-11-28 RX ORDER — GABAPENTIN 300 MG/1
600 CAPSULE ORAL 2 TIMES DAILY
Qty: 360 CAPSULE | Refills: 1 | Status: SHIPPED | OUTPATIENT
Start: 2023-11-28 | End: 2024-03-05 | Stop reason: SDUPTHER

## 2023-12-06 DIAGNOSIS — E11.65 TYPE 2 DIABETES MELLITUS WITH HYPERGLYCEMIA, WITHOUT LONG-TERM CURRENT USE OF INSULIN: ICD-10-CM

## 2023-12-06 DIAGNOSIS — M32.9 SYSTEMIC LUPUS ERYTHEMATOSUS, UNSPECIFIED SLE TYPE, UNSPECIFIED ORGAN INVOLVEMENT STATUS: ICD-10-CM

## 2023-12-06 DIAGNOSIS — Z79.4 TYPE 2 DIABETES MELLITUS WITH HYPERGLYCEMIA, WITH LONG-TERM CURRENT USE OF INSULIN: ICD-10-CM

## 2023-12-06 DIAGNOSIS — G25.81 RESTLESS LEG SYNDROME: ICD-10-CM

## 2023-12-06 DIAGNOSIS — E78.5 HYPERLIPIDEMIA, UNSPECIFIED HYPERLIPIDEMIA TYPE: ICD-10-CM

## 2023-12-06 DIAGNOSIS — R11.0 NAUSEA: ICD-10-CM

## 2023-12-06 DIAGNOSIS — E11.65 TYPE 2 DIABETES MELLITUS WITH HYPERGLYCEMIA, WITH LONG-TERM CURRENT USE OF INSULIN: ICD-10-CM

## 2023-12-06 DIAGNOSIS — I25.10 CORONARY ARTERY DISEASE, UNSPECIFIED VESSEL OR LESION TYPE, UNSPECIFIED WHETHER ANGINA PRESENT, UNSPECIFIED WHETHER NATIVE OR TRANSPLANTED HEART: ICD-10-CM

## 2023-12-06 DIAGNOSIS — G43.909 MIGRAINE WITHOUT STATUS MIGRAINOSUS, NOT INTRACTABLE, UNSPECIFIED MIGRAINE TYPE: ICD-10-CM

## 2023-12-06 DIAGNOSIS — I10 HYPERTENSION, UNSPECIFIED TYPE: ICD-10-CM

## 2023-12-06 RX ORDER — DAPAGLIFLOZIN 10 MG/1
10 TABLET, FILM COATED ORAL DAILY
Qty: 90 TABLET | Refills: 1 | Status: SHIPPED | OUTPATIENT
Start: 2023-12-06 | End: 2024-03-05 | Stop reason: SDUPTHER

## 2023-12-06 RX ORDER — ROPINIROLE 0.25 MG/1
0.25 TABLET, FILM COATED ORAL 3 TIMES DAILY
Qty: 270 TABLET | Refills: 1 | Status: SHIPPED | OUTPATIENT
Start: 2023-12-06 | End: 2024-03-05 | Stop reason: SDUPTHER

## 2023-12-06 RX ORDER — HYDROXYCHLOROQUINE SULFATE 200 MG/1
200 TABLET, FILM COATED ORAL 2 TIMES DAILY
Qty: 180 TABLET | Refills: 1 | Status: SHIPPED | OUTPATIENT
Start: 2023-12-06 | End: 2024-03-05 | Stop reason: SDUPTHER

## 2023-12-06 RX ORDER — ATORVASTATIN CALCIUM 40 MG/1
40 TABLET, FILM COATED ORAL DAILY
Qty: 90 TABLET | Refills: 1 | Status: SHIPPED | OUTPATIENT
Start: 2023-12-06 | End: 2024-03-05 | Stop reason: SDUPTHER

## 2023-12-06 RX ORDER — CLOPIDOGREL BISULFATE 75 MG/1
75 TABLET ORAL DAILY
Qty: 90 TABLET | Refills: 1 | Status: SHIPPED | OUTPATIENT
Start: 2023-12-06 | End: 2024-03-05 | Stop reason: SDUPTHER

## 2023-12-06 RX ORDER — TOPIRAMATE 25 MG/1
25 TABLET ORAL DAILY
Qty: 90 TABLET | Refills: 1 | Status: SHIPPED | OUTPATIENT
Start: 2023-12-06 | End: 2024-03-05 | Stop reason: SDUPTHER

## 2023-12-06 RX ORDER — INSULIN GLARGINE 100 [IU]/ML
30 INJECTION, SOLUTION SUBCUTANEOUS 2 TIMES DAILY
Qty: 51 ML | Refills: 1 | Status: SHIPPED | OUTPATIENT
Start: 2023-12-06 | End: 2024-03-05 | Stop reason: SDUPTHER

## 2023-12-06 RX ORDER — METOPROLOL SUCCINATE 50 MG/1
50 TABLET, EXTENDED RELEASE ORAL DAILY
Qty: 90 TABLET | Refills: 3 | Status: SHIPPED | OUTPATIENT
Start: 2023-12-06 | End: 2024-03-05 | Stop reason: SDUPTHER

## 2023-12-06 RX ORDER — ONDANSETRON 4 MG/1
4 TABLET, ORALLY DISINTEGRATING ORAL EVERY 8 HOURS PRN
Qty: 20 TABLET | Refills: 0 | Status: SHIPPED | OUTPATIENT
Start: 2023-12-06 | End: 2024-01-26 | Stop reason: SDUPTHER

## 2023-12-06 NOTE — TELEPHONE ENCOUNTER
Refills have been requested for the following medications:         atorvastatin (LIPITOR) 40 MG tablet [Oren Gage]       Patient Comment: Need refilled         clopidogreL (PLAVIX) 75 mg tablet [Oren Gage]       Patient Comment: Need refilled         dapagliflozin propanediol (FARXIGA) 10 mg tablet [Oren Gage]       Patient Comment: Need refilled         hydroxychloroquine (PLAQUENIL) 200 mg tablet [Oren Gage]       Patient Comment: Need refilled         insulin (LANTUS SOLOSTAR U-100 INSULIN) glargine 100 units/mL SubQ pen [Oren Gage]       Patient Comment: Need refilled         metoprolol succinate (TOPROL-XL) 50 MG 24 hr tablet [Oren Gage]       Patient Comment: Need refilled         rOPINIRole (REQUIP) 0.25 MG tablet [Oren Gage]       Patient Comment: Need refilled         topiramate (TOPAMAX) 25 MG tablet [Oren Gage]       Patient Comment: Need refilled     Preferred pharmacy: [facility] [address]

## 2024-01-26 DIAGNOSIS — R11.0 NAUSEA: ICD-10-CM

## 2024-01-29 DIAGNOSIS — R11.0 NAUSEA: ICD-10-CM

## 2024-01-29 RX ORDER — ONDANSETRON 4 MG/1
4 TABLET, ORALLY DISINTEGRATING ORAL EVERY 8 HOURS PRN
Qty: 20 TABLET | Refills: 0 | OUTPATIENT
Start: 2024-01-29

## 2024-01-30 RX ORDER — ONDANSETRON 4 MG/1
4 TABLET, ORALLY DISINTEGRATING ORAL EVERY 8 HOURS PRN
Qty: 20 TABLET | Refills: 0 | Status: SHIPPED | OUTPATIENT
Start: 2024-01-30 | End: 2024-03-05 | Stop reason: SDUPTHER

## 2024-03-05 ENCOUNTER — PATIENT MESSAGE (OUTPATIENT)
Dept: ADMINISTRATIVE | Facility: HOSPITAL | Age: 54
End: 2024-03-05

## 2024-03-05 DIAGNOSIS — Z79.4 TYPE 2 DIABETES MELLITUS WITH HYPERGLYCEMIA, WITH LONG-TERM CURRENT USE OF INSULIN: ICD-10-CM

## 2024-03-05 DIAGNOSIS — F31.81 BIPOLAR 2 DISORDER, MAJOR DEPRESSIVE EPISODE: ICD-10-CM

## 2024-03-05 DIAGNOSIS — J44.9 CHRONIC OBSTRUCTIVE PULMONARY DISEASE, UNSPECIFIED COPD TYPE: ICD-10-CM

## 2024-03-05 DIAGNOSIS — F41.9 ANXIETY: ICD-10-CM

## 2024-03-05 DIAGNOSIS — R11.0 NAUSEA: ICD-10-CM

## 2024-03-05 DIAGNOSIS — G25.81 RESTLESS LEG SYNDROME: ICD-10-CM

## 2024-03-05 DIAGNOSIS — M32.9 SYSTEMIC LUPUS ERYTHEMATOSUS, UNSPECIFIED SLE TYPE, UNSPECIFIED ORGAN INVOLVEMENT STATUS: ICD-10-CM

## 2024-03-05 DIAGNOSIS — I25.10 CORONARY ARTERY DISEASE, UNSPECIFIED VESSEL OR LESION TYPE, UNSPECIFIED WHETHER ANGINA PRESENT, UNSPECIFIED WHETHER NATIVE OR TRANSPLANTED HEART: ICD-10-CM

## 2024-03-05 DIAGNOSIS — E11.65 TYPE 2 DIABETES MELLITUS WITH HYPERGLYCEMIA, WITHOUT LONG-TERM CURRENT USE OF INSULIN: ICD-10-CM

## 2024-03-05 DIAGNOSIS — G43.909 MIGRAINE WITHOUT STATUS MIGRAINOSUS, NOT INTRACTABLE, UNSPECIFIED MIGRAINE TYPE: ICD-10-CM

## 2024-03-05 DIAGNOSIS — K59.00 CONSTIPATION, UNSPECIFIED CONSTIPATION TYPE: ICD-10-CM

## 2024-03-05 DIAGNOSIS — E78.5 HYPERLIPIDEMIA, UNSPECIFIED HYPERLIPIDEMIA TYPE: ICD-10-CM

## 2024-03-05 DIAGNOSIS — I10 HYPERTENSION, UNSPECIFIED TYPE: ICD-10-CM

## 2024-03-05 DIAGNOSIS — E11.65 TYPE 2 DIABETES MELLITUS WITH HYPERGLYCEMIA, WITH LONG-TERM CURRENT USE OF INSULIN: ICD-10-CM

## 2024-03-05 NOTE — TELEPHONE ENCOUNTER
Patient has not seen Dr. Gage since August 2023 , pt has had several appointment scheduled that have been canceled. This nurse instructed pt that I would send a 30 day supply request to Dr. Gage.

## 2024-03-06 DIAGNOSIS — Z12.11 SCREENING FOR COLON CANCER: ICD-10-CM

## 2024-03-06 RX ORDER — ATORVASTATIN CALCIUM 40 MG/1
40 TABLET, FILM COATED ORAL DAILY
Qty: 30 TABLET | Refills: 2 | Status: SHIPPED | OUTPATIENT
Start: 2024-03-06 | End: 2024-05-31 | Stop reason: SDUPTHER

## 2024-03-06 RX ORDER — ROPINIROLE 0.25 MG/1
0.25 TABLET, FILM COATED ORAL 3 TIMES DAILY
Qty: 90 TABLET | Refills: 2 | Status: SHIPPED | OUTPATIENT
Start: 2024-03-06

## 2024-03-06 RX ORDER — TOPIRAMATE 25 MG/1
25 TABLET ORAL DAILY
Qty: 30 TABLET | Refills: 2 | Status: SHIPPED | OUTPATIENT
Start: 2024-03-06

## 2024-03-06 RX ORDER — GABAPENTIN 300 MG/1
600 CAPSULE ORAL 2 TIMES DAILY
Qty: 120 CAPSULE | Refills: 2 | Status: SHIPPED | OUTPATIENT
Start: 2024-03-06

## 2024-03-06 RX ORDER — METOPROLOL SUCCINATE 50 MG/1
50 TABLET, EXTENDED RELEASE ORAL DAILY
Qty: 30 TABLET | Refills: 2 | Status: SHIPPED | OUTPATIENT
Start: 2024-03-06 | End: 2024-05-31 | Stop reason: SDUPTHER

## 2024-03-06 RX ORDER — DAPAGLIFLOZIN 10 MG/1
10 TABLET, FILM COATED ORAL DAILY
Qty: 30 TABLET | Refills: 2 | Status: SHIPPED | OUTPATIENT
Start: 2024-03-06

## 2024-03-06 RX ORDER — ONDANSETRON 4 MG/1
4 TABLET, ORALLY DISINTEGRATING ORAL EVERY 8 HOURS PRN
Qty: 20 TABLET | Refills: 2 | Status: SHIPPED | OUTPATIENT
Start: 2024-03-06 | End: 2024-04-01 | Stop reason: SDUPTHER

## 2024-03-06 RX ORDER — CARIPRAZINE 3 MG/1
6 CAPSULE, GELATIN COATED ORAL DAILY
Qty: 60 CAPSULE | Refills: 2 | Status: SHIPPED | OUTPATIENT
Start: 2024-03-06

## 2024-03-06 RX ORDER — CLOPIDOGREL BISULFATE 75 MG/1
75 TABLET ORAL DAILY
Qty: 30 TABLET | Refills: 2 | Status: SHIPPED | OUTPATIENT
Start: 2024-03-06 | End: 2024-05-31 | Stop reason: SDUPTHER

## 2024-03-06 RX ORDER — HYDROXYCHLOROQUINE SULFATE 200 MG/1
200 TABLET, FILM COATED ORAL 2 TIMES DAILY
Qty: 60 TABLET | Refills: 2 | Status: SHIPPED | OUTPATIENT
Start: 2024-03-06

## 2024-03-06 RX ORDER — ARFORMOTEROL TARTRATE 15 UG/2ML
15 SOLUTION RESPIRATORY (INHALATION) 2 TIMES DAILY
Qty: 180 EACH | Refills: 1 | Status: SHIPPED | OUTPATIENT
Start: 2024-03-06

## 2024-03-06 RX ORDER — MIRTAZAPINE 30 MG/1
30 TABLET, FILM COATED ORAL DAILY
Qty: 30 TABLET | Refills: 2 | Status: SHIPPED | OUTPATIENT
Start: 2024-03-06

## 2024-03-06 RX ORDER — BUSPIRONE HYDROCHLORIDE 15 MG/1
TABLET ORAL
Qty: 60 TABLET | Refills: 2 | Status: SHIPPED | OUTPATIENT
Start: 2024-03-06

## 2024-03-06 RX ORDER — INSULIN GLARGINE 100 [IU]/ML
30 INJECTION, SOLUTION SUBCUTANEOUS 2 TIMES DAILY
Qty: 51 ML | Refills: 2 | Status: SHIPPED | OUTPATIENT
Start: 2024-03-06

## 2024-03-06 RX ORDER — ALBUTEROL SULFATE 0.83 MG/ML
SOLUTION RESPIRATORY (INHALATION)
Qty: 180 EACH | Refills: 1 | Status: SHIPPED | OUTPATIENT
Start: 2024-03-06

## 2024-03-19 LAB — HEMOCCULT STL QL IA: NEGATIVE

## 2024-03-24 ENCOUNTER — HOSPITAL ENCOUNTER (EMERGENCY)
Facility: HOSPITAL | Age: 54
Discharge: LEFT BEFORE DISCHARGE | End: 2024-03-24
Payer: MEDICAID

## 2024-03-24 VITALS
RESPIRATION RATE: 20 BRPM | BODY MASS INDEX: 31.41 KG/M2 | TEMPERATURE: 98 F | HEIGHT: 60 IN | HEART RATE: 109 BPM | DIASTOLIC BLOOD PRESSURE: 74 MMHG | OXYGEN SATURATION: 96 % | SYSTOLIC BLOOD PRESSURE: 107 MMHG | WEIGHT: 160 LBS

## 2024-03-24 DIAGNOSIS — E87.20 LACTIC ACIDOSIS: ICD-10-CM

## 2024-03-24 DIAGNOSIS — R11.10 VOMITING, UNSPECIFIED VOMITING TYPE, UNSPECIFIED WHETHER NAUSEA PRESENT: Primary | ICD-10-CM

## 2024-03-24 DIAGNOSIS — R10.9 ABDOMINAL PAIN: ICD-10-CM

## 2024-03-24 DIAGNOSIS — R00.0 SINUS TACHYCARDIA: ICD-10-CM

## 2024-03-24 LAB
ALBUMIN SERPL BCP-MCNC: 4.2 G/DL (ref 3.5–5)
ALBUMIN/GLOB SERPL: 1.3 {RATIO}
ALP SERPL-CCNC: 73 U/L (ref 41–108)
ALT SERPL W P-5'-P-CCNC: 32 U/L (ref 13–56)
AMPHET UR QL SCN: NEGATIVE
ANION GAP SERPL CALCULATED.3IONS-SCNC: 19 MMOL/L (ref 7–16)
APTT PPP: 23.5 SECONDS (ref 25.2–37.3)
AST SERPL W P-5'-P-CCNC: 18 U/L (ref 15–37)
BARBITURATES UR QL SCN: NEGATIVE
BASOPHILS # BLD AUTO: 0.07 K/UL (ref 0–0.2)
BASOPHILS NFR BLD AUTO: 0.3 % (ref 0–1)
BENZODIAZ METAB UR QL SCN: NEGATIVE
BILIRUB SERPL-MCNC: 0.6 MG/DL (ref ?–1.2)
BILIRUB UR QL STRIP: NEGATIVE
BUN SERPL-MCNC: 46 MG/DL (ref 7–18)
BUN/CREAT SERPL: 37 (ref 6–20)
CALCIUM SERPL-MCNC: 9.6 MG/DL (ref 8.5–10.1)
CANNABINOIDS UR QL SCN: POSITIVE
CHLORIDE SERPL-SCNC: 100 MMOL/L (ref 98–107)
CLARITY UR: CLEAR
CO2 SERPL-SCNC: 24 MMOL/L (ref 21–32)
COCAINE UR QL SCN: NEGATIVE
COLOR UR: YELLOW
CREAT SERPL-MCNC: 1.24 MG/DL (ref 0.55–1.02)
DIFFERENTIAL METHOD BLD: ABNORMAL
EGFR (NO RACE VARIABLE) (RUSH/TITUS): 53 ML/MIN/1.73M2
EOSINOPHIL # BLD AUTO: 0.06 K/UL (ref 0–0.5)
EOSINOPHIL NFR BLD AUTO: 0.3 % (ref 1–4)
ERYTHROCYTE [DISTWIDTH] IN BLOOD BY AUTOMATED COUNT: 13.3 % (ref 11.5–14.5)
ETHANOL SERPL-MCNC: <3 MG/DL
GLOBULIN SER-MCNC: 3.2 G/DL (ref 2–4)
GLUCOSE SERPL-MCNC: 284 MG/DL (ref 70–105)
GLUCOSE SERPL-MCNC: 292 MG/DL (ref 74–106)
GLUCOSE UR STRIP-MCNC: >=1000 MG/DL
HCT VFR BLD AUTO: 40.5 % (ref 38–47)
HGB BLD-MCNC: 13 G/DL (ref 12–16)
INR BLD: 1.07
KETONES UR STRIP-SCNC: 15 MG/DL
LACTATE SERPL-SCNC: 1.5 MMOL/L (ref 0.4–2)
LACTATE SERPL-SCNC: 3.1 MMOL/L (ref 0.4–2)
LACTATE SERPL-SCNC: 7.1 MMOL/L (ref 0.4–2)
LEUKOCYTE ESTERASE UR QL STRIP: NEGATIVE
LIPASE SERPL-CCNC: 22 U/L (ref 16–77)
LYMPHOCYTES # BLD AUTO: 6.23 K/UL (ref 1–4.8)
LYMPHOCYTES NFR BLD AUTO: 30.4 % (ref 27–41)
LYMPHOCYTES NFR BLD MANUAL: 32 % (ref 27–41)
MCH RBC QN AUTO: 31.3 PG (ref 27–31)
MCHC RBC AUTO-ENTMCNC: 32.1 G/DL (ref 32–36)
MCV RBC AUTO: 97.4 FL (ref 80–96)
MONOCYTES # BLD AUTO: 1.06 K/UL (ref 0–0.8)
MONOCYTES NFR BLD AUTO: 5.2 % (ref 2–6)
MONOCYTES NFR BLD MANUAL: 6 % (ref 2–6)
MPC BLD CALC-MCNC: 11.2 FL (ref 9.4–12.4)
NEUTROPHILS # BLD AUTO: 13.09 K/UL (ref 1.8–7.7)
NEUTROPHILS NFR BLD AUTO: 63.8 % (ref 53–65)
NEUTS BAND NFR BLD MANUAL: 3 % (ref 1–5)
NEUTS SEG NFR BLD MANUAL: 59 % (ref 50–62)
NITRITE UR QL STRIP: NEGATIVE
NRBC BLD MANUAL-RTO: NORMAL %
OCCULT BLOOD: NEGATIVE
OPIATES UR QL SCN: POSITIVE
PCP UR QL SCN: NEGATIVE
PH UR STRIP: 6 PH UNITS
PLATELET # BLD AUTO: 298 K/UL (ref 150–400)
PLATELET MORPHOLOGY: NORMAL
POTASSIUM SERPL-SCNC: 4 MMOL/L (ref 3.5–5.1)
PROT SERPL-MCNC: 7.4 G/DL (ref 6.4–8.2)
PROT UR QL STRIP: NEGATIVE
PROTHROMBIN TIME: 14.6 SECONDS (ref 11.7–14.7)
RBC # BLD AUTO: 4.16 M/UL (ref 4.2–5.4)
RBC # UR STRIP: NEGATIVE /UL
RBC MORPH BLD: NORMAL
SARS-COV-2 RDRP RESP QL NAA+PROBE: NEGATIVE
SODIUM SERPL-SCNC: 139 MMOL/L (ref 136–145)
SP GR UR STRIP: 1.01
UROBILINOGEN UR STRIP-ACNC: 0.2 MG/DL
WBC # BLD AUTO: 20.51 K/UL (ref 4.5–11)

## 2024-03-24 PROCEDURE — 82272 OCCULT BLD FECES 1-3 TESTS: CPT | Performed by: REGISTERED NURSE

## 2024-03-24 PROCEDURE — 83605 ASSAY OF LACTIC ACID: CPT | Mod: 59 | Performed by: REGISTERED NURSE

## 2024-03-24 PROCEDURE — C9113 INJ PANTOPRAZOLE SODIUM, VIA: HCPCS | Performed by: REGISTERED NURSE

## 2024-03-24 PROCEDURE — 96375 TX/PRO/DX INJ NEW DRUG ADDON: CPT

## 2024-03-24 PROCEDURE — 87635 SARS-COV-2 COVID-19 AMP PRB: CPT | Performed by: REGISTERED NURSE

## 2024-03-24 PROCEDURE — 99285 EMERGENCY DEPT VISIT HI MDM: CPT | Mod: 25

## 2024-03-24 PROCEDURE — 93010 ELECTROCARDIOGRAM REPORT: CPT | Mod: ,,, | Performed by: INTERNAL MEDICINE

## 2024-03-24 PROCEDURE — 82962 GLUCOSE BLOOD TEST: CPT

## 2024-03-24 PROCEDURE — 96376 TX/PRO/DX INJ SAME DRUG ADON: CPT | Mod: 59

## 2024-03-24 PROCEDURE — 96374 THER/PROPH/DIAG INJ IV PUSH: CPT | Mod: 59

## 2024-03-24 PROCEDURE — 85025 COMPLETE CBC W/AUTO DIFF WBC: CPT | Performed by: REGISTERED NURSE

## 2024-03-24 PROCEDURE — 85610 PROTHROMBIN TIME: CPT | Performed by: REGISTERED NURSE

## 2024-03-24 PROCEDURE — 81003 URINALYSIS AUTO W/O SCOPE: CPT | Performed by: REGISTERED NURSE

## 2024-03-24 PROCEDURE — 80307 DRUG TEST PRSMV CHEM ANLYZR: CPT | Performed by: REGISTERED NURSE

## 2024-03-24 PROCEDURE — 93005 ELECTROCARDIOGRAM TRACING: CPT

## 2024-03-24 PROCEDURE — 83690 ASSAY OF LIPASE: CPT | Performed by: REGISTERED NURSE

## 2024-03-24 PROCEDURE — 96361 HYDRATE IV INFUSION ADD-ON: CPT

## 2024-03-24 PROCEDURE — 25500020 PHARM REV CODE 255: Performed by: REGISTERED NURSE

## 2024-03-24 PROCEDURE — 80053 COMPREHEN METABOLIC PANEL: CPT | Performed by: REGISTERED NURSE

## 2024-03-24 PROCEDURE — 82077 ASSAY SPEC XCP UR&BREATH IA: CPT | Performed by: REGISTERED NURSE

## 2024-03-24 PROCEDURE — 63600175 PHARM REV CODE 636 W HCPCS: Performed by: REGISTERED NURSE

## 2024-03-24 RX ORDER — HYDROMORPHONE HYDROCHLORIDE 2 MG/ML
0.5 INJECTION, SOLUTION INTRAMUSCULAR; INTRAVENOUS; SUBCUTANEOUS
Status: COMPLETED | OUTPATIENT
Start: 2024-03-24 | End: 2024-03-24

## 2024-03-24 RX ORDER — ONDANSETRON HYDROCHLORIDE 2 MG/ML
4 INJECTION, SOLUTION INTRAVENOUS
Status: COMPLETED | OUTPATIENT
Start: 2024-03-24 | End: 2024-03-24

## 2024-03-24 RX ORDER — PANTOPRAZOLE SODIUM 40 MG/1
40 TABLET, DELAYED RELEASE ORAL DAILY
Status: DISCONTINUED | OUTPATIENT
Start: 2024-03-25 | End: 2024-03-24 | Stop reason: HOSPADM

## 2024-03-24 RX ORDER — ONDANSETRON 4 MG/1
4 TABLET, FILM COATED ORAL EVERY 6 HOURS
Qty: 12 TABLET | Refills: 0 | Status: SHIPPED | OUTPATIENT
Start: 2024-03-24 | End: 2024-05-09

## 2024-03-24 RX ORDER — PANTOPRAZOLE SODIUM 40 MG/10ML
40 INJECTION, POWDER, LYOPHILIZED, FOR SOLUTION INTRAVENOUS
Status: COMPLETED | OUTPATIENT
Start: 2024-03-24 | End: 2024-03-24

## 2024-03-24 RX ADMIN — PANTOPRAZOLE SODIUM 40 MG: 40 INJECTION, POWDER, FOR SOLUTION INTRAVENOUS at 02:03

## 2024-03-24 RX ADMIN — ONDANSETRON 4 MG: 2 INJECTION INTRAMUSCULAR; INTRAVENOUS at 05:03

## 2024-03-24 RX ADMIN — IOPAMIDOL 100 ML: 755 INJECTION, SOLUTION INTRAVENOUS at 04:03

## 2024-03-24 RX ADMIN — SODIUM CHLORIDE, POTASSIUM CHLORIDE, SODIUM LACTATE AND CALCIUM CHLORIDE 1000 ML: 600; 310; 30; 20 INJECTION, SOLUTION INTRAVENOUS at 01:03

## 2024-03-24 RX ADMIN — HYDROMORPHONE HYDROCHLORIDE 0.5 MG: 2 INJECTION, SOLUTION INTRAMUSCULAR; INTRAVENOUS; SUBCUTANEOUS at 03:03

## 2024-03-24 RX ADMIN — ONDANSETRON 4 MG: 2 INJECTION INTRAMUSCULAR; INTRAVENOUS at 01:03

## 2024-03-24 RX ADMIN — SODIUM CHLORIDE, POTASSIUM CHLORIDE, SODIUM LACTATE AND CALCIUM CHLORIDE 1000 ML: 600; 310; 30; 20 INJECTION, SOLUTION INTRAVENOUS at 03:03

## 2024-03-24 RX ADMIN — HYDROMORPHONE HYDROCHLORIDE 0.5 MG: 2 INJECTION, SOLUTION INTRAMUSCULAR; INTRAVENOUS; SUBCUTANEOUS at 05:03

## 2024-03-24 RX ADMIN — HYDROMORPHONE HYDROCHLORIDE 0.5 MG: 2 INJECTION, SOLUTION INTRAMUSCULAR; INTRAVENOUS; SUBCUTANEOUS at 02:03

## 2024-03-24 NOTE — ED PROVIDER NOTES
Encounter Date: 3/24/2024       History     Chief Complaint   Patient presents with    Vomiting    Diarrhea     53 y-old  female with PMH of CAD (s/p CABG and LHC 2021), CHF, COPD, DM, hyperlipidemia, and HTN, received to ED with complaint of N/V/D and generalized abdominal pain that started yesterday. States that she had several episodes of clear vomitus yesterday, but that this became worse this AM around 0930 and her vomitus changed to a black liquid. Also reports diarrhea that is black.       Review of patient's allergies indicates:   Allergen Reactions    Fish containing products Hives and Swelling    Penicillins Swelling     Localized swelling     Wasp venom Anaphylaxis    Opioids - morphine analogues      Change in behavior    Toradol [ketorolac] Hives    Tramadol Hives    Zithromax [azithromycin] Other (See Comments)     Patient cannot remember what kind of reaction she had to this medication    Bactrim ds [sulfamethoxazole-trimethoprim] Nausea And Vomiting     Facial flush    Latex, natural rubber Rash     Past Medical History:   Diagnosis Date    Anxiety     Arthritis     Asthma     Cancer     CHF (congestive heart failure)     Chronic obstructive lung disease     Coronary artery disease     Depression     Diabetes mellitus     Diabetes mellitus, type 2     Encounter for blood transfusion     History of kidney stones     Hyperlipidemia     Hypertension     Lupus     Renal disorder     Seizures     Thyroid disease     Transfusion reaction      Past Surgical History:   Procedure Laterality Date    CARDIAC CATHETERIZATION Left 09/19/2020    Proximal left main stenosis; IVUS in the LAD and left main    CHOLECYSTECTOMY      CORONARY ARTERY BYPASS GRAFT  09/30/2020    CORONARY STENT PLACEMENT  11/09/2021    Everolimus Eluting Coronary Stent-MLAD;LAD    DIRECT LARYNGOSCOPY Bilateral 7/18/2023    Procedure: LARYNGOSCOPY, DIRECT;  Surgeon: Nazario Sloan MD;  Location: [facility];  Service: ENT;   Laterality: Bilateral;    HYSTERECTOMY      INCISION AND DRAINAGE  06/02/2020    LEFT HEART CATHETERIZATION Left 11/09/2021    Procedure: Left heart cath;  Surgeon: Aureliano Giron MD;  Location: [facility] CATH LAB;  Service: Cardiology;  Laterality: Left;    TOTAL ABDOMINAL HYSTERECTOMY W/ BILATERAL SALPINGOOPHORECTOMY  2008    KIRSTY Lala     TUBAL LIGATION      VOCAL FOLD LESION EXCISION Bilateral 7/18/2023    Procedure: EXCISION, LESION, VOCAL CORD;  Surgeon: Nazario Sloan MD;  Location: [facility];  Service: ENT;  Laterality: Bilateral;     Family History   Problem Relation Age of Onset    Hypertension Mother     Diabetes Mother     Heart disease Mother     Cancer Mother     Bone cancer Father     Lung cancer Father     Cancer Father     COPD Father     Diabetes Sister     Heart disease Brother     Diabetes Brother     No Known Problems Daughter     Cushing syndrome Daughter     Ovarian cancer Maternal Grandmother     No Known Problems Maternal Grandfather     Heart attack Paternal Grandmother     No Known Problems Paternal Grandfather     No Known Problems Son      Social History     Tobacco Use    Smoking status: Every Day     Current packs/day: 0.50     Average packs/day: 0.5 packs/day for 37.2 years (18.6 ttl pk-yrs)     Types: Cigarettes     Start date: 1987     Passive exposure: Current    Smokeless tobacco: Never    Tobacco comments:     Patient was smoking cigarettes 2 packs per day for 10 years   Substance Use Topics    Alcohol use: Never    Drug use: Never     Review of Systems    Physical Exam     Initial Vitals [03/24/24 1352]   BP Pulse Resp Temp SpO2   112/82 (!) 143 (!) 22 97.6 °F (36.4 °C) 98 %      MAP       --         Physical Exam    Nursing note and vitals reviewed.  Constitutional: She appears well-developed and well-nourished.   HENT:   Head: Normocephalic and atraumatic.   Right Ear: External ear normal.   Left Ear: External ear normal.   Nose: Nose normal.   Mouth/Throat:  Oropharynx is clear and moist.   Eyes: Conjunctivae are normal.   Neck: Neck supple.   Cardiovascular:  Normal rate, regular rhythm, normal heart sounds and intact distal pulses.           Pulmonary/Chest: Breath sounds normal.   Abdominal: Abdomen is soft. Bowel sounds are normal. She exhibits no distension and no mass. There is no abdominal tenderness. There is no rebound and no guarding.   Musculoskeletal:         General: Normal range of motion.      Cervical back: Neck supple.     Neurological: She is alert and oriented to person, place, and time. She has normal strength. GCS score is 15. GCS eye subscore is 4. GCS verbal subscore is 5. GCS motor subscore is 6.   Skin: Skin is dry. Capillary refill takes less than 2 seconds.   Psychiatric: She has a normal mood and affect. Her behavior is normal. Judgment and thought content normal.         Medical Screening Exam   See Full Note    ED Course   Procedures  Labs Reviewed   COMPREHENSIVE METABOLIC PANEL - Abnormal; Notable for the following components:       Result Value    Anion Gap 19 (*)     Glucose 292 (*)     BUN 46 (*)     Creatinine 1.24 (*)     BUN/Creatinine Ratio 37 (*)     eGFR 53 (*)     All other components within normal limits   CBC WITH DIFFERENTIAL - Abnormal; Notable for the following components:    WBC 20.51 (*)     RBC 4.16 (*)     MCV 97.4 (*)     MCH 31.3 (*)     Neutrophils, Abs 13.09 (*)     Lymphocytes, Absolute 6.23 (*)     Eosinophils % 0.3 (*)     Monocytes, Absolute 1.06 (*)     All other components within normal limits   LACTIC ACID, PLASMA - Abnormal; Notable for the following components:    Lactic Acid 7.1 (*)     All other components within normal limits   PT AND PTT - Abnormal; Notable for the following components:    PTT 23.5 (*)     All other components within normal limits   POCT GLUCOSE MONITORING CONTINUOUS - Abnormal; Notable for the following components:    POC Glucose 284 (*)     All other components within normal limits    LIPASE - Normal   CBC W/ AUTO DIFFERENTIAL    Narrative:     The following orders were created for panel order CBC auto differential.  Procedure                               Abnormality         Status                     ---------                               -----------         ------                     CBC with Differential[[id number]]       Abnormal            Final result               Manual Differential[[id number]]                             Final result                 Please view results for these tests on the individual orders.   MANUAL DIFFERENTIAL   OCCULT BLOOD X 1, STOOL   LACTIC ACID, PLASMA   ALCOHOL,MEDICAL (ETHANOL)   URINALYSIS, REFLEX TO URINE CULTURE   DRUG SCREEN, URINE (BEAKER)          Imaging Results              X-Ray Abdomen Flat And Erect (Final result)  Result time 03/24/24 14:50:54      Final result by Melvin Swenson MD (03/24/24 14:50:54)                   Impression:      No acute findings.      Electronically signed by: Melvin Swenson  Date:    03/24/2024  Time:    14:50               Narrative:    EXAMINATION:  XR ABDOMEN FLAT AND ERECT    CLINICAL HISTORY:  Unspecified abdominal pain    TECHNIQUE:  Flat and erect AP views of the abdomen were performed.    COMPARISON:  None.    FINDINGS:  There is no bowel obstruction. Surgical clips right upper quadrant. No pneumoperitoneum. No abnormal calcifications.                                       X-Ray Chest AP Portable (Final result)  Result time 03/24/24 14:50:19      Final result by Melvin Swenson MD (03/24/24 14:50:19)                   Impression:      No acute findings.      Electronically signed by: Melvin Swenson  Date:    03/24/2024  Time:    14:50               Narrative:    EXAMINATION:  XR CHEST AP PORTABLE    CLINICAL HISTORY:  Tachycardia, unspecified    TECHNIQUE:  Single frontal view of the chest was performed.    COMPARISON:  08/14/2022    FINDINGS:  Heart size borderline as before.  Sternotomy.  Lungs clear. No pneumothorax  or pleural effusion.                                       Medications   lactated ringers bolus 1,000 mL (1,000 mLs Intravenous New Bag 3/24/24 1344)   ondansetron injection 4 mg (4 mg Intravenous Given 3/24/24 1344)   HYDROmorphone (PF) injection 0.5 mg (0.5 mg Intravenous Given 3/24/24 1412)   pantoprazole injection 40 mg (40 mg Intravenous Given 3/24/24 1446)     Medical Decision Making  Amount and/or Complexity of Data Reviewed  External Data Reviewed: notes.     Details: [facility] 11/05/21  The Mid LM lesion was 50% stenosed.  The Mid LAD lesion was 70% stenosed.  The Dist LAD lesion was 90% stenosed with 0% stenosis post-intervention.  The Mid Cx to Dist Cx lesion was 90% stenosed.  The Prox Cx lesion was 90% stenosed.  The estimated blood loss was none.  There was three vessel coronary artery disease. There was left main disease.  A STENT PAVITHRA XIENCE SKYPOINT 2.25 X 15 stent was successfully placed at 8 WILIAN for 19 sec in the distal LAD, distal to the LIMA-LAD anastamosis.  Patent LIMA to LAD  Patent SVG to OM  Native RCA has mild disease    Labs: ordered.  Radiology: ordered.     Details: CXR:  No acute findings.     Abdomen flat and erect:   There is no bowel obstruction. Surgical clips right upper quadrant. No pneumoperitoneum. No abnormal calcifications.  Discussion of management or test interpretation with external provider(s): 1540 Dr. Becerra on [employer] for consult. Patient was able to complete telemed. MD recommends CT chest/abdomen and pelvis with IV contrast and notify her of results.   1731 CT results received. Spoke with Dr. Becrera. MD recommends that we try to transfer to a hospital that has GI    Risk  Prescription drug management.  Risk Details: I discussed the patients work up at length. Patient has been expressing that she wishes to go home for the past hour. I discussed with her at length that she probably has an upper GI bleed possibly an esophageal varices. I discussed at length that this  needed to be followed up on tonight and that this was also Dr. Becerra's recommendation. Patient states that she feels much better and that she wishes to go home. Again I discussed with patient that she needed transfer and that if she suddenly started bleeding again that she could die. Patient verbalizes understanding and states that she still wishes to go home. Patient to sign out AMA at this time.                                       Clinical Impression:   Final diagnoses:  [R00.0] Sinus tachycardia  [R10.9] Abdominal pain               Davey Kearney, MARTHA  03/24/24 1752       Davey Kearney NP-C  03/31/24 0591

## 2024-03-24 NOTE — ED TRIAGE NOTES
Patient presents ambulatory to ED with c/o vomiting black emesis and black diarrhea today. Was having abdominal pain and N/V/D yesterday as well but today vomitius and stool has been black. Frequent cough noted. Patient unable to be still during triage.

## 2024-03-24 NOTE — Clinical Note
Rachna Lopez accompanied their sister(s) to the emergency department on 3/24/2024. They may return to work on 03/25/2024.      If you have any questions or concerns, please don't hesitate to call.      ALEK Kearney NP/[place] RN

## 2024-03-24 NOTE — DISCHARGE INSTRUCTIONS
You must follow up immediately for any new vomiting or abdominal pain. Follow up with Dr. Gage in the AM>

## 2024-03-25 LAB
OHS QRS DURATION: 78 MS
OHS QTC CALCULATION: 468 MS

## 2024-04-01 DIAGNOSIS — R11.0 NAUSEA: ICD-10-CM

## 2024-04-05 RX ORDER — ONDANSETRON 4 MG/1
4 TABLET, ORALLY DISINTEGRATING ORAL EVERY 8 HOURS PRN
Qty: 18 TABLET | Refills: 3 | Status: SHIPPED | OUTPATIENT
Start: 2024-04-05

## 2024-04-28 ENCOUNTER — HOSPITAL ENCOUNTER (EMERGENCY)
Facility: HOSPITAL | Age: 54
Discharge: HOME | End: 2024-04-28
Payer: MEDICAID

## 2024-04-28 VITALS
BODY MASS INDEX: 31.41 KG/M2 | DIASTOLIC BLOOD PRESSURE: 68 MMHG | HEIGHT: 60 IN | RESPIRATION RATE: 18 BRPM | HEART RATE: 69 BPM | SYSTOLIC BLOOD PRESSURE: 118 MMHG | OXYGEN SATURATION: 97 % | TEMPERATURE: 98 F | WEIGHT: 160 LBS

## 2024-04-28 DIAGNOSIS — K59.00 CONSTIPATION, UNSPECIFIED CONSTIPATION TYPE: ICD-10-CM

## 2024-04-28 DIAGNOSIS — R11.2 NAUSEA AND VOMITING, UNSPECIFIED VOMITING TYPE: Primary | ICD-10-CM

## 2024-04-28 DIAGNOSIS — R10.9 ABDOMINAL PAIN, UNSPECIFIED ABDOMINAL LOCATION: ICD-10-CM

## 2024-04-28 LAB
ALBUMIN SERPL BCP-MCNC: 4.3 G/DL (ref 3.5–5)
ALBUMIN/GLOB SERPL: 0.9 {RATIO}
ALP SERPL-CCNC: 119 U/L (ref 41–108)
ALT SERPL W P-5'-P-CCNC: 43 U/L (ref 13–56)
ANION GAP SERPL CALCULATED.3IONS-SCNC: 18 MMOL/L (ref 7–16)
AST SERPL W P-5'-P-CCNC: 28 U/L (ref 15–37)
BASOPHILS # BLD AUTO: 0.07 K/UL (ref 0–0.2)
BASOPHILS NFR BLD AUTO: 0.7 % (ref 0–1)
BILIRUB SERPL-MCNC: 0.3 MG/DL (ref ?–1.2)
BILIRUB UR QL STRIP: NEGATIVE
BUN SERPL-MCNC: 18 MG/DL (ref 7–18)
BUN/CREAT SERPL: 17 (ref 6–20)
CALCIUM SERPL-MCNC: 9.3 MG/DL (ref 8.5–10.1)
CHLORIDE SERPL-SCNC: 97 MMOL/L (ref 98–107)
CLARITY UR: CLEAR
CO2 SERPL-SCNC: 26 MMOL/L (ref 21–32)
COLOR UR: YELLOW
CREAT SERPL-MCNC: 1.06 MG/DL (ref 0.55–1.02)
DIFFERENTIAL METHOD BLD: ABNORMAL
EGFR (NO RACE VARIABLE) (RUSH/TITUS): 63 ML/MIN/1.73M2
EOSINOPHIL # BLD AUTO: 0.2 K/UL (ref 0–0.5)
EOSINOPHIL NFR BLD AUTO: 1.9 % (ref 1–4)
ERYTHROCYTE [DISTWIDTH] IN BLOOD BY AUTOMATED COUNT: 13.7 % (ref 11.5–14.5)
GLOBULIN SER-MCNC: 4.6 G/DL (ref 2–4)
GLUCOSE SERPL-MCNC: 153 MG/DL (ref 74–106)
GLUCOSE UR STRIP-MCNC: >=1000 MG/DL
HCT VFR BLD AUTO: 42.5 % (ref 38–47)
HGB BLD-MCNC: 13 G/DL (ref 12–16)
KETONES UR STRIP-SCNC: NEGATIVE MG/DL
LEUKOCYTE ESTERASE UR QL STRIP: NEGATIVE
LIPASE SERPL-CCNC: 50 U/L (ref 16–77)
LYMPHOCYTES # BLD AUTO: 3.81 K/UL (ref 1–4.8)
LYMPHOCYTES NFR BLD AUTO: 36.4 % (ref 27–41)
MCH RBC QN AUTO: 27.7 PG (ref 27–31)
MCHC RBC AUTO-ENTMCNC: 30.6 G/DL (ref 32–36)
MCV RBC AUTO: 90.6 FL (ref 80–96)
MONOCYTES # BLD AUTO: 0.57 K/UL (ref 0–0.8)
MONOCYTES NFR BLD AUTO: 5.4 % (ref 2–6)
MPC BLD CALC-MCNC: 9.9 FL (ref 9.4–12.4)
NEUTROPHILS # BLD AUTO: 5.81 K/UL (ref 1.8–7.7)
NEUTROPHILS NFR BLD AUTO: 55.6 % (ref 53–65)
NITRITE UR QL STRIP: NEGATIVE
OCCULT BLOOD: NEGATIVE
PH UR STRIP: 5.5 PH UNITS
PLATELET # BLD AUTO: 306 K/UL (ref 150–400)
POTASSIUM SERPL-SCNC: 4.3 MMOL/L (ref 3.5–5.1)
PROT SERPL-MCNC: 8.9 G/DL (ref 6.4–8.2)
PROT UR QL STRIP: NEGATIVE
RBC # BLD AUTO: 4.69 M/UL (ref 4.2–5.4)
RBC # UR STRIP: NEGATIVE /UL
SODIUM SERPL-SCNC: 137 MMOL/L (ref 136–145)
SP GR UR STRIP: 1.01
UROBILINOGEN UR STRIP-ACNC: 0.2 MG/DL
WBC # BLD AUTO: 10.46 K/UL (ref 4.5–11)

## 2024-04-28 PROCEDURE — 96374 THER/PROPH/DIAG INJ IV PUSH: CPT

## 2024-04-28 PROCEDURE — 99284 EMERGENCY DEPT VISIT MOD MDM: CPT | Mod: ,,, | Performed by: NURSE PRACTITIONER

## 2024-04-28 PROCEDURE — 80053 COMPREHEN METABOLIC PANEL: CPT | Performed by: NURSE PRACTITIONER

## 2024-04-28 PROCEDURE — 36415 COLL VENOUS BLD VENIPUNCTURE: CPT | Performed by: NURSE PRACTITIONER

## 2024-04-28 PROCEDURE — 96361 HYDRATE IV INFUSION ADD-ON: CPT

## 2024-04-28 PROCEDURE — 96375 TX/PRO/DX INJ NEW DRUG ADDON: CPT

## 2024-04-28 PROCEDURE — 82272 OCCULT BLD FECES 1-3 TESTS: CPT | Performed by: NURSE PRACTITIONER

## 2024-04-28 PROCEDURE — 85025 COMPLETE CBC W/AUTO DIFF WBC: CPT | Performed by: NURSE PRACTITIONER

## 2024-04-28 PROCEDURE — 81003 URINALYSIS AUTO W/O SCOPE: CPT | Performed by: NURSE PRACTITIONER

## 2024-04-28 PROCEDURE — 99285 EMERGENCY DEPT VISIT HI MDM: CPT | Mod: 25

## 2024-04-28 PROCEDURE — 96376 TX/PRO/DX INJ SAME DRUG ADON: CPT

## 2024-04-28 PROCEDURE — 63600175 PHARM REV CODE 636 W HCPCS: Performed by: NURSE PRACTITIONER

## 2024-04-28 PROCEDURE — 83690 ASSAY OF LIPASE: CPT | Performed by: NURSE PRACTITIONER

## 2024-04-28 PROCEDURE — 25000003 PHARM REV CODE 250: Performed by: NURSE PRACTITIONER

## 2024-04-28 RX ORDER — ONDANSETRON HYDROCHLORIDE 2 MG/ML
4 INJECTION, SOLUTION INTRAVENOUS
Status: COMPLETED | OUTPATIENT
Start: 2024-04-28 | End: 2024-04-28

## 2024-04-28 RX ORDER — HYDROMORPHONE HYDROCHLORIDE 2 MG/ML
0.5 INJECTION, SOLUTION INTRAMUSCULAR; INTRAVENOUS; SUBCUTANEOUS
Status: COMPLETED | OUTPATIENT
Start: 2024-04-28 | End: 2024-04-28

## 2024-04-28 RX ADMIN — ONDANSETRON 4 MG: 2 INJECTION INTRAMUSCULAR; INTRAVENOUS at 03:04

## 2024-04-28 RX ADMIN — HYDROMORPHONE HYDROCHLORIDE 0.5 MG: 2 INJECTION, SOLUTION INTRAMUSCULAR; INTRAVENOUS; SUBCUTANEOUS at 04:04

## 2024-04-28 RX ADMIN — SODIUM CHLORIDE 1000 ML: 9 INJECTION, SOLUTION INTRAVENOUS at 04:04

## 2024-04-28 RX ADMIN — ONDANSETRON 4 MG: 2 INJECTION INTRAMUSCULAR; INTRAVENOUS at 05:04

## 2024-04-28 NOTE — ED PROVIDER NOTES
Encounter Date: 4/28/2024       History     Chief Complaint   Patient presents with    Emesis    Abdominal Pain     Patient is a 54-year-old white female who presents to the ED today for complaint of generalized abdominal pain started yesterday.  She had 4-5 episodes of vomiting did not observe any blood.  She describes the pain as crampy primarily located in the right lower quadrant it was made worse with vomiting.  Patient reports she had a workup about 2 months ago for similar situation when she had black tarry stool CT was done did not reveal anything she has not followed up since.      Past medical history includes coronary artery disease hypertension, hyperlipidemia, lupus, cancer      Review of patient's allergies indicates:   Allergen Reactions    Fish containing products Hives and Swelling    Penicillins Swelling     Localized swelling     Wasp venom Anaphylaxis    Opioids - morphine analogues      Change in behavior    Toradol [ketorolac] Hives    Tramadol Hives    Zithromax [azithromycin] Other (See Comments)     Patient cannot remember what kind of reaction she had to this medication    Bactrim ds [sulfamethoxazole-trimethoprim] Nausea And Vomiting     Facial flush    Latex, natural rubber Rash     Past Medical History:   Diagnosis Date    Anxiety     Arthritis     Asthma     Cancer     CHF (congestive heart failure)     Chronic obstructive lung disease     Coronary artery disease     Depression     Diabetes mellitus     Diabetes mellitus, type 2     Encounter for blood transfusion     History of kidney stones     Hyperlipidemia     Hypertension     Lupus     Renal disorder     Seizures     Thyroid disease     Transfusion reaction      Past Surgical History:   Procedure Laterality Date    CARDIAC CATHETERIZATION Left 09/19/2020    Proximal left main stenosis; IVUS in the LAD and left main    CHOLECYSTECTOMY      CORONARY ARTERY BYPASS GRAFT  09/30/2020    CORONARY STENT  PLACEMENT  11/09/2021    Everolimus Eluting Coronary Stent-MLAD;LAD    DIRECT LARYNGOSCOPY Bilateral 7/18/2023    Procedure: LARYNGOSCOPY, DIRECT;  Surgeon: Nazario Sloan MD;  Location: [facility];  Service: ENT;  Laterality: Bilateral;    HYSTERECTOMY      INCISION AND DRAINAGE  06/02/2020    LEFT HEART CATHETERIZATION Left 11/09/2021    Procedure: Left heart cath;  Surgeon: Aureliano Giron MD;  Location: [facility];  Service: Cardiology;  Laterality: Left;    TOTAL ABDOMINAL HYSTERECTOMY W/ BILATERAL SALPINGOOPHORECTOMY  2008    [place], [place]     TUBAL LIGATION      VOCAL FOLD LESION EXCISION Bilateral 7/18/2023    Procedure: EXCISION, LESION, VOCAL CORD;  Surgeon: Nazario Sloan MD;  Location: [facility];  Service: ENT;  Laterality: Bilateral;     Family History   Problem Relation Name Age of Onset    Hypertension Mother Virginia Nichols     Diabetes Mother Virginia Nichols     Heart disease Mother Shantel Nichols     Cancer Mother Shantel Nichols     Bone cancer Father Eleno nichols     Lung cancer Father Eleno nichols     Cancer Father Eleno nichols     COPD Father Eleno nichols     Diabetes Sister      Heart disease Brother      Diabetes Brother      No Known Problems Daughter      Cushing syndrome Daughter      Ovarian cancer Maternal Grandmother      No Known Problems Maternal Grandfather      Heart attack Paternal Grandmother      No Known Problems Paternal Grandfather      No Known Problems Son       Social History     Tobacco Use    Smoking status: Every Day     Current packs/day: 0.50     Average packs/day: 0.5 packs/day for 37.3 years (18.7 ttl pk-yrs)     Types: Cigarettes     Start date: 1987     Passive exposure: Current    Smokeless tobacco: Never    Tobacco comments:     Patient was smoking cigarettes 2 packs per day for 10 years   Substance Use Topics    Alcohol use: Never    Drug use: Never     Review of Systems    Physical Exam      Initial Vitals [04/28/24 1525]   BP Pulse Resp Temp SpO2   138/83 84 18 98.2 °F (36.8 °C) 97 %      MAP       --         Physical Exam    Medical Screening Exam   See Full Note    ED Course   Procedures  Labs Reviewed   COMPREHENSIVE METABOLIC PANEL - Abnormal; Notable for the following components:       Result Value    Chloride 97 (*)     Anion Gap 18 (*)     Glucose 153 (*)     Creatinine 1.06 (*)     Total Protein 8.9 (*)     Globulin 4.6 (*)     Alk Phos 119 (*)     All other components within normal limits   URINALYSIS, REFLEX TO URINE CULTURE - Abnormal; Notable for the following components:    Glucose, UA >=1000 (*)     All other components within normal limits   CBC WITH DIFFERENTIAL - Abnormal; Notable for the following components:    MCHC 30.6 (*)     All other components within normal limits   LIPASE - Normal   OCCULT BLOOD X 1, STOOL - Normal   CBC W/ AUTO DIFFERENTIAL    Narrative:     The following orders were created for panel order CBC W/ AUTO DIFFERENTIAL.  Procedure                               Abnormality         Status                     ---------                               -----------         ------                     CBC with Differential[[id number]]       Abnormal            Final result                 Please view results for these tests on the individual orders.          Imaging Results              CT Abdomen Pelvis  Without Contrast (Final result)  Result time 04/28/24 16:56:22      Final result by Franki Carter II, MD (04/28/24 16:56:22)                   Impression:      No evidence of acute process demonstrated      Electronically signed by: Franki Carter  Date:    04/28/2024  Time:    16:56               Narrative:    EXAMINATION:  CT ABDOMEN PELVIS WITHOUT CONTRAST    CLINICAL HISTORY:  Abdominal pain, acute, nonlocalized;    TECHNIQUE:  Axial CT imaging of the abdomen and pelvis is performed without contrast.    CT dose reduction technique used - Dose Rite and tube  current modulation.    COMPARISON:  18 January 2021    FINDINGS:  Cardiac and lung bases are within normal limits    CT abdomen: The gallbladder has been removed.  The liver, spleen, pancreas and adrenal glands are normal in size and density.  No evidence of focal lesion is demonstrated in these solid organs.    Kidneys are stable in size and density.  No evidence of hydronephrosis or nephrolithiasis is seen.    The bowel caliber is normal and no wall thickening or adjacent inflammatory change is seen.  No evidence of free fluid or free air is present.  Appendix is normal.    CT pelvis: The bowel and bladder appear within normal limits.  The uterus and ovaries are not identified.                                       X-Ray Abdomen Flat And Erect (Final result)  Result time 04/28/24 16:22:15      Final result by Franki Carter II, MD (04/28/24 16:22:15)                   Impression:      No evidence of acute process demonstrated      Electronically signed by: Franki Carter  Date:    04/28/2024  Time:    16:22               Narrative:    EXAMINATION:  XR ABDOMEN FLAT AND ERECT    CLINICAL HISTORY:  Abdominal pain  Abdominal Pain;    COMPARISON:  24 March 2024    TECHNIQUE:  XR ABDOMEN FLAT AND ERECT    FINDINGS:  No free fluid or free air seen.  The bowel gas pattern appears within normal limits.  Mild clips from previous surgeries.  No abnormal calcifications are present.  No other abnormality is identified.                                    X-Rays:   Independently Interpreted Readings:   Abdomen:   Abdomen and Pelvis without Contrast - No masses.  Normal vessels.  No acute changes.   Other Readings:  Moderate increased stool on abdominal x-ray no air-fluid levels no evidence of ileus    Medications   sodium chloride 0.9% bolus 1,000 mL 1,000 mL (1,000 mLs Intravenous New Bag 4/28/24 1623)   ondansetron injection 4 mg (4 mg Intravenous Given 4/28/24 1547)   HYDROmorphone (PF) injection 0.5 mg (0.5 mg  Intravenous Given 4/28/24 1602)     Medical Decision Making  MDM    Patient presents for emergent evaluation of acute abdominal pain nausea vomiting that poses a threat to life and/or bodily function.    In the ED patient found to have acute constipation.    I ordered labs and personally reviewed them.  Labs significant for normal CMP normal CBC normal lipase  I ordered X-rays and personally reviewed them and reviewed the radiologist interpretation.  Xray significant for moderate changes of constipation.      I ordered CT scan and personally reviewed it and reviewed the radiologist interpretation.  CT significant for no acute intra-abdominal process.      Discharge MDM    Patient was managed in the ED with IV Dilaudid and normal saline Zofran.    The response to treatment was normal saline 1 L excellent.    Patient was discharged in stable condition.  Detailed return precautions discussed.      Amount and/or Complexity of Data Reviewed  Labs: ordered.  Radiology: ordered.    Risk  Prescription drug management.                                      Clinical Impression:   Final diagnoses:  [R11.2] Nausea and vomiting, unspecified vomiting type (Primary)  [K59.00] Constipation, unspecified constipation type  [R10.9] Abdominal pain, unspecified abdominal location        ED Disposition Condition    Discharge Stable          ED Prescriptions    None       Follow-up Information       Follow up With Specialties Details Why Contact Info    Oren Gage, DO Family Medicine In 1 week  [address]  [facility] [place] [address]  [phone]               Litzy Colmenares, FNP  05/18/24 9030

## 2024-04-28 NOTE — Clinical Note
Rachna Lopez accompanied their caregiver to the emergency department on 4/28/2024. They may return to work on 04/29/2024.      If you have any questions or concerns, please don't hesitate to call.       RN

## 2024-04-28 NOTE — ED TRIAGE NOTES
Pt to ED with vomiting and lower abdominal pain that started this morning. Pt states she has vomited 3 times this am. Pt states she had a GI bleed in March. Pt reports no diarrhea.

## 2024-04-30 ENCOUNTER — TELEPHONE (OUTPATIENT)
Dept: EMERGENCY MEDICINE | Facility: HOSPITAL | Age: 54
End: 2024-04-30
Payer: MEDICAID

## 2024-05-01 ENCOUNTER — TELEPHONE (OUTPATIENT)
Dept: EMERGENCY MEDICINE | Facility: HOSPITAL | Age: 54
End: 2024-05-01
Payer: MEDICAID

## 2024-05-09 ENCOUNTER — OFFICE VISIT (OUTPATIENT)
Dept: FAMILY MEDICINE | Facility: CLINIC | Age: 54
End: 2024-05-09
Payer: MEDICAID

## 2024-05-09 VITALS
HEART RATE: 81 BPM | BODY MASS INDEX: 31.41 KG/M2 | OXYGEN SATURATION: 95 % | RESPIRATION RATE: 18 BRPM | WEIGHT: 160 LBS | HEIGHT: 60 IN | DIASTOLIC BLOOD PRESSURE: 78 MMHG | TEMPERATURE: 98 F | SYSTOLIC BLOOD PRESSURE: 120 MMHG

## 2024-05-09 DIAGNOSIS — J44.9 CHRONIC OBSTRUCTIVE PULMONARY DISEASE, UNSPECIFIED COPD TYPE: ICD-10-CM

## 2024-05-09 DIAGNOSIS — E66.9 OBESITY (BMI 30.0-34.9): ICD-10-CM

## 2024-05-09 DIAGNOSIS — Z79.4 TYPE 2 DIABETES MELLITUS WITH HYPERGLYCEMIA, WITH LONG-TERM CURRENT USE OF INSULIN: ICD-10-CM

## 2024-05-09 DIAGNOSIS — R11.10 VOMITING, UNSPECIFIED VOMITING TYPE, UNSPECIFIED WHETHER NAUSEA PRESENT: ICD-10-CM

## 2024-05-09 DIAGNOSIS — Z13.220 SCREENING FOR LIPID DISORDERS: ICD-10-CM

## 2024-05-09 DIAGNOSIS — Z00.00 ENCOUNTER FOR GENERAL ADULT MEDICAL EXAMINATION WITHOUT ABNORMAL FINDINGS: Primary | ICD-10-CM

## 2024-05-09 DIAGNOSIS — E11.65 TYPE 2 DIABETES MELLITUS WITH HYPERGLYCEMIA, WITH LONG-TERM CURRENT USE OF INSULIN: ICD-10-CM

## 2024-05-09 DIAGNOSIS — R11.0 NAUSEA: Primary | ICD-10-CM

## 2024-05-09 LAB
CHOLEST SERPL-MCNC: 137 MG/DL (ref 0–200)
CHOLEST/HDLC SERPL: 3.4 {RATIO}
CREAT UR-MCNC: 150 MG/DL (ref 28–219)
EST. AVERAGE GLUCOSE BLD GHB EST-MCNC: 128 MG/DL
HBA1C MFR BLD HPLC: 6.1 % (ref 4.5–6.6)
HDLC SERPL-MCNC: 40 MG/DL (ref 40–60)
LDLC SERPL CALC-MCNC: 70 MG/DL
LDLC/HDLC SERPL: 1.8 {RATIO}
MICROALBUMIN UR-MCNC: 1.8 MG/DL (ref 0–2.8)
MICROALBUMIN/CREAT RATIO PNL UR: 12 MG/G (ref 0–30)
NONHDLC SERPL-MCNC: 97 MG/DL
TRIGL SERPL-MCNC: 133 MG/DL (ref 35–150)
VLDLC SERPL-MCNC: 27 MG/DL

## 2024-05-09 PROCEDURE — 1159F MED LIST DOCD IN RCRD: CPT | Mod: CPTII,,, | Performed by: NURSE PRACTITIONER

## 2024-05-09 PROCEDURE — 82043 UR ALBUMIN QUANTITATIVE: CPT | Mod: ,,, | Performed by: CLINICAL MEDICAL LABORATORY

## 2024-05-09 PROCEDURE — 1160F RVW MEDS BY RX/DR IN RCRD: CPT | Mod: CPTII,,, | Performed by: NURSE PRACTITIONER

## 2024-05-09 PROCEDURE — 3008F BODY MASS INDEX DOCD: CPT | Mod: CPTII,,, | Performed by: NURSE PRACTITIONER

## 2024-05-09 PROCEDURE — 99396 PREV VISIT EST AGE 40-64: CPT | Mod: ,,, | Performed by: NURSE PRACTITIONER

## 2024-05-09 PROCEDURE — 3078F DIAST BP <80 MM HG: CPT | Mod: CPTII,,, | Performed by: NURSE PRACTITIONER

## 2024-05-09 PROCEDURE — 82570 ASSAY OF URINE CREATININE: CPT | Mod: ,,, | Performed by: CLINICAL MEDICAL LABORATORY

## 2024-05-09 PROCEDURE — 3074F SYST BP LT 130 MM HG: CPT | Mod: CPTII,,, | Performed by: NURSE PRACTITIONER

## 2024-05-09 PROCEDURE — 83036 HEMOGLOBIN GLYCOSYLATED A1C: CPT | Mod: ,,, | Performed by: CLINICAL MEDICAL LABORATORY

## 2024-05-09 PROCEDURE — 80061 LIPID PANEL: CPT | Mod: ,,, | Performed by: CLINICAL MEDICAL LABORATORY

## 2024-05-09 NOTE — PROGRESS NOTES
[facility]    Shauna Velázquez AGPCNP-BC [facility]  [address] [place] [address]  [phone]          PATIENT NAME: Rosemarie Lane  : 1970  DATE: 24  MRN: [id number]          Reason for Visit        Chief Complaint   Patient presents with    medicaid wellness     She is here for her medicaid wellness to be done and she states that she is non fasting for labs    Health Maintenance     Pneumococcal Vaccines (Age 0-64)(1 of 2 - PCV) Never done-declined  TETANUS VACCINE Never done- [place]  Mammogram Never done  Shingles Vaccine(1 of 2) Never done-declined  COVID-19 Vaccine( - -24 season) Never done-declined  Hemoglobin A1c due on 2023         History of Present Illness      Rosemarie Lane is a 54 y.o. female with chronic conditions of CHF, COPD, Anxiety, Hysterectomy Status, CAD, Depression, DM Type 2, Hypertension, Seizure Disorder, Hyperlipidemia, Lupus, Chronic Idiopathic Constipation, Migraine Headaches, Restless Leg Syndrome, Vocal Cord Lesion with removal by Dr. Sloan, Tobacco Use, and Obesity who presents today for [place] [employer] Wellness. She gets her primary care needs met by Dr. Oren Gage. She is non-fasting this morning. /78 HR 81 weight 160 with BMI 31.25. She amara all recommended vaccines and understands the deleterious effects of doing so. She reports not having a mammogram in many years nor has she had an eye exam. She declines these things today. She reports that her voice is beginning to get hoarse again like when she previously had vocal cord lesion. Chart reviewed and it was determined she missed follow up with Dr. Sloan we will assist with getting this rescheduled as well as getting her GI appointment for follow up from her ED visits for abdominal pain, nausea, and vomiting.      MEDICAL / SURGICAL / SOCIAL HISTORY     Past Medical History:   Diagnosis Date     Anxiety     Arthritis     Asthma     Cancer     CHF (congestive heart failure)     Chronic obstructive lung disease     Coronary artery disease     Depression     Diabetes mellitus     Diabetes mellitus, type 2     Encounter for blood transfusion     History of kidney stones     Hyperlipidemia     Hypertension     Lupus     Renal disorder     Seizures     Thyroid disease     Transfusion reaction        Past Surgical History:   Procedure Laterality Date    CARDIAC CATHETERIZATION Left 09/19/2020    Proximal left main stenosis; IVUS in the LAD and left main    CHOLECYSTECTOMY      CORONARY ARTERY BYPASS GRAFT  09/30/2020    CORONARY STENT PLACEMENT  11/09/2021    Everolimus Eluting Coronary Stent-MLAD;LAD    DIRECT LARYNGOSCOPY Bilateral 7/18/2023    Procedure: LARYNGOSCOPY, DIRECT;  Surgeon: Nazario Sloan MD;  Location: [facility] OR;  Service: ENT;  Laterality: Bilateral;    HYSTERECTOMY      INCISION AND DRAINAGE  06/02/2020    LEFT HEART CATHETERIZATION Left 11/09/2021    Procedure: Left heart cath;  Surgeon: Aureliano Giron MD;  Location: [facility] CATH LAB;  Service: Cardiology;  Laterality: Left;    TOTAL ABDOMINAL HYSTERECTOMY W/ BILATERAL SALPINGOOPHORECTOMY  2008    KIRSTY Lala     TUBAL LIGATION      VOCAL FOLD LESION EXCISION Bilateral 7/18/2023    Procedure: EXCISION, LESION, VOCAL CORD;  Surgeon: Nazario Sloan MD;  Location: [facility] OR;  Service: ENT;  Laterality: Bilateral;       Social History     Tobacco Use    Smoking status: Every Day     Current packs/day: 0.50     Average packs/day: 0.5 packs/day for 37.4 years (18.7 ttl pk-yrs)     Types: Cigarettes     Start date: 1987     Passive exposure: Current    Smokeless tobacco: Never    Tobacco comments:     Patient was smoking cigarettes 2 packs per day for 10 years   Substance Use Topics    Alcohol use: Never    Drug use: Never         I personally reviewed all past medical, surgical, and social.     Review of Systems  "  Constitutional:  Negative for chills and fever.   HENT:  Negative for congestion, rhinorrhea, sore throat and trouble swallowing.    Respiratory:  Positive for cough ("I keep a cough I got COPD") and shortness of breath ("just a little bit I stay that way").    Cardiovascular:  Negative for chest pain.   Gastrointestinal:  Positive for abdominal pain ("just a little bit not bad just cramping like menstrual cramp"), constipation ("I take Linzess") and nausea ("its ok"). Negative for diarrhea.   Genitourinary:  Negative for dysuria, frequency and urgency.   Musculoskeletal:  Positive for myalgias. Negative for arthralgias, back pain and neck pain.   Neurological:  Negative for dizziness and headaches.   Psychiatric/Behavioral:  Positive for dysphoric mood ("My mom  March 2 years ago I got to [facility] they deal with my depression") and sleep disturbance (" I am on medication for it"). The patient is nervous/anxious ("My anxiety has been off the roof my daughter has cancer").         MEDICATIONS / ALLERGIES / HM     Medication List with Changes/Refills   Current Medications    ALBUTEROL (PROVENTIL) 2.5 MG /3 ML (0.083 %) NEBULIZER SOLUTION    USE 1 VIAL IN NEBULIZER EVERY 4 TO 6 HOURS AS NEEDED    ARFORMOTEROL (BROVANA) 15 MCG/2 ML NEBU    Take 2 mLs (15 mcg total) by nebulization 2 (two) times a day.    ASPIRIN (ECOTRIN) 81 MG EC TABLET    Take 1 tablet by mouth once daily.    ATORVASTATIN (LIPITOR) 40 MG TABLET    Take 1 tablet (40 mg total) by mouth once daily.    BUDESONIDE-FORMOTEROL 160-4.5 MCG (SYMBICORT) 160-4.5 MCG/ACTUATION [facility]    Inhale 2 puffs into the lungs every 12 (twelve) hours. Controller    BUSPIRONE (BUSPAR) 15 MG TABLET    TAKE 1 TABLET BY MOUTH 2 TIMES A DAY AS DIRECTED    CARIPRAZINE (VRAYLAR) 3 MG CAP    Take 2 capsules (6 mg total) by mouth once daily.    CLOPIDOGREL (PLAVIX) 75 MG TABLET    Take 1 tablet (75 mg total) by mouth once daily.    DAPAGLIFLOZIN PROPANEDIOL (FARXIGA) 10 MG TABLET   "  Take 1 tablet (10 mg total) by mouth once daily.    GABAPENTIN (NEURONTIN) 300 MG CAPSULE    Take 2 capsules (600 mg total) by mouth 2 (two) times daily.    HYDROXYCHLOROQUINE (PLAQUENIL) 200 MG TABLET    Take 1 tablet (200 mg total) by mouth 2 (two) times daily.    INSULIN (LANTUS SOLOSTAR U-100 INSULIN) GLARGINE 100 UNITS/ML SUBQ PEN    Inject 30 Units into the skin 2 (two) times a day.    LINACLOTIDE (LINZESS) 145 MCG CAP CAPSULE    Take 1 capsule (145 mcg total) by mouth daily as needed (constipation).    METOPROLOL SUCCINATE (TOPROL-XL) 50 MG 24 HR TABLET    Take 1 tablet (50 mg total) by mouth once daily.    MIRTAZAPINE (REMERON) 30 MG TABLET    Take 1 tablet (30 mg total) by mouth once daily.    ONDANSETRON (ZOFRAN-ODT) 4 MG TBDL    Take 1 tablet (4 mg total) by mouth every 8 (eight) hours as needed (nausea).    ROPINIROLE (REQUIP) 0.25 MG TABLET    Take 1 tablet (0.25 mg total) by mouth 3 (three) times daily.    TOPIRAMATE (TOPAMAX) 25 MG TABLET    Take 1 tablet (25 mg total) by mouth once daily.    TRIAMCINOLONE ACETONIDE 0.1% (KENALOG) 0.1 % CREAM    Apply topically 2 (two) times daily.   Discontinued Medications    CLINDAMYCIN (CLEOCIN) 300 MG CAPSULE    Take 300 mg by mouth 3 (three) times daily.    HYDROCODONE-ACETAMINOPHEN (NORCO) 7.5-325 MG PER TABLET    Take 1 tablet by mouth every 6 (six) hours as needed for Pain.    ONDANSETRON (ZOFRAN) 4 MG TABLET    Take 1 tablet (4 mg total) by mouth every 6 (six) hours.           Review of patient's allergies indicates:   Allergen Reactions    Fish containing products Hives and Swelling    Penicillins Swelling     Localized swelling     Wasp venom Anaphylaxis    Opioids - morphine analogues      Change in behavior    Toradol [ketorolac] Hives    Tramadol Hives    Zithromax [azithromycin] Other (See Comments)     Patient cannot remember what kind of reaction she had to this medication    Bactrim ds [sulfamethoxazole-trimethoprim] Nausea And Vomiting     Facial  flush    Latex, natural rubber Rash         There is no immunization history on file for this patient.     Health Maintenance   Topic Date Due    TETANUS VACCINE  Never done-declined     Mammogram  Never done-declined     Shingles Vaccine (1 of 2) Never done-declined     Hemoglobin A1c  11/24/2023-ordered today results pending     Lipid Panel  08/24/2024    High Dose Statin  03/06/2025    Aspirin/Antiplatelet Therapy  03/06/2025    Colorectal Cancer Screening  03/14/2025    Hepatitis C Screening  Completed    Foot Exam  Discontinued-resumed will f/u with ALEK Barrios NP     Eye Exam  Discontinued        Physical Exam      Vital Signs  Temp: 98 °F (36.7 °C)  Temp Source: Oral  Pulse: 81  Resp: 18  SpO2: 95 %  BP: 120/78  BP Location: Right arm  Patient Position: Sitting  Pain Score: 0-No pain  Height and Weight  Height: 5' (152.4 cm)  Weight: 72.6 kg (160 lb)  BSA (Calculated - sq m): 1.75 sq meters  BMI (Calculated): 31.2  Weight in (lb) to have BMI = 25: 127.7]    Physical Exam  Constitutional:       Appearance: She is obese.   HENT:      Head: Normocephalic.      Right Ear: External ear normal.      Left Ear: External ear normal.   Cardiovascular:      Rate and Rhythm: Normal rate and regular rhythm.      Pulses: Normal pulses.      Heart sounds: Normal heart sounds.   Pulmonary:      Effort: Pulmonary effort is normal.      Breath sounds: Normal breath sounds.   Abdominal:      Palpations: Abdomen is soft.      Tenderness: There is no abdominal tenderness.   Skin:     General: Skin is warm and dry.   Neurological:      Mental Status: She is alert and oriented to person, place, and time.   Psychiatric:         Mood and Affect: Mood normal.         Behavior: Behavior normal.          Laboratory:    Lab Results   Component Value Date     (H) 04/28/2024     04/28/2024    K 4.3 04/28/2024    CL 97 (L) 04/28/2024    CO2 26 04/28/2024    BUN 18 04/28/2024    CREATININE 1.06 (H) 04/28/2024    CALCIUM 9.3  "04/28/2024    PROT 8.9 (H) 04/28/2024    ALBUMIN 4.3 04/28/2024    BILITOT 0.3 04/28/2024    ALKPHOS 119 (H) 04/28/2024    AST 28 04/28/2024    ALT 43 04/28/2024    ANIONGAP 18 (H) 04/28/2024    ESTGFRAFRICA 62 01/18/2021    EGFRNONAA 70 07/08/2022       Lab Results   Component Value Date    WBC 10.46 04/28/2024    RBC 4.69 04/28/2024    HGB 13.0 04/28/2024    HCT 42.5 04/28/2024    MCV 90.6 04/28/2024    RDW 13.7 04/28/2024     04/28/2024        Lab Results   Component Value Date    CHOL 144 08/24/2023    TRIG 285 (H) 08/24/2023    HDL 43 08/24/2023    LDLCALC 44 08/24/2023       Lab Results   Component Value Date    TSH 1.180 08/24/2023       Lab Results   Component Value Date    HGBA1C 8.4 (H) 08/24/2023    ESTIMATEDAVG 194 08/24/2023        No results found for: "LLJIVZYS35"    No results found for: "GUOWLVED65II"      Point Of Care Testing:    WBC, UA   Date Value Ref Range Status   08/22/2022 negative  Final     Nitrites, UA   Date Value Ref Range Status   04/28/2024 Negative Negative Final     Urobilinogen, UA   Date Value Ref Range Status   04/28/2024 0.2 0.2, 1.0, Normal mg/dL Final     Protein, POC   Date Value Ref Range Status   08/22/2022 negative  Final     pH, UA   Date Value Ref Range Status   04/28/2024 5.5 5.0 to 8.0 pH Units Final     Specific Gravity, UA   Date Value Ref Range Status   04/28/2024 1.015 <=1.005, 1.010, 1.015, 1.020, 1.025, 1.030 Final     Ketones, UA   Date Value Ref Range Status   04/28/2024 Negative Negative mg/dL Final     Bilirubin, POC   Date Value Ref Range Status   08/22/2022 negative  Final     Glucose, UA   Date Value Ref Range Status   08/22/2022 500  Final       Lab Results   Component Value Date    [id number] Negative 11/05/2021             Assessment/Plan     Encounter for general adult medical examination without abnormal findings  - follow up in 12 months for repeat annual wellness visit   - follow up with PCP as directed     Obesity (BMI 30.0-34.9)  - " encouraged heart healthy diabetic diet   - encouraged exercise in the form of walking 5-7 days per week for at least 30 minutes     Screening for lipid disorders  - Lipid Panel; drawn today results pending     Type 2 diabetes mellitus with hyperglycemia, with long-term current use of insulin  - Repeat Hemoglobin A1C; drawn today results pending   - Microalbumin/Creatinine Ratio, Urine; collected today results pending   - will follow up with ALEK Barrios NP for foot care  - declined eye exam today   - Farxiga 10 mg one tablet daily   - Lantus 30 units twice daily   - 08/24/2023 Hemoglobin A1c 8.4%    Chronic Obstructive Pulmonary Disease, Unspecified  - continue home O2 at 2 LPM    Health goals to improve overall health and well-being by maintaining a healthy blood pressure and blood glucose level by reducing caloric intake, engaging in physical activity, and eating heart healthy, low sodium, low concentrated sweet meals.     Monitor BP at home with a goal of a systolic blood pressure less than 130 and diastolic blood pressure less than 80    Lifestyle: Keep all follow-medical appointments and take all medications as prescribed.     Nutrition: Eat a well-balanced diet to include  vegetables, fruit, lean meats, and whole grains    Exercise: Engage in physical activity to tolerance 5-7 days for at least 30-45 minutes    Tobacco: Avoid all tobacco products including 2nd and 3rd hand smoke exposure. Tobacco Cessation Program available if needed.       Future Appointments   Date Time Provider Department Center   5/15/2024  2:40 PM Nazario Sloan MD [facility]   6/6/2024  8:20 AM Yvonne Barrios FNP [facility]   6/27/2024 10:40 AM Loretta Jefferson NP [facility]   7/5/2024  8:40 AM Oren Gage DO [facility]   5/16/2025  9:20 AM [facility]       Workup results were reviewed and all questions were answered. Diagnosis and treatment options  were discussed and the patient  is amenable with the overall treatment plan. Verbal and written discharge instructions were given including to return to clinic/ED with any acute worsening of symptoms or failure of symptoms to improve. The reasons for return to the clinic/ED were explained in lay terms. No further intervention is warranted at this time. The patient agrees with the plan, expresses understanding, is hemodynamically stable and in no acute distress.     All questions answered to desired level of satisfaction          MCKENZIE Blanco-BC  [facility]

## 2024-05-09 NOTE — PATIENT INSTRUCTIONS
Please bring ALL medications bottles (from ALL providers) including over-the-counter medications to your next appointment !!!       Health goals to improve overall health and well-being by maintaining a healthy blood pressure and blood glucose level by reducing caloric intake, engaging in physical activity, and eating heart healthy, low sodium, low concentrated sweet meals.     Monitor BP at home with a goal of a systolic blood pressure less than 130 and diastolic blood pressure less than 80    Lifestyle: Keep all follow-medical appointments and take all medications as prescribed.     Nutrition: Eat a well-balanced diet to include  vegetables, fruit, lean meats, and whole grains    Exercise: Engage in physical activity to tolerance 5-7 days for at least 30-45 minutes    Tobacco: Avoid all tobacco products including 2nd and 3rd hand smoke exposure. Tobacco Cessation Program available if needed.

## 2024-05-31 DIAGNOSIS — I10 HYPERTENSION, UNSPECIFIED TYPE: ICD-10-CM

## 2024-05-31 DIAGNOSIS — I25.10 CORONARY ARTERY DISEASE, UNSPECIFIED VESSEL OR LESION TYPE, UNSPECIFIED WHETHER ANGINA PRESENT, UNSPECIFIED WHETHER NATIVE OR TRANSPLANTED HEART: ICD-10-CM

## 2024-05-31 DIAGNOSIS — E78.5 HYPERLIPIDEMIA, UNSPECIFIED HYPERLIPIDEMIA TYPE: ICD-10-CM

## 2024-05-31 RX ORDER — CLOPIDOGREL BISULFATE 75 MG/1
75 TABLET ORAL DAILY
Qty: 30 TABLET | Refills: 2 | Status: SHIPPED | OUTPATIENT
Start: 2024-05-31

## 2024-05-31 RX ORDER — ATORVASTATIN CALCIUM 40 MG/1
40 TABLET, FILM COATED ORAL DAILY
Qty: 30 TABLET | Refills: 2 | Status: SHIPPED | OUTPATIENT
Start: 2024-05-31

## 2024-05-31 RX ORDER — METOPROLOL SUCCINATE 50 MG/1
50 TABLET, EXTENDED RELEASE ORAL DAILY
Qty: 30 TABLET | Refills: 2 | Status: SHIPPED | OUTPATIENT
Start: 2024-05-31

## 2024-07-24 ENCOUNTER — APPOINTMENT (OUTPATIENT)
Dept: RADIOLOGY | Facility: CLINIC | Age: 54
End: 2024-07-24
Attending: NURSE PRACTITIONER
Payer: MEDICAID

## 2024-07-24 ENCOUNTER — OFFICE VISIT (OUTPATIENT)
Dept: FAMILY MEDICINE | Facility: CLINIC | Age: 54
End: 2024-07-24
Payer: MEDICAID

## 2024-07-24 VITALS
HEIGHT: 60 IN | HEART RATE: 73 BPM | OXYGEN SATURATION: 95 % | DIASTOLIC BLOOD PRESSURE: 80 MMHG | TEMPERATURE: 99 F | RESPIRATION RATE: 18 BRPM | BODY MASS INDEX: 32.04 KG/M2 | WEIGHT: 163.19 LBS | SYSTOLIC BLOOD PRESSURE: 129 MMHG

## 2024-07-24 DIAGNOSIS — R82.90 MALODOROUS URINE: ICD-10-CM

## 2024-07-24 DIAGNOSIS — G62.9 NEUROPATHY: ICD-10-CM

## 2024-07-24 DIAGNOSIS — R41.3 MEMORY LOSS: ICD-10-CM

## 2024-07-24 DIAGNOSIS — R11.0 NAUSEA: ICD-10-CM

## 2024-07-24 DIAGNOSIS — N28.9 RENAL INSUFFICIENCY: ICD-10-CM

## 2024-07-24 DIAGNOSIS — M79.672 LEFT FOOT PAIN: Primary | ICD-10-CM

## 2024-07-24 DIAGNOSIS — M79.672 LEFT FOOT PAIN: ICD-10-CM

## 2024-07-24 LAB
ANION GAP SERPL CALCULATED.3IONS-SCNC: 9 MMOL/L (ref 7–16)
BILIRUB SERPL-MCNC: ABNORMAL MG/DL
BLOOD URINE, POC: ABNORMAL
BUN SERPL-MCNC: 11 MG/DL (ref 7–18)
BUN/CREAT SERPL: 11 (ref 6–20)
CALCIUM SERPL-MCNC: 10.3 MG/DL (ref 8.5–10.1)
CHLORIDE SERPL-SCNC: 105 MMOL/L (ref 98–107)
CLARITY, POC UA: ABNORMAL
CO2 SERPL-SCNC: 29 MMOL/L (ref 21–32)
COLOR, POC UA: YELLOW
CREAT SERPL-MCNC: 1.03 MG/DL (ref 0.55–1.02)
EGFR (NO RACE VARIABLE) (RUSH/TITUS): 66 ML/MIN/1.73M2
GLUCOSE SERPL-MCNC: 108 MG/DL (ref 74–106)
GLUCOSE UR QL STRIP: >=1000
KETONES UR QL STRIP: ABNORMAL
LEUKOCYTE ESTERASE URINE, POC: ABNORMAL
NITRITE, POC UA: ABNORMAL
PH, POC UA: 6
POTASSIUM SERPL-SCNC: 3.5 MMOL/L (ref 3.5–5.1)
PROTEIN, POC: ABNORMAL
SODIUM SERPL-SCNC: 139 MMOL/L (ref 136–145)
SPECIFIC GRAVITY, POC UA: >=1.03
SYPHILIS AB INTERPRETATION: NORMAL
UROBILINOGEN, POC UA: 0.2
VIT B12 SERPL-MCNC: 853 PG/ML (ref 193–986)

## 2024-07-24 PROCEDURE — 3044F HG A1C LEVEL LT 7.0%: CPT | Mod: CPTII,,, | Performed by: NURSE PRACTITIONER

## 2024-07-24 PROCEDURE — 82607 VITAMIN B-12: CPT | Mod: ,,, | Performed by: CLINICAL MEDICAL LABORATORY

## 2024-07-24 PROCEDURE — 3061F NEG MICROALBUMINURIA REV: CPT | Mod: CPTII,,, | Performed by: NURSE PRACTITIONER

## 2024-07-24 PROCEDURE — 86780 TREPONEMA PALLIDUM: CPT | Mod: ,,, | Performed by: CLINICAL MEDICAL LABORATORY

## 2024-07-24 PROCEDURE — 73630 X-RAY EXAM OF FOOT: CPT | Mod: 26,LT,, | Performed by: RADIOLOGY

## 2024-07-24 PROCEDURE — 3008F BODY MASS INDEX DOCD: CPT | Mod: CPTII,,, | Performed by: NURSE PRACTITIONER

## 2024-07-24 PROCEDURE — 99214 OFFICE O/P EST MOD 30 MIN: CPT | Mod: ,,, | Performed by: NURSE PRACTITIONER

## 2024-07-24 PROCEDURE — 3066F NEPHROPATHY DOC TX: CPT | Mod: CPTII,,, | Performed by: NURSE PRACTITIONER

## 2024-07-24 PROCEDURE — 1159F MED LIST DOCD IN RCRD: CPT | Mod: CPTII,,, | Performed by: NURSE PRACTITIONER

## 2024-07-24 PROCEDURE — 73630 X-RAY EXAM OF FOOT: CPT | Mod: TC,RHCUB,LT | Performed by: NURSE PRACTITIONER

## 2024-07-24 PROCEDURE — 80048 BASIC METABOLIC PNL TOTAL CA: CPT | Mod: ,,, | Performed by: CLINICAL MEDICAL LABORATORY

## 2024-07-24 PROCEDURE — 3079F DIAST BP 80-89 MM HG: CPT | Mod: CPTII,,, | Performed by: NURSE PRACTITIONER

## 2024-07-24 PROCEDURE — 81003 URINALYSIS AUTO W/O SCOPE: CPT | Mod: RHCUB | Performed by: NURSE PRACTITIONER

## 2024-07-24 PROCEDURE — 1160F RVW MEDS BY RX/DR IN RCRD: CPT | Mod: CPTII,,, | Performed by: NURSE PRACTITIONER

## 2024-07-24 PROCEDURE — 3074F SYST BP LT 130 MM HG: CPT | Mod: CPTII,,, | Performed by: NURSE PRACTITIONER

## 2024-07-24 RX ORDER — MIRTAZAPINE 30 MG/1
30 TABLET, FILM COATED ORAL DAILY
Qty: 30 TABLET | Refills: 2 | Status: CANCELLED | OUTPATIENT
Start: 2024-07-24

## 2024-07-24 RX ORDER — BUSPIRONE HYDROCHLORIDE 15 MG/1
TABLET ORAL
Qty: 60 TABLET | Refills: 2 | Status: CANCELLED | OUTPATIENT
Start: 2024-07-24

## 2024-07-24 RX ORDER — ONDANSETRON HYDROCHLORIDE 8 MG/1
8 TABLET, FILM COATED ORAL EVERY 8 HOURS PRN
Qty: 90 TABLET | Refills: 1 | Status: SHIPPED | OUTPATIENT
Start: 2024-07-24

## 2024-07-24 RX ORDER — ONDANSETRON 4 MG/1
4 TABLET, ORALLY DISINTEGRATING ORAL EVERY 8 HOURS PRN
Qty: 18 TABLET | Refills: 3 | Status: CANCELLED | OUTPATIENT
Start: 2024-07-24

## 2024-07-24 RX ORDER — METOPROLOL SUCCINATE 50 MG/1
50 TABLET, EXTENDED RELEASE ORAL DAILY
Qty: 30 TABLET | Refills: 2 | Status: CANCELLED | OUTPATIENT
Start: 2024-07-24

## 2024-07-24 RX ORDER — ATORVASTATIN CALCIUM 40 MG/1
40 TABLET, FILM COATED ORAL DAILY
Qty: 30 TABLET | Refills: 2 | Status: CANCELLED | OUTPATIENT
Start: 2024-07-24

## 2024-07-24 RX ORDER — CLOPIDOGREL BISULFATE 75 MG/1
75 TABLET ORAL DAILY
Qty: 30 TABLET | Refills: 2 | Status: CANCELLED | OUTPATIENT
Start: 2024-07-24

## 2024-07-24 RX ORDER — ROPINIROLE 0.25 MG/1
0.25 TABLET, FILM COATED ORAL 3 TIMES DAILY
Qty: 90 TABLET | Refills: 2 | Status: CANCELLED | OUTPATIENT
Start: 2024-07-24

## 2024-07-24 RX ORDER — CARIPRAZINE 3 MG/1
6 CAPSULE, GELATIN COATED ORAL DAILY
Qty: 60 CAPSULE | Refills: 2 | Status: CANCELLED | OUTPATIENT
Start: 2024-07-24

## 2024-07-24 RX ORDER — GABAPENTIN 300 MG/1
600 CAPSULE ORAL 2 TIMES DAILY
Qty: 120 CAPSULE | Refills: 2 | Status: CANCELLED | OUTPATIENT
Start: 2024-07-24

## 2024-07-24 RX ORDER — DAPAGLIFLOZIN 10 MG/1
10 TABLET, FILM COATED ORAL DAILY
Qty: 30 TABLET | Refills: 2 | Status: CANCELLED | OUTPATIENT
Start: 2024-07-24

## 2024-07-24 RX ORDER — HYDROXYCHLOROQUINE SULFATE 200 MG/1
200 TABLET, FILM COATED ORAL 2 TIMES DAILY
Qty: 60 TABLET | Refills: 2 | Status: CANCELLED | OUTPATIENT
Start: 2024-07-24

## 2024-07-24 NOTE — PROGRESS NOTES
"             [facility]    Shauna Velázquez, AGPCNP-BC [facility]  [address] [place] [address]  [phone]          PATIENT NAME: Rosemarie Lane  : 1970  DATE: 24  MRN: [id number]          Reason for Visit        Chief Complaint   Patient presents with    Foot Pain     She is here for left foot pain , she states that she is having muscle spasms on the top of her foot and her three smaller toes , states she has only been taking nerontin, rates her pain a 6/10    Health Maintenance     Pneumococcal Vaccines (Age 0-64)(1 of 2 - PCV) Never done-dec  Foot Exam Never done-dec  TETANUS VACCINE Never done-dec  Mammogram Never done-dec  Shingles Vaccine(1 of 2) Never done-dec  COVID-19 Vaccine( season) Never done-dec  Hemoglobin A1c due on 2024         History of Present Illness      Rosemarie Lane is a 54 y.o. female with chronic conditions of CHF, COPD, Anxiety, Hysterectomy Status, CAD, Depression, DM Type 2, Hypertension, Seizure Disorder, Hyperlipidemia, Lupus, Chronic Idiopathic Constipation, Migraine Headaches, Restless Leg Syndrome, Vocal Cord Lesion with removal by Dr. Sloan, Tobacco Use, and Obesity  who presents today for left foot pain. She reports having muscle spasms on the top of her foot and in her last 3 toes. She rates pain "6" on numeric pain scale. She has only been taking her Neurontin. She has neuropathy and also reports having trouble remembering things. She has not had a B12 level checked we will check this along with an RPR. She also mentioned having some foul smelling urine and wished to have this tested today. She reports having persistent nausea and that has her dry heaving. She request refill of Zofran. She has GI appointment pending.       MEDICAL / SURGICAL / SOCIAL HISTORY     Past Medical History:   Diagnosis Date    Anxiety     Arthritis     Asthma     Cancer     CHF (congestive " heart failure)     Chronic obstructive lung disease     Coronary artery disease     Depression     Diabetes mellitus     Diabetes mellitus, type 2     Encounter for blood transfusion     History of kidney stones     Hyperlipidemia     Hypertension     Lupus     Renal disorder     Seizures     Thyroid disease     Transfusion reaction        Past Surgical History:   Procedure Laterality Date    CARDIAC CATHETERIZATION Left 09/19/2020    Proximal left main stenosis; IVUS in the LAD and left main    CHOLECYSTECTOMY      CORONARY ARTERY BYPASS GRAFT  09/30/2020    CORONARY STENT PLACEMENT  11/09/2021    Everolimus Eluting Coronary Stent-MLAD;LAD    DIRECT LARYNGOSCOPY Bilateral 7/18/2023    Procedure: LARYNGOSCOPY, DIRECT;  Surgeon: Nazario Sloan MD;  Location: [facility] OR;  Service: ENT;  Laterality: Bilateral;    HYSTERECTOMY      INCISION AND DRAINAGE  06/02/2020    LEFT HEART CATHETERIZATION Left 11/09/2021    Procedure: Left heart cath;  Surgeon: Aureliano Giron MD;  Location: [facility] CATH LAB;  Service: Cardiology;  Laterality: Left;    TOTAL ABDOMINAL HYSTERECTOMY W/ BILATERAL SALPINGOOPHORECTOMY  2008    [place], [place]     TUBAL LIGATION      VOCAL FOLD LESION EXCISION Bilateral 7/18/2023    Procedure: EXCISION, LESION, VOCAL CORD;  Surgeon: Nazario Sloan MD;  Location: [facility] OR;  Service: ENT;  Laterality: Bilateral;       Social History     Tobacco Use    Smoking status: Every Day     Current packs/day: 0.50     Average packs/day: 0.5 packs/day for 37.6 years (18.8 ttl pk-yrs)     Types: Cigarettes     Start date: 1987     Passive exposure: Current    Smokeless tobacco: Never    Tobacco comments:     Patient was smoking cigarettes 2 packs per day for 10 years   Substance Use Topics    Alcohol use: Never    Drug use: Never         I personally reviewed all past medical, surgical, and social.     Review of Systems   Constitutional:  Negative for chills and fever.   HENT:  Negative for  congestion, postnasal drip and sore throat.    Respiratory:  Positive for cough. Negative for shortness of breath.    Cardiovascular:  Negative for chest pain.   Gastrointestinal:  Positive for nausea. Negative for abdominal pain, constipation and diarrhea.   Genitourinary:  Negative for dysuria, frequency and urgency.   Musculoskeletal:  Positive for arthralgias (right foot) and myalgias.   Neurological:  Negative for dizziness and headaches.   Psychiatric/Behavioral:  Positive for confusion (forgetfulness and memory loss) and decreased concentration.         MEDICATIONS / ALLERGIES / HM     Current Outpatient Medications   Medication Sig Dispense Refill    albuterol (PROVENTIL) 2.5 mg /3 mL (0.083 %) nebulizer solution USE 1 VIAL IN NEBULIZER EVERY 4 TO 6 HOURS AS NEEDED 180 each 1    arformoteroL (BROVANA) 15 mcg/2 mL Nebu Take 2 mLs (15 mcg total) by nebulization 2 (two) times a day. 180 each 1    aspirin (ECOTRIN) 81 MG EC tablet Take 1 tablet by mouth once daily.      atorvastatin (LIPITOR) 40 MG tablet Take 1 tablet (40 mg total) by mouth once daily. 30 tablet 2    busPIRone (BUSPAR) 15 MG tablet TAKE 1 TABLET BY MOUTH 2 TIMES A DAY AS DIRECTED 60 tablet 2    cariprazine (VRAYLAR) 3 mg Cap Take 2 capsules (6 mg total) by mouth once daily. 60 capsule 2    clopidogreL (PLAVIX) 75 mg tablet Take 1 tablet (75 mg total) by mouth once daily. 30 tablet 2    dapagliflozin propanediol (FARXIGA) 10 mg tablet Take 1 tablet (10 mg total) by mouth once daily. 30 tablet 2    gabapentin (NEURONTIN) 300 MG capsule Take 2 capsules (600 mg total) by mouth 2 (two) times daily. 120 capsule 2    hydroxychloroquine (PLAQUENIL) 200 mg tablet Take 1 tablet (200 mg total) by mouth 2 (two) times daily. 60 tablet 2    insulin (LANTUS SOLOSTAR U-100 INSULIN) glargine 100 units/mL SubQ pen Inject 30 Units into the skin 2 (two) times a day. 51 mL 2    linaCLOtide (LINZESS) 145 mcg Cap capsule Take 1 capsule (145 mcg total) by mouth daily  as needed (constipation). 30 capsule 2    metoprolol succinate (TOPROL-XL) 50 MG 24 hr tablet Take 1 tablet (50 mg total) by mouth once daily. 30 tablet 2    mirtazapine (REMERON) 30 MG tablet Take 1 tablet (30 mg total) by mouth once daily. 30 tablet 2    rOPINIRole (REQUIP) 0.25 MG tablet Take 1 tablet (0.25 mg total) by mouth 3 (three) times daily. 90 tablet 2    topiramate (TOPAMAX) 25 MG tablet Take 1 tablet (25 mg total) by mouth once daily. 30 tablet 2    triamcinolone acetonide 0.1% (KENALOG) 0.1 % cream Apply topically 2 (two) times daily. 454 g 5    budesonide-formoterol 160-4.5 mcg (SYMBICORT) 160-4.5 mcg/actuation HFAA Inhale 2 puffs into the lungs every 12 (twelve) hours. Controller 18 g 5    ondansetron (ZOFRAN) 8 MG tablet Take 1 tablet (8 mg total) by mouth every 8 (eight) hours as needed for Nausea. 90 tablet 1     No current facility-administered medications for this visit.       Review of patient's allergies indicates:   Allergen Reactions    Fish containing products Hives and Swelling    Penicillins Swelling     Localized swelling     Wasp venom Anaphylaxis    Opioids - morphine analogues      Change in behavior    Toradol [ketorolac] Hives    Tramadol Hives    Zithromax [azithromycin] Other (See Comments)     Patient cannot remember what kind of reaction she had to this medication    Bactrim ds [sulfamethoxazole-trimethoprim] Nausea And Vomiting     Facial flush    Latex, natural rubber Rash         There is no immunization history on file for this patient.     Health Maintenance   Topic Date Due    Foot Exam  Never done    TETANUS VACCINE  Never done    Mammogram  Never done    Shingles Vaccine (1 of 2) Never done    Hemoglobin A1c  08/09/2024    Colorectal Cancer Screening  03/14/2025    Lipid Panel  05/09/2025    High Dose Statin  07/24/2025    Aspirin/Antiplatelet Therapy  07/24/2025    Hepatitis C Screening  Completed    Eye Exam  Discontinued        Physical Exam      Vital Signs  Temp:  98.6 °F (37 °C)  Temp Source: Oral  Pulse: 73  Resp: 18  SpO2: 95 %  BP: 129/80  BP Location: Left arm  Patient Position: Sitting  Pain Score:   6  Pain Loc: Foot  Height and Weight  Height: 5' (152.4 cm)  Weight: 74 kg (163 lb 3.2 oz)  BSA (Calculated - sq m): 1.77 sq meters  BMI (Calculated): 31.9  Weight in (lb) to have BMI = 25: 127.7]    Physical Exam  Constitutional:       General: She is not in acute distress.     Appearance: She is obese. She is ill-appearing.   HENT:      Head: Normocephalic.      Right Ear: External ear normal.      Left Ear: External ear normal.   Cardiovascular:      Rate and Rhythm: Normal rate and regular rhythm.      Pulses: Normal pulses.           Dorsalis pedis pulses are 2+ on the left side.      Heart sounds: Normal heart sounds.   Pulmonary:      Effort: Pulmonary effort is normal.      Breath sounds: Normal breath sounds.   Abdominal:      Palpations: Abdomen is soft.      Tenderness: There is no abdominal tenderness.   Musculoskeletal:        Feet:    Skin:     General: Skin is warm and dry.   Neurological:      Mental Status: She is alert and oriented to person, place, and time.   Psychiatric:         Mood and Affect: Mood normal.         Behavior: Behavior normal.          Laboratory:    Lab Results   Component Value Date     (H) 04/28/2024     04/28/2024    K 4.3 04/28/2024    CL 97 (L) 04/28/2024    CO2 26 04/28/2024    BUN 18 04/28/2024    CREATININE 1.06 (H) 04/28/2024    CALCIUM 9.3 04/28/2024    PROT 8.9 (H) 04/28/2024    ALBUMIN 4.3 04/28/2024    BILITOT 0.3 04/28/2024    ALKPHOS 119 (H) 04/28/2024    AST 28 04/28/2024    ALT 43 04/28/2024    ANIONGAP 18 (H) 04/28/2024    ESTGFRAFRICA 62 01/18/2021    EGFRNONAA 70 07/08/2022       Lab Results   Component Value Date    WBC 10.46 04/28/2024    RBC 4.69 04/28/2024    HGB 13.0 04/28/2024    HCT 42.5 04/28/2024    MCV 90.6 04/28/2024    RDW 13.7 04/28/2024     04/28/2024        Lab Results   Component  "Value Date    CHOL 137 05/09/2024    TRIG 133 05/09/2024    HDL 40 05/09/2024    LDLCALC 70 05/09/2024       Lab Results   Component Value Date    TSH 1.180 08/24/2023       Lab Results   Component Value Date    HGBA1C 6.1 05/09/2024    ESTIMATEDAVG 128 05/09/2024        No results found for: "KATSCUVA04"    No results found for: "[id number]"      Point Of Care Testing:    WBC, UA   Date Value Ref Range Status   07/24/2024 -  Final     Nitrite, UA   Date Value Ref Range Status   07/24/2024 -  Final     Urobilinogen, UA   Date Value Ref Range Status   07/24/2024 0.2  Final     Protein, POC   Date Value Ref Range Status   07/24/2024 -  Final     pH, UA   Date Value Ref Range Status   07/24/2024 6.0  Final     Spec Grav UA   Date Value Ref Range Status   07/24/2024 >=1.030  Final     Ketones, UA   Date Value Ref Range Status   07/24/2024 -  Final     Bilirubin, POC   Date Value Ref Range Status   07/24/2024 -  Final     Glucose, UA   Date Value Ref Range Status   07/24/2024 >=1,000  Final       Lab Results   Component Value Date    [id number] Negative 11/05/2021             Assessment/Plan     Left foot pain  -     walking boot provided today in clinic   -     X-Ray Foot Complete 3 view Left; results pending     Memory loss  -     Vitamin B12; Future; Expected date: 07/24/2024  -     Syphilis Antibody with reflex to RPR; Future; Expected date: 07/24/2024    Neuropathy  -     Vitamin B12; Future; Expected date: 07/24/2024    Renal insufficiency  -     03/24/2024 BUN 46 (7-18) Crea 1.24 (0.55-1.02)   -     04/28/2024 BUN 18 (7-18) Crea 1.06 (0.55-1.02)  -     Basic Metabolic Panel; Future; Expected date: 07/24/2024    Nausea  -     ondansetron (ZOFRAN) 8 MG tablet; Take 1 tablet (8 mg total) by mouth every 8 (eight) hours as needed for Nausea.  Dispense: 90 tablet; Refill: 1  -     instructed to keep GI appointment as scheduled     Malodorous urine  -     POCT URINALYSIS W/O SCOPE  -     Urine culture; Future; " Expected date: 07/24/2024          Future Appointments   Date Time Provider Department Center   8/2/2024 11:40 AM Nazario Sloan MD [facility] ENT [facility]   8/16/2024 10:40 AM Loretta Jefferson NP [facility] GASTR [facility]   8/16/2024  2:20 PM Shauna Velázquez NP [facility]   9/6/2024  2:20 PM Yvonne Barrios FNP [facility]   9/20/2024 10:00 AM Shamir Price,  [facility] CARD [facility]   5/16/2025  9:20 AM Oren Gage,  [facility]       Workup results were reviewed and all questions were answered. Diagnosis and treatment options were discussed and the patient  is amenable with the overall treatment plan. Verbal and written discharge instructions were given including to return to clinic/ED with any acute worsening of symptoms or failure of symptoms to improve. The reasons for return to the clinic/ED were explained in lay terms. No further intervention is warranted at this time. The patient agrees with the plan, expresses understanding, is hemodynamically stable and in no acute distress.     All questions answered to desired level of satisfaction          MCKENZIE Blanco-BC [facility]

## 2024-07-26 ENCOUNTER — TELEPHONE (OUTPATIENT)
Dept: FAMILY MEDICINE | Facility: CLINIC | Age: 54
End: 2024-07-26

## 2024-07-26 NOTE — TELEPHONE ENCOUNTER
----- Message from Carole Pittman sent at 7/25/2024  9:48 AM CDT -----  Pt said she needs someone to call her about her results on her xray and blood work, and she is still having bad muscle spasms in her toes, left leg, and thigh.     Pt# [phone]

## 2024-07-29 DIAGNOSIS — M19.072 OSTEOARTHRITIS OF LEFT FOOT, UNSPECIFIED OSTEOARTHRITIS TYPE: ICD-10-CM

## 2024-07-29 DIAGNOSIS — M77.32 CALCANEAL SPUR, LEFT FOOT: ICD-10-CM

## 2024-07-29 DIAGNOSIS — M77.32 CALCANEAL SPUR OF LEFT FOOT: ICD-10-CM

## 2024-07-29 DIAGNOSIS — Q66.72 PES CAVUS OF LEFT FOOT: Primary | ICD-10-CM

## 2024-07-31 ENCOUNTER — TELEPHONE (OUTPATIENT)
Dept: FAMILY MEDICINE | Facility: CLINIC | Age: 54
End: 2024-07-31
Payer: MEDICAID

## 2024-07-31 NOTE — TELEPHONE ENCOUNTER
She was made aware of her lab and xray results and podiatry consult placed , she verbalized understanding

## 2024-08-21 ENCOUNTER — TELEPHONE (OUTPATIENT)
Dept: FAMILY MEDICINE | Facility: CLINIC | Age: 54
End: 2024-08-21
Payer: MEDICAID

## 2024-08-21 NOTE — TELEPHONE ENCOUNTER
Spoke with her concerning call back she was told that her incontinence paperwork will be discussed at her visit on friday

## 2024-08-21 NOTE — TELEPHONE ENCOUNTER
----- Message from Carolynn Cornelius sent at 8/21/2024 10:57 AM CDT -----  Patient requests call back

## 2024-09-05 ENCOUNTER — OFFICE VISIT (OUTPATIENT)
Dept: FAMILY MEDICINE | Facility: CLINIC | Age: 54
End: 2024-09-05
Payer: MEDICAID

## 2024-09-05 VITALS
OXYGEN SATURATION: 97 % | DIASTOLIC BLOOD PRESSURE: 76 MMHG | TEMPERATURE: 98 F | SYSTOLIC BLOOD PRESSURE: 123 MMHG | RESPIRATION RATE: 19 BRPM | HEIGHT: 62 IN | HEART RATE: 64 BPM | WEIGHT: 165.38 LBS | BODY MASS INDEX: 30.44 KG/M2

## 2024-09-05 DIAGNOSIS — M19.072 OSTEOARTHRITIS OF LEFT FOOT, UNSPECIFIED OSTEOARTHRITIS TYPE: ICD-10-CM

## 2024-09-05 DIAGNOSIS — Z79.4 TYPE 2 DIABETES MELLITUS WITH HYPERGLYCEMIA, WITH LONG-TERM CURRENT USE OF INSULIN: Primary | ICD-10-CM

## 2024-09-05 DIAGNOSIS — I25.10 CORONARY ARTERY DISEASE, UNSPECIFIED VESSEL OR LESION TYPE, UNSPECIFIED WHETHER ANGINA PRESENT, UNSPECIFIED WHETHER NATIVE OR TRANSPLANTED HEART: ICD-10-CM

## 2024-09-05 DIAGNOSIS — R31.9 HEMATURIA, UNSPECIFIED TYPE: ICD-10-CM

## 2024-09-05 DIAGNOSIS — E11.65 TYPE 2 DIABETES MELLITUS WITH HYPERGLYCEMIA, WITH LONG-TERM CURRENT USE OF INSULIN: Primary | ICD-10-CM

## 2024-09-05 DIAGNOSIS — N30.00 ACUTE CYSTITIS WITHOUT HEMATURIA: ICD-10-CM

## 2024-09-05 LAB
BILIRUB SERPL-MCNC: NORMAL MG/DL
BLOOD URINE, POC: NORMAL
CLARITY, UA: CLEAR
COLOR, UA: NORMAL
GLUCOSE UR QL STRIP: 500
KETONES UR QL STRIP: NORMAL
LEUKOCYTE ESTERASE URINE, POC: NORMAL
NITRITE, POC UA: NORMAL
PH, POC UA: 6
PROTEIN, POC: NORMAL
SPECIFIC GRAVITY, POC UA: <=1.005
UROBILINOGEN, POC UA: 0.2

## 2024-09-05 PROCEDURE — 83036 HEMOGLOBIN GLYCOSYLATED A1C: CPT | Mod: ,,, | Performed by: CLINICAL MEDICAL LABORATORY

## 2024-09-05 PROCEDURE — 87086 URINE CULTURE/COLONY COUNT: CPT | Mod: ,,, | Performed by: CLINICAL MEDICAL LABORATORY

## 2024-09-05 PROCEDURE — 81003 URINALYSIS AUTO W/O SCOPE: CPT | Mod: RHCUB | Performed by: FAMILY MEDICINE

## 2024-09-05 RX ORDER — CLOPIDOGREL BISULFATE 75 MG/1
75 TABLET ORAL DAILY
Qty: 30 TABLET | Refills: 2 | Status: SHIPPED | OUTPATIENT
Start: 2024-09-05

## 2024-09-05 RX ORDER — NITROFURANTOIN 25; 75 MG/1; MG/1
100 CAPSULE ORAL 2 TIMES DAILY
Qty: 10 CAPSULE | Refills: 0 | Status: SHIPPED | OUTPATIENT
Start: 2024-09-05 | End: 2024-09-10

## 2024-09-05 RX ORDER — CEFTRIAXONE 1 G/1
1 INJECTION, POWDER, FOR SOLUTION INTRAMUSCULAR; INTRAVENOUS
Status: COMPLETED | OUTPATIENT
Start: 2024-09-05 | End: 2024-09-05

## 2024-09-05 RX ADMIN — CEFTRIAXONE 1 G: 1 INJECTION, POWDER, FOR SOLUTION INTRAMUSCULAR; INTRAVENOUS at 10:09

## 2024-09-05 NOTE — PROGRESS NOTES
Enrique Velasco DO   [facility]  [address]  [place]  [address]      PATIENT NAME: Rosemarie Lane  : 1970  DATE: 24  MRN: [id number]      Billing Provider: Enrique Velasco DO  Level of Service:   Patient PCP Information       Provider PCP Type    Enrique Velasco DO General            Reason for Visit / Chief Complaint: Establish Care (Rosemarie Lane 55 y/o presents to clinic to establish care with provider. She is due for A1C. She is requesting a letter for assistance with in home care with ADL care.  Patient is also requesting order for in home walker use, her wheels broke on the one she has had. ), Hematuria (Patient reports she has blood in her urine and low back pain. She reports having a history of UTI and has a foul smelling odor), and Low-back Pain         History of Present Illness / Problem Focused Workflow     HPI    HPI as noted.  Patient has a history of T2D, COPD, CAD s/p bypass and stent, SLE (on plaquenil; last saw rheumatologist several years ago) Bipolar/Schizophrenia, Seizure dizorder on topamax, ovarian CA s/p hysterectomy approx. 10 years ago and throat CA.  Patient has an upcoming follow up appointment with Dr. Sloan for her throat cancer.      Social Hx  Pt smoke 0.5 ppd. She denies EtOH and illicit substance use.     Surgical Hx  As detailed in her record    Family Hx   As detailed in her record       Review of Systems     @Review of Systems   Constitutional:  Negative for chills, fatigue and fever.   Eyes:  Negative for visual disturbance.   Respiratory:  Negative for cough and shortness of breath.    Cardiovascular:  Negative for chest pain, palpitations and leg swelling.   Gastrointestinal:  Negative for abdominal pain, diarrhea, rectal pain and vomiting.   Genitourinary:  Positive for dysuria.   Musculoskeletal:  Negative for arthralgias.   Neurological:  Negative for dizziness and light-headedness.       Medical / Social / Family History     Past  Medical History:   Diagnosis Date    Anxiety     Arthritis     Asthma     Cancer     CHF (congestive heart failure)     Chronic obstructive lung disease     Coronary artery disease     Depression     Diabetes mellitus     Diabetes mellitus, type 2     Encounter for blood transfusion     History of kidney stones     Hyperlipidemia     Hypertension     Lupus     Renal disorder     Seizures     Thyroid disease     Transfusion reaction        Past Surgical History:   Procedure Laterality Date    CARDIAC CATHETERIZATION Left 09/19/2020    Proximal left main stenosis; IVUS in the LAD and left main    CHOLECYSTECTOMY      CORONARY ARTERY BYPASS GRAFT  09/30/2020    CORONARY STENT PLACEMENT  11/09/2021    Everolimus Eluting Coronary Stent-MLAD;LAD    DIRECT LARYNGOSCOPY Bilateral 7/18/2023    Procedure: LARYNGOSCOPY, DIRECT;  Surgeon: Nazario Sloan MD;  Location: [facility] ORTHO OR;  Service: ENT;  Laterality: Bilateral;    HYSTERECTOMY      INCISION AND DRAINAGE  06/02/2020    LEFT HEART CATHETERIZATION Left 11/09/2021    Procedure: Left heart cath;  Surgeon: Aureliano Giron MD;  Location: [facility] CATH LAB;  Service: Cardiology;  Laterality: Left;    TOTAL ABDOMINAL HYSTERECTOMY W/ BILATERAL SALPINGOOPHORECTOMY  2008    KIRSTY Lala     TUBAL LIGATION      VOCAL FOLD LESION EXCISION Bilateral 7/18/2023    Procedure: EXCISION, LESION, VOCAL CORD;  Surgeon: Nazario Sloan MD;  Location: [facility] ORTHO OR;  Service: ENT;  Laterality: Bilateral;       Medications and Allergies     Medications  Outpatient Medications Marked as Taking for the 9/5/24 encounter (Office Visit) with Enrique Velasco, DO   Medication Sig Dispense Refill    albuterol (PROVENTIL) 2.5 mg /3 mL (0.083 %) nebulizer solution USE 1 VIAL IN NEBULIZER EVERY 4 TO 6 HOURS AS NEEDED 180 each 1    arformoteroL (BROVANA) 15 mcg/2 mL Nebu Take 2 mLs (15 mcg total) by nebulization 2 (two) times a day. 180 each 1    aspirin (ECOTRIN) 81 MG EC tablet Take 1  tablet by mouth once daily.      atorvastatin (LIPITOR) 40 MG tablet Take 1 tablet (40 mg total) by mouth once daily. 30 tablet 2    budesonide-formoterol 160-4.5 mcg (SYMBICORT) 160-4.5 mcg/actuation HFAA Inhale 2 puffs into the lungs every 12 (twelve) hours. Controller 18 g 5    busPIRone (BUSPAR) 15 MG tablet TAKE 1 TABLET BY MOUTH 2 TIMES A DAY AS DIRECTED 60 tablet 2    cariprazine (VRAYLAR) 3 mg Cap Take 2 capsules (6 mg total) by mouth once daily. 60 capsule 2    dapagliflozin propanediol (FARXIGA) 10 mg tablet Take 1 tablet (10 mg total) by mouth once daily. 30 tablet 2    gabapentin (NEURONTIN) 300 MG capsule Take 2 capsules (600 mg total) by mouth 2 (two) times daily. 120 capsule 2    hydroxychloroquine (PLAQUENIL) 200 mg tablet Take 1 tablet (200 mg total) by mouth 2 (two) times daily. 60 tablet 2    insulin (LANTUS SOLOSTAR U-100 INSULIN) glargine 100 units/mL SubQ pen Inject 30 Units into the skin 2 (two) times a day. 51 mL 2    linaCLOtide (LINZESS) 145 mcg Cap capsule Take 1 capsule (145 mcg total) by mouth daily as needed (constipation). 30 capsule 2    metoprolol succinate (TOPROL-XL) 50 MG 24 hr tablet Take 1 tablet (50 mg total) by mouth once daily. 30 tablet 2    mirtazapine (REMERON) 30 MG tablet Take 1 tablet (30 mg total) by mouth once daily. 30 tablet 2    ondansetron (ZOFRAN) 8 MG tablet Take 1 tablet (8 mg total) by mouth every 8 (eight) hours as needed for Nausea. 90 tablet 1    rOPINIRole (REQUIP) 0.25 MG tablet Take 1 tablet (0.25 mg total) by mouth 3 (three) times daily. 90 tablet 2    topiramate (TOPAMAX) 25 MG tablet Take 1 tablet (25 mg total) by mouth once daily. 30 tablet 2    triamcinolone acetonide 0.1% (KENALOG) 0.1 % cream Apply topically 2 (two) times daily. 454 g 5    [DISCONTINUED] clopidogreL (PLAVIX) 75 mg tablet Take 1 tablet (75 mg total) by mouth once daily. 30 tablet 2       Allergies  Review of patient's allergies indicates:   Allergen Reactions    Fish containing  products Hives and Swelling    Penicillins Swelling     Localized swelling     Wasp venom Anaphylaxis    Opioids - morphine analogues      Change in behavior    Toradol [ketorolac] Hives    Tramadol Hives    Zithromax [azithromycin] Other (See Comments)     Patient cannot remember what kind of reaction she had to this medication    Bactrim ds [sulfamethoxazole-trimethoprim] Nausea And Vomiting     Facial flush    Latex, natural rubber Rash       Physical Examination     Vitals:    09/05/24 0955   BP: 123/76   Pulse: 64   Resp: 19   Temp: 98.2 °F (36.8 °C)     Physical Exam  Vitals reviewed.   Constitutional:       General: She is not in acute distress.     Appearance: She is not ill-appearing, toxic-appearing or diaphoretic.   HENT:      Head: Normocephalic and atraumatic.      Right Ear: External ear normal.      Left Ear: External ear normal.      Mouth/Throat:      Mouth: Mucous membranes are moist.   Eyes:      General: No scleral icterus.     Pupils: Pupils are equal, round, and reactive to light.   Cardiovascular:      Rate and Rhythm: Normal rate and regular rhythm.      Heart sounds: Normal heart sounds. No murmur heard.     No friction rub. No gallop.   Pulmonary:      Effort: Pulmonary effort is normal. No respiratory distress.      Breath sounds: Normal breath sounds. No wheezing, rhonchi or rales.   Abdominal:      General: Bowel sounds are normal.      Palpations: Abdomen is soft.      Tenderness: There is no abdominal tenderness. There is no guarding or rebound.   Musculoskeletal:         General: No swelling.      Right lower leg: No edema.      Left lower leg: No edema.   Skin:     General: Skin is warm and dry.      Coloration: Skin is not jaundiced.      Findings: No erythema or rash.   Neurological:      Mental Status: She is alert and oriented to person, place, and time.               Lab Results   Component Value Date    WBC 10.46 04/28/2024    HGB 13.0 04/28/2024    HCT 42.5 04/28/2024    MCV  90.6 04/28/2024     04/28/2024        CMP  Sodium   Date Value Ref Range Status   07/24/2024 139 136 - 145 mmol/L Final     Potassium   Date Value Ref Range Status   07/24/2024 3.5 3.5 - 5.1 mmol/L Final     Chloride   Date Value Ref Range Status   07/24/2024 105 98 - 107 mmol/L Final     CO2   Date Value Ref Range Status   07/24/2024 29 21 - 32 mmol/L Final     Glucose   Date Value Ref Range Status   07/24/2024 108 (H) 74 - 106 mg/dL Final     BUN   Date Value Ref Range Status   07/24/2024 11 7 - 18 mg/dL Final     Creatinine   Date Value Ref Range Status   07/24/2024 1.03 (H) 0.55 - 1.02 mg/dL Final     Calcium   Date Value Ref Range Status   07/24/2024 10.3 (H) 8.5 - 10.1 mg/dL Final     Total Protein   Date Value Ref Range Status   04/28/2024 8.9 (H) 6.4 - 8.2 g/dL Final     Albumin   Date Value Ref Range Status   04/28/2024 4.3 3.5 - 5.0 g/dL Final     Bilirubin, Total   Date Value Ref Range Status   04/28/2024 0.3 >0.0 - 1.2 mg/dL Final     Alk Phos   Date Value Ref Range Status   04/28/2024 119 (H) 41 - 108 U/L Final     AST   Date Value Ref Range Status   04/28/2024 28 15 - 37 U/L Final     ALT   Date Value Ref Range Status   04/28/2024 43 13 - 56 U/L Final     Anion Gap   Date Value Ref Range Status   07/24/2024 9 7 - 16 mmol/L Final     eGFR   Date Value Ref Range Status   07/24/2024 66 >=60 mL/min/1.73m2 Final     Procedures   Assessment and Plan (including Health Maintenance)   :    Plan:     Problem List Items Addressed This Visit          Endocrine    Diabetes mellitus - Primary    Relevant Orders    Hemoglobin A1C     Other Visit Diagnoses       Coronary artery disease, unspecified vessel or lesion type, unspecified whether angina present, unspecified whether native or transplanted heart        Relevant Medications    clopidogreL (PLAVIX) 75 mg tablet    Hematuria, unspecified type        Relevant Orders    POCT URINALYSIS W/O SCOPE    Osteoarthritis of left foot, unspecified osteoarthritis  type        Relevant Orders    WALKER FOR HOME USE        Patient has multiple comorbidities.  Today in A1c will be drawn.  Labs last A1c was within normal limits.  Plavix will be refilled.  Patient does not appear to have seeing Cardiology in several years.  Patient advised to ask  her cardiologist to see if she needs to continue taking Plavix as she is evidently status post revascularization.  Urinalysis obtained for possible UTI.  Walker order placed.  Patient given note for in-home caregiver.  Follow up in 1 month.  Patient and caregiver signal understanding and agreement with the plan of care.    Health Maintenance Topics with due status: Not Due       Topic Last Completion Date    Colorectal Cancer Screening 03/14/2024    Diabetes Urine Screening 05/09/2024    Lipid Panel 05/09/2024    High Dose Statin 07/24/2024    Aspirin/Antiplatelet Therapy 07/24/2024       Future Appointments   Date Time Provider Department Center   10/4/2024 11:10 AM Nazario Sloan MD [facility]   10/18/2024  9:20 AM Loretta Jefferson, NP [facility]   10/25/2024 12:20 PM Shamir Price DO [facility]   5/16/2025  9:20 AM Oren Gage DO [facility]        Health Maintenance Due   Topic Date Due    Pneumococcal Vaccines (Age 0-64) (1 of 2 - PCV) Never done    Foot Exam  Never done    TETANUS VACCINE  Never done    Mammogram  Never done    Shingles Vaccine (1 of 2) Never done    Hemoglobin A1c  08/09/2024    COVID-19 Vaccine (1 - 2023-24 season) Never done          Signature:  Enrique Velasco DO  [facility]  [address] [place]  [address]    Date of encounter: 9/5/24

## 2024-09-05 NOTE — LETTER
September 5, 2024    Rosemarie Lane  [address] [place] [address]             [facility] - [address]  [place] [address]  Phone: [phone]  Fax: [phone] To Whom It May Concern:    Rosemarie Lane requires assistance in her home to take care of activities of daily living due to multiple health conditions and co-morbidities.     If you have any questions or concerns, please don't hesitate to call.    Sincerely,        Enrique Velasco, DO

## 2024-09-05 NOTE — PROGRESS NOTES
09/05/24 0943   Depression Patient Health Questionnaire (PHQ-2)   Over the last two weeks how often have you been bothered by little interest or pleasure in doing things 3   Over the last two weeks how often have you been bothered by feeling down, depressed or hopeless 1   PHQ-2 Total Score 4   Depression Patient Health Questionnaire (PHQ-9)   Over the last two weeks how often have you been bothered by trouble falling or staying asleep, or sleeping too much 3   Over the last two weeks how often have you been bothered by feeling tired or having little energy 3   Over the last two weeks how often have you been bothered by a poor appetite or overeating 0   Over the last two weeks how often have you been bothered by feeling bad about yourself - or that you are a failure or have let yourself or your family down 0   Over the last two weeks how often have you been bothered by trouble concentrating on things, such as reading the newspaper or watching television 1   Over the last two weeks how often have you been bothered by moving or speaking so slowly that other people could have noticed. Or the opposite - being so fidgety or restless that you have been moving around a lot more than usual. 1   Over the last two weeks how often have you been bothered by thoughts that you would be better off dead, or of hurting yourself 0   If you checked off any problems, how difficult have these problems made it for you to do your work, take care of things at home or get along with other people? Very difficult   PHQ-9 Score 12   PHQ-9 Interpretation Moderate

## 2024-09-06 LAB
EST. AVERAGE GLUCOSE BLD GHB EST-MCNC: 157 MG/DL
HBA1C MFR BLD HPLC: 7.1 % (ref 4.5–6.6)

## 2024-09-07 LAB — UA COMPLETE W REFLEX CULTURE PNL UR: NORMAL

## 2024-09-08 DIAGNOSIS — K59.00 CONSTIPATION, UNSPECIFIED CONSTIPATION TYPE: ICD-10-CM

## 2024-10-03 DIAGNOSIS — K59.00 CONSTIPATION, UNSPECIFIED CONSTIPATION TYPE: ICD-10-CM

## 2024-10-03 DIAGNOSIS — J44.9 CHRONIC OBSTRUCTIVE PULMONARY DISEASE, UNSPECIFIED COPD TYPE: ICD-10-CM

## 2024-10-03 RX ORDER — ARFORMOTEROL TARTRATE 15 UG/2ML
15 SOLUTION RESPIRATORY (INHALATION) 2 TIMES DAILY
Qty: 180 EACH | Refills: 1 | OUTPATIENT
Start: 2024-10-03

## 2024-10-06 DIAGNOSIS — E11.65 TYPE 2 DIABETES MELLITUS WITH HYPERGLYCEMIA, WITHOUT LONG-TERM CURRENT USE OF INSULIN: ICD-10-CM

## 2024-10-14 RX ORDER — DAPAGLIFLOZIN 10 MG/1
10 TABLET, FILM COATED ORAL DAILY
Qty: 30 TABLET | Refills: 2 | Status: SHIPPED | OUTPATIENT
Start: 2024-10-14

## 2024-10-15 ENCOUNTER — TELEPHONE (OUTPATIENT)
Dept: FAMILY MEDICINE | Facility: CLINIC | Age: 54
End: 2024-10-15
Payer: MEDICAID

## 2024-10-15 NOTE — TELEPHONE ENCOUNTER
Received request from [facility] for patient's oxygen for home use. I spoke with Patty at [facility] regarding request. She stated no orders needed  patient's equipment at this time. She will contact clinic if needed for pt to continue oxygen at home. Explained to her that patient is now seeing Dr. Velasco and we have no up to date information regarding patient's need for oxygen. Pt may need overnight oximeter study. She will reach out to the clinic if something changes with patient's certification.

## 2024-10-21 DIAGNOSIS — K59.00 CONSTIPATION, UNSPECIFIED CONSTIPATION TYPE: ICD-10-CM

## 2024-10-22 ENCOUNTER — TELEPHONE (OUTPATIENT)
Dept: FAMILY MEDICINE | Facility: CLINIC | Age: 54
End: 2024-10-22

## 2024-10-22 NOTE — TELEPHONE ENCOUNTER
Spoke with patient regarding her call about home oxygen. Explained to patient that I spoke with Patty at [facility] last week. At the time Patty stated no information was needed for patient's home oxygen. Received a recert for patient's home oxygen this week. Notified patient I have left a message for Barbie at [phone] Ext [phone] to call me regarding recertification request. Will call patient back with new information once we receive a call back from [facility]. Explained to patient she may need an overnight oximeter for documentation. Patient voiced understanding.

## 2024-10-23 NOTE — TELEPHONE ENCOUNTER
Discussed with dandre from [facility] the need for documentation for patien'ts home oxygen. She stated that patient may get billed for overnight oximeter if ordered. She is unsure if [place] Medicaid pays for complete study through [facility].

## 2024-10-23 NOTE — TELEPHONE ENCOUNTER
Attempted to reach Barbie at [facility]. No answer, did not leave a second voice mail. Sent provider a message about home oxygen, requesting overnight oximeter.

## 2024-11-19 ENCOUNTER — PATIENT MESSAGE (OUTPATIENT)
Dept: GASTROENTEROLOGY | Facility: CLINIC | Age: 54
End: 2024-11-19

## 2024-11-22 ENCOUNTER — OFFICE VISIT (OUTPATIENT)
Dept: FAMILY MEDICINE | Facility: CLINIC | Age: 54
End: 2024-11-22
Payer: MEDICAID

## 2024-11-22 VITALS
TEMPERATURE: 99 F | RESPIRATION RATE: 18 BRPM | HEIGHT: 60 IN | WEIGHT: 171.38 LBS | SYSTOLIC BLOOD PRESSURE: 114 MMHG | BODY MASS INDEX: 33.65 KG/M2 | DIASTOLIC BLOOD PRESSURE: 76 MMHG | HEART RATE: 73 BPM | OXYGEN SATURATION: 95 %

## 2024-11-22 DIAGNOSIS — L03.221 CELLULITIS OF NECK: ICD-10-CM

## 2024-11-22 DIAGNOSIS — F31.81 BIPOLAR 2 DISORDER, MAJOR DEPRESSIVE EPISODE: ICD-10-CM

## 2024-11-22 DIAGNOSIS — E11.65 TYPE 2 DIABETES MELLITUS WITH HYPERGLYCEMIA, WITHOUT LONG-TERM CURRENT USE OF INSULIN: Primary | ICD-10-CM

## 2024-11-22 DIAGNOSIS — L03.90 CELLULITIS DUE TO METHICILLIN-RESISTANT STAPHYLOCOCCUS AUREUS (MRSA): ICD-10-CM

## 2024-11-22 DIAGNOSIS — M54.2 CERVICAL PAIN (NECK): ICD-10-CM

## 2024-11-22 DIAGNOSIS — B95.62 CELLULITIS DUE TO METHICILLIN-RESISTANT STAPHYLOCOCCUS AUREUS (MRSA): ICD-10-CM

## 2024-11-22 DIAGNOSIS — M32.19 OTHER SYSTEMIC LUPUS ERYTHEMATOSUS WITH OTHER ORGAN INVOLVEMENT: ICD-10-CM

## 2024-11-22 LAB — GLUCOSE SERPL-MCNC: 155 MG/DL (ref 70–110)

## 2024-11-22 PROCEDURE — 96372 THER/PROPH/DIAG INJ SC/IM: CPT | Mod: ,,, | Performed by: FAMILY MEDICINE

## 2024-11-22 PROCEDURE — 87077 CULTURE AEROBIC IDENTIFY: CPT | Mod: ,,, | Performed by: CLINICAL MEDICAL LABORATORY

## 2024-11-22 PROCEDURE — 87186 SC STD MICRODIL/AGAR DIL: CPT | Mod: ,,, | Performed by: CLINICAL MEDICAL LABORATORY

## 2024-11-22 PROCEDURE — 3078F DIAST BP <80 MM HG: CPT | Mod: CPTII,,, | Performed by: FAMILY MEDICINE

## 2024-11-22 PROCEDURE — 82962 GLUCOSE BLOOD TEST: CPT | Mod: RHCUB | Performed by: FAMILY MEDICINE

## 2024-11-22 PROCEDURE — 99214 OFFICE O/P EST MOD 30 MIN: CPT | Mod: 25,,, | Performed by: FAMILY MEDICINE

## 2024-11-22 PROCEDURE — 3074F SYST BP LT 130 MM HG: CPT | Mod: CPTII,,, | Performed by: FAMILY MEDICINE

## 2024-11-22 PROCEDURE — 3008F BODY MASS INDEX DOCD: CPT | Mod: CPTII,,, | Performed by: FAMILY MEDICINE

## 2024-11-22 PROCEDURE — 87070 CULTURE OTHR SPECIMN AEROBIC: CPT | Mod: ,,, | Performed by: CLINICAL MEDICAL LABORATORY

## 2024-11-22 RX ORDER — MUPIROCIN 20 MG/G
OINTMENT TOPICAL 3 TIMES DAILY
Qty: 30 G | Refills: 2 | Status: SHIPPED | OUTPATIENT
Start: 2024-11-22

## 2024-11-22 RX ORDER — LINCOMYCIN HYDROCHLORIDE 300 MG/ML
600 INJECTION, SOLUTION INTRAMUSCULAR; INTRAVENOUS; SUBCONJUNCTIVAL
Status: COMPLETED | OUTPATIENT
Start: 2024-11-22 | End: 2024-11-22

## 2024-11-22 RX ORDER — HYDROMORPHONE HYDROCHLORIDE 4 MG/1
4 TABLET ORAL EVERY 4 HOURS PRN
Qty: 12 TABLET | Refills: 0 | Status: ON HOLD | OUTPATIENT
Start: 2024-11-22 | End: 2025-01-08

## 2024-11-22 RX ORDER — DOXYCYCLINE 100 MG/1
100 CAPSULE ORAL 2 TIMES DAILY
Qty: 14 CAPSULE | Refills: 0 | Status: ON HOLD | OUTPATIENT
Start: 2024-11-22 | End: 2025-01-08 | Stop reason: ALTCHOICE

## 2024-11-22 RX ADMIN — LINCOMYCIN HYDROCHLORIDE 600 MG: 300 INJECTION, SOLUTION INTRAMUSCULAR; INTRAVENOUS; SUBCONJUNCTIVAL at 04:11

## 2024-11-22 NOTE — TELEPHONE ENCOUNTER
Patient cannot afford overnight oximeter. Will need documentation in patient's chart to complete CMN. Patient has made and cancelled several appointment in our clinic. I spoke with the nurse at 's office regarding need for DME documentation. Patient has cancelled several appointments with Dr. Gage also.

## 2024-11-24 LAB — MICROORGANISM SPEC CULT: ABNORMAL

## 2024-11-28 DIAGNOSIS — J44.9 CHRONIC OBSTRUCTIVE PULMONARY DISEASE, UNSPECIFIED COPD TYPE: ICD-10-CM

## 2024-11-29 RX ORDER — ARFORMOTEROL TARTRATE 15 UG/2ML
15 SOLUTION RESPIRATORY (INHALATION) 2 TIMES DAILY
Qty: 180 EACH | Refills: 1 | Status: SHIPPED | OUTPATIENT
Start: 2024-11-29

## 2024-12-02 DIAGNOSIS — R11.0 NAUSEA: ICD-10-CM

## 2024-12-02 RX ORDER — ONDANSETRON 4 MG/1
TABLET, ORALLY DISINTEGRATING ORAL
Qty: 18 TABLET | Refills: 3 | Status: SHIPPED | OUTPATIENT
Start: 2024-12-02

## 2024-12-06 DIAGNOSIS — F41.9 ANXIETY: ICD-10-CM

## 2024-12-06 RX ORDER — BUSPIRONE HYDROCHLORIDE 15 MG/1
TABLET ORAL
Qty: 60 TABLET | Refills: 2 | Status: SHIPPED | OUTPATIENT
Start: 2024-12-06

## 2024-12-18 ENCOUNTER — OFFICE VISIT (OUTPATIENT)
Dept: CARDIOLOGY | Facility: CLINIC | Age: 54
End: 2024-12-18
Payer: MEDICAID

## 2024-12-18 VITALS
SYSTOLIC BLOOD PRESSURE: 130 MMHG | HEART RATE: 72 BPM | DIASTOLIC BLOOD PRESSURE: 72 MMHG | HEIGHT: 60 IN | BODY MASS INDEX: 32.39 KG/M2 | OXYGEN SATURATION: 96 % | WEIGHT: 165 LBS

## 2024-12-18 DIAGNOSIS — I25.110 ATHEROSCLEROSIS OF NATIVE CORONARY ARTERY OF NATIVE HEART WITH UNSTABLE ANGINA PECTORIS: ICD-10-CM

## 2024-12-18 DIAGNOSIS — R00.2 PALPITATIONS: Primary | ICD-10-CM

## 2024-12-18 DIAGNOSIS — J41.0 SIMPLE CHRONIC BRONCHITIS: ICD-10-CM

## 2024-12-18 DIAGNOSIS — G25.81 RESTLESS LEG SYNDROME: ICD-10-CM

## 2024-12-18 DIAGNOSIS — Z72.0 TOBACCO ABUSE DISORDER: ICD-10-CM

## 2024-12-18 DIAGNOSIS — I10 PRIMARY HYPERTENSION: ICD-10-CM

## 2024-12-18 DIAGNOSIS — F31.81 BIPOLAR 2 DISORDER, MAJOR DEPRESSIVE EPISODE: ICD-10-CM

## 2024-12-18 PROCEDURE — 3078F DIAST BP <80 MM HG: CPT | Mod: CPTII,,, | Performed by: INTERNAL MEDICINE

## 2024-12-18 PROCEDURE — 3008F BODY MASS INDEX DOCD: CPT | Mod: CPTII,,, | Performed by: INTERNAL MEDICINE

## 2024-12-18 PROCEDURE — 93010 ELECTROCARDIOGRAM REPORT: CPT | Mod: S$PBB,,, | Performed by: INTERNAL MEDICINE

## 2024-12-18 PROCEDURE — 3075F SYST BP GE 130 - 139MM HG: CPT | Mod: CPTII,,, | Performed by: INTERNAL MEDICINE

## 2024-12-18 PROCEDURE — 99999 PR PBB SHADOW E&M-EST. PATIENT-LVL V: CPT | Mod: PBBFAC,,, | Performed by: INTERNAL MEDICINE

## 2024-12-18 PROCEDURE — 93005 ELECTROCARDIOGRAM TRACING: CPT | Mod: PBBFAC | Performed by: INTERNAL MEDICINE

## 2024-12-18 PROCEDURE — 99215 OFFICE O/P EST HI 40 MIN: CPT | Mod: PBBFAC,25 | Performed by: INTERNAL MEDICINE

## 2024-12-18 PROCEDURE — 99214 OFFICE O/P EST MOD 30 MIN: CPT | Mod: S$PBB,,, | Performed by: INTERNAL MEDICINE

## 2024-12-18 NOTE — PROGRESS NOTES
PCP: Enrique Velasco DO    Referring Provider:     Subjective:   Rosemarie Lane is a 54 y.o. female, smoker, with hx of DM, HTN , HLD, CAD s/p CABG X2 in 9/20, PCI to native LAD in 11/21 who presents for follow up.    Patient denies further chest pain.  Denies shortness of breath or dizziness. She is frequently pacing during the day, to control her anxiety, not limited by chest pain, pressure or shortness of breath.  Pt reports chest pain experienced prior to PCI with PAVITHRA has resolved.        EKG 11/3/21:  Normal sinus rhythm.  Low voltage QRS.    Echo 9/2020:   Normal left ventricular cavity with overall normal systolic function. LVF is 55-60%.                           No significant valvular abnormalities were noted.  Holter 5/14/21:  Sinus tachycardia 25% of the time.  Max HR of 142 bpm was sinus tachycardia. No arrhythmias noted.  NM stress test 4/30/21:  negative for ischemia or infarct.  LHC in 9/2020:  Three vessel coronary artery disease.   Proximal left main stenosis, minimal luminal area is 4.8 mm² . IVUS performed in the LAD and left main              11/9/21:  Mid LM lesion was 50% stenosed.  The Mid LAD lesion was 70% stenosed.  The Dist LAD lesion was 90% stenosed with 0% stenosis post-intervention.              The Mid Cx to Dist Cx lesion was 90% stenosed.  The Prox Cx lesion was 90% stenosed.                           PCI with PAVITHRA XIENCE SKYPOINT 2.25 X 15 to distal LAD, distal to the LIMA-LAD anastamosis. [id number] Xience stent in the distal LAD.              Patent CHRISTOPHER to LAD Patent SVG to OM   Will potentially need PCI to OM. Due to high dye load and patient discomfort we opted to do LAD today.      Has remained asymptomatic post PCI, has continued on DAP    Past Surgical History:   Procedure Laterality Date    CARDIAC CATHETERIZATION Left 09/19/2020    Proximal left main stenosis; IVUS in the LAD and left main    CHOLECYSTECTOMY      CORONARY ARTERY BYPASS GRAFT  09/30/2020    CORONARY STENT  PLACEMENT  11/09/2021    Everolimus Eluting Coronary Stent-MLAD;LAD    DIRECT LARYNGOSCOPY Bilateral 7/18/2023    Procedure: LARYNGOSCOPY, DIRECT;  Surgeon: Nazario Sloan MD;  Location: [facility] OR;  Service: ENT;  Laterality: Bilateral;    HYSTERECTOMY      INCISION AND DRAINAGE  06/02/2020    LEFT HEART CATHETERIZATION Left 11/09/2021    Procedure: Left heart cath;  Surgeon: Aureliano Giron MD;  Location: [facility] CATH LAB;  Service: Cardiology;  Laterality: Left;    TOTAL ABDOMINAL HYSTERECTOMY W/ BILATERAL SALPINGOOPHORECTOMY  2008    [place], [place]     TUBAL LIGATION      VOCAL FOLD LESION EXCISION Bilateral 7/18/2023    Procedure: EXCISION, LESION, VOCAL CORD;  Surgeon: Nazario Sloan MD;  Location: [facility] OR;  Service: ENT;  Laterality: Bilateral;      Family History   Problem Relation Name Age of Onset    Hypertension Mother Virginia Nichols     Diabetes Mother Virginia Nichols     Heart disease Mother Virginia Nichols     Cancer Mother Shantel Nichols     Bone cancer Father Eleno nichols     Lung cancer Father Eleno nichols     Cancer Father Eleno nichols     COPD Father Eleno nichols     Diabetes Sister      Heart disease Brother      Diabetes Brother      No Known Problems Daughter      Cushing syndrome Daughter      Ovarian cancer Maternal Grandmother      No Known Problems Maternal Grandfather      Heart attack Paternal Grandmother      No Known Problems Paternal Grandfather      No Known Problems Son        Social History     Socioeconomic History    Marital status:     Number of children: 3   Tobacco Use    Smoking status: Every Day     Current packs/day: 0.50     Average packs/day: 0.5 packs/day for 38.0 years (19.0 ttl pk-yrs)     Types: Cigarettes     Start date: 1987     Passive exposure: Current    Smokeless tobacco: Never    Tobacco comments:     Patient was smoking cigarettes 2 packs per day for 10 years   Substance and Sexual Activity    Alcohol use: Never     Drug use: Never    Sexual activity: Not Currently     Partners: Male     Birth control/protection: See Surgical Hx   Social History Narrative    Patient lives in the home with personal aide.     Social Drivers of Health     Financial Resource Strain: Patient Declined (7/24/2024)    Overall Financial Resource Strain (CARDIA)     Difficulty of Paying Living Expenses: Patient declined   Food Insecurity: Food Insecurity Present (7/24/2024)    Hunger Vital Sign     Worried About Running Out of Food in the Last Year: Sometimes true     Ran Out of Food in the Last Year: Patient declined   Transportation Needs: Unmet Transportation Needs (3/26/2024)    PRAPARE - Transportation     Lack of Transportation (Medical): Yes     Lack of Transportation (Non-Medical): Yes   Physical Activity: Inactive (7/24/2024)    Exercise Vital Sign     Days of Exercise per Week: 0 days     Minutes of Exercise per Session: 0 min   Stress: Stress Concern Present (7/24/2024)    [employer] - Occupational Stress Questionnaire     Feeling of Stress : Very much   Housing Stability: Low Risk  (3/26/2024)    Housing Stability Vital Sign     Unable to Pay for Housing in the Last Year: No     Number of Places Lived in the Last Year: 1     Unstable Housing in the Last Year: No              Lab Results   Component Value Date     07/24/2024    K 3.5 07/24/2024     07/24/2024    CO2 29 07/24/2024    BUN 11 07/24/2024    CREATININE 1.03 (H) 07/24/2024    CALCIUM 10.3 (H) 07/24/2024    ANIONGAP 9 07/24/2024    ESTGFRAFRICA 62 01/18/2021    EGFRNONAA 70 07/08/2022       Lab Results   Component Value Date    CHOL 137 05/09/2024    CHOL 144 08/24/2023    CHOL 135 06/23/2023     Lab Results   Component Value Date    HDL 40 05/09/2024    HDL 43 08/24/2023    HDL 33 (L) 06/23/2023     Lab Results   Component Value Date    LDLCALC 70 05/09/2024    LDLCALC 44 08/24/2023    LDLCALC 55 06/23/2023     Lab Results   Component Value  Date    TRIG 133 05/09/2024    TRIG 285 (H) 08/24/2023    TRIG 237 (H) 06/23/2023     Lab Results   Component Value Date    CHOLHDL 3.4 05/09/2024    CHOLHDL 3.3 08/24/2023    CHOLHDL 4.1 06/23/2023       Lab Results   Component Value Date    WBC 10.46 04/28/2024    HGB 13.0 04/28/2024    HCT 42.5 04/28/2024    MCV 90.6 04/28/2024     04/28/2024           Current Outpatient Medications:     albuterol (PROVENTIL) 2.5 mg /3 mL (0.083 %) nebulizer solution, USE 1 VIAL IN NEBULIZER EVERY 4 TO 6 HOURS AS NEEDED, Disp: 180 each, Rfl: 1    arformoteroL (BROVANA) 15 mcg/2 mL Nebu, Take 2 mLs (15 mcg total) by nebulization 2 (two) times a day., Disp: 180 each, Rfl: 1    aspirin (ECOTRIN) 81 MG EC tablet, Take 1 tablet by mouth once daily., Disp: , Rfl:     atorvastatin (LIPITOR) 40 MG tablet, Take 1 tablet (40 mg total) by mouth once daily., Disp: 30 tablet, Rfl: 2    budesonide-formoterol 160-4.5 mcg (SYMBICORT) 160-4.5 mcg/actuation HFAA, Inhale 2 puffs into the lungs every 12 (twelve) hours. Controller, Disp: 18 g, Rfl: 5    busPIRone (BUSPAR) 15 MG tablet, TAKE 1 TABLET BY MOUTH 2 TIMES A DAY AS DIRECTED, Disp: 60 tablet, Rfl: 2    cariprazine (VRAYLAR) 3 mg Cap, Take 2 capsules (6 mg total) by mouth once daily., Disp: 60 capsule, Rfl: 2    clopidogreL (PLAVIX) 75 mg tablet, Take 1 tablet (75 mg total) by mouth once daily., Disp: 30 tablet, Rfl: 2    dapagliflozin propanediol (FARXIGA) 10 mg tablet, Take 1 tablet (10 mg total) by mouth once daily., Disp: 30 tablet, Rfl: 2    doxycycline (VIBRAMYCIN) 100 MG Cap, Take 1 capsule (100 mg total) by mouth 2 (two) times daily., Disp: 14 capsule, Rfl: 0    gabapentin (NEURONTIN) 300 MG capsule, Take 2 capsules (600 mg total) by mouth 2 (two) times daily., Disp: 120 capsule, Rfl: 2    HYDROmorphone (DILAUDID) 4 MG tablet, Take 1 tablet (4 mg total) by mouth every 4 (four) hours as needed for Pain., Disp: 12 tablet, Rfl: 0    hydroxychloroquine (PLAQUENIL) 200 mg tablet,  Take 1 tablet (200 mg total) by mouth 2 (two) times daily., Disp: 60 tablet, Rfl: 2    insulin (LANTUS SOLOSTAR U-100 INSULIN) glargine 100 units/mL SubQ pen, Inject 30 Units into the skin 2 (two) times a day., Disp: 51 mL, Rfl: 2    linaCLOtide (LINZESS) 145 mcg Cap capsule, Take 1 capsule (145 mcg total) by mouth daily as needed (constipation)., Disp: 30 capsule, Rfl: 2    metoprolol succinate (TOPROL-XL) 50 MG 24 hr tablet, Take 1 tablet (50 mg total) by mouth once daily., Disp: 30 tablet, Rfl: 2    mirtazapine (REMERON) 30 MG tablet, Take 1 tablet (30 mg total) by mouth once daily., Disp: 30 tablet, Rfl: 2    mupirocin (BACTROBAN) 2 % ointment, Apply topically 3 (three) times daily., Disp: 30 g, Rfl: 2    ondansetron (ZOFRAN-ODT) 4 MG TbDL, dissolve 1 tablet on tongue EVERY 8 HOURS AS NEEDED FOR NAUSEA, Disp: 18 tablet, Rfl: 3    rOPINIRole (REQUIP) 0.25 MG tablet, Take 1 tablet (0.25 mg total) by mouth 3 (three) times daily., Disp: 90 tablet, Rfl: 2    topiramate (TOPAMAX) 25 MG tablet, Take 1 tablet (25 mg total) by mouth once daily., Disp: 30 tablet, Rfl: 2    triamcinolone acetonide 0.1% (KENALOG) 0.1 % cream, Apply topically 2 (two) times daily., Disp: 454 g, Rfl: 5       Review of Systems   Respiratory: Negative for cough and shortness of breath.    Cardiovascular: Negative for chest pain, palpitations, orthopnea, claudication, leg swelling and PND.         Objective:   /72 (BP Location: Left arm, Patient Position: Sitting)   Pulse 72   Ht 5' (1.524 m)   Wt 74.8 kg (165 lb)   SpO2 96%   BMI 32.22 kg/m²     Physical Exam  Cardiovascular:      Rate and Rhythm: Normal rate and regular rhythm.      Heart sounds: Normal heart sounds. No murmur heard.     Pulmonary:      Effort: Pulmonary effort is normal.      Breath sounds: Normal breath sounds.   Musculoskeletal:      Right lower leg: No edema.      Left lower leg: No edema.           Assessment:     1. Palpitations  EKG 12-lead      2. Restless  leg syndrome        3. Bipolar 2 disorder, major depressive episode        4. Simple chronic bronchitis        5. Atherosclerosis of native coronary artery of native heart with unstable angina pectoris        6. Primary hypertension              Plan:     Problem List Items Addressed This Visit       Atherosclerosis of native coronary artery of native heart with unstable angina pectoris    Overview     S/p CABG in 9/20         Palpitations - Primary    Relevant Orders    EKG 12-lead    Chronic obstructive lung disease    Bipolar 2 disorder, major depressive episode    Restless leg syndrome    Hypertension      #CAD  Denies chest pain   LM stenosis s/p 2 vessel CABG in 9/20 with Dr Rdz  [facility]  11/9/21,  s/p PCI with PAVITHRA XIENCE SKYPOINT 2.25 X 15 to distal LAD, distal to the LIMA-LAD anastamosis. 2.89O84pr Xience stent in the distal LAD.  Patent LIMA to LAD Patent SVG to OM  Denies any more CP  She has a SVG to OM touchdown stenosis but is asymptomatic at this time.  Will continue medical therapy.    On Ranexa BID   On aspirin and Plavix   Toprol 50mg daily     #Tobacco abuse  Prescribe nicotine patches     Follow up in 6 mo

## 2024-12-25 LAB
OHS QRS DURATION: 72 MS
OHS QTC CALCULATION: 453 MS

## 2025-01-02 ENCOUNTER — OFFICE VISIT (OUTPATIENT)
Dept: FAMILY MEDICINE | Facility: CLINIC | Age: 55
End: 2025-01-02
Payer: MEDICAID

## 2025-01-02 ENCOUNTER — APPOINTMENT (OUTPATIENT)
Dept: RADIOLOGY | Facility: CLINIC | Age: 55
End: 2025-01-02
Attending: FAMILY MEDICINE
Payer: MEDICAID

## 2025-01-02 VITALS
RESPIRATION RATE: 20 BRPM | TEMPERATURE: 98 F | SYSTOLIC BLOOD PRESSURE: 123 MMHG | HEIGHT: 60 IN | BODY MASS INDEX: 32.95 KG/M2 | HEART RATE: 75 BPM | OXYGEN SATURATION: 95 % | DIASTOLIC BLOOD PRESSURE: 72 MMHG | WEIGHT: 167.81 LBS

## 2025-01-02 DIAGNOSIS — I10 PRIMARY HYPERTENSION: ICD-10-CM

## 2025-01-02 DIAGNOSIS — M54.50 ACUTE MIDLINE LOW BACK PAIN WITHOUT SCIATICA: ICD-10-CM

## 2025-01-02 DIAGNOSIS — I50.9 CONGESTIVE HEART FAILURE, UNSPECIFIED HF CHRONICITY, UNSPECIFIED HEART FAILURE TYPE: ICD-10-CM

## 2025-01-02 DIAGNOSIS — B95.8 STAPH INFECTION: ICD-10-CM

## 2025-01-02 DIAGNOSIS — S62.666D CLOSED NONDISPLACED FRACTURE OF DISTAL PHALANX OF RIGHT LITTLE FINGER WITH ROUTINE HEALING: ICD-10-CM

## 2025-01-02 DIAGNOSIS — E78.2 MIXED HYPERLIPIDEMIA: ICD-10-CM

## 2025-01-02 DIAGNOSIS — E11.65 TYPE 2 DIABETES MELLITUS WITH HYPERGLYCEMIA, WITHOUT LONG-TERM CURRENT USE OF INSULIN: Primary | ICD-10-CM

## 2025-01-02 DIAGNOSIS — G25.81 RESTLESS LEG SYNDROME: ICD-10-CM

## 2025-01-02 DIAGNOSIS — F31.81 BIPOLAR 2 DISORDER, MAJOR DEPRESSIVE EPISODE: ICD-10-CM

## 2025-01-02 DIAGNOSIS — M32.19 OTHER SYSTEMIC LUPUS ERYTHEMATOSUS WITH OTHER ORGAN INVOLVEMENT: ICD-10-CM

## 2025-01-02 DIAGNOSIS — J38.3 LESION OF VOCAL CORD: ICD-10-CM

## 2025-01-02 LAB — GLUCOSE SERPL-MCNC: 295 MG/DL (ref 70–110)

## 2025-01-02 PROCEDURE — 3078F DIAST BP <80 MM HG: CPT | Mod: CPTII,,, | Performed by: FAMILY MEDICINE

## 2025-01-02 PROCEDURE — 3008F BODY MASS INDEX DOCD: CPT | Mod: CPTII,,, | Performed by: FAMILY MEDICINE

## 2025-01-02 PROCEDURE — 3074F SYST BP LT 130 MM HG: CPT | Mod: CPTII,,, | Performed by: FAMILY MEDICINE

## 2025-01-02 PROCEDURE — 99214 OFFICE O/P EST MOD 30 MIN: CPT | Mod: ,,, | Performed by: FAMILY MEDICINE

## 2025-01-02 PROCEDURE — 72220 X-RAY EXAM SACRUM TAILBONE: CPT | Mod: 26,,, | Performed by: RADIOLOGY

## 2025-01-02 PROCEDURE — 82962 GLUCOSE BLOOD TEST: CPT | Mod: RHCUB | Performed by: FAMILY MEDICINE

## 2025-01-02 PROCEDURE — 72220 X-RAY EXAM SACRUM TAILBONE: CPT | Mod: TC,RHCUB | Performed by: FAMILY MEDICINE

## 2025-01-02 RX ORDER — HYDROCODONE BITARTRATE AND ACETAMINOPHEN 7.5; 325 MG/1; MG/1
1 TABLET ORAL EVERY 6 HOURS PRN
Qty: 20 TABLET | Refills: 0 | Status: ON HOLD | OUTPATIENT
Start: 2025-01-02 | End: 2025-01-08

## 2025-01-02 NOTE — PROGRESS NOTES
Oren Gage DO   [facility]  [address]  [place]  [address]      PATIENT NAME: Rosemarie Lane  : 1970  DATE: 25  MRN: [id number]      Billing Provider: Oren Gage DO  Level of Service:   Patient PCP Information       None on File            Reason for Visit / Chief Complaint: Follow-up (Ms. Houston 55 y/o female presenting to the clinic for 1 month follow up. /Ms. Houston states her concerns about dealing with memory loss.), Hip Pain (Pain started 3 weeks ago, pain in both hips.), and Diabetes (Ms. Houston states her glucose levels been elevated for the last 4 days.)       Update PCP  Update Chief Complaint         History of Present Illness / Problem Focused Workflow     Rosemarie Lane presents to the clinic with Follow-up (Ms. Houston 55 y/o female presenting to the clinic for 1 month follow up. /Ms. Houston states her concerns about dealing with memory loss.), Hip Pain (Pain started 3 weeks ago, pain in both hips.), and Diabetes (Ms. Houston states her glucose levels been elevated for the last 4 days.)     Patient has had some memory loss in that is beginning to worry her.  Likely medication related and also from her diabetes.  Referral to Neurology.  Patient has COPD and shortness breast is improved.  She complains of pain in her finger and x-rays of the finger reveals what appears to be distal phalanx avulsion fracture posteriorly.  Patient's diabetes is not well controlled at this time.  She states her blood pressure is fine she denies any chest pain shortness in breath palpitations.  She has a history of lupus in his on Plaquenil.        Review of Systems     Review of Systems   Constitutional:  Negative for activity change, appetite change, chills, fatigue and fever.   HENT:  Negative for nasal congestion, ear discharge, ear pain, mouth dryness, mouth sores, postnasal drip, sinus pressure/congestion, sore throat and voice change.    Eyes:  Negative for pain,  discharge, redness, itching and visual disturbance.   Respiratory:  Positive for shortness of breath. Negative for apnea, cough, chest tightness and wheezing.    Cardiovascular:  Positive for leg swelling. Negative for chest pain and palpitations.   Gastrointestinal:  Negative for abdominal distention, abdominal pain, anal bleeding, blood in stool, change in bowel habit, constipation, diarrhea, nausea, vomiting and reflux.   Endocrine: Negative for cold intolerance, heat intolerance, polydipsia, polyphagia and polyuria.   Genitourinary:  Negative for difficulty urinating, enuresis, frequency, genital sores, hematuria, hot flashes, menstrual irregularity, urgency and vaginal dryness.   Musculoskeletal:  Positive for back pain, joint swelling and joint deformity. Negative for arthralgias, gait problem, leg pain, myalgias and neck pain.   Integumentary:  Negative for rash, mole/lesion, breast mass, breast discharge and breast tenderness.   Allergic/Immunologic: Negative for environmental allergies and food allergies.   Neurological:  Negative for dizziness, vertigo, tremors, seizures, syncope, facial asymmetry, speech difficulty, weakness, light-headedness, numbness, headaches, memory loss and coordination difficulties.   Hematological:  Negative for adenopathy. Does not bruise/bleed easily.   Psychiatric/Behavioral:  Negative for agitation, behavioral problems, confusion, decreased concentration, dysphoric mood, hallucinations, self-injury, sleep disturbance and suicidal ideas. The patient is not nervous/anxious and is not hyperactive.    Breast: Negative for mass and tenderness      Medical / Social / Family History     Past Medical History:   Diagnosis Date    Anxiety     Arthritis     Asthma     Cancer     CHF (congestive heart failure)     Chronic obstructive lung disease     Coronary artery disease     Depression     Diabetes mellitus     Diabetes mellitus, type 2     Encounter for blood transfusion     History  of kidney stones     Hyperlipidemia     Hypertension     Lupus     Renal disorder     Seizures     Thyroid disease     Transfusion reaction        Past Surgical History:   Procedure Laterality Date    CARDIAC CATHETERIZATION Left 09/19/2020    Proximal left main stenosis; IVUS in the LAD and left main    CHOLECYSTECTOMY      CORONARY ARTERY BYPASS GRAFT  09/30/2020    CORONARY STENT PLACEMENT  11/09/2021    Everolimus Eluting Coronary Stent-MLAD;LAD    DIRECT LARYNGOSCOPY Bilateral 7/18/2023    Procedure: LARYNGOSCOPY, DIRECT;  Surgeon: Nazario Sloan MD;  Location: [facility] OR;  Service: ENT;  Laterality: Bilateral;    HYSTERECTOMY      INCISION AND DRAINAGE  06/02/2020    LEFT HEART CATHETERIZATION Left 11/09/2021    Procedure: Left heart cath;  Surgeon: Aureliano Giron MD;  Location: [facility] CATH LAB;  Service: Cardiology;  Laterality: Left;    TOTAL ABDOMINAL HYSTERECTOMY W/ BILATERAL SALPINGOOPHORECTOMY  2008    [place]     TUBAL LIGATION      VOCAL FOLD LESION EXCISION Bilateral 7/18/2023    Procedure: EXCISION, LESION, VOCAL CORD;  Surgeon: Nazario Sloan MD;  Location: [facility] OR;  Service: ENT;  Laterality: Bilateral;       Social History  Ms.  reports that she has been smoking cigarettes. She started smoking about 38 years ago. She has a 19.1 pack-year smoking history. She has been exposed to tobacco smoke. She has never used smokeless tobacco. She reports that she does not drink alcohol and does not use drugs.    Family History  Ms.'s family history includes Bone cancer in her father; COPD in her father; Cancer in her father and mother; Cushing syndrome in her daughter; Diabetes in her brother, mother, and sister; Heart attack in her paternal grandmother; Heart disease in her brother and mother; Hypertension in her mother; Lung cancer in her father; No Known Problems in her daughter, maternal grandfather, paternal grandfather, and son; Ovarian cancer in her maternal  grandmother.    Medications and Allergies     Medications  Outpatient Medications Marked as Taking for the 1/2/25 encounter (Office Visit) with Oren Gage, DO   Medication Sig Dispense Refill    aspirin (ECOTRIN) 81 MG EC tablet Take 1 tablet by mouth once daily.      atorvastatin (LIPITOR) 40 MG tablet Take 1 tablet (40 mg total) by mouth once daily. 30 tablet 2    busPIRone (BUSPAR) 15 MG tablet TAKE 1 TABLET BY MOUTH 2 TIMES A DAY AS DIRECTED 60 tablet 2    cariprazine (VRAYLAR) 3 mg Cap Take 2 capsules (6 mg total) by mouth once daily. 60 capsule 2    dapagliflozin propanediol (FARXIGA) 10 mg tablet Take 1 tablet (10 mg total) by mouth once daily. 30 tablet 2    gabapentin (NEURONTIN) 300 MG capsule Take 2 capsules (600 mg total) by mouth 2 (two) times daily. 120 capsule 2    hydroxychloroquine (PLAQUENIL) 200 mg tablet Take 1 tablet (200 mg total) by mouth 2 (two) times daily. 60 tablet 2    metoprolol succinate (TOPROL-XL) 50 MG 24 hr tablet Take 1 tablet (50 mg total) by mouth once daily. 30 tablet 2    mirtazapine (REMERON) 30 MG tablet Take 1 tablet (30 mg total) by mouth once daily. 30 tablet 2    mupirocin (BACTROBAN) 2 % ointment Apply topically 3 (three) times daily. 30 g 2    topiramate (TOPAMAX) 25 MG tablet Take 1 tablet (25 mg total) by mouth once daily. 30 tablet 2    [DISCONTINUED] albuterol (PROVENTIL) 2.5 mg /3 mL (0.083 %) nebulizer solution USE 1 VIAL IN NEBULIZER EVERY 4 TO 6 HOURS AS NEEDED 180 each 1    [DISCONTINUED] arformoteroL (BROVANA) 15 mcg/2 mL Nebu Take 2 mLs (15 mcg total) by nebulization 2 (two) times a day. 180 each 1    [DISCONTINUED] clopidogreL (PLAVIX) 75 mg tablet Take 1 tablet (75 mg total) by mouth once daily. 30 tablet 2    [DISCONTINUED] doxycycline (VIBRAMYCIN) 100 MG Cap Take 1 capsule (100 mg total) by mouth 2 (two) times daily. 14 capsule 0    [DISCONTINUED] HYDROmorphone (DILAUDID) 4 MG tablet Take 1 tablet (4 mg total) by mouth every 4 (four) hours as  needed for Pain. 12 tablet 0    [DISCONTINUED] insulin (LANTUS SOLOSTAR U-100 INSULIN) glargine 100 units/mL SubQ pen Inject 30 Units into the skin 2 (two) times a day. 51 mL 2    [DISCONTINUED] linaCLOtide (LINZESS) 145 mcg Cap capsule Take 1 capsule (145 mcg total) by mouth daily as needed (constipation). 30 capsule 2    [DISCONTINUED] ondansetron (ZOFRAN-ODT) 4 MG TbDL dissolve 1 tablet on tongue EVERY 8 HOURS AS NEEDED FOR NAUSEA 18 tablet 3    [DISCONTINUED] rOPINIRole (REQUIP) 0.25 MG tablet Take 1 tablet (0.25 mg total) by mouth 3 (three) times daily. 90 tablet 2    [DISCONTINUED] triamcinolone acetonide 0.1% (KENALOG) 0.1 % cream Apply topically 2 (two) times daily. 454 g 5       Allergies  Review of patient's allergies indicates:   Allergen Reactions    Fish containing products Hives and Swelling    Penicillins Swelling     Localized swelling     Wasp venom Anaphylaxis    Hydroxyzine Hallucinations    Opioids - morphine analogues      Change in behavior    Toradol [ketorolac] Hives    Tramadol Hives    Zithromax [azithromycin] Other (See Comments)     Patient cannot remember what kind of reaction she had to this medication    Bactrim ds [sulfamethoxazole-trimethoprim] Nausea And Vomiting     Facial flush    Latex, natural rubber Rash       Physical Examination     Vitals:    01/02/25 1357   BP: 123/72   Pulse: 75   Resp: 20   Temp: 98.3 °F (36.8 °C)     Physical Exam  Vitals reviewed.   Constitutional:       General: She is not in acute distress.     Appearance: Normal appearance. She is obese.   HENT:      Head: Normocephalic and atraumatic.      Nose: Nose normal.      Mouth/Throat:      Mouth: Mucous membranes are moist.      Pharynx: Oropharynx is clear. No oropharyngeal exudate or posterior oropharyngeal erythema.   Eyes:      General: No scleral icterus.     Extraocular Movements: Extraocular movements intact.      Conjunctiva/sclera: Conjunctivae normal.      Pupils: Pupils are equal, round, and  reactive to light.   Neck:      Vascular: No carotid bruit.   Cardiovascular:      Rate and Rhythm: Normal rate and regular rhythm.      Pulses: Normal pulses.      Heart sounds: Murmur heard.      No gallop.   Pulmonary:      Effort: Pulmonary effort is normal.      Breath sounds: Normal breath sounds. No wheezing.   Abdominal:      General: Abdomen is flat. Bowel sounds are normal.      Palpations: Abdomen is soft.      Tenderness: There is no abdominal tenderness.   Musculoskeletal:         General: Normal range of motion.      Cervical back: Normal range of motion and neck supple.      Right lower leg: No edema.      Left lower leg: No edema.      Comments: Right middle finger noted tender posteriorly and can not completely flex the finger distally.  X-rays show what appears to be a posterior avulsion fracture distal phalanx.  Lumbar spine decreased range of motion in all planes.  X-rays show some mild degenerative changes are though no fractures are noted in the lumbar spine in sacrum coccyx area.   Lymphadenopathy:      Cervical: No cervical adenopathy.   Skin:     General: Skin is warm and dry.      Capillary Refill: Capillary refill takes less than 2 seconds.      Coloration: Skin is not jaundiced.      Findings: No lesion.   Neurological:      General: No focal deficit present.      Mental Status: She is alert and oriented to person, place, and time. Mental status is at baseline.      Cranial Nerves: No cranial nerve deficit.      Sensory: No sensory deficit.      Motor: No weakness.      Coordination: Coordination normal.      Gait: Gait normal.      Deep Tendon Reflexes: Reflexes normal.   Psychiatric:         Mood and Affect: Mood normal.         Behavior: Behavior normal.         Thought Content: Thought content normal.         Judgment: Judgment normal.               Lab Results   Component Value Date    WBC 4.68 01/09/2025    HGB 13.3 01/09/2025    HCT 41.6 01/09/2025    MCV 89.3 01/09/2025      (L) 01/09/2025          Sodium   Date Value Ref Range Status   01/09/2025 139 136 - 145 mmol/L Final     Potassium   Date Value Ref Range Status   01/09/2025 4.0 3.5 - 5.1 mmol/L Final     Chloride   Date Value Ref Range Status   01/09/2025 107 98 - 107 mmol/L Final     CO2   Date Value Ref Range Status   01/09/2025 25 22 - 29 mmol/L Final     Glucose   Date Value Ref Range Status   01/09/2025 133 (H) 74 - 100 mg/dL Final     BUN   Date Value Ref Range Status   01/09/2025 10 10 - 20 mg/dL Final     Creatinine   Date Value Ref Range Status   01/09/2025 0.87 0.55 - 1.02 mg/dL Final     Calcium   Date Value Ref Range Status   01/09/2025 8.7 8.4 - 10.2 mg/dL Final     Total Protein   Date Value Ref Range Status   01/08/2025 8.0 6.4 - 8.3 g/dL Final     Albumin   Date Value Ref Range Status   01/08/2025 4.2 3.5 - 5.0 g/dL Final     Bilirubin, Total   Date Value Ref Range Status   01/08/2025 0.5 <=1.5 mg/dL Final     Alk Phos   Date Value Ref Range Status   01/08/2025 104 40 - 150 U/L Final     AST   Date Value Ref Range Status   01/08/2025 34 5 - 34 U/L Final     ALT   Date Value Ref Range Status   01/08/2025 27 <=55 U/L Final     Anion Gap   Date Value Ref Range Status   01/09/2025 11 7 - 16 mmol/L Final     eGFR    Date Value Ref Range Status   01/18/2021 62       eGFR   Date Value Ref Range Status   07/08/2022 70 >=60 mL/min/1.73m² Final      CT Abdomen Pelvis With IV Contrast NO Oral Contrast  Addendum: The body of the report should read:     Bowel: Scattered sigmoid diverticula without focal diverticulitis.     Electronically signed by: Austin Chambers   Date:    01/08/2025   Time:    10:55  Narrative: CMS MANDATED QUALITY DATA - CT RADIATION - 436    All CT scans at this facility utilize dose modulation, iterative reconstruction, and/or weight based dosing when appropriate to reduce radiation dose to as low as reasonably achievable.    CLINICAL HISTORY:  ([id number])53 y/o  (1970)  F    Abdominal pain, acute, nonlocalized;    TECHNIQUE:  (A#[id number], exam time 1/8/2025 10:24)    CT ABDOMEN PELVIS WITH IV CONTRAST [id number]    Axial CT images of the abdomen and pelvis were obtained from the dome of the diaphragm to the proximal thighs.    COMPARISON:  04/28/2024.    FINDINGS:  Lower Thorax:    The lung bases are clear. The heart is normal in size.    CT Abdomen:    Liver: Relative diffuse low-attenuation liver consistent with hepatic steatosis.    Gallbladder: The gallbladder is surgically absent.    Biliary Tree: No intra or extrahepatic ductal dilation.    Spleen: Normal.    Pancreas: The pancreas is normal.    Adrenal Glands: Normal    Kidneys: Is asymmetrically small/atrophic with renal cortical scarring/thinning and wedge-shaped cortical defects compatible with either remote infarct, infection or remote obstructive uropathy.  No hydronephrosis/hydroureter or evidence of obstructive uropathy is seen on today's exam.  No contour deforming renal mass or cyst is identified.    Vasculature: Calcified plaques are seen in the abdominal aorta and iliacs with no aneurysm.    Lymph nodes: No abdominal lymphadenopathy is seen.    Intraperitoneal structures: There is no ascites.    Bowel: Scattered sigmoid diverticula of focal diverticulitis.  Mildly ectatic loops of small bowel are seen in the mid abdomen (image 43), top-normal sore for size measuring up to 2.7 cm, likely transient.  The bowel is normal in course and caliber without finding of obstruction, free air or abscess identified.    Abdominal wall: The abdominal wall and musculature are normal.    Musculoskeletal: No acute osseous abnormality is identified.  Levoscoliosis is seen with a transitional lumbosacral vertebral body (L5) which is partially sacralized at the left transverse process.    CT Pelvis:    Bladder: Normal.    Reproductive Organs: The uterus is not visualized/surgically absent.  Surgically absent.    Pelvic Lymph nodes: No  lymphadenopathy.  Impression: No acute abdominal or pelvic process, with numerous additional, and incidental findings as above.    Electronically signed by: Austin Chambers  Date:    01/08/2025  Time:    10:32     Procedures   Lab Results   Component Value Date    CHOL 137 05/09/2024    CHOL 144 08/24/2023    CHOL 135 06/23/2023     Lab Results   Component Value Date    HDL 40 05/09/2024    HDL 43 08/24/2023    HDL 33 (L) 06/23/2023     Lab Results   Component Value Date    LDLCALC 70 05/09/2024    LDLCALC 44 08/24/2023    LDLCALC 55 06/23/2023     Lab Results   Component Value Date    TRIG 133 05/09/2024    TRIG 285 (H) 08/24/2023    TRIG 237 (H) 06/23/2023     Lab Results   Component Value Date    CHOLHDL 3.4 05/09/2024    CHOLHDL 3.3 08/24/2023    CHOLHDL 4.1 06/23/2023      Lab Results   Component Value Date    HGBA1C 7.1 (H) 09/05/2024      Lab Results   Component Value Date    TSH 1.180 08/24/2023    T6ICXMN 8.3 08/24/2023      Assessment and Plan (including Health Maintenance)      Problem List  Smart Sets  Document Outside HM   :    Plan:         Health Maintenance Due   Topic Date Due    Foot Exam  Never done    TETANUS VACCINE  Never done    Sign Pain Contract  Never done    Complete Opioid Risk Tool  Never done    Naloxone Prescription  Never done    Mammogram  Never done    Pneumococcal Vaccines (Age 50+) (1 of 2 - PCV) Never done    Shingles Vaccine (1 of 2) Never done    COVID-19 Vaccine (1 - 2024-25 season) Never done    Urine Drug Screen  09/24/2024    Hemoglobin A1c  03/05/2025    Colorectal Cancer Screening  03/14/2025       Problem List Items Addressed This Visit       Diabetes mellitus - Primary    Current Assessment & Plan   Lab Results   Component Value Date    HGBA1C 7.1 (H) 09/05/2024    Last blood sugar today was 295 and she states it has been up lately.  Last A1c was 7.1 in September.  No changes in her Lantus at this time.         Relevant Orders    POCT Glucose, Hand-Held Device  (Completed)    Bipolar 2 disorder, major depressive episode    Current Assessment & Plan   Bipolar disease currently stable.  Continue her Remeron and on Vraylar.  Follow-up every 3 months.  Recommend she follow-up with Adilia kebede         Systemic lupus erythematosus    Current Assessment & Plan   Currently on Plaquenil and stable.  Referral to Rheumatology.  Will need to check her NICHOLE rheumatoid factor as well as CBC and CMP at her next visit.  Also CRP and sed rate         Acute midline low back pain without sciatica    Current Assessment & Plan   Lumbar disc disease with degenerative disease likely.  Patient currently takes Dilaudid from the pain clinic and will not change that medication.  Heat to the area and lidocaine patches may be useful.  Would also recommend 10s units.         Relevant Orders    X-Ray Sacrum And Coccyx (Completed)    X-Ray Lumbar Spine AP And Lateral (Completed)    Hyperlipidemia    Current Assessment & Plan   Lab Results   Component Value Date    LDLCALC 70 05/09/2024    Last cholesterol was 70 LDL C.  Good control of her lipids.  Maintain atorvastatin 40 mg daily.  Recheck lipids today.         CHF (congestive heart failure)    Current Assessment & Plan   Congestive heart failure currently stable.  No PND orthopnea.  Maintain her on her Toprol and Farxiga.  Follow-up with cardiology every 6 months.         Hypertension    Current Assessment & Plan   Hypertension well controlled no change in medication.  Follow-up in 3 months.  Will check CBC and CMP yearly.  Goal is blood pressure 120/70          Lesion of vocal cord    Current Assessment & Plan   History of old record lesion and she is to follow-up with ear nose throat.         Staph infection    Current Assessment & Plan   Staph infection improved.  Finish her antibiotics.  Follow-up on a p.r.n. basis.         Closed nondisplaced fracture of distal phalanx of right little finger with routine healing    Current Assessment & Plan    Will splint her middle finger.  If it does not heal within 4 weeks will refer to Orthopedics.         Relevant Orders    X-Ray Finger 2 or More Views Right (Completed)       Health Maintenance Topics with due status: Not Due       Topic Last Completion Date    Diabetes Urine Screening 05/09/2024    Lipid Panel 05/09/2024    High Dose Statin 01/02/2025    Aspirin/Antiplatelet Therapy 01/02/2025    RSV Vaccine (Age 60+ and Pregnant patients) Not Due       Future Appointments   Date Time Provider Department Center   3/14/2025  4:00 PM Oren Gage,  [facility]   4/1/2025  1:50 PM Nazario Sloan MD [facility] ENT [facility]   5/16/2025  9:20 AM Oren Gage DO [facility]   6/25/2025 10:20 AM Shamir Price DO [facility] CARD [facility]        No follow-ups on file.     Signature:  Oren Gage DO  [address] [place]  [address]    Date of encounter: 1/2/25

## 2025-01-03 RX ORDER — ROPINIROLE 0.25 MG/1
0.25 TABLET, FILM COATED ORAL 3 TIMES DAILY
Qty: 90 TABLET | Refills: 2 | Status: SHIPPED | OUTPATIENT
Start: 2025-01-03

## 2025-01-08 ENCOUNTER — HOSPITAL ENCOUNTER (OUTPATIENT)
Facility: HOSPITAL | Age: 55
Discharge: HOME | End: 2025-01-09
Attending: FAMILY MEDICINE | Admitting: FAMILY MEDICINE
Payer: MEDICAID

## 2025-01-08 DIAGNOSIS — K85.90 ACUTE PANCREATITIS: ICD-10-CM

## 2025-01-08 DIAGNOSIS — K85.90 ACUTE PANCREATITIS, UNSPECIFIED COMPLICATION STATUS, UNSPECIFIED PANCREATITIS TYPE: Primary | ICD-10-CM

## 2025-01-08 PROBLEM — F17.210 CIGARETTE NICOTINE DEPENDENCE WITHOUT COMPLICATION: Status: ACTIVE | Noted: 2025-01-08

## 2025-01-08 LAB
ALBUMIN SERPL BCP-MCNC: 4.2 G/DL (ref 3.5–5)
ALBUMIN/GLOB SERPL: 1.1 {RATIO}
ALP SERPL-CCNC: 104 U/L (ref 40–150)
ALT SERPL W P-5'-P-CCNC: 27 U/L
ANION GAP SERPL CALCULATED.3IONS-SCNC: 17 MMOL/L (ref 7–16)
AST SERPL W P-5'-P-CCNC: 34 U/L (ref 5–34)
BACTERIA #/AREA URNS HPF: NORMAL /HPF
BASOPHILS # BLD AUTO: 0.05 K/UL (ref 0–0.2)
BASOPHILS NFR BLD AUTO: 0.6 % (ref 0–1)
BILIRUB SERPL-MCNC: 0.5 MG/DL
BILIRUB UR QL STRIP: NEGATIVE
BUN SERPL-MCNC: 14 MG/DL (ref 10–20)
BUN/CREAT SERPL: 12 (ref 6–20)
CALCIUM SERPL-MCNC: 10.3 MG/DL (ref 8.4–10.2)
CHLORIDE SERPL-SCNC: 99 MMOL/L (ref 98–107)
CLARITY UR: CLEAR
CO2 SERPL-SCNC: 23 MMOL/L (ref 22–29)
COLOR UR: ABNORMAL
CREAT SERPL-MCNC: 1.15 MG/DL (ref 0.55–1.02)
DIFFERENTIAL METHOD BLD: ABNORMAL
EGFR (NO RACE VARIABLE) (RUSH/TITUS): 57 ML/MIN/1.73M2
EOSINOPHIL # BLD AUTO: 0.15 K/UL (ref 0–0.5)
EOSINOPHIL NFR BLD AUTO: 1.8 % (ref 1–4)
ERYTHROCYTE [DISTWIDTH] IN BLOOD BY AUTOMATED COUNT: 14.3 % (ref 11.5–14.5)
GLOBULIN SER-MCNC: 3.8 G/DL (ref 2–4)
GLUCOSE SERPL-MCNC: 129 MG/DL (ref 70–105)
GLUCOSE SERPL-MCNC: 162 MG/DL (ref 70–105)
GLUCOSE SERPL-MCNC: 207 MG/DL (ref 70–105)
GLUCOSE SERPL-MCNC: 266 MG/DL (ref 70–105)
GLUCOSE SERPL-MCNC: 271 MG/DL (ref 74–100)
GLUCOSE UR STRIP-MCNC: >=1000 MG/DL
HCT VFR BLD AUTO: 47.7 % (ref 38–47)
HGB BLD-MCNC: 15.9 G/DL (ref 12–16)
INFLUENZA A MOLECULAR (OHS): NEGATIVE
INFLUENZA B MOLECULAR (OHS): NEGATIVE
KETONES UR STRIP-SCNC: NEGATIVE MG/DL
LACTATE SERPL-SCNC: 0.9 MMOL/L (ref 0.5–2.2)
LACTATE SERPL-SCNC: 2.3 MMOL/L (ref 0.5–2.2)
LEUKOCYTE ESTERASE UR QL STRIP: NEGATIVE
LIPASE SERPL-CCNC: 184 U/L
LYMPHOCYTES # BLD AUTO: 2.69 K/UL (ref 1–4.8)
LYMPHOCYTES NFR BLD AUTO: 33.1 % (ref 27–41)
MCH RBC QN AUTO: 28.6 PG (ref 27–31)
MCHC RBC AUTO-ENTMCNC: 33.3 G/DL (ref 32–36)
MCV RBC AUTO: 85.9 FL (ref 80–96)
MONOCYTES # BLD AUTO: 0.5 K/UL (ref 0–0.8)
MONOCYTES NFR BLD AUTO: 6.2 % (ref 2–6)
MPC BLD CALC-MCNC: 10.1 FL (ref 9.4–12.4)
NEUTROPHILS # BLD AUTO: 4.74 K/UL (ref 1.8–7.7)
NEUTROPHILS NFR BLD AUTO: 58.3 % (ref 53–65)
NITRITE UR QL STRIP: NEGATIVE
PH UR STRIP: 6 PH UNITS
PLATELET # BLD AUTO: 168 K/UL (ref 150–400)
POTASSIUM SERPL-SCNC: 4.3 MMOL/L (ref 3.5–5.1)
PROT SERPL-MCNC: 8 G/DL (ref 6.4–8.3)
PROT UR QL STRIP: NEGATIVE
RBC # BLD AUTO: 5.55 M/UL (ref 4.2–5.4)
RBC # UR STRIP: ABNORMAL /UL
RBC #/AREA URNS HPF: NORMAL /HPF
SODIUM SERPL-SCNC: 135 MMOL/L (ref 136–145)
SP GR UR STRIP: <=1.005
SQUAMOUS #/AREA URNS LPF: NORMAL /LPF
UROBILINOGEN UR STRIP-ACNC: 0.2 MG/DL
WBC # BLD AUTO: 8.13 K/UL (ref 4.5–11)
WBC #/AREA URNS HPF: NORMAL /HPF

## 2025-01-08 PROCEDURE — 25000242 PHARM REV CODE 250 ALT 637 W/ HCPCS: Performed by: NURSE PRACTITIONER

## 2025-01-08 PROCEDURE — 94761 N-INVAS EAR/PLS OXIMETRY MLT: CPT

## 2025-01-08 PROCEDURE — 63600175 PHARM REV CODE 636 W HCPCS: Performed by: FAMILY MEDICINE

## 2025-01-08 PROCEDURE — 83605 ASSAY OF LACTIC ACID: CPT | Mod: 91 | Performed by: NURSE PRACTITIONER

## 2025-01-08 PROCEDURE — 96372 THER/PROPH/DIAG INJ SC/IM: CPT | Performed by: FAMILY MEDICINE

## 2025-01-08 PROCEDURE — 99499 UNLISTED E&M SERVICE: CPT | Mod: ,,, | Performed by: NURSE PRACTITIONER

## 2025-01-08 PROCEDURE — 80053 COMPREHEN METABOLIC PANEL: CPT | Performed by: NURSE PRACTITIONER

## 2025-01-08 PROCEDURE — 99222 1ST HOSP IP/OBS MODERATE 55: CPT | Mod: ,,, | Performed by: NURSE PRACTITIONER

## 2025-01-08 PROCEDURE — 25000003 PHARM REV CODE 250: Performed by: NURSE PRACTITIONER

## 2025-01-08 PROCEDURE — 81003 URINALYSIS AUTO W/O SCOPE: CPT | Performed by: NURSE PRACTITIONER

## 2025-01-08 PROCEDURE — 94799 UNLISTED PULMONARY SVC/PX: CPT

## 2025-01-08 PROCEDURE — 36415 COLL VENOUS BLD VENIPUNCTURE: CPT | Performed by: NURSE PRACTITIONER

## 2025-01-08 PROCEDURE — 96376 TX/PRO/DX INJ SAME DRUG ADON: CPT

## 2025-01-08 PROCEDURE — 96365 THER/PROPH/DIAG IV INF INIT: CPT | Mod: 59

## 2025-01-08 PROCEDURE — 63600175 PHARM REV CODE 636 W HCPCS: Performed by: NURSE PRACTITIONER

## 2025-01-08 PROCEDURE — 96375 TX/PRO/DX INJ NEW DRUG ADDON: CPT

## 2025-01-08 PROCEDURE — 87502 INFLUENZA DNA AMP PROBE: CPT | Performed by: NURSE PRACTITIONER

## 2025-01-08 PROCEDURE — 94640 AIRWAY INHALATION TREATMENT: CPT | Mod: XB

## 2025-01-08 PROCEDURE — 99900035 HC TECH TIME PER 15 MIN (STAT)

## 2025-01-08 PROCEDURE — 82962 GLUCOSE BLOOD TEST: CPT

## 2025-01-08 PROCEDURE — G0378 HOSPITAL OBSERVATION PER HR: HCPCS

## 2025-01-08 PROCEDURE — S4991 NICOTINE PATCH NONLEGEND: HCPCS | Performed by: NURSE PRACTITIONER

## 2025-01-08 PROCEDURE — 25000003 PHARM REV CODE 250: Performed by: FAMILY MEDICINE

## 2025-01-08 PROCEDURE — 83690 ASSAY OF LIPASE: CPT | Performed by: NURSE PRACTITIONER

## 2025-01-08 PROCEDURE — 99285 EMERGENCY DEPT VISIT HI MDM: CPT | Mod: 25

## 2025-01-08 PROCEDURE — 85025 COMPLETE CBC W/AUTO DIFF WBC: CPT | Performed by: NURSE PRACTITIONER

## 2025-01-08 PROCEDURE — 96372 THER/PROPH/DIAG INJ SC/IM: CPT | Performed by: NURSE PRACTITIONER

## 2025-01-08 PROCEDURE — 96361 HYDRATE IV INFUSION ADD-ON: CPT

## 2025-01-08 PROCEDURE — 25500020 PHARM REV CODE 255: Performed by: NURSE PRACTITIONER

## 2025-01-08 RX ORDER — ATORVASTATIN CALCIUM 40 MG/1
40 TABLET, FILM COATED ORAL DAILY
Status: DISCONTINUED | OUTPATIENT
Start: 2025-01-08 | End: 2025-01-08

## 2025-01-08 RX ORDER — ATORVASTATIN CALCIUM 40 MG/1
40 TABLET, FILM COATED ORAL NIGHTLY
Status: DISCONTINUED | OUTPATIENT
Start: 2025-01-08 | End: 2025-01-09 | Stop reason: HOSPADM

## 2025-01-08 RX ORDER — IOPAMIDOL 755 MG/ML
100 INJECTION, SOLUTION INTRAVASCULAR
Status: COMPLETED | OUTPATIENT
Start: 2025-01-08 | End: 2025-01-08

## 2025-01-08 RX ORDER — INSULIN ASPART 100 [IU]/ML
0-10 INJECTION, SOLUTION INTRAVENOUS; SUBCUTANEOUS
Status: DISCONTINUED | OUTPATIENT
Start: 2025-01-08 | End: 2025-01-09 | Stop reason: HOSPADM

## 2025-01-08 RX ORDER — GABAPENTIN 300 MG/1
600 CAPSULE ORAL 2 TIMES DAILY
Status: DISCONTINUED | OUTPATIENT
Start: 2025-01-08 | End: 2025-01-09 | Stop reason: HOSPADM

## 2025-01-08 RX ORDER — ASPIRIN 81 MG/1
81 TABLET ORAL DAILY
Status: DISCONTINUED | OUTPATIENT
Start: 2025-01-08 | End: 2025-01-08

## 2025-01-08 RX ORDER — CLOPIDOGREL BISULFATE 75 MG/1
75 TABLET ORAL DAILY
Status: DISCONTINUED | OUTPATIENT
Start: 2025-01-08 | End: 2025-01-08

## 2025-01-08 RX ORDER — METOPROLOL SUCCINATE 50 MG/1
50 TABLET, EXTENDED RELEASE ORAL DAILY
Status: DISCONTINUED | OUTPATIENT
Start: 2025-01-08 | End: 2025-01-08

## 2025-01-08 RX ORDER — INSULIN GLARGINE 100 [IU]/ML
30 INJECTION, SOLUTION SUBCUTANEOUS 2 TIMES DAILY
Status: DISCONTINUED | OUTPATIENT
Start: 2025-01-08 | End: 2025-01-09 | Stop reason: HOSPADM

## 2025-01-08 RX ORDER — IBUPROFEN 200 MG
16 TABLET ORAL
Status: DISCONTINUED | OUTPATIENT
Start: 2025-01-08 | End: 2025-01-09 | Stop reason: HOSPADM

## 2025-01-08 RX ORDER — NALOXONE HCL 0.4 MG/ML
0.02 VIAL (ML) INJECTION
Status: DISCONTINUED | OUTPATIENT
Start: 2025-01-08 | End: 2025-01-09 | Stop reason: HOSPADM

## 2025-01-08 RX ORDER — GLUCAGON 1 MG
1 KIT INJECTION
Status: DISCONTINUED | OUTPATIENT
Start: 2025-01-08 | End: 2025-01-09 | Stop reason: HOSPADM

## 2025-01-08 RX ORDER — HYDROXYCHLOROQUINE SULFATE 200 MG/1
200 TABLET, FILM COATED ORAL 2 TIMES DAILY
Status: DISCONTINUED | OUTPATIENT
Start: 2025-01-08 | End: 2025-01-09 | Stop reason: HOSPADM

## 2025-01-08 RX ORDER — HYDROXYZINE HYDROCHLORIDE 25 MG/ML
25 INJECTION, SOLUTION INTRAMUSCULAR ONCE
Status: COMPLETED | OUTPATIENT
Start: 2025-01-08 | End: 2025-01-08

## 2025-01-08 RX ORDER — SODIUM CHLORIDE 9 MG/ML
INJECTION, SOLUTION INTRAVENOUS CONTINUOUS
Status: DISCONTINUED | OUTPATIENT
Start: 2025-01-08 | End: 2025-01-09 | Stop reason: HOSPADM

## 2025-01-08 RX ORDER — BUDESONIDE 0.5 MG/2ML
0.5 INHALANT ORAL EVERY 12 HOURS
Status: DISCONTINUED | OUTPATIENT
Start: 2025-01-08 | End: 2025-01-09 | Stop reason: HOSPADM

## 2025-01-08 RX ORDER — ONDANSETRON HYDROCHLORIDE 2 MG/ML
4 INJECTION, SOLUTION INTRAVENOUS EVERY 8 HOURS PRN
Status: DISCONTINUED | OUTPATIENT
Start: 2025-01-08 | End: 2025-01-09 | Stop reason: HOSPADM

## 2025-01-08 RX ORDER — SODIUM CHLORIDE 0.9 % (FLUSH) 0.9 %
10 SYRINGE (ML) INJECTION EVERY 12 HOURS PRN
Status: DISCONTINUED | OUTPATIENT
Start: 2025-01-08 | End: 2025-01-09 | Stop reason: HOSPADM

## 2025-01-08 RX ORDER — ASPIRIN 81 MG/1
81 TABLET ORAL DAILY
Status: DISCONTINUED | OUTPATIENT
Start: 2025-01-09 | End: 2025-01-09 | Stop reason: HOSPADM

## 2025-01-08 RX ORDER — HYDROMORPHONE HYDROCHLORIDE 2 MG/ML
1 INJECTION, SOLUTION INTRAMUSCULAR; INTRAVENOUS; SUBCUTANEOUS
Status: COMPLETED | OUTPATIENT
Start: 2025-01-08 | End: 2025-01-08

## 2025-01-08 RX ORDER — MIRTAZAPINE 15 MG/1
30 TABLET, FILM COATED ORAL DAILY
Status: DISCONTINUED | OUTPATIENT
Start: 2025-01-09 | End: 2025-01-09 | Stop reason: HOSPADM

## 2025-01-08 RX ORDER — HYDROMORPHONE HYDROCHLORIDE 2 MG/ML
1 INJECTION, SOLUTION INTRAMUSCULAR; INTRAVENOUS; SUBCUTANEOUS EVERY 6 HOURS PRN
Status: DISCONTINUED | OUTPATIENT
Start: 2025-01-08 | End: 2025-01-09 | Stop reason: HOSPADM

## 2025-01-08 RX ORDER — TOPIRAMATE 25 MG/1
25 TABLET ORAL DAILY
Status: DISCONTINUED | OUTPATIENT
Start: 2025-01-09 | End: 2025-01-09 | Stop reason: HOSPADM

## 2025-01-08 RX ORDER — HYDROXYZINE HYDROCHLORIDE 25 MG/ML
25 INJECTION, SOLUTION INTRAMUSCULAR ONCE
Status: DISCONTINUED | OUTPATIENT
Start: 2025-01-08 | End: 2025-01-08

## 2025-01-08 RX ORDER — METOPROLOL SUCCINATE 50 MG/1
50 TABLET, EXTENDED RELEASE ORAL DAILY
Status: DISCONTINUED | OUTPATIENT
Start: 2025-01-09 | End: 2025-01-09 | Stop reason: HOSPADM

## 2025-01-08 RX ORDER — MUPIROCIN 20 MG/G
OINTMENT TOPICAL 3 TIMES DAILY
Status: DISCONTINUED | OUTPATIENT
Start: 2025-01-08 | End: 2025-01-09 | Stop reason: HOSPADM

## 2025-01-08 RX ORDER — IBUPROFEN 200 MG
24 TABLET ORAL
Status: DISCONTINUED | OUTPATIENT
Start: 2025-01-08 | End: 2025-01-09 | Stop reason: HOSPADM

## 2025-01-08 RX ORDER — IPRATROPIUM BROMIDE AND ALBUTEROL SULFATE 2.5; .5 MG/3ML; MG/3ML
3 SOLUTION RESPIRATORY (INHALATION)
Status: DISCONTINUED | OUTPATIENT
Start: 2025-01-08 | End: 2025-01-09 | Stop reason: HOSPADM

## 2025-01-08 RX ORDER — ROPINIROLE 0.25 MG/1
0.25 TABLET, FILM COATED ORAL 3 TIMES DAILY
Status: DISCONTINUED | OUTPATIENT
Start: 2025-01-08 | End: 2025-01-09 | Stop reason: HOSPADM

## 2025-01-08 RX ORDER — ACETAMINOPHEN 325 MG/1
650 TABLET ORAL EVERY 8 HOURS PRN
Status: DISCONTINUED | OUTPATIENT
Start: 2025-01-08 | End: 2025-01-09 | Stop reason: HOSPADM

## 2025-01-08 RX ORDER — IBUPROFEN 200 MG
1 TABLET ORAL DAILY
Status: DISCONTINUED | OUTPATIENT
Start: 2025-01-08 | End: 2025-01-09 | Stop reason: HOSPADM

## 2025-01-08 RX ORDER — TOPIRAMATE 25 MG/1
25 TABLET ORAL DAILY
Status: DISCONTINUED | OUTPATIENT
Start: 2025-01-08 | End: 2025-01-08

## 2025-01-08 RX ORDER — MIRTAZAPINE 15 MG/1
30 TABLET, FILM COATED ORAL DAILY
Status: DISCONTINUED | OUTPATIENT
Start: 2025-01-08 | End: 2025-01-08

## 2025-01-08 RX ADMIN — NICOTINE 1 PATCH: 14 PATCH TRANSDERMAL at 12:01

## 2025-01-08 RX ADMIN — ROPINIROLE HYDROCHLORIDE 0.25 MG: 0.25 TABLET, FILM COATED ORAL at 08:01

## 2025-01-08 RX ADMIN — SODIUM CHLORIDE 1000 ML: 9 INJECTION, SOLUTION INTRAVENOUS at 09:01

## 2025-01-08 RX ADMIN — IPRATROPIUM BROMIDE AND ALBUTEROL SULFATE 3 ML: 2.5; .5 SOLUTION RESPIRATORY (INHALATION) at 07:01

## 2025-01-08 RX ADMIN — ACETAMINOPHEN 650 MG: 325 TABLET ORAL at 01:01

## 2025-01-08 RX ADMIN — INSULIN GLARGINE 30 UNITS: 100 INJECTION, SOLUTION SUBCUTANEOUS at 12:01

## 2025-01-08 RX ADMIN — HYDROXYZINE HYDROCHLORIDE 25 MG: 25 INJECTION, SOLUTION INTRAMUSCULAR at 11:01

## 2025-01-08 RX ADMIN — SODIUM CHLORIDE: 900 INJECTION, SOLUTION INTRAVENOUS at 11:01

## 2025-01-08 RX ADMIN — MUPIROCIN: 20 OINTMENT TOPICAL at 02:01

## 2025-01-08 RX ADMIN — ONDANSETRON 4 MG: 2 INJECTION INTRAMUSCULAR; INTRAVENOUS at 07:01

## 2025-01-08 RX ADMIN — BUDESONIDE INHALATION 0.5 MG: 0.5 SUSPENSION RESPIRATORY (INHALATION) at 07:01

## 2025-01-08 RX ADMIN — HYDROMORPHONE HYDROCHLORIDE 1 MG: 2 INJECTION INTRAMUSCULAR; INTRAVENOUS; SUBCUTANEOUS at 11:01

## 2025-01-08 RX ADMIN — IPRATROPIUM BROMIDE AND ALBUTEROL SULFATE 3 ML: 2.5; .5 SOLUTION RESPIRATORY (INHALATION) at 01:01

## 2025-01-08 RX ADMIN — HYDROMORPHONE HYDROCHLORIDE 1 MG: 2 INJECTION INTRAMUSCULAR; INTRAVENOUS; SUBCUTANEOUS at 09:01

## 2025-01-08 RX ADMIN — MUPIROCIN: 20 OINTMENT TOPICAL at 09:01

## 2025-01-08 RX ADMIN — HYDROXYCHLOROQUINE SULFATE 200 MG: 200 TABLET ORAL at 08:01

## 2025-01-08 RX ADMIN — SODIUM CHLORIDE: 900 INJECTION, SOLUTION INTRAVENOUS at 12:01

## 2025-01-08 RX ADMIN — GABAPENTIN 600 MG: 300 CAPSULE ORAL at 08:01

## 2025-01-08 RX ADMIN — SODIUM CHLORIDE 25 MG: 900 INJECTION, SOLUTION INTRAVENOUS at 10:01

## 2025-01-08 RX ADMIN — ROPINIROLE HYDROCHLORIDE 0.25 MG: 0.25 TABLET, FILM COATED ORAL at 02:01

## 2025-01-08 RX ADMIN — IOPAMIDOL 100 ML: 755 INJECTION, SOLUTION INTRAVENOUS at 10:01

## 2025-01-08 RX ADMIN — BUSPIRONE HYDROCHLORIDE 15 MG: 10 TABLET ORAL at 08:01

## 2025-01-08 RX ADMIN — ONDANSETRON 4 MG: 2 INJECTION INTRAMUSCULAR; INTRAVENOUS at 01:01

## 2025-01-08 RX ADMIN — HYDROMORPHONE HYDROCHLORIDE 1 MG: 2 INJECTION INTRAMUSCULAR; INTRAVENOUS; SUBCUTANEOUS at 04:01

## 2025-01-08 RX ADMIN — ATORVASTATIN CALCIUM 40 MG: 40 TABLET, FILM COATED ORAL at 08:01

## 2025-01-08 NOTE — ASSESSMENT & PLAN NOTE
01/08/2025: Patient's COPD is controlled currently.  Patient is currently off COPD Pathway. Continue scheduled inhalers and monitor respiratory status closely.   -Duo nebs every 6 hours while awake  -Budesonide 0.5 mg neb every 12 hours

## 2025-01-08 NOTE — H&P
[facility] - Medical Surgical Unit  Hospital Medicine  History & Physical    Patient Name: Rosemarie Lane  MRN: [id number]  Patient Class: OP- Observation  Admission Date: 1/8/2025  Attending Physician: Oren Gage DO   Primary Care Provider: Oren Gage DO         Patient information was obtained from patient and ER records.     Subjective:     Principal Problem:Acute pancreatitis    Chief Complaint:   Chief Complaint   Patient presents with    Abdominal Pain    Fever        HPI: 55 y/o female with PMHx of COPD, DM, Lupus, Hx of pancreatitis, HLD, Asthma, CAD, CHF, Seizures, Renal disorder, Depression and Anxiety is admitted for IV fluids, pain medication and antiemetics for acute pancreatitis.   Patient arrived from [place] via [facility] with complaint of hyperglycemia, abdominal pain and nausea with vomiting that started today. .Glucose 385 mg/dL at [place] this morning. Patient had take her morning medications prior to going to [place]. EMS gave patient Zofran 4 mg IV for nausea. Glucose 270 mg/dL per EMS. Patient reported vomited x 1 this morning.  Describes generalized abdominal pain as cramping type pain, no radiation of pain. Rates pain 7/10, has not taken any medication for pain. Had diarrhea last night, but none today. Denies alcohol use, stopped drinking alcohol 16 years ago. Smokes less than 10 cigarettes a day.  Patient denies fever and is not aware of any sick contacts.  Upon arrival to the ED glucose was 266 mg/dL.    Labs today WBC 8.13, H&H 15.9/47.7, Plt 168, Na 135, K+ 4.3, BUN/CR 14/1.15, glucose 207. GAP 17, Flu swab negative, Lipase 184, Lactic 0.9, UA negative for infection. CT abdomen and pelvis with contrast revealed no acute abdominal or pelvic process.     Past Medical History:   Diagnosis Date    Anxiety     Arthritis     Asthma     Cancer     CHF (congestive heart failure)     Chronic obstructive lung disease     Coronary artery disease     Depression      Diabetes mellitus     Diabetes mellitus, type 2     Encounter for blood transfusion     History of kidney stones     Hyperlipidemia     Hypertension     Lupus     Renal disorder     Seizures     Thyroid disease     Transfusion reaction        Past Surgical History:   Procedure Laterality Date    CARDIAC CATHETERIZATION Left 09/19/2020    Proximal left main stenosis; IVUS in the LAD and left main    CHOLECYSTECTOMY      CORONARY ARTERY BYPASS GRAFT  09/30/2020    CORONARY STENT PLACEMENT  11/09/2021    Everolimus Eluting Coronary Stent-MLAD;LAD    DIRECT LARYNGOSCOPY Bilateral 7/18/2023    Procedure: LARYNGOSCOPY, DIRECT;  Surgeon: Nazario Sloan MD;  Location: [facility] ORTHO OR;  Service: ENT;  Laterality: Bilateral;    HYSTERECTOMY      INCISION AND DRAINAGE  06/02/2020    LEFT HEART CATHETERIZATION Left 11/09/2021    Procedure: Left heart cath;  Surgeon: Aureliano Giron MD;  Location: [facility] [place] CATH LAB;  Service: Cardiology;  Laterality: Left;    TOTAL ABDOMINAL HYSTERECTOMY W/ BILATERAL SALPINGOOPHORECTOMY  2008    [place]     TUBAL LIGATION      VOCAL FOLD LESION EXCISION Bilateral 7/18/2023    Procedure: EXCISION, LESION, VOCAL CORD;  Surgeon: Nazario Sloan MD;  Location: [facility] OR;  Service: ENT;  Laterality: Bilateral;       Review of patient's allergies indicates:   Allergen Reactions    Fish containing products Hives and Swelling    Penicillins Swelling     Localized swelling     Wasp venom Anaphylaxis    Opioids - morphine analogues      Change in behavior    Toradol [ketorolac] Hives    Tramadol Hives    Zithromax [azithromycin] Other (See Comments)     Patient cannot remember what kind of reaction she had to this medication    Bactrim ds [sulfamethoxazole-trimethoprim] Nausea And Vomiting     Facial flush    Latex, natural rubber Rash       No current facility-administered medications on file prior to encounter.     Current Outpatient Medications on File Prior to Encounter    Medication Sig    aspirin (ECOTRIN) 81 MG EC tablet Take 1 tablet by mouth once daily.    atorvastatin (LIPITOR) 40 MG tablet Take 1 tablet (40 mg total) by mouth once daily.    budesonide-formoterol 160-4.5 mcg (SYMBICORT) 160-4.5 mcg/actuation HFAA Inhale 2 puffs into the lungs every 12 (twelve) hours. Controller    busPIRone (BUSPAR) 15 MG tablet TAKE 1 TABLET BY MOUTH 2 TIMES A DAY AS DIRECTED    cariprazine (VRAYLAR) 3 mg Cap Take 2 capsules (6 mg total) by mouth once daily.    clopidogreL (PLAVIX) 75 mg tablet Take 1 tablet (75 mg total) by mouth once daily.    dapagliflozin propanediol (FARXIGA) 10 mg tablet Take 1 tablet (10 mg total) by mouth once daily.    gabapentin (NEURONTIN) 300 MG capsule Take 2 capsules (600 mg total) by mouth 2 (two) times daily.    hydroxychloroquine (PLAQUENIL) 200 mg tablet Take 1 tablet (200 mg total) by mouth 2 (two) times daily.    insulin (LANTUS SOLOSTAR U-100 INSULIN) glargine 100 units/mL SubQ pen Inject 30 Units into the skin 2 (two) times a day.    metoprolol succinate (TOPROL-XL) 50 MG 24 hr tablet Take 1 tablet (50 mg total) by mouth once daily.    mirtazapine (REMERON) 30 MG tablet Take 1 tablet (30 mg total) by mouth once daily.    mupirocin (BACTROBAN) 2 % ointment Apply topically 3 (three) times daily.    ondansetron (ZOFRAN-ODT) 4 MG TbDL dissolve 1 tablet on tongue EVERY 8 HOURS AS NEEDED FOR NAUSEA    rOPINIRole (REQUIP) 0.25 MG tablet TAKE 1 TABLET BY MOUTH THREE TIMES DAILY.    topiramate (TOPAMAX) 25 MG tablet Take 1 tablet (25 mg total) by mouth once daily.    [DISCONTINUED] albuterol (PROVENTIL) 2.5 mg /3 mL (0.083 %) nebulizer solution USE 1 VIAL IN NEBULIZER EVERY 4 TO 6 HOURS AS NEEDED    [DISCONTINUED] arformoteroL (BROVANA) 15 mcg/2 mL Nebu Take 2 mLs (15 mcg total) by nebulization 2 (two) times a day.    [DISCONTINUED] doxycycline (VIBRAMYCIN) 100 MG Cap Take 1 capsule (100 mg total) by mouth 2 (two) times daily.    [DISCONTINUED]  HYDROcodone-acetaminophen (NORCO) 7.5-325 mg per tablet Take 1 tablet by mouth every 6 (six) hours as needed for Pain.    [DISCONTINUED] HYDROmorphone (DILAUDID) 4 MG tablet Take 1 tablet (4 mg total) by mouth every 4 (four) hours as needed for Pain.    [DISCONTINUED] linaCLOtide (LINZESS) 145 mcg Cap capsule Take 1 capsule (145 mcg total) by mouth daily as needed (constipation).    [DISCONTINUED] triamcinolone acetonide 0.1% (KENALOG) 0.1 % cream Apply topically 2 (two) times daily.     Family History       Problem Relation (Age of Onset)    Bone cancer Father    COPD Father    Cancer Mother, Father    Cushing syndrome Daughter    Diabetes Mother, Sister, Brother    Heart attack Paternal Grandmother    Heart disease Mother, Brother    Hypertension Mother    Lung cancer Father    No Known Problems Daughter, Maternal Grandfather, Paternal Grandfather, Son    Ovarian cancer Maternal Grandmother          Tobacco Use    Smoking status: Every Day     Current packs/day: 0.50     Average packs/day: 0.5 packs/day for 38.0 years (19.0 ttl pk-yrs)     Types: Cigarettes     Start date: 1987     Passive exposure: Current    Smokeless tobacco: Never    Tobacco comments:     Patient was smoking cigarettes 2 packs per day for 10 years   Substance and Sexual Activity    Alcohol use: Never    Drug use: Never    Sexual activity: Not Currently     Partners: Male     Birth control/protection: See Surgical Hx     Review of Systems   Constitutional:  Positive for appetite change and fatigue. Negative for activity change, chills and fever.   HENT:  Positive for dental problem (chronic, dental caries). Negative for congestion, ear discharge, ear pain, postnasal drip, rhinorrhea, sinus pressure, sinus pain, sneezing, sore throat and trouble swallowing.    Eyes: Negative.    Respiratory:  Negative for cough and shortness of breath.    Cardiovascular:  Negative for chest pain, palpitations and leg swelling.   Gastrointestinal:  Positive for  abdominal pain, diarrhea, nausea and vomiting. Negative for constipation.   Genitourinary:  Negative for difficulty urinating, dysuria, enuresis, frequency, hematuria, pelvic pain and vaginal bleeding.   Musculoskeletal:  Negative for gait problem, neck pain and neck stiffness.   Skin: Negative.    Neurological:  Negative for dizziness, weakness, light-headedness, numbness and headaches.   Hematological: Negative.    Psychiatric/Behavioral:  Negative for agitation, behavioral problems, confusion, self-injury, sleep disturbance and suicidal ideas. The patient is nervous/anxious.      Objective:     Vital Signs (Most Recent):  Temp: 97.8 °F (36.6 °C) (01/08/25 1143)  Pulse: 72 (01/08/25 1143)  Resp: 18 (01/08/25 1143)  BP: 131/84 (01/08/25 1143)  SpO2: (!) 94 % (01/08/25 1143) Vital Signs (24h Range):  Temp:  [97.8 °F (36.6 °C)-99.4 °F (37.4 °C)] 97.8 °F (36.6 °C)  Pulse:  [63-81] 72  Resp:  [14-25] 18  SpO2:  [92 %-96 %] 94 %  BP: (104-137)/(60-84) 131/84     Weight: 73.9 kg (163 lb)  Body mass index is 31.83 kg/m².     Physical Exam  Vitals reviewed.   Constitutional:       General: She is awake. She is not in acute distress.     Appearance: Normal appearance. She is well-developed. She is ill-appearing. She is not toxic-appearing or diaphoretic.   HENT:      Head: Normocephalic and atraumatic.      Right Ear: Tympanic membrane and ear canal normal.      Left Ear: Tympanic membrane and ear canal normal.      Nose: Nose normal.      Mouth/Throat:      Mouth: Mucous membranes are moist.   Eyes:      General: Lids are normal.      Extraocular Movements: Extraocular movements intact.      Conjunctiva/sclera: Conjunctivae normal.      Pupils: Pupils are equal, round, and reactive to light.   Cardiovascular:      Rate and Rhythm: Normal rate and regular rhythm.      Pulses:           Radial pulses are 2+ on the right side and 2+ on the left side.        Posterior tibial pulses are 2+ on the right side and 2+ on the left  side.      Heart sounds: Normal heart sounds.   Pulmonary:      Effort: Pulmonary effort is normal.      Breath sounds: Normal breath sounds.   Abdominal:      General: Bowel sounds are normal.      Palpations: Abdomen is soft.      Tenderness: There is abdominal tenderness in the epigastric area. There is no right CVA tenderness, left CVA tenderness, guarding or rebound.      Comments: Generalized abdominal pain, but worse over LUQ.   Musculoskeletal:         General: Normal range of motion.      Cervical back: Normal range of motion and neck supple.   Lymphadenopathy:      Cervical: No cervical adenopathy.   Skin:     General: Skin is warm and dry.      Capillary Refill: Capillary refill takes less than 2 seconds.   Neurological:      General: No focal deficit present.      Mental Status: She is alert and oriented to person, place, and time. Mental status is at baseline.      GCS: GCS eye subscore is 4. GCS verbal subscore is 5. GCS motor subscore is 6.      Sensory: Sensation is intact.      Motor: Motor function is intact.      Gait: Gait is intact.   Psychiatric:         Attention and Perception: Attention normal.         Mood and Affect: Affect normal. Mood is anxious.         Speech: Speech normal.         Behavior: Behavior normal. Behavior is cooperative.         Thought Content: Thought content normal.         Judgment: Judgment normal.              CRANIAL NERVES     CN III, IV, VI   Pupils are equal, round, and reactive to light.       Significant Labs: All pertinent labs within the past 24 hours have been reviewed.  Recent Lab Results  (Last 5 results in the past 24 hours)        01/08/25  1122   01/08/25  1057   01/08/25  0925   01/08/25  0918   01/08/25  0907        Influenza A, Molecular       Negative         Influenza B, Molecular       Negative         Albumin/Globulin Ratio       1.1         Albumin       4.2         ALP       104         ALT       27         Anion Gap       17          Appearance, UA     Clear           AST       34         Bacteria, UA     Rare           Baso #       0.05         Basophil %       0.6         Bilirubin (UA)     Negative           BILIRUBIN TOTAL       0.5         BUN       14         BUN/CREAT RATIO       12         Calcium       10.3         Chloride       99         CO2       23         Color, UA     Light Yellow           Creatinine       1.15         Differential Method       Auto         eGFR       57         Eos #       0.15         Eos %       1.8         Globulin, Total       3.8         Glucose       271         Glucose, UA     >=1000           Hematocrit       47.7         Hemoglobin       15.9         Ketones, UA     Negative           Lactic Acid Level 0.9       2.3  Comment: A repeat order for Lactic Acid has been placed for collection in 2 hours.         Leukocyte Esterase, UA     Negative           Lipase       184         Lymph #       2.69         Lymph %       33.1         MCH       28.6         MCHC       33.3         MCV       85.9         Mono #       0.50         Mono %       6.2         MPV       10.1         Neutrophils, Abs       4.74         Neutrophils Relative       58.3         NITRITE UA     Negative           Blood, UA     Trace-Intact           pH, UA     6.0           Platelet Count       168         POC Glucose   207       266       Potassium       4.3         PROTEIN TOTAL       8.0         Protein, UA     Negative           RBC       5.55         RBC, UA     0-3           RDW       14.3         Sodium       135         Spec Grav UA     <=1.005           Squam Epithel, UA     Rare           UROBILINOGEN UA     0.2           WBC, UA     None Seen           WBC       8.13                                Significant Imaging: I have reviewed all pertinent imaging results/findings within the past 24 hours.  Assessment/Plan:     * Acute pancreatitis  01/08/2025: Lipase 184. CT abdomen/pelvis negative for acute abdominal or pelvic  "process  - ml/hr  -Sips of clear liquids and may take po medications  -Dilaudid 1 mg every 6 hours for moderate to severe pain  -Zofran 4 mg IV every 6 hours  as needed for nausea/vomiting (first choice)  -Phenergan 25 mg IV every 6 hours as needed for nausea/vomiting (second choice)  -CBC/BMP daily          Cigarette nicotine dependence without complication  01/08/2025: Dangers of cigarette smoking were reviewed with patient in detail. Patient was Counseled for 3-10 minutes. Nicotine replacement options were discussed. Nicotine replacement was discussed- prescribed  -Nicoderm CQ 14 mg/hr patches daily    Discoid lupus  01/08/2025:   -Continue Plaquenil 200 mg twice a day      Diabetes mellitus  01/08/2025: Patient's FSGs are uncontrolled due to hyperglycemia on current medication regimen.  Last A1c reviewed-   Lab Results   Component Value Date    HGBA1C 7.1 (H) 09/05/2024     Most recent fingerstick glucose reviewed- No results for input(s): "POCTGLUCOSE" in the last 24 hours.  Current correctional scale  Medium  Maintain anti-hyperglycemic dose as follows-   Antihyperglycemics (From admission, onward)      Start     Stop Route Frequency Ordered    01/08/25 1316  insulin aspart U-100 injection 0-10 Units         -- SubQ Before meals & nightly PRN 01/08/25 1219    01/08/25 1230  insulin glargine U-100 (Lantus) injection 30 Units         -- SubQ 2 times daily 01/08/25 1219          Hold Oral hypoglycemics while patient is in the hospital. Currently holding Farxiga during hospitalization.     Chronic obstructive lung disease  01/08/2025: Patient's COPD is controlled currently.  Patient is currently off COPD Pathway. Continue scheduled inhalers and monitor respiratory status closely.   -Duo nebs every 6 hours while awake  -Budesonide 0.5 mg neb every 12 hours        VTE Risk Mitigation (From admission, onward)           Ordered     IP VTE HIGH RISK PATIENT  Once         01/08/25 1219     Place sequential " compression device  Until discontinued         01/08/25 1219                         On 01/08/2025, patient should be placed in hospital observation services under my care in collaboration with Dr. Gage.           Effie Menjivar, OPAL  Department of Hospital Medicine  [facility] - Medical Surgical Unit

## 2025-01-08 NOTE — ASSESSMENT & PLAN NOTE
01/08/2025: Lipase 184. CT abdomen/pelvis negative for acute abdominal or pelvic process  - ml/hr  -Sips of clear liquids and may take po medications  -Dilaudid 1 mg every 6 hours for moderate to severe pain  -Zofran 4 mg IV every 6 hours  as needed for nausea/vomiting (first choice)  -Phenergan 25 mg IV every 6 hours as needed for nausea/vomiting (second choice)  -CBC/BMP daily

## 2025-01-08 NOTE — PLAN OF CARE
Care plan reviewed  Problem: Adult Inpatient Plan of Care  Goal: Plan of Care Review  Outcome: Progressing  Goal: Patient-Specific Goal (Individualized)  Outcome: Progressing  Goal: Absence of Hospital-Acquired Illness or Injury  Outcome: Progressing  Goal: Optimal Comfort and Wellbeing  Outcome: Progressing  Goal: Readiness for Transition of Care  Outcome: Progressing     Problem: Diabetes Comorbidity  Goal: Blood Glucose Level Within Targeted Range  Outcome: Progressing     Problem: Fall Injury Risk  Goal: Absence of Fall and Fall-Related Injury  Outcome: Progressing     Problem: Pain Acute  Goal: Optimal Pain Control and Function  Outcome: Progressing     Problem: Fatigue  Goal: Improved Activity Tolerance  Outcome: Progressing     Problem: Pancreatitis  Goal: Fluid and Electrolyte Balance  Outcome: Progressing  Goal: Absence of Infection Signs and Symptoms  Outcome: Progressing  Goal: Optimal Nutrition Delivery  Outcome: Progressing  Goal: Optimal Pain Control and Function  Outcome: Progressing  Goal: Effective Oxygenation and Ventilation  Outcome: Progressing

## 2025-01-08 NOTE — ASSESSMENT & PLAN NOTE
"01/08/2025: Patient's FSGs are uncontrolled due to hyperglycemia on current medication regimen.  Last A1c reviewed-   Lab Results   Component Value Date    HGBA1C 7.1 (H) 09/05/2024     Most recent fingerstick glucose reviewed- No results for input(s): "POCTGLUCOSE" in the last 24 hours.  Current correctional scale  Medium  Maintain anti-hyperglycemic dose as follows-   Antihyperglycemics (From admission, onward)      Start     Stop Route Frequency Ordered    01/08/25 1316  insulin aspart U-100 injection 0-10 Units         -- SubQ Before meals & nightly PRN 01/08/25 1219    01/08/25 1230  insulin glargine U-100 (Lantus) injection 30 Units         -- SubQ 2 times daily 01/08/25 1219          Hold Oral hypoglycemics while patient is in the hospital. Currently holding Farxiga during hospitalization.   "

## 2025-01-08 NOTE — ASSESSMENT & PLAN NOTE
01/08/2025: Dangers of cigarette smoking were reviewed with patient in detail. Patient was Counseled for 3-10 minutes. Nicotine replacement options were discussed. Nicotine replacement was discussed- prescribed  -Nicoderm CQ 14 mg/hr patches daily

## 2025-01-08 NOTE — SUBJECTIVE & OBJECTIVE
Past Medical History:   Diagnosis Date    Anxiety     Arthritis     Asthma     Cancer     CHF (congestive heart failure)     Chronic obstructive lung disease     Coronary artery disease     Depression     Diabetes mellitus     Diabetes mellitus, type 2     Encounter for blood transfusion     History of kidney stones     Hyperlipidemia     Hypertension     Lupus     Renal disorder     Seizures     Thyroid disease     Transfusion reaction        Past Surgical History:   Procedure Laterality Date    CARDIAC CATHETERIZATION Left 09/19/2020    Proximal left main stenosis; IVUS in the LAD and left main    CHOLECYSTECTOMY      CORONARY ARTERY BYPASS GRAFT  09/30/2020    CORONARY STENT PLACEMENT  11/09/2021    Everolimus Eluting Coronary Stent-MLAD;LAD    DIRECT LARYNGOSCOPY Bilateral 7/18/2023    Procedure: LARYNGOSCOPY, DIRECT;  Surgeon: Nazario Sloan MD;  Location: [facility] OR;  Service: ENT;  Laterality: Bilateral;    HYSTERECTOMY      INCISION AND DRAINAGE  06/02/2020    LEFT HEART CATHETERIZATION Left 11/09/2021    Procedure: Left heart cath;  Surgeon: Aureliano Giron MD;  Location: [facility] CATH LAB;  Service: Cardiology;  Laterality: Left;    TOTAL ABDOMINAL HYSTERECTOMY W/ BILATERAL SALPINGOOPHORECTOMY  2008    [place], [place]     TUBAL LIGATION      VOCAL FOLD LESION EXCISION Bilateral 7/18/2023    Procedure: EXCISION, LESION, VOCAL CORD;  Surgeon: Nazario Sloan MD;  Location: [facility] ORTHO OR;  Service: ENT;  Laterality: Bilateral;       Review of patient's allergies indicates:   Allergen Reactions    Fish containing products Hives and Swelling    Penicillins Swelling     Localized swelling     Wasp venom Anaphylaxis    Opioids - morphine analogues      Change in behavior    Toradol [ketorolac] Hives    Tramadol Hives    Zithromax [azithromycin] Other (See Comments)     Patient cannot remember what kind of reaction she had to this medication    Bactrim ds [sulfamethoxazole-trimethoprim] Nausea And  Vomiting     Facial flush    Latex, natural rubber Rash       No current facility-administered medications on file prior to encounter.     Current Outpatient Medications on File Prior to Encounter   Medication Sig    aspirin (ECOTRIN) 81 MG EC tablet Take 1 tablet by mouth once daily.    atorvastatin (LIPITOR) 40 MG tablet Take 1 tablet (40 mg total) by mouth once daily.    budesonide-formoterol 160-4.5 mcg (SYMBICORT) 160-4.5 mcg/actuation HFAA Inhale 2 puffs into the lungs every 12 (twelve) hours. Controller    busPIRone (BUSPAR) 15 MG tablet TAKE 1 TABLET BY MOUTH 2 TIMES A DAY AS DIRECTED    cariprazine (VRAYLAR) 3 mg Cap Take 2 capsules (6 mg total) by mouth once daily.    clopidogreL (PLAVIX) 75 mg tablet Take 1 tablet (75 mg total) by mouth once daily.    dapagliflozin propanediol (FARXIGA) 10 mg tablet Take 1 tablet (10 mg total) by mouth once daily.    gabapentin (NEURONTIN) 300 MG capsule Take 2 capsules (600 mg total) by mouth 2 (two) times daily.    hydroxychloroquine (PLAQUENIL) 200 mg tablet Take 1 tablet (200 mg total) by mouth 2 (two) times daily.    insulin (LANTUS SOLOSTAR U-100 INSULIN) glargine 100 units/mL SubQ pen Inject 30 Units into the skin 2 (two) times a day.    metoprolol succinate (TOPROL-XL) 50 MG 24 hr tablet Take 1 tablet (50 mg total) by mouth once daily.    mirtazapine (REMERON) 30 MG tablet Take 1 tablet (30 mg total) by mouth once daily.    mupirocin (BACTROBAN) 2 % ointment Apply topically 3 (three) times daily.    ondansetron (ZOFRAN-ODT) 4 MG TbDL dissolve 1 tablet on tongue EVERY 8 HOURS AS NEEDED FOR NAUSEA    rOPINIRole (REQUIP) 0.25 MG tablet TAKE 1 TABLET BY MOUTH THREE TIMES DAILY.    topiramate (TOPAMAX) 25 MG tablet Take 1 tablet (25 mg total) by mouth once daily.    [DISCONTINUED] albuterol (PROVENTIL) 2.5 mg /3 mL (0.083 %) nebulizer solution USE 1 VIAL IN NEBULIZER EVERY 4 TO 6 HOURS AS NEEDED    [DISCONTINUED] arformoteroL (BROVANA) 15 mcg/2 mL Nebu Take 2 mLs (15  mcg total) by nebulization 2 (two) times a day.    [DISCONTINUED] doxycycline (VIBRAMYCIN) 100 MG Cap Take 1 capsule (100 mg total) by mouth 2 (two) times daily.    [DISCONTINUED] HYDROcodone-acetaminophen (NORCO) 7.5-325 mg per tablet Take 1 tablet by mouth every 6 (six) hours as needed for Pain.    [DISCONTINUED] HYDROmorphone (DILAUDID) 4 MG tablet Take 1 tablet (4 mg total) by mouth every 4 (four) hours as needed for Pain.    [DISCONTINUED] linaCLOtide (LINZESS) 145 mcg Cap capsule Take 1 capsule (145 mcg total) by mouth daily as needed (constipation).    [DISCONTINUED] triamcinolone acetonide 0.1% (KENALOG) 0.1 % cream Apply topically 2 (two) times daily.     Family History       Problem Relation (Age of Onset)    Bone cancer Father    COPD Father    Cancer Mother, Father    Cushing syndrome Daughter    Diabetes Mother, Sister, Brother    Heart attack Paternal Grandmother    Heart disease Mother, Brother    Hypertension Mother    Lung cancer Father    No Known Problems Daughter, Maternal Grandfather, Paternal Grandfather, Son    Ovarian cancer Maternal Grandmother          Tobacco Use    Smoking status: Every Day     Current packs/day: 0.50     Average packs/day: 0.5 packs/day for 38.0 years (19.0 ttl pk-yrs)     Types: Cigarettes     Start date: 1987     Passive exposure: Current    Smokeless tobacco: Never    Tobacco comments:     Patient was smoking cigarettes 2 packs per day for 10 years   Substance and Sexual Activity    Alcohol use: Never    Drug use: Never    Sexual activity: Not Currently     Partners: Male     Birth control/protection: See Surgical Hx     Review of Systems   Constitutional:  Positive for appetite change and fatigue. Negative for activity change, chills and fever.   HENT:  Positive for dental problem (chronic, dental caries). Negative for congestion, ear discharge, ear pain, postnasal drip, rhinorrhea, sinus pressure, sinus pain, sneezing, sore throat and trouble swallowing.    Eyes:  Negative.    Respiratory:  Negative for cough and shortness of breath.    Cardiovascular:  Negative for chest pain, palpitations and leg swelling.   Gastrointestinal:  Positive for abdominal pain, diarrhea, nausea and vomiting. Negative for constipation.   Genitourinary:  Negative for difficulty urinating, dysuria, enuresis, frequency, hematuria, pelvic pain and vaginal bleeding.   Musculoskeletal:  Negative for gait problem, neck pain and neck stiffness.   Skin: Negative.    Neurological:  Negative for dizziness, weakness, light-headedness, numbness and headaches.   Hematological: Negative.    Psychiatric/Behavioral:  Negative for agitation, behavioral problems, confusion, self-injury, sleep disturbance and suicidal ideas. The patient is nervous/anxious.      Objective:     Vital Signs (Most Recent):  Temp: 97.8 °F (36.6 °C) (01/08/25 1143)  Pulse: 72 (01/08/25 1143)  Resp: 18 (01/08/25 1143)  BP: 131/84 (01/08/25 1143)  SpO2: (!) 94 % (01/08/25 1143) Vital Signs (24h Range):  Temp:  [97.8 °F (36.6 °C)-99.4 °F (37.4 °C)] 97.8 °F (36.6 °C)  Pulse:  [63-81] 72  Resp:  [14-25] 18  SpO2:  [92 %-96 %] 94 %  BP: (104-137)/(60-84) 131/84     Weight: 73.9 kg (163 lb)  Body mass index is 31.83 kg/m².     Physical Exam  Vitals reviewed.   Constitutional:       General: She is awake. She is not in acute distress.     Appearance: Normal appearance. She is well-developed. She is ill-appearing. She is not toxic-appearing or diaphoretic.   HENT:      Head: Normocephalic and atraumatic.      Right Ear: Tympanic membrane and ear canal normal.      Left Ear: Tympanic membrane and ear canal normal.      Nose: Nose normal.      Mouth/Throat:      Mouth: Mucous membranes are moist.   Eyes:      General: Lids are normal.      Extraocular Movements: Extraocular movements intact.      Conjunctiva/sclera: Conjunctivae normal.      Pupils: Pupils are equal, round, and reactive to light.   Cardiovascular:      Rate and Rhythm: Normal rate  and regular rhythm.      Pulses:           Radial pulses are 2+ on the right side and 2+ on the left side.        Posterior tibial pulses are 2+ on the right side and 2+ on the left side.      Heart sounds: Normal heart sounds.   Pulmonary:      Effort: Pulmonary effort is normal.      Breath sounds: Normal breath sounds.   Abdominal:      General: Bowel sounds are normal.      Palpations: Abdomen is soft.      Tenderness: There is abdominal tenderness in the epigastric area. There is no right CVA tenderness, left CVA tenderness, guarding or rebound.      Comments: Generalized abdominal pain, but worse over LUQ.   Musculoskeletal:         General: Normal range of motion.      Cervical back: Normal range of motion and neck supple.   Lymphadenopathy:      Cervical: No cervical adenopathy.   Skin:     General: Skin is warm and dry.      Capillary Refill: Capillary refill takes less than 2 seconds.   Neurological:      General: No focal deficit present.      Mental Status: She is alert and oriented to person, place, and time. Mental status is at baseline.      GCS: GCS eye subscore is 4. GCS verbal subscore is 5. GCS motor subscore is 6.      Sensory: Sensation is intact.      Motor: Motor function is intact.      Gait: Gait is intact.   Psychiatric:         Attention and Perception: Attention normal.         Mood and Affect: Affect normal. Mood is anxious.         Speech: Speech normal.         Behavior: Behavior normal. Behavior is cooperative.         Thought Content: Thought content normal.         Judgment: Judgment normal.              CRANIAL NERVES     CN III, IV, VI   Pupils are equal, round, and reactive to light.       Significant Labs: All pertinent labs within the past 24 hours have been reviewed.  Recent Lab Results  (Last 5 results in the past 24 hours)        01/08/25  1122   01/08/25  1057   01/08/25  0925   01/08/25  0918   01/08/25  0907        Influenza A, Molecular       Negative         Influenza  B, Molecular       Negative         Albumin/Globulin Ratio       1.1         Albumin       4.2         ALP       104         ALT       27         Anion Gap       17         Appearance, UA     Clear           AST       34         Bacteria, UA     Rare           Baso #       0.05         Basophil %       0.6         Bilirubin (UA)     Negative           BILIRUBIN TOTAL       0.5         BUN       14         BUN/CREAT RATIO       12         Calcium       10.3         Chloride       99         CO2       23         Color, UA     Light Yellow           Creatinine       1.15         Differential Method       Auto         eGFR       57         Eos #       0.15         Eos %       1.8         Globulin, Total       3.8         Glucose       271         Glucose, UA     >=1000           Hematocrit       47.7         Hemoglobin       15.9         Ketones, UA     Negative           Lactic Acid Level 0.9       2.3  Comment: A repeat order for Lactic Acid has been placed for collection in 2 hours.         Leukocyte Esterase, UA     Negative           Lipase       184         Lymph #       2.69         Lymph %       33.1         MCH       28.6         MCHC       33.3         MCV       85.9         Mono #       0.50         Mono %       6.2         MPV       10.1         Neutrophils, Abs       4.74         Neutrophils Relative       58.3         NITRITE UA     Negative           Blood, UA     Trace-Intact           pH, UA     6.0           Platelet Count       168         POC Glucose   207       266       Potassium       4.3         PROTEIN TOTAL       8.0         Protein, UA     Negative           RBC       5.55         RBC, UA     0-3           RDW       14.3         Sodium       135         Spec Grav UA     <=1.005           Squam Epithel, UA     Rare           UROBILINOGEN UA     0.2           WBC, UA     None Seen           WBC       8.13                                Significant Imaging: I have reviewed all pertinent imaging  results/findings within the past 24 hours.

## 2025-01-08 NOTE — ED PROVIDER NOTES
Encounter Date: 1/8/2025       History     Chief Complaint   Patient presents with    Abdominal Pain    Fever     53 y/o female with PMHx of COPD, DM, Lupus, HLD, Asthma, CAD, CHF, Seizures, Renal disorder, Depression and Anxiety arrived to the ED via [facility] with complaint of hyperglycemia, abdominal pain and nausea with vomiting that started today. . Glucose 385 mg/dL at [place] this morning. Patient had take her morning medications prior to going to [place]. EMS gave patient Zofran 4 mg. Glucose 270 mg/dL per EMS. Patient reported vomited x 1 this morning.  Describes generalized abdominal pain as cramping type pain, no radiation of pain. Rates pain 7/10, has not taken any medication for pain. Had diarrhea last night, but none today. Denies fever and is not aware of any sick contacts.  Upon arrival glucose 266 mg/dL    The history is provided by the patient.     Review of patient's allergies indicates:   Allergen Reactions    Fish containing products Hives and Swelling    Penicillins Swelling     Localized swelling     Wasp venom Anaphylaxis    Opioids - morphine analogues      Change in behavior    Toradol [ketorolac] Hives    Tramadol Hives    Zithromax [azithromycin] Other (See Comments)     Patient cannot remember what kind of reaction she had to this medication    Bactrim ds [sulfamethoxazole-trimethoprim] Nausea And Vomiting     Facial flush    Latex, natural rubber Rash     Past Medical History:   Diagnosis Date    Anxiety     Arthritis     Asthma     Cancer     CHF (congestive heart failure)     Chronic obstructive lung disease     Coronary artery disease     Depression     Diabetes mellitus     Diabetes mellitus, type 2     Encounter for blood transfusion     History of kidney stones     Hyperlipidemia     Hypertension     Lupus     Renal disorder     Seizures     Thyroid disease     Transfusion reaction      Past Surgical History:   Procedure Laterality Date    CARDIAC CATHETERIZATION Left 09/19/2020     Proximal left main stenosis; IVUS in the LAD and left main    CHOLECYSTECTOMY      CORONARY ARTERY BYPASS GRAFT  09/30/2020    CORONARY STENT PLACEMENT  11/09/2021    Everolimus Eluting Coronary Stent-MLAD;LAD    DIRECT LARYNGOSCOPY Bilateral 7/18/2023    Procedure: LARYNGOSCOPY, DIRECT;  Surgeon: Nazario Sloan MD;  Location: [facility] OR;  Service: ENT;  Laterality: Bilateral;    HYSTERECTOMY      INCISION AND DRAINAGE  06/02/2020    LEFT HEART CATHETERIZATION Left 11/09/2021    Procedure: Left heart cath;  Surgeon: Aureliano Giron MD;  Location: [facility] CATH LAB;  Service: Cardiology;  Laterality: Left;    TOTAL ABDOMINAL HYSTERECTOMY W/ BILATERAL SALPINGOOPHORECTOMY  2008    KIRSTY Lala     TUBAL LIGATION      VOCAL FOLD LESION EXCISION Bilateral 7/18/2023    Procedure: EXCISION, LESION, VOCAL CORD;  Surgeon: Nazario Sloan MD;  Location: [facility] OR;  Service: ENT;  Laterality: Bilateral;     Family History   Problem Relation Name Age of Onset    Hypertension Mother Virginia Welch     Diabetes Mother Virginia Welch     Heart disease Mother Virginia Welch     Cancer Mother Virginia Welch     Bone cancer Father Eleno welch     Lung cancer Father Eleno welch     Cancer Father Eleno welch     COPD Father Eleno welch     Diabetes Sister      Heart disease Brother      Diabetes Brother      No Known Problems Daughter      Cushing syndrome Daughter      Ovarian cancer Maternal Grandmother      No Known Problems Maternal Grandfather      Heart attack Paternal Grandmother      No Known Problems Paternal Grandfather      No Known Problems Son       Social History     Tobacco Use    Smoking status: Every Day     Current packs/day: 0.50     Average packs/day: 0.5 packs/day for 38.0 years (19.0 ttl pk-yrs)     Types: Cigarettes     Start date: 1987     Passive exposure: Current    Smokeless tobacco: Never    Tobacco comments:     Patient was smoking cigarettes 2 packs per day for 10  years   Substance Use Topics    Alcohol use: Never    Drug use: Never     Review of Systems   Constitutional:  Negative for activity change, chills, fatigue and fever.   HENT: Negative.     Eyes: Negative.    Respiratory: Negative.     Cardiovascular: Negative.    Gastrointestinal:  Positive for abdominal pain and nausea. Negative for constipation, diarrhea and vomiting.   Genitourinary: Negative.    Musculoskeletal: Negative.    Skin: Negative.    Neurological: Negative.    Hematological: Negative.    Psychiatric/Behavioral: Negative.         Physical Exam     Initial Vitals [01/08/25 0910]   BP Pulse Resp Temp SpO2   121/75 81 17 99.4 °F (37.4 °C) 96 %      MAP       --         Physical Exam    Nursing note and vitals reviewed.  Constitutional: Vital signs are normal. She appears well-developed and well-nourished. She is cooperative.  Non-toxic appearance. She does not have a sickly appearance. She appears ill. No distress.   HENT:   Head: Normocephalic and atraumatic.   Right Ear: Tympanic membrane, external ear and ear canal normal.   Left Ear: Tympanic membrane, external ear and ear canal normal.   Nose: Nose normal. Mouth/Throat: Oropharynx is clear and moist and mucous membranes are normal.   Eyes: Conjunctivae and lids are normal. Pupils are equal, round, and reactive to light.   Neck: Neck supple.   Normal range of motion.   Full passive range of motion without pain.     Cardiovascular:  Normal rate, regular rhythm, normal heart sounds and intact distal pulses.           No edema to BLE   Pulmonary/Chest: Effort normal and breath sounds normal.   Abdominal: Abdomen is soft. Bowel sounds are normal. There is generalized abdominal tenderness.   No right CVA tenderness.  No left CVA tenderness. There is no rebound, no guarding, no tenderness at McBurney's point and negative Sroenson's sign.   Musculoskeletal:         General: Normal range of motion.      Cervical back: Full passive range of motion without pain,  normal range of motion and neck supple.     Neurological: She is alert and oriented to person, place, and time.   Skin: Skin is warm, dry and intact. Capillary refill takes less than 2 seconds. No rash noted.   Psychiatric: She has a normal mood and affect. Her speech is normal and behavior is normal. Judgment normal.         Medical Screening Exam   See Full Note    ED Course   Procedures  Labs Reviewed   COMPREHENSIVE METABOLIC PANEL - Abnormal       Result Value    Sodium 135 (*)     Potassium 4.3      Chloride 99      CO2 23      Anion Gap 17 (*)     Glucose 271 (*)     BUN 14      Creatinine 1.15 (*)     BUN/Creatinine Ratio 12      Calcium 10.3 (*)     Total Protein 8.0      Albumin 4.2      Globulin 3.8      A/G Ratio 1.1      Bilirubin, Total 0.5      Alk Phos 104      ALT 27      AST 34      eGFR 57 (*)    LACTIC ACID, PLASMA - Abnormal    Lactic Acid 2.3 (*)    LIPASE - Abnormal    Lipase 184 (*)    URINALYSIS, REFLEX TO URINE CULTURE - Abnormal    Color, UA Light Yellow      Clarity, UA Clear      pH, UA 6.0      Leukocytes, UA Negative      Nitrites, UA Negative      Protein, UA Negative      Glucose, UA >=1000 (*)     Ketones, UA Negative      Urobilinogen, UA 0.2      Bilirubin, UA Negative      Blood, UA Trace-Intact (*)     Specific Gravity, UA <=1.005     CBC WITH DIFFERENTIAL - Abnormal    WBC 8.13      RBC 5.55 (*)     Hemoglobin 15.9      Hematocrit 47.7 (*)     MCV 85.9      MCH 28.6      MCHC 33.3      RDW 14.3      Platelet Count 168      MPV 10.1      Neutrophils % 58.3      Lymphocytes % 33.1      Neutrophils, Abs 4.74      Lymphocytes, Absolute 2.69      Diff Type Auto      Monocytes % 6.2 (*)     Eosinophils % 1.8      Basophils % 0.6      Monocytes, Absolute 0.50      Eosinophils, Absolute 0.15      Basophils, Absolute 0.05     POCT GLUCOSE MONITORING CONTINUOUS - Abnormal    POC Glucose 266 (*)    POCT GLUCOSE MONITORING CONTINUOUS - Abnormal    POC Glucose 207 (*)    INFLUENZA A & B BY  MOLECULAR - Normal    INFLUENZA A MOLECULAR Negative      INFLUENZA B MOLECULAR  Negative     URINALYSIS, MICROSCOPIC - Normal    WBC, UA None Seen      RBC, UA 0-3      Bacteria, UA Rare      Squamous Epithelial Cells, UA Rare     CBC W/ AUTO DIFFERENTIAL    Narrative:     The following orders were created for panel order CBC auto differential.  Procedure                               Abnormality         Status                     ---------                               -----------         ------                     CBC with Differential[[id number]]       Abnormal            Final result                 Please view results for these tests on the individual orders.   LACTIC ACID, PLASMA   POCT GLUCOSE MONITORING CONTINUOUS          Imaging Results              CT Abdomen Pelvis With IV Contrast NO Oral Contrast (Edited Result - FINAL)  Result time 01/08/25 10:55:13      Addendum (preliminary) 1 of 1 by Austin Chambers MD (01/08/25 10:55:13)      The body of the report should read:    Bowel: Scattered sigmoid diverticula without focal diverticulitis.      Electronically signed by: Austin Chambers  Date:    01/08/2025  Time:    10:55                 Final result by Austin Chambers MD (01/08/25 10:32:50)                   Impression:      No acute abdominal or pelvic process, with numerous additional, and incidental findings as above.      Electronically signed by: Austin Chambers  Date:    01/08/2025  Time:    10:32               Narrative:      CMS MANDATED QUALITY DATA - CT RADIATION - 436    All CT scans at this facility utilize dose modulation, iterative reconstruction, and/or weight based dosing when appropriate to reduce radiation dose to as low as reasonably achievable.    CLINICAL HISTORY:  ([id number])55 y/o  (1970) F    Abdominal pain, acute, nonlocalized;    TECHNIQUE:  (A#[id number], exam time 1/8/2025 10:24)    CT ABDOMEN PELVIS WITH IV CONTRAST [id number]    Axial CT images of the abdomen and  pelvis were obtained from the dome of the diaphragm to the proximal thighs.    COMPARISON:  04/28/2024.    FINDINGS:  Lower Thorax:    The lung bases are clear. The heart is normal in size.    CT Abdomen:    Liver: Relative diffuse low-attenuation liver consistent with hepatic steatosis.    Gallbladder: The gallbladder is surgically absent.    Biliary Tree: No intra or extrahepatic ductal dilation.    Spleen: Normal.    Pancreas: The pancreas is normal.    Adrenal Glands: Normal    Kidneys: Is asymmetrically small/atrophic with renal cortical scarring/thinning and wedge-shaped cortical defects compatible with either remote infarct, infection or remote obstructive uropathy.  No hydronephrosis/hydroureter or evidence of obstructive uropathy is seen on today's exam.  No contour deforming renal mass or cyst is identified.    Vasculature: Calcified plaques are seen in the abdominal aorta and iliacs with no aneurysm.    Lymph nodes: No abdominal lymphadenopathy is seen.    Intraperitoneal structures: There is no ascites.    Bowel: Scattered sigmoid diverticula of focal diverticulitis.  Mildly ectatic loops of small bowel are seen in the mid abdomen (image 43), top-normal sore for size measuring up to 2.7 cm, likely transient.  The bowel is normal in course and caliber without finding of obstruction, free air or abscess identified.    Abdominal wall: The abdominal wall and musculature are normal.    Musculoskeletal: No acute osseous abnormality is identified.  Levoscoliosis is seen with a transitional lumbosacral vertebral body (L5) which is partially sacralized at the left transverse process.    CT Pelvis:    Bladder: Normal.    Reproductive Organs: The uterus is not visualized/surgically absent.  Surgically absent.    Pelvic Lymph nodes: No lymphadenopathy.                                       Medications   sodium chloride 0.9% bolus 1,000 mL 1,000 mL (1,000 mLs Intravenous New Bag 1/8/25 0948)   HYDROmorphone (PF)  injection 1 mg (1 mg Intravenous Given 1/8/25 0948)   iopamidoL (ISOVUE-370) injection 100 mL (100 mLs Intravenous Given 1/8/25 1010)   promethazine (PHENERGAN) 25 mg in 0.9% NaCl 50 mL IVPB (0 mg Intravenous Stopped 1/8/25 1047)     Medical Decision Making  53 y/o female arrived to the ED via [facility] with complaint of hyperglycemia, abdominal pain and nausea with vomiting that started today. . Glucose 385 mg/dL at [place] this morning. Patient had take her morning medications prior to going to [place]. EMS gave patient Zofran 4 mg. Glucose 270 mg/dL per EMS. Patient reported vomited x 1 this morning.  Describes generalized abdominal pain as cramping type pain, no radiation of pain. Rates pain 7/10, has not taken any medication for pain. Had diarrhea last night, but none today. Denies fever and is not aware of any sick contacts.  Upon arrival glucose 266 mg/dL    Amount and/or Complexity of Data Reviewed  Labs: ordered. Decision-making details documented in ED Course.  Radiology: ordered. Decision-making details documented in ED Course.     Details: CT abdomen/pelvis with contrast  Discussion of management or test interpretation with external provider(s): 0945: [facility]. Dr. Hill approved for CT abdomen/pelvis with contrast.     Admission MDM  I discussed the patient presentation labs and CT findings with the consultant with Dr. Gage.  Patient was managed in the ED with:  -Dilaudid 1 mg IV  -Phenergan 25 mg IV  The response to treatment nausea improved and no further vomiting while in the ED. Abdominal pain improved, but did not resolve.    Dr. Gage accepted patient for admission to OBS here at [facility].       Risk  Prescription drug management.                                      Clinical Impression:   Final diagnoses:  [K85.90] Acute pancreatitis, unspecified complication status, unspecified pancreatitis type (Primary)        ED Disposition Condition    Observation Stable                 Effie Menjivar, [facility]  01/08/25 1120

## 2025-01-08 NOTE — HPI
55 y/o female with PMHx of COPD, DM, Lupus, Hx of pancreatitis, HLD, Asthma, CAD, CHF, Seizures, Renal disorder, Depression and Anxiety is admitted for IV fluids, pain medication and antiemetics for acute pancreatitis.   Patient arrived from [place] via [facility] with complaint of hyperglycemia, abdominal pain and nausea with vomiting that started today. .Glucose 385 mg/dL at [place] this morning. Patient had take her morning medications prior to going to [place]. EMS gave patient Zofran 4 mg IV for nausea. Glucose 270 mg/dL per EMS. Patient reported vomited x 1 this morning.  Describes generalized abdominal pain as cramping type pain, no radiation of pain. Rates pain 7/10, has not taken any medication for pain. Had diarrhea last night, but none today. Denies alcohol use, stopped drinking alcohol 16 years ago. Smokes less than 10 cigarettes a day.  Patient denies fever and is not aware of any sick contacts.  Upon arrival to the ED glucose was 266 mg/dL.    Labs today WBC 8.13, H&H 15.9/47.7, Plt 168, Na 135, K+ 4.3, BUN/CR 14/1.15, glucose 207. GAP 17, Flu swab negative, Lipase 184, Lactic 0.9, UA negative for infection. CT abdomen and pelvis with contrast revealed no acute abdominal or pelvic process.

## 2025-01-09 VITALS
TEMPERATURE: 98 F | RESPIRATION RATE: 18 BRPM | WEIGHT: 163 LBS | OXYGEN SATURATION: 94 % | HEIGHT: 60 IN | BODY MASS INDEX: 32 KG/M2 | SYSTOLIC BLOOD PRESSURE: 107 MMHG | DIASTOLIC BLOOD PRESSURE: 70 MMHG | HEART RATE: 60 BPM

## 2025-01-09 DIAGNOSIS — K85.00 IDIOPATHIC ACUTE PANCREATITIS WITHOUT INFECTION OR NECROSIS: Primary | ICD-10-CM

## 2025-01-09 LAB
ANION GAP SERPL CALCULATED.3IONS-SCNC: 11 MMOL/L (ref 7–16)
BASOPHILS # BLD AUTO: 0.03 K/UL (ref 0–0.2)
BASOPHILS NFR BLD AUTO: 0.6 % (ref 0–1)
BUN SERPL-MCNC: 10 MG/DL (ref 10–20)
BUN/CREAT SERPL: 11 (ref 6–20)
CALCIUM SERPL-MCNC: 8.7 MG/DL (ref 8.4–10.2)
CHLORIDE SERPL-SCNC: 107 MMOL/L (ref 98–107)
CO2 SERPL-SCNC: 25 MMOL/L (ref 22–29)
CREAT SERPL-MCNC: 0.87 MG/DL (ref 0.55–1.02)
DIFFERENTIAL METHOD BLD: ABNORMAL
EGFR (NO RACE VARIABLE) (RUSH/TITUS): 80 ML/MIN/1.73M2
EOSINOPHIL # BLD AUTO: 0.07 K/UL (ref 0–0.5)
EOSINOPHIL NFR BLD AUTO: 1.5 % (ref 1–4)
ERYTHROCYTE [DISTWIDTH] IN BLOOD BY AUTOMATED COUNT: 14.3 % (ref 11.5–14.5)
GLUCOSE SERPL-MCNC: 130 MG/DL (ref 70–105)
GLUCOSE SERPL-MCNC: 133 MG/DL (ref 74–100)
GLUCOSE SERPL-MCNC: 149 MG/DL (ref 70–105)
HCT VFR BLD AUTO: 41.6 % (ref 38–47)
HGB BLD-MCNC: 13.3 G/DL (ref 12–16)
LIPASE SERPL-CCNC: 88 U/L
LYMPHOCYTES # BLD AUTO: 1.59 K/UL (ref 1–4.8)
LYMPHOCYTES NFR BLD AUTO: 34 % (ref 27–41)
MCH RBC QN AUTO: 28.5 PG (ref 27–31)
MCHC RBC AUTO-ENTMCNC: 32 G/DL (ref 32–36)
MCV RBC AUTO: 89.3 FL (ref 80–96)
MONOCYTES # BLD AUTO: 0.4 K/UL (ref 0–0.8)
MONOCYTES NFR BLD AUTO: 8.5 % (ref 2–6)
MPC BLD CALC-MCNC: 10.4 FL (ref 9.4–12.4)
NEUTROPHILS # BLD AUTO: 2.59 K/UL (ref 1.8–7.7)
NEUTROPHILS NFR BLD AUTO: 55.4 % (ref 53–65)
PLATELET # BLD AUTO: 140 K/UL (ref 150–400)
POTASSIUM SERPL-SCNC: 4 MMOL/L (ref 3.5–5.1)
RBC # BLD AUTO: 4.66 M/UL (ref 4.2–5.4)
SODIUM SERPL-SCNC: 139 MMOL/L (ref 136–145)
WBC # BLD AUTO: 4.68 K/UL (ref 4.5–11)

## 2025-01-09 PROCEDURE — 85025 COMPLETE CBC W/AUTO DIFF WBC: CPT | Performed by: NURSE PRACTITIONER

## 2025-01-09 PROCEDURE — 96361 HYDRATE IV INFUSION ADD-ON: CPT

## 2025-01-09 PROCEDURE — 25000003 PHARM REV CODE 250: Performed by: FAMILY MEDICINE

## 2025-01-09 PROCEDURE — S4991 NICOTINE PATCH NONLEGEND: HCPCS | Performed by: NURSE PRACTITIONER

## 2025-01-09 PROCEDURE — 96372 THER/PROPH/DIAG INJ SC/IM: CPT | Performed by: FAMILY MEDICINE

## 2025-01-09 PROCEDURE — G0378 HOSPITAL OBSERVATION PER HR: HCPCS

## 2025-01-09 PROCEDURE — 83690 ASSAY OF LIPASE: CPT

## 2025-01-09 PROCEDURE — 25000242 PHARM REV CODE 250 ALT 637 W/ HCPCS: Performed by: NURSE PRACTITIONER

## 2025-01-09 PROCEDURE — 94640 AIRWAY INHALATION TREATMENT: CPT | Mod: XB

## 2025-01-09 PROCEDURE — 80048 BASIC METABOLIC PNL TOTAL CA: CPT | Performed by: NURSE PRACTITIONER

## 2025-01-09 PROCEDURE — 63600175 PHARM REV CODE 636 W HCPCS: Performed by: NURSE PRACTITIONER

## 2025-01-09 PROCEDURE — 63600175 PHARM REV CODE 636 W HCPCS: Performed by: FAMILY MEDICINE

## 2025-01-09 PROCEDURE — 27000958

## 2025-01-09 PROCEDURE — 96366 THER/PROPH/DIAG IV INF ADDON: CPT

## 2025-01-09 PROCEDURE — 94761 N-INVAS EAR/PLS OXIMETRY MLT: CPT

## 2025-01-09 PROCEDURE — 99900035 HC TECH TIME PER 15 MIN (STAT)

## 2025-01-09 PROCEDURE — 25000003 PHARM REV CODE 250: Performed by: NURSE PRACTITIONER

## 2025-01-09 PROCEDURE — 96376 TX/PRO/DX INJ SAME DRUG ADON: CPT

## 2025-01-09 PROCEDURE — 99239 HOSP IP/OBS DSCHRG MGMT >30: CPT | Mod: ,,, | Performed by: FAMILY MEDICINE

## 2025-01-09 PROCEDURE — 36415 COLL VENOUS BLD VENIPUNCTURE: CPT | Performed by: NURSE PRACTITIONER

## 2025-01-09 PROCEDURE — 27000982 HC MATTRESS, MATRIX LAL RENTAL

## 2025-01-09 PROCEDURE — 82962 GLUCOSE BLOOD TEST: CPT

## 2025-01-09 RX ORDER — IPRATROPIUM BROMIDE AND ALBUTEROL SULFATE 2.5; .5 MG/3ML; MG/3ML
3 SOLUTION RESPIRATORY (INHALATION)
Qty: 75 ML | Refills: 0 | Status: SHIPPED | OUTPATIENT
Start: 2025-01-09 | End: 2026-01-09

## 2025-01-09 RX ORDER — PROMETHAZINE HYDROCHLORIDE 25 MG/1
25 TABLET ORAL EVERY 4 HOURS PRN
Qty: 30 TABLET | Refills: 0 | Status: SHIPPED | OUTPATIENT
Start: 2025-01-09

## 2025-01-09 RX ORDER — HYDROMORPHONE HYDROCHLORIDE 2 MG/1
2 TABLET ORAL EVERY 4 HOURS PRN
Qty: 10 TABLET | Refills: 0 | Status: SHIPPED | OUTPATIENT
Start: 2025-01-09

## 2025-01-09 RX ORDER — HYDROMORPHONE HYDROCHLORIDE 4 MG/1
4 TABLET ORAL EVERY 4 HOURS PRN
Qty: 10 TABLET | Refills: 0 | Status: SHIPPED | OUTPATIENT
Start: 2025-01-09 | End: 2025-01-09

## 2025-01-09 RX ORDER — HYDROMORPHONE HYDROCHLORIDE 4 MG/1
2 TABLET ORAL EVERY 4 HOURS PRN
Qty: 10 TABLET | Refills: 0 | Status: SHIPPED | OUTPATIENT
Start: 2025-01-09

## 2025-01-09 RX ORDER — HYDROMORPHONE HYDROCHLORIDE 2 MG/1
2 TABLET ORAL
Status: DISCONTINUED | OUTPATIENT
Start: 2025-01-09 | End: 2025-01-09 | Stop reason: HOSPADM

## 2025-01-09 RX ADMIN — MIRTAZAPINE 30 MG: 15 TABLET, FILM COATED ORAL at 08:01

## 2025-01-09 RX ADMIN — HYDROMORPHONE HYDROCHLORIDE 2 MG: 2 TABLET ORAL at 11:01

## 2025-01-09 RX ADMIN — ROPINIROLE HYDROCHLORIDE 0.25 MG: 0.25 TABLET, FILM COATED ORAL at 08:01

## 2025-01-09 RX ADMIN — ASPIRIN 81 MG: 81 TABLET, COATED ORAL at 08:01

## 2025-01-09 RX ADMIN — TOPIRAMATE 25 MG: 25 TABLET, FILM COATED ORAL at 08:01

## 2025-01-09 RX ADMIN — NICOTINE 1 PATCH: 14 PATCH TRANSDERMAL at 08:01

## 2025-01-09 RX ADMIN — HYDROMORPHONE HYDROCHLORIDE 1 MG: 2 INJECTION INTRAMUSCULAR; INTRAVENOUS; SUBCUTANEOUS at 06:01

## 2025-01-09 RX ADMIN — BUDESONIDE INHALATION 0.5 MG: 0.5 SUSPENSION RESPIRATORY (INHALATION) at 07:01

## 2025-01-09 RX ADMIN — GABAPENTIN 600 MG: 300 CAPSULE ORAL at 08:01

## 2025-01-09 RX ADMIN — IPRATROPIUM BROMIDE AND ALBUTEROL SULFATE 3 ML: 2.5; .5 SOLUTION RESPIRATORY (INHALATION) at 07:01

## 2025-01-09 RX ADMIN — ONDANSETRON 4 MG: 2 INJECTION INTRAMUSCULAR; INTRAVENOUS at 06:01

## 2025-01-09 RX ADMIN — SODIUM CHLORIDE 25 MG: 900 INJECTION, SOLUTION INTRAVENOUS at 01:01

## 2025-01-09 RX ADMIN — INSULIN GLARGINE 30 UNITS: 100 INJECTION, SOLUTION SUBCUTANEOUS at 08:01

## 2025-01-09 RX ADMIN — MUPIROCIN: 20 OINTMENT TOPICAL at 08:01

## 2025-01-09 RX ADMIN — BUSPIRONE HYDROCHLORIDE 15 MG: 10 TABLET ORAL at 08:01

## 2025-01-09 RX ADMIN — SODIUM CHLORIDE: 900 INJECTION, SOLUTION INTRAVENOUS at 08:01

## 2025-01-09 RX ADMIN — HYDROXYCHLOROQUINE SULFATE 200 MG: 200 TABLET ORAL at 08:01

## 2025-01-09 RX ADMIN — METOPROLOL SUCCINATE 50 MG: 50 TABLET, EXTENDED RELEASE ORAL at 08:01

## 2025-01-09 NOTE — DISCHARGE SUMMARY
[facility] - Medical Surgical Unit  Hospital Medicine  Discharge Summary      Patient Name: Rosemarie Lane  MRN: [id number]  BENNY: [id number]  Patient Class: OP- Observation  Admission Date: 1/8/2025  Hospital Length of Stay: 0 days  Discharge Date and Time:  01/09/2025 11:23 AM  Attending Physician: Oren Gage DO   Discharging Provider: Oren Gage DO  Primary Care Provider: Oren Gage DO    Primary Care Team: Networked reference to record PCT     HPI:   53 y/o female with PMHx of COPD, DM, Lupus, Hx of pancreatitis, HLD, Asthma, CAD, CHF, Seizures, Renal disorder, Depression and Anxiety is admitted for IV fluids, pain medication and antiemetics for acute pancreatitis.   Patient arrived from [place] via [facility] with complaint of hyperglycemia, abdominal pain and nausea with vomiting that started today. .Glucose 385 mg/dL at [place] this morning. Patient had take her morning medications prior to going to [place]. EMS gave patient Zofran 4 mg IV for nausea. Glucose 270 mg/dL per EMS. Patient reported vomited x 1 this morning.  Describes generalized abdominal pain as cramping type pain, no radiation of pain. Rates pain 7/10, has not taken any medication for pain. Had diarrhea last night, but none today. Denies alcohol use, stopped drinking alcohol 16 years ago. Smokes less than 10 cigarettes a day.  Patient denies fever and is not aware of any sick contacts.  Upon arrival to the ED glucose was 266 mg/dL.    Labs today WBC 8.13, H&H 15.9/47.7, Plt 168, Na 135, K+ 4.3, BUN/CR 14/1.15, glucose 207. GAP 17, Flu swab negative, Lipase 184, Lactic 0.9, UA negative for infection. CT abdomen and pelvis with contrast revealed no acute abdominal or pelvic process.     * No surgery found *      Hospital Course:   Patient is much better today with decreased abdominal pain though .  She is able to tolerate food without vomiting.  Patient denies any consumption of alcohol or any  medications other than what prescribed.  No fever or chills.  Her sugars a little bit elevated at home but improved in the hospital.  Pancreatitis mild improved.  Will discharge her to follow-up with us in 1 week in the office.     Goals of Care Treatment Preferences:  Code Status: Full Code      SDOH Screening:  The patient declined to be screened for utility difficulties, food insecurity, transport difficulties, housing insecurity, and interpersonal safety, so no concerns could be identified this admission.     Consults:     * Acute pancreatitis  01/08/2025: Lipase 184. CT abdomen/pelvis negative for acute abdominal or pelvic process  - ml/hr  -Sips of clear liquids and may take po medications  -Dilaudid 1 mg every 6 hours for moderate to severe pain  -Zofran 4 mg IV every 6 hours  as needed for nausea/vomiting (first choice)  -Phenergan 25 mg IV every 6 hours as needed for nausea/vomiting (second choice)  -CBC/BMP daily  1-9-2025-patient has abdominal pain is markedly improved.  She has had no nausea or vomiting.  Patient denies taking any new medicines or drinking alcohol.  Patient feels she is stable enough to go home today and will give her some nausea as well as pain medicines for home.  She is to gradually increase her diet up.  Follow-up back in 1 week in the office.        Cigarette nicotine dependence without complication  01/08/2025: Dangers of cigarette smoking were reviewed with patient in detail. Patient was Counseled for 3-10 minutes. Nicotine replacement options were discussed. Nicotine replacement was discussed- prescribed  -Nicoderm CQ 14 mg/hr patches daily    Discoid lupus  01/08/2025:   -Continue Plaquenil 200 mg twice a day      Diabetes mellitus  01/08/2025: Patient's FSGs are uncontrolled due to hyperglycemia on current medication regimen.  Last A1c reviewed-   Lab Results   Component Value Date    HGBA1C 7.1 (H) 09/05/2024     Most recent fingerstick glucose reviewed- No results for  "input(s): "POCTGLUCOSE" in the last 24 hours.  Current correctional scale  Medium  Maintain anti-hyperglycemic dose as follows-   Antihyperglycemics (From admission, onward)      Start     Stop Route Frequency Ordered    01/08/25 1316  insulin aspart U-100 injection 0-10 Units         -- SubQ Before meals & nightly PRN 01/08/25 1219    01/08/25 1230  insulin glargine U-100 (Lantus) injection 30 Units         -- SubQ 2 times daily 01/08/25 1219          Hold Oral hypoglycemics while patient is in the hospital. Currently holding Farxiga during hospitalization.     1-9-2025-patient much improved in the hospital.  She does have some mild abdominal pain.  Her diabetes is remained stable in his sugars less than 200.  Currently stable and okay for home.  Follow-up 1 week     Chronic obstructive lung disease  01/08/2025: Patient's COPD is controlled currently.  Patient is currently off COPD Pathway. Continue scheduled inhalers and monitor respiratory status closely.   -Duo nebs every 6 hours while awake  -Budesonide 0.5 mg neb every 12 hours    1-9-2025-patient COPD has been stable in the hospital.  No changes in treatment.  Follow-up in the office in 1 week        Final Active Diagnoses:    Diagnosis Date Noted POA    PRINCIPAL PROBLEM:  Acute pancreatitis [K85.90] 01/08/2025 Yes    Cigarette nicotine dependence without complication [F17.210] 01/08/2025 Yes    Discoid lupus [L93.0] 06/23/2023 Yes    Chronic obstructive lung disease [J44.9]  Yes    Diabetes mellitus [E11.9]  Yes      Problems Resolved During this Admission:       Discharged Condition: stable    Disposition: Home or Self Care    Follow Up:   Follow-up Information       Oren Gage, DO Follow up.    Specialty: Family Medicine  Contact information:  [address]  [facility] [place] [address]  [phone]               Oren Gage, DO Follow up in 1 week(s).    Specialty: Family Medicine  Contact information:  [address]  [facility] [address]  [phone]                           Patient Instructions:      Diet diabetic     Activity as tolerated       Significant Diagnostic Studies: Labs: CMP   Recent Labs   Lab 01/08/25 0918 01/09/25 0514   * 139   K 4.3 4.0   CL 99 107   CO2 23 25   * 133*   BUN 14 10   CREATININE 1.15* 0.87   CALCIUM 10.3* 8.7   PROT 8.0  --    ALBUMIN 4.2  --    BILITOT 0.5  --    ALKPHOS 104  --    AST 34  --    ALT 27  --    ANIONGAP 17* 11   , CBC   Recent Labs   Lab 01/08/25 0918 01/09/25 0514   WBC 8.13 4.68   HGB 15.9 13.3   HCT 47.7* 41.6    140*   , Lipid Panel   Lab Results   Component Value Date    CHOL 137 05/09/2024    HDL 40 05/09/2024    LDLCALC 70 05/09/2024    TRIG 133 05/09/2024    CHOLHDL 3.4 05/09/2024   , and All labs within the past 24 hours have been reviewed  Radiology: X-Ray: CXR: X-Ray Chest 1 View (CXR): No results found for this visit on 01/08/25.    Pending Diagnostic Studies:       None           Medications:  Reconciled Home Medications:      Medication List        START taking these medications      albuterol-ipratropium 2.5 mg-0.5 mg/3 mL nebulizer solution  Commonly known as: DUO-NEB  Take 3 mLs by nebulization every 6 (six) hours while awake. Rescue     HYDROmorphone 4 MG tablet  Commonly known as: DILAUDID  Take 1 tablet (4 mg total) by mouth every 4 (four) hours as needed for Pain.     promethazine 25 MG tablet  Commonly known as: PHENERGAN  Take 1 tablet (25 mg total) by mouth every 4 (four) hours as needed for Nausea.            CONTINUE taking these medications      aspirin 81 MG EC tablet  Commonly known as: ECOTRIN  Take 1 tablet by mouth once daily.     atorvastatin 40 MG tablet  Commonly known as: LIPITOR  Take 1 tablet (40 mg total) by mouth once daily.     budesonide-formoterol 160-4.5 mcg 160-4.5 mcg/actuation Hfaa  Commonly known as: SYMBICORT  Inhale 2 puffs into the lungs every 12 (twelve) hours. Controller     busPIRone  15 MG tablet  Commonly known as: BUSPAR  TAKE 1 TABLET BY MOUTH 2 TIMES A DAY AS DIRECTED     clopidogreL 75 mg tablet  Commonly known as: PLAVIX  Take 1 tablet (75 mg total) by mouth once daily.     dapagliflozin propanediol 10 mg tablet  Commonly known as: FARXIGA  Take 1 tablet (10 mg total) by mouth once daily.     gabapentin 300 MG capsule  Commonly known as: NEURONTIN  Take 2 capsules (600 mg total) by mouth 2 (two) times daily.     hydroxychloroquine 200 mg tablet  Commonly known as: PLAQUENIL  Take 1 tablet (200 mg total) by mouth 2 (two) times daily.     LANTUS SOLOSTAR U-100 INSULIN 100 unit/mL (3 mL) Inpn pen  Generic drug: insulin glargine U-100 (Lantus)  Inject 30 Units into the skin 2 (two) times a day.     metoprolol succinate 50 MG 24 hr tablet  Commonly known as: TOPROL-XL  Take 1 tablet (50 mg total) by mouth once daily.     mirtazapine 30 MG tablet  Commonly known as: REMERON  Take 1 tablet (30 mg total) by mouth once daily.     mupirocin 2 % ointment  Commonly known as: BACTROBAN  Apply topically 3 (three) times daily.     rOPINIRole 0.25 MG tablet  Commonly known as: REQUIP  TAKE 1 TABLET BY MOUTH THREE TIMES DAILY.     topiramate 25 MG tablet  Commonly known as: TOPAMAX  Take 1 tablet (25 mg total) by mouth once daily.     VRAYLAR 3 mg Cap  Generic drug: cariprazine  Take 2 capsules (6 mg total) by mouth once daily.            STOP taking these medications      ondansetron 4 MG Tbdl  Commonly known as: ZOFRAN-ODT              Indwelling Lines/Drains at time of discharge:   Lines/Drains/Airways       None                   Time spent on the discharge of patient: 35 minutes         Oren Gage DO  Department of Hospital Medicine  [facility] - Medical Surgical Unit

## 2025-01-09 NOTE — TELEPHONE ENCOUNTER
----- Message from Jessica sent at 1/9/2025  2:01 PM CST -----  HYDROmorphone (DILAUDID) 4 MG tablet inga called these in to [facility] but they dont carry them so she was wondering if he can call something different in for her.   Home

## 2025-01-09 NOTE — ASSESSMENT & PLAN NOTE
01/08/2025: Lipase 184. CT abdomen/pelvis negative for acute abdominal or pelvic process  - ml/hr  -Sips of clear liquids and may take po medications  -Dilaudid 1 mg every 6 hours for moderate to severe pain  -Zofran 4 mg IV every 6 hours  as needed for nausea/vomiting (first choice)  -Phenergan 25 mg IV every 6 hours as needed for nausea/vomiting (second choice)  -CBC/BMP daily  1-9-2025-patient has abdominal pain is markedly improved.  She has had no nausea or vomiting.  Patient denies taking any new medicines or drinking alcohol.  Patient feels she is stable enough to go home today and will give her some nausea as well as pain medicines for home.  She is to gradually increase her diet up.  Follow-up back in 1 week in the office.

## 2025-01-09 NOTE — PLAN OF CARE
Care plan met  Problem: Adult Inpatient Plan of Care  Goal: Plan of Care Review  Outcome: Met  Goal: Patient-Specific Goal (Individualized)  Outcome: Met  Goal: Absence of Hospital-Acquired Illness or Injury  Outcome: Met  Goal: Optimal Comfort and Wellbeing  Outcome: Met  Goal: Readiness for Transition of Care  Outcome: Met     Problem: Diabetes Comorbidity  Goal: Blood Glucose Level Within Targeted Range  Outcome: Met     Problem: Fall Injury Risk  Goal: Absence of Fall and Fall-Related Injury  Outcome: Met     Problem: Pain Acute  Goal: Optimal Pain Control and Function  Outcome: Met     Problem: Fatigue  Goal: Improved Activity Tolerance  Outcome: Met     Problem: Pancreatitis  Goal: Fluid and Electrolyte Balance  Outcome: Met  Goal: Absence of Infection Signs and Symptoms  Outcome: Met  Goal: Optimal Nutrition Delivery  Outcome: Met  Goal: Optimal Pain Control and Function  Outcome: Met  Goal: Effective Oxygenation and Ventilation  Outcome: Met     Problem: Breathing Pattern Ineffective  Goal: Effective Breathing Pattern  Outcome: Met     Problem: Gas Exchange Impaired  Goal: Optimal Gas Exchange  Outcome: Met

## 2025-01-09 NOTE — DISCHARGE INSTRUCTIONS
Discharge instructions including medications and follow up appointments reviewed with patient. Patient verbalized understanding.

## 2025-01-09 NOTE — NURSING
Discharge instructions reviewed with patient including medications and follow up appointments. Patient verbalized understanding

## 2025-01-09 NOTE — ASSESSMENT & PLAN NOTE
01/08/2025: Patient's COPD is controlled currently.  Patient is currently off COPD Pathway. Continue scheduled inhalers and monitor respiratory status closely.   -Duo nebs every 6 hours while awake  -Budesonide 0.5 mg neb every 12 hours    1-9-2025-patient COPD has been stable in the hospital.  No changes in treatment.  Follow-up in the office in 1 week

## 2025-01-09 NOTE — ASSESSMENT & PLAN NOTE
"01/08/2025: Patient's FSGs are uncontrolled due to hyperglycemia on current medication regimen.  Last A1c reviewed-   Lab Results   Component Value Date    HGBA1C 7.1 (H) 09/05/2024     Most recent fingerstick glucose reviewed- No results for input(s): "POCTGLUCOSE" in the last 24 hours.  Current correctional scale  Medium  Maintain anti-hyperglycemic dose as follows-   Antihyperglycemics (From admission, onward)      Start     Stop Route Frequency Ordered    01/08/25 1316  insulin aspart U-100 injection 0-10 Units         -- SubQ Before meals & nightly PRN 01/08/25 1219    01/08/25 1230  insulin glargine U-100 (Lantus) injection 30 Units         -- SubQ 2 times daily 01/08/25 1219          Hold Oral hypoglycemics while patient is in the hospital. Currently holding Farxiga during hospitalization.     1-9-2025-patient much improved in the hospital.  She does have some mild abdominal pain.  Her diabetes is remained stable in his sugars less than 200.  Currently stable and okay for home.  Follow-up 1 week   "

## 2025-01-09 NOTE — PLAN OF CARE
Patient is awake and alert.  HR 67, RR 18, o2 sat. 93% on room air with clear bilateral breath sounds.    Problem: Breathing Pattern Ineffective  Goal: Effective Breathing Pattern  Outcome: Progressing  Intervention: Promote Improved Breathing Pattern  Flowsheets (Taken 1/9/2025 1037)  Airway/Ventilation Management:   airway patency maintained   calming measures promoted   pulmonary hygiene promoted  Head of Bed (HOB) Positioning: HOB at 20-30 degrees     Problem: Gas Exchange Impaired  Goal: Optimal Gas Exchange  Outcome: Progressing  Intervention: Optimize Oxygenation and Ventilation  Flowsheets (Taken 1/9/2025 1037)  Airway/Ventilation Management:   airway patency maintained   calming measures promoted   pulmonary hygiene promoted  Head of Bed (HOB) Positioning: HOB at 20-30 degrees

## 2025-01-10 ENCOUNTER — PATIENT OUTREACH (OUTPATIENT)
Facility: HOSPITAL | Age: 55
End: 2025-01-10
Payer: MEDICAID

## 2025-01-10 NOTE — PROGRESS NOTES
Population Health Chart Review & Patient Outreach Details    Updates Requested / Reviewed:  [x]  Care Team Updated  [x]  Care Everywhere Updated & Reviewed  [x]  [facility] & [facility] Reviewed  [x]   Reviewed      Breast Cancer Screening [x] Telephone outreach: Patient declined mammogram at this time.

## 2025-01-15 NOTE — TELEPHONE ENCOUNTER
Returned call to pt. She stated that while in hospital they gave her nicotine patches and is wondering if Dr. Gage would be willing to call her in some as she is ready to stop smoking. Will talk to Dr. Gage and let her know.

## 2025-01-16 ENCOUNTER — TELEPHONE (OUTPATIENT)
Dept: FAMILY MEDICINE | Facility: CLINIC | Age: 55
End: 2025-01-16
Payer: MEDICAID

## 2025-01-16 RX ORDER — IBUPROFEN 200 MG
1 TABLET ORAL DAILY
Qty: 30 PATCH | Refills: 2 | Status: SHIPPED | OUTPATIENT
Start: 2025-01-16

## 2025-01-16 NOTE — TELEPHONE ENCOUNTER
Pt called back. She state that while in hospital she got a shot (IM) of hydroxyzine and caused her to fell crazy and wanted it added to her allergy list. Also stated we should be getting a fax from company to get her diapers/gloves. Informed that as of 12:00 pm when I cam in one had not be received but that I would keep an eye out for it. Voiced understanding.

## 2025-01-16 NOTE — TELEPHONE ENCOUNTER
----- Message from Carolynn sent at 1/16/2025 10:14 AM CST -----  Patient called again and also requests Gloves  Said a Fax should be coming through about that  ----- Message -----  From: Carolynn Cornelius  Sent: 1/16/2025  10:08 AM CST  To: [facility] Staff    Patient requests call back about a Medication/Injection she received while in the Hospital on the 8th that she had a bad reaction to and wants to add it to her list of Allergies

## 2025-01-22 DIAGNOSIS — R11.0 NAUSEA: ICD-10-CM

## 2025-01-22 RX ORDER — ONDANSETRON HYDROCHLORIDE 8 MG/1
8 TABLET, FILM COATED ORAL EVERY 8 HOURS PRN
Qty: 90 TABLET | Refills: 1 | OUTPATIENT
Start: 2025-01-22

## 2025-01-22 NOTE — TELEPHONE ENCOUNTER
----- Message from Carole sent at 1/22/2025  9:47 AM CST -----  Pt needs refill on her [facility]    Pharmacy: [facility] - [place], [place] - [address]

## 2025-01-29 RX ORDER — ONDANSETRON 8 MG/1
8 TABLET, ORALLY DISINTEGRATING ORAL EVERY 6 HOURS PRN
Qty: 30 TABLET | Refills: 1 | Status: SHIPPED | OUTPATIENT
Start: 2025-01-29

## 2025-01-29 NOTE — TELEPHONE ENCOUNTER
----- Message from Jessica sent at 1/29/2025  9:16 AM CST -----  Patient wants to know if dr hoang will [facility] [place]

## 2025-02-21 DIAGNOSIS — E11.65 TYPE 2 DIABETES MELLITUS WITH HYPERGLYCEMIA, WITH LONG-TERM CURRENT USE OF INSULIN: ICD-10-CM

## 2025-02-21 DIAGNOSIS — Z79.4 TYPE 2 DIABETES MELLITUS WITH HYPERGLYCEMIA, WITH LONG-TERM CURRENT USE OF INSULIN: ICD-10-CM

## 2025-02-21 RX ORDER — INSULIN GLARGINE 100 [IU]/ML
30 INJECTION, SOLUTION SUBCUTANEOUS 2 TIMES DAILY
Qty: 6 ML | Refills: 3 | Status: SHIPPED | OUTPATIENT
Start: 2025-02-21

## 2025-02-26 ENCOUNTER — TELEPHONE (OUTPATIENT)
Dept: FAMILY MEDICINE | Facility: CLINIC | Age: 55
End: 2025-02-26
Payer: MEDICAID

## 2025-02-26 NOTE — TELEPHONE ENCOUNTER
Returned call to pt. Questioned if we had rec'd records from [facility] in [place]. Stated she signed a release at their office to send to our office yesterday. No records rec'd at this time. Informed pt we would keep eye out for it though. No other concerns voiced.

## 2025-03-03 PROBLEM — L03.90 CELLULITIS DUE TO METHICILLIN-RESISTANT STAPHYLOCOCCUS AUREUS (MRSA): Status: ACTIVE | Noted: 2025-03-03

## 2025-03-03 PROBLEM — L03.221 CELLULITIS OF NECK: Status: ACTIVE | Noted: 2025-03-03

## 2025-03-03 PROBLEM — B95.62 CELLULITIS DUE TO METHICILLIN-RESISTANT STAPHYLOCOCCUS AUREUS (MRSA): Status: ACTIVE | Noted: 2025-03-03

## 2025-03-03 NOTE — PROGRESS NOTES
Oren Gage DO   [address] [place]  [address]      PATIENT NAME: Rosemarie Lane  : 1970  DATE: 24  MRN: [id number]      Billing Provider: Oren Gage DO  Level of Service:   Patient PCP Information       Provider PCP Type    Enrique Velasco DO General            Reason for Visit / Chief Complaint: Diabetes (Mrs. Lane is a 54 yr old female that presents today with needing to have a check up on her diabetes. Taking all meds as prescribed. ), Neck Pain (Complains of neck pain/upper back pain as well. States she has been scratching the area and now has scabs and redness to area as well as the pain. Everything has been going on for a month or so. ), Dizziness (Has dizziness as well that has started about a month ago. Notices it more when she bends over. But it is getting worse. ), and Health Maintenance (Discussed care gaps with pt. Does not wish to update. )       Update PCP  Update Chief Complaint         History of Present Illness / Problem Focused Workflow     Rosemarie Lane presents to the clinic with Diabetes (Mrs. Lane is a 54 yr old female that presents today with needing to have a check up on her diabetes. Taking all meds as prescribed. ), Neck Pain (Complains of neck pain/upper back pain as well. States she has been scratching the area and now has scabs and redness to area as well as the pain. Everything has been going on for a month or so. ), Dizziness (Has dizziness as well that has started about a month ago. Notices it more when she bends over. But it is getting worse. ), and Health Maintenance (Discussed care gaps with pt. Does not wish to update. )     Patient is in for follow-up on her medical problems to include her bipolar disease as well as rash on her neck for which she has noted that it has been painful over the last 2 months in she now has scabs on the area.  She has had methicillin-resistant staph infections on other areas in the  past.  She does take gabapentin and Dilaudid for chronic pain.  Her breathing is stable.  Had no cough or sputum production.  Patient has lupus is well controlled she is currently taking hydroxychloroquine.  She denies any hypoglycemic episodes with the Farxiga along with her insulin.    Diabetes  She has type 2 diabetes mellitus. No MedicAlert identification noted. The initial diagnosis of diabetes was made 2009 years ago. Hypoglycemia symptoms include dizziness. Pertinent negatives for hypoglycemia include no confusion, headaches, hunger, mood changes, nervousness/anxiousness, pallor, seizures, sleepiness, speech difficulty, sweats or tremors. Associated symptoms include fatigue, foot paresthesias and polydipsia. Pertinent negatives for diabetes include no blurred vision, no chest pain, no polyphagia, no polyuria, no visual change, no weakness and no weight loss. Pertinent negatives for hypoglycemia complications include no blackouts, no hospitalization, no nocturnal hypoglycemia, no required assistance and no required glucagon injection. Symptoms are stable. Diabetic complications include autonomic neuropathy, heart disease, peripheral neuropathy and retinopathy. Pertinent negatives for diabetic complications include no CVA, nephropathy or PVD. Risk factors for coronary artery disease include dyslipidemia, family history, hypertension, obesity, stress, tobacco exposure and diabetes mellitus. Current diabetic treatment includes insulin injections and oral agent (monotherapy). She is compliant with treatment most of the time. She is currently taking insulin pre-breakfast and at bedtime. Insulin injections are given by patient. Rotation sites for injection include the abdominal wall. Her weight is fluctuating minimally. She has not had a previous visit with a dietitian. She monitors blood glucose at home 5+ x per day. Blood glucose monitoring compliance is good. There is no change in her home blood glucose trend.  She does not see a podiatrist.Eye exam is not current.   Neck Pain   Pertinent negatives include no chest pain, fever, headaches, visual change, weakness or weight loss.   Dizziness: no ear pain, no fever, no headaches, no nausea, no vomiting, no weakness, no palpitations and no chest pain.no environmental allergies.      Review of Systems     Review of Systems   Constitutional:  Positive for fatigue. Negative for activity change, appetite change, chills, fever and weight loss.   HENT:  Negative for nasal congestion, ear discharge, ear pain, mouth dryness, mouth sores, postnasal drip, sinus pressure/congestion, sore throat and voice change.    Eyes:  Negative for blurred vision, pain, discharge, redness and itching.   Respiratory:  Negative for apnea, cough and shortness of breath.    Cardiovascular:  Negative for chest pain, palpitations and leg swelling.   Gastrointestinal:  Negative for abdominal distention, abdominal pain, anal bleeding, blood in stool, change in bowel habit, constipation, diarrhea, nausea and vomiting.   Endocrine: Positive for polydipsia. Negative for cold intolerance, heat intolerance, polyphagia and polyuria.   Genitourinary:  Negative for difficulty urinating, enuresis, frequency and hematuria.   Musculoskeletal:  Positive for neck pain. Negative for arthralgias and back pain.   Integumentary:  Positive for rash. Negative for pallor, breast mass, breast discharge and breast tenderness.   Allergic/Immunologic: Negative for environmental allergies and food allergies.   Neurological:  Positive for dizziness. Negative for tremors, seizures, speech difficulty, weakness and headaches.   Psychiatric/Behavioral:  Negative for confusion. The patient is not nervous/anxious.    Breast: Negative for mass and tenderness      Medical / Social / Family History     Past Medical History:   Diagnosis Date    Anxiety     Arthritis     Asthma     Cancer     CHF (congestive heart failure)     Chronic  obstructive lung disease     Coronary artery disease     Depression     Diabetes mellitus     Diabetes mellitus, type 2     Encounter for blood transfusion     History of kidney stones     Hyperlipidemia     Hypertension     Lupus     Renal disorder     Seizures     Thyroid disease     Transfusion reaction        Past Surgical History:   Procedure Laterality Date    CARDIAC CATHETERIZATION Left 09/19/2020    Proximal left main stenosis; IVUS in the LAD and left main    CHOLECYSTECTOMY      CORONARY ARTERY BYPASS GRAFT  09/30/2020    CORONARY STENT PLACEMENT  11/09/2021    Everolimus Eluting Coronary Stent-MLAD;LAD    DIRECT LARYNGOSCOPY Bilateral 7/18/2023    Procedure: LARYNGOSCOPY, DIRECT;  Surgeon: Nazario Sloan MD;  Location: [facility] OR;  Service: ENT;  Laterality: Bilateral;    HYSTERECTOMY      INCISION AND DRAINAGE  06/02/2020    LEFT HEART CATHETERIZATION Left 11/09/2021    Procedure: Left heart cath;  Surgeon: Aureliano Giron MD;  Location: [facility] CATH LAB;  Service: Cardiology;  Laterality: Left;    TOTAL ABDOMINAL HYSTERECTOMY W/ BILATERAL SALPINGOOPHORECTOMY  2008    [place], [place]     TUBAL LIGATION      VOCAL FOLD LESION EXCISION Bilateral 7/18/2023    Procedure: EXCISION, LESION, VOCAL CORD;  Surgeon: Nazario Sloan MD;  Location: [facility] OR;  Service: ENT;  Laterality: Bilateral;       Social History  Ms.  reports that she has been smoking cigarettes. She started smoking about 38 years ago. She has a 19.1 pack-year smoking history. She has been exposed to tobacco smoke. She has never used smokeless tobacco. She reports that she does not drink alcohol and does not use drugs.    Family History  Ms.'s family history includes Bone cancer in her father; COPD in her father; Cancer in her father and mother; Cushing syndrome in her daughter; Diabetes in her brother, mother, and sister; Heart attack in her paternal grandmother; Heart disease in her brother and mother; Hypertension in  her mother; Lung cancer in her father; No Known Problems in her daughter, maternal grandfather, paternal grandfather, and son; Ovarian cancer in her maternal grandmother.    Medications and Allergies     Medications  Outpatient Medications Marked as Taking for the 11/22/24 encounter (Office Visit) with Oren Gage, DO   Medication Sig Dispense Refill    aspirin (ECOTRIN) 81 MG EC tablet Take 1 tablet by mouth once daily.      atorvastatin (LIPITOR) 40 MG tablet Take 1 tablet (40 mg total) by mouth once daily. 30 tablet 2    cariprazine (VRAYLAR) 3 mg Cap Take 2 capsules (6 mg total) by mouth once daily. 60 capsule 2    dapagliflozin propanediol (FARXIGA) 10 mg tablet Take 1 tablet (10 mg total) by mouth once daily. 30 tablet 2    hydroxychloroquine (PLAQUENIL) 200 mg tablet Take 1 tablet (200 mg total) by mouth 2 (two) times daily. 60 tablet 2    metoprolol succinate (TOPROL-XL) 50 MG 24 hr tablet Take 1 tablet (50 mg total) by mouth once daily. 30 tablet 2    mirtazapine (REMERON) 30 MG tablet Take 1 tablet (30 mg total) by mouth once daily. 30 tablet 2    topiramate (TOPAMAX) 25 MG tablet Take 1 tablet (25 mg total) by mouth once daily. 30 tablet 2    [DISCONTINUED] albuterol (PROVENTIL) 2.5 mg /3 mL (0.083 %) nebulizer solution USE 1 VIAL IN NEBULIZER EVERY 4 TO 6 HOURS AS NEEDED 180 each 1    [DISCONTINUED] arformoteroL (BROVANA) 15 mcg/2 mL Nebu Take 2 mLs (15 mcg total) by nebulization 2 (two) times a day. 180 each 1    [DISCONTINUED] busPIRone (BUSPAR) 15 MG tablet TAKE 1 TABLET BY MOUTH 2 TIMES A DAY AS DIRECTED 60 tablet 2    [DISCONTINUED] clopidogreL (PLAVIX) 75 mg tablet Take 1 tablet (75 mg total) by mouth once daily. 30 tablet 2    [DISCONTINUED] insulin (LANTUS SOLOSTAR U-100 INSULIN) glargine 100 units/mL SubQ pen Inject 30 Units into the skin 2 (two) times a day. 51 mL 2    [DISCONTINUED] linaCLOtide (LINZESS) 145 mcg Cap capsule Take 1 capsule (145 mcg total) by mouth daily as needed  (constipation). 30 capsule 2    [DISCONTINUED] ondansetron (ZOFRAN) 8 MG tablet Take 1 tablet (8 mg total) by mouth every 8 (eight) hours as needed for Nausea. 90 tablet 1    [DISCONTINUED] rOPINIRole (REQUIP) 0.25 MG tablet Take 1 tablet (0.25 mg total) by mouth 3 (three) times daily. 90 tablet 2    [DISCONTINUED] triamcinolone acetonide 0.1% (KENALOG) 0.1 % cream Apply topically 2 (two) times daily. 454 g 5       Allergies  Review of patient's allergies indicates:   Allergen Reactions    Fish containing products Hives and Swelling    Penicillins Swelling     Localized swelling     Wasp venom Anaphylaxis    Hydroxyzine Hallucinations    Opioids - morphine analogues      Change in behavior    Toradol [ketorolac] Hives    Tramadol Hives    Zithromax [azithromycin] Other (See Comments)     Patient cannot remember what kind of reaction she had to this medication    Bactrim ds [sulfamethoxazole-trimethoprim] Nausea And Vomiting     Facial flush    Latex, natural rubber Rash       Physical Examination     Vitals:    11/22/24 1454   BP: 114/76   Pulse: 73   Resp: 18   Temp: 98.8 °F (37.1 °C)     Physical Exam  Vitals reviewed.   Constitutional:       General: She is not in acute distress.     Appearance: Normal appearance. She is obese.   HENT:      Head: Normocephalic and atraumatic.      Nose: Nose normal.      Mouth/Throat:      Mouth: Mucous membranes are moist.      Pharynx: Oropharynx is clear. No oropharyngeal exudate or posterior oropharyngeal erythema.   Eyes:      General: No scleral icterus.     Extraocular Movements: Extraocular movements intact.      Conjunctiva/sclera: Conjunctivae normal.      Pupils: Pupils are equal, round, and reactive to light.   Neck:      Vascular: No carotid bruit.   Cardiovascular:      Rate and Rhythm: Normal rate and regular rhythm.      Pulses: Normal pulses.      Heart sounds: Normal heart sounds.      No gallop.   Pulmonary:      Effort: Pulmonary effort is normal.      Breath  sounds: Normal breath sounds. No wheezing.   Abdominal:      General: Abdomen is flat. Bowel sounds are normal.      Palpations: Abdomen is soft.      Tenderness: There is no abdominal tenderness.   Musculoskeletal:         General: Normal range of motion.      Cervical back: Normal range of motion and neck supple.      Right lower leg: No edema.      Left lower leg: No edema.   Lymphadenopathy:      Cervical: No cervical adenopathy.   Skin:     General: Skin is warm and dry.      Capillary Refill: Capillary refill takes less than 2 seconds.      Coloration: Skin is not jaundiced.      Findings: Erythema and rash present. No lesion.      Comments: Patient noted to have erythematous papules on her neck and upper back in the midline.  Some yellow crusting noted.   Neurological:      General: No focal deficit present.      Mental Status: She is alert and oriented to person, place, and time. Mental status is at baseline.      Cranial Nerves: No cranial nerve deficit.      Sensory: No sensory deficit.      Motor: No weakness.      Coordination: Coordination normal.      Gait: Gait normal.      Deep Tendon Reflexes: Reflexes normal.   Psychiatric:         Mood and Affect: Mood normal.         Behavior: Behavior normal.         Thought Content: Thought content normal.         Judgment: Judgment normal.               Lab Results   Component Value Date    WBC 4.68 01/09/2025    HGB 13.3 01/09/2025    HCT 41.6 01/09/2025    MCV 89.3 01/09/2025     (L) 01/09/2025          Sodium   Date Value Ref Range Status   01/09/2025 139 136 - 145 mmol/L Final     Potassium   Date Value Ref Range Status   01/09/2025 4.0 3.5 - 5.1 mmol/L Final     Chloride   Date Value Ref Range Status   01/09/2025 107 98 - 107 mmol/L Final     CO2   Date Value Ref Range Status   01/09/2025 25 22 - 29 mmol/L Final     Glucose   Date Value Ref Range Status   01/09/2025 133 (H) 74 - 100 mg/dL Final     BUN   Date Value Ref Range Status   01/09/2025 10  10 - 20 mg/dL Final     Creatinine   Date Value Ref Range Status   01/09/2025 0.87 0.55 - 1.02 mg/dL Final     Calcium   Date Value Ref Range Status   01/09/2025 8.7 8.4 - 10.2 mg/dL Final     Total Protein   Date Value Ref Range Status   01/08/2025 8.0 6.4 - 8.3 g/dL Final     Albumin   Date Value Ref Range Status   01/08/2025 4.2 3.5 - 5.0 g/dL Final     Bilirubin, Total   Date Value Ref Range Status   01/08/2025 0.5 <=1.5 mg/dL Final     Alk Phos   Date Value Ref Range Status   01/08/2025 104 40 - 150 U/L Final     AST   Date Value Ref Range Status   01/08/2025 34 5 - 34 U/L Final     ALT   Date Value Ref Range Status   01/08/2025 27 <=55 U/L Final     Anion Gap   Date Value Ref Range Status   01/09/2025 11 7 - 16 mmol/L Final     eGFR    Date Value Ref Range Status   01/18/2021 62       eGFR   Date Value Ref Range Status   07/08/2022 70 >=60 mL/min/1.73m² Final      CT Abdomen Pelvis With IV Contrast NO Oral Contrast  Addendum: The body of the report should read:     Bowel: Scattered sigmoid diverticula without focal diverticulitis.     Electronically signed by: Austin Chambers   Date:    01/08/2025   Time:    10:55  Narrative: CMS MANDATED QUALITY DATA - CT RADIATION - 436    All CT scans at this facility utilize dose modulation, iterative reconstruction, and/or weight based dosing when appropriate to reduce radiation dose to as low as reasonably achievable.    CLINICAL HISTORY:  ([id number])55 y/o  (1970) F    Abdominal pain, acute, nonlocalized;    TECHNIQUE:  (A#[id number], exam time 1/8/2025 10:24)    CT ABDOMEN PELVIS WITH IV CONTRAST [id number]    Axial CT images of the abdomen and pelvis were obtained from the dome of the diaphragm to the proximal thighs.    COMPARISON:  04/28/2024.    FINDINGS:  Lower Thorax:    The lung bases are clear. The heart is normal in size.    CT Abdomen:    Liver: Relative diffuse low-attenuation liver consistent with hepatic steatosis.    Gallbladder: The  gallbladder is surgically absent.    Biliary Tree: No intra or extrahepatic ductal dilation.    Spleen: Normal.    Pancreas: The pancreas is normal.    Adrenal Glands: Normal    Kidneys: Is asymmetrically small/atrophic with renal cortical scarring/thinning and wedge-shaped cortical defects compatible with either remote infarct, infection or remote obstructive uropathy.  No hydronephrosis/hydroureter or evidence of obstructive uropathy is seen on today's exam.  No contour deforming renal mass or cyst is identified.    Vasculature: Calcified plaques are seen in the abdominal aorta and iliacs with no aneurysm.    Lymph nodes: No abdominal lymphadenopathy is seen.    Intraperitoneal structures: There is no ascites.    Bowel: Scattered sigmoid diverticula of focal diverticulitis.  Mildly ectatic loops of small bowel are seen in the mid abdomen (image 43), top-normal sore for size measuring up to 2.7 cm, likely transient.  The bowel is normal in course and caliber without finding of obstruction, free air or abscess identified.    Abdominal wall: The abdominal wall and musculature are normal.    Musculoskeletal: No acute osseous abnormality is identified.  Levoscoliosis is seen with a transitional lumbosacral vertebral body (L5) which is partially sacralized at the left transverse process.    CT Pelvis:    Bladder: Normal.    Reproductive Organs: The uterus is not visualized/surgically absent.  Surgically absent.    Pelvic Lymph nodes: No lymphadenopathy.  Impression: No acute abdominal or pelvic process, with numerous additional, and incidental findings as above.    Electronically signed by: Austin Chambers  Date:    01/08/2025  Time:    10:32     Procedures   Lab Results   Component Value Date    CHOL 137 05/09/2024    CHOL 144 08/24/2023    CHOL 135 06/23/2023     Lab Results   Component Value Date    HDL 40 05/09/2024    HDL 43 08/24/2023    HDL 33 (L) 06/23/2023     Lab Results   Component Value Date    LDLCALC 70  05/09/2024    LDLCALC 44 08/24/2023    LDLCALC 55 06/23/2023     Lab Results   Component Value Date    TRIG 133 05/09/2024    TRIG 285 (H) 08/24/2023    TRIG 237 (H) 06/23/2023     Lab Results   Component Value Date    CHOLHDL 3.4 05/09/2024    CHOLHDL 3.3 08/24/2023    CHOLHDL 4.1 06/23/2023      Lab Results   Component Value Date    HGBA1C 7.1 (H) 09/05/2024      Lab Results   Component Value Date    TSH 1.180 08/24/2023    [id number] 8.3 08/24/2023      Assessment and Plan (including Health Maintenance)      Problem List  Smart Sets  Document Outside HM   :    Plan:         Health Maintenance Due   Topic Date Due    Foot Exam  Never done    TETANUS VACCINE  Never done    Mammogram  Never done    Pneumococcal Vaccines (Age 50+) (1 of 2 - PCV) Never done    Shingles Vaccine (1 of 2) Never done    COVID-19 Vaccine (1 - 2024-25 season) Never done    Hemoglobin A1c  03/05/2025    Colorectal Cancer Screening  03/14/2025       Problem List Items Addressed This Visit       Diabetes mellitus - Primary    Current Assessment & Plan   Lab Results   Component Value Date    HGBA1C 7.1 (H) 09/05/2024    Good control her diabetes with the insulin.  She is at goal of 7.  Continue Farxiga as well.  Recheck A1c every 3 months.         Relevant Orders    POCT Glucose, Hand-Held Device (Completed)    Bipolar 2 disorder, major depressive episode    Current Assessment & Plan   Currently taking Remeron as well as Vraylar for her bipolar 2 disease.  She has no history of suicidal ideation.  She has no social issues.  Patient does follow-up with psychiatry.  No change in medicines.         Systemic lupus erythematosus    Current Assessment & Plan   History of systemic lupus currently doing well patient currently is taking Plaquenil in his followed by Rheumatology.  No change in medicines at this time.         Cervical pain (neck)    Current Assessment & Plan   Neck pain which is chronic in nature.  She is on gabapentin and Dilaudid for  neuropathy.  She has developed a cellulitis in the area and is being treated for that.  Continue her gabapentin and Dilaudid which she gets from pain management.         Cellulitis of neck    Current Assessment & Plan   Patient has been having some superficial pain in her neck and has been scratching at the area of the now has some scabs and lesions on her upper back area.  No fever chills so will treat her with mupirocin and doxycycline.         Relevant Medications    mupirocin (BACTROBAN) 2 % ointment    Other Relevant Orders    Culture, Wound (Completed)    Cellulitis due to methicillin-resistant Staphylococcus aureus (MRSA)    Current Assessment & Plan   Will give her 600 mg of Lincocin today and start her on doxycycline 100 mg twice a day for 2 weeks.  Also use Bactroban on it at least daily.  Follow-up in 2 weeks.         Relevant Medications    mupirocin (BACTROBAN) 2 % ointment    Other Relevant Orders    Culture, Wound (Completed)       Health Maintenance Topics with due status: Not Due       Topic Last Completion Date    Diabetes Urine Screening 05/09/2024    Lipid Panel 05/09/2024    High Dose Statin 01/02/2025    Aspirin/Antiplatelet Therapy 01/02/2025    RSV Vaccine (Age 60+ and Pregnant patients) Not Due       Future Appointments   Date Time Provider Department Center   3/14/2025  4:00 PM Oren Gage,  [facility]   4/1/2025  1:50 PM Nazario Sloan MD [facility]   5/16/2025  9:20 AM Oren Gage DO [facility]   6/25/2025 10:20 AM Shamir Price DO [facility]        Follow up in about 4 weeks (around 12/20/2024).     Signature:  [facility]  [address] [address]  [place]  [address]    Date of encounter: 11/22/24

## 2025-03-03 NOTE — ASSESSMENT & PLAN NOTE
Patient has been having some superficial pain in her neck and has been scratching at the area of the now has some scabs and lesions on her upper back area.  No fever chills so will treat her with mupirocin and doxycycline.

## 2025-03-03 NOTE — ASSESSMENT & PLAN NOTE
Currently taking Remeron as well as Vraylar for her bipolar 2 disease.  She has no history of suicidal ideation.  She has no social issues.  Patient does follow-up with psychiatry.  No change in medicines.

## 2025-03-03 NOTE — ASSESSMENT & PLAN NOTE
History of systemic lupus currently doing well patient currently is taking Plaquenil in his followed by Rheumatology.  No change in medicines at this time.

## 2025-03-03 NOTE — ASSESSMENT & PLAN NOTE
Lab Results   Component Value Date    HGBA1C 7.1 (H) 09/05/2024    Good control her diabetes with the insulin.  She is at goal of 7.  Continue Farxiga as well.  Recheck A1c every 3 months.

## 2025-03-03 NOTE — ASSESSMENT & PLAN NOTE
Neck pain which is chronic in nature.  She is on gabapentin and Dilaudid for neuropathy.  She has developed a cellulitis in the area and is being treated for that.  Continue her gabapentin and Dilaudid which she gets from pain management.

## 2025-03-03 NOTE — ASSESSMENT & PLAN NOTE
Will give her 600 mg of Lincocin today and start her on doxycycline 100 mg twice a day for 2 weeks.  Also use Bactroban on it at least daily.  Follow-up in 2 weeks.

## 2025-03-05 NOTE — ASSESSMENT & PLAN NOTE
Congestive heart failure currently stable.  No PND orthopnea.  Maintain her on her Toprol and Farxiga.  Follow-up with cardiology every 6 months.

## 2025-03-05 NOTE — ASSESSMENT & PLAN NOTE
Lab Results   Component Value Date    LDLCALC 70 05/09/2024    Last cholesterol was 70 LDL C.  Good control of her lipids.  Maintain atorvastatin 40 mg daily.  Recheck lipids today.

## 2025-03-05 NOTE — ASSESSMENT & PLAN NOTE
Currently on Plaquenil and stable.  Referral to Rheumatology.  Will need to check her NICHOLE rheumatoid factor as well as CBC and CMP at her next visit.  Also CRP and sed rate

## 2025-03-05 NOTE — ASSESSMENT & PLAN NOTE
Lumbar disc disease with degenerative disease likely.  Patient currently takes Dilaudid from the pain clinic and will not change that medication.  Heat to the area and lidocaine patches may be useful.  Would also recommend 10s units.

## 2025-03-05 NOTE — ASSESSMENT & PLAN NOTE
Bipolar disease currently stable.  Continue her Remeron and on Vraylar.  Follow-up every 3 months.  Recommend she follow-up with [facility]

## 2025-03-11 NOTE — ASSESSMENT & PLAN NOTE
Problem: Pain - Adult  Goal: Verbalizes/displays adequate comfort level or baseline comfort level  Outcome: Progressing     Problem: Safety - Adult  Goal: Free from fall injury  Outcome: Progressing     Problem: Discharge Planning  Goal: Discharge to home or other facility with appropriate resources  Outcome: Progressing     Problem: Chronic Conditions and Co-morbidities  Goal: Patient's chronic conditions and co-morbidity symptoms are monitored and maintained or improved  Outcome: Progressing     Problem: Nutrition  Goal: Nutrient intake appropriate for maintaining nutritional needs  Outcome: Progressing     Problem: Diabetes  Goal: Achieve decreasing blood glucose levels by end of shift  Outcome: Progressing  Goal: Increase stability of blood glucose readings by end of shift  Outcome: Progressing  Goal: Decrease in ketones present in urine by end of shift  Outcome: Progressing  Goal: Maintain electrolyte levels within acceptable range throughout shift  Outcome: Progressing  Goal: Maintain glucose levels >70mg/dl to <250mg/dl throughout shift  Outcome: Progressing  Goal: No changes in neurological exam by end of shift  Outcome: Progressing  Goal: Learn about and adhere to nutrition recommendations by end of shift  Outcome: Progressing  Goal: Vital signs within normal range for age by end of shift  Outcome: Progressing  Goal: Increase self care and/or family involovement by end of shift  Outcome: Progressing  Goal: Receive DSME education by end of shift  Outcome: Progressing       The clinical goals for the shift include ween nitro drip as tolerated       History of bipolar disease currently controlled with Vraylar.  Will continue her current does.  Follow-up every 3 months.  Recommend counseling.

## 2025-03-18 NOTE — ASSESSMENT & PLAN NOTE
Lab Results   Component Value Date    HGBA1C 7.1 (H) 09/05/2024    Last blood sugar today was 295 and she states it has been up lately.  Last A1c was 7.1 in September.  No changes in her Lantus at this time.   Pediatric Otolaryngology Operative Note      Procedure:   Bilateral ear tube placement and Adenoidectomy    Surgeons:  Tay Hou MD  Assistants:  None  Anesthesia:  General endotracheal    EBL: 3cc  Drains:  None  Complications: None   Specimens:   None    Pre-op Diagnosis:  Recurrent acute otitis media of both ears, bilateral eustachian tube dysfunction, nasal congestion, snoring.  Post-op Diagnosis:  Same    Indications:  Yan Casillas is a 6 year old male with the above pre-op diagnosis. Decision was made to proceed with surgery. Informed consent was obtained.     Findings:   Right ear: Mucoid  Left ear: Clear  Castro tubes placed bilaterally    Tonsils: 1+, NOT removed  Adenoids: 40%  Palate: Intact, no submucosal cleft palate.  Uvula: Singular      Procedure:  After consent, the patient was brought to the operating room and placed in the supine position.  Following induction, the patient was intubated orotracheally.  Monitoring lines were placed as appropriate. The bed was turned 90 degrees. The patient was prepped and draped in standard fashion. A time out was performed and the patient correctly identified.    The left ear was examined with the operating microscope. A speculum was inserted. Cerumen was removed using a ring curette. A myringotomy was made in the anterior inferior quadrant. The middle ear was suctioned and irrigated with sterile saline. A PE tube was placed. Saline was once again irrigated into the middle ear. The same procedure was then done on the right side.     Next, a McGyvor mouth gag was inserted and mouth retracted open. The soft palate was palpated and no evidence of submucuous cleft palate. A red reynoso catheter was inserted in the nasal cavity and the soft palate elevated. The nasopharynx was examined with a mirror held in the oropharynx and obstructive adenioid tissue was removed using a coblator on 8/4. Care was used to leave a inferior tissue at passavants ridge.  Hemostasis was confirmed.     The red rubber catheter was removed, and the retractor was closed and removed. The patient's care was returned to anesthesia, and he was awakened, extubated, and taken to the PACU in stable condition.    Tay Hou MD  Pediatric Otolaryngology and Facial Plastics  Department of Otolaryngology  [facility]

## 2025-03-25 DIAGNOSIS — E11.65 TYPE 2 DIABETES MELLITUS WITH HYPERGLYCEMIA, WITH LONG-TERM CURRENT USE OF INSULIN: ICD-10-CM

## 2025-03-25 DIAGNOSIS — Z79.4 TYPE 2 DIABETES MELLITUS WITH HYPERGLYCEMIA, WITH LONG-TERM CURRENT USE OF INSULIN: ICD-10-CM

## 2025-03-26 RX ORDER — GABAPENTIN 300 MG/1
CAPSULE ORAL
Qty: 120 CAPSULE | Refills: 2 | Status: SHIPPED | OUTPATIENT
Start: 2025-03-26

## 2025-04-02 RX ORDER — IBUPROFEN 200 MG
1 TABLET ORAL DAILY
Qty: 30 PATCH | Refills: 2 | Status: SHIPPED | OUTPATIENT
Start: 2025-04-02

## 2025-04-16 DIAGNOSIS — E78.5 HYPERLIPIDEMIA, UNSPECIFIED HYPERLIPIDEMIA TYPE: ICD-10-CM

## 2025-04-16 DIAGNOSIS — I10 HYPERTENSION, UNSPECIFIED TYPE: ICD-10-CM

## 2025-04-20 RX ORDER — METOPROLOL SUCCINATE 50 MG/1
50 TABLET, EXTENDED RELEASE ORAL DAILY
Qty: 30 TABLET | Refills: 2 | Status: SHIPPED | OUTPATIENT
Start: 2025-04-20

## 2025-04-20 RX ORDER — ATORVASTATIN CALCIUM 40 MG/1
40 TABLET, FILM COATED ORAL DAILY
Qty: 30 TABLET | Refills: 2 | Status: SHIPPED | OUTPATIENT
Start: 2025-04-20

## 2025-04-23 RX ORDER — ONDANSETRON 8 MG/1
8 TABLET, ORALLY DISINTEGRATING ORAL EVERY 6 HOURS PRN
Qty: 30 TABLET | Refills: 2 | Status: SHIPPED | OUTPATIENT
Start: 2025-04-23

## 2025-04-26 ENCOUNTER — HOSPITAL ENCOUNTER (EMERGENCY)
Facility: HOSPITAL | Age: 55
Discharge: HOME | End: 2025-04-26
Payer: MEDICAID

## 2025-04-26 VITALS
BODY MASS INDEX: 28.47 KG/M2 | TEMPERATURE: 100 F | HEIGHT: 60 IN | HEART RATE: 77 BPM | SYSTOLIC BLOOD PRESSURE: 124 MMHG | OXYGEN SATURATION: 96 % | RESPIRATION RATE: 20 BRPM | DIASTOLIC BLOOD PRESSURE: 61 MMHG | WEIGHT: 145 LBS

## 2025-04-26 DIAGNOSIS — R10.9 ABDOMINAL PAIN: ICD-10-CM

## 2025-04-26 LAB
ALBUMIN SERPL BCP-MCNC: 4.1 G/DL (ref 3.5–5)
ALBUMIN/GLOB SERPL: 1.2 {RATIO}
ALP SERPL-CCNC: 128 U/L (ref 40–150)
ALT SERPL W P-5'-P-CCNC: 26 U/L
ANION GAP SERPL CALCULATED.3IONS-SCNC: 15 MMOL/L (ref 7–16)
AST SERPL W P-5'-P-CCNC: 25 U/L (ref 11–45)
BASOPHILS # BLD AUTO: 0.04 K/UL (ref 0–0.2)
BASOPHILS NFR BLD AUTO: 0.5 % (ref 0–1)
BILIRUB SERPL-MCNC: 0.4 MG/DL
BILIRUB UR QL STRIP: NEGATIVE
BUN SERPL-MCNC: 15 MG/DL (ref 10–20)
BUN/CREAT SERPL: 10 (ref 6–20)
CALCIUM SERPL-MCNC: 9.6 MG/DL (ref 8.4–10.2)
CHLORIDE SERPL-SCNC: 96 MMOL/L (ref 98–107)
CLARITY UR: CLEAR
CO2 SERPL-SCNC: 27 MMOL/L (ref 22–29)
COLOR UR: YELLOW
CREAT SERPL-MCNC: 1.45 MG/DL (ref 0.55–1.02)
DIFFERENTIAL METHOD BLD: ABNORMAL
EGFR (NO RACE VARIABLE) (RUSH/TITUS): 43 ML/MIN/1.73M2
EOSINOPHIL # BLD AUTO: 0.01 K/UL (ref 0–0.5)
EOSINOPHIL NFR BLD AUTO: 0.1 % (ref 1–4)
ERYTHROCYTE [DISTWIDTH] IN BLOOD BY AUTOMATED COUNT: 13.6 % (ref 11.5–14.5)
GLOBULIN SER-MCNC: 3.3 G/DL (ref 2–4)
GLUCOSE SERPL-MCNC: 509 MG/DL (ref 74–100)
GLUCOSE UR STRIP-MCNC: >=1000 MG/DL
HCT VFR BLD AUTO: 46.7 % (ref 38–47)
HGB BLD-MCNC: 15.7 G/DL (ref 12–16)
KETONES UR STRIP-SCNC: NEGATIVE MG/DL
LEUKOCYTE ESTERASE UR QL STRIP: NEGATIVE
LYMPHOCYTES # BLD AUTO: 3.16 K/UL (ref 1–4.8)
LYMPHOCYTES NFR BLD AUTO: 39.1 % (ref 27–41)
MCH RBC QN AUTO: 30.2 PG (ref 27–31)
MCHC RBC AUTO-ENTMCNC: 33.6 G/DL (ref 32–36)
MCV RBC AUTO: 89.8 FL (ref 80–96)
MONOCYTES # BLD AUTO: 0.45 K/UL (ref 0–0.8)
MONOCYTES NFR BLD AUTO: 5.6 % (ref 2–6)
MPC BLD CALC-MCNC: 10.3 FL (ref 9.4–12.4)
NEUTROPHILS # BLD AUTO: 4.43 K/UL (ref 1.8–7.7)
NEUTROPHILS NFR BLD AUTO: 54.7 % (ref 53–65)
NITRITE UR QL STRIP: NEGATIVE
PH UR STRIP: 5.5 PH UNITS
PLATELET # BLD AUTO: 170 K/UL (ref 150–400)
POTASSIUM SERPL-SCNC: 4.1 MMOL/L (ref 3.5–5.1)
PROT SERPL-MCNC: 7.4 G/DL (ref 6.4–8.3)
PROT UR QL STRIP: NEGATIVE
RBC # BLD AUTO: 5.2 M/UL (ref 4.2–5.4)
RBC # UR STRIP: ABNORMAL /UL
RBC #/AREA URNS HPF: ABNORMAL /HPF
SODIUM SERPL-SCNC: 134 MMOL/L (ref 136–145)
SP GR UR STRIP: <=1.005
UROBILINOGEN UR STRIP-ACNC: 0.2 MG/DL
WBC # BLD AUTO: 8.09 K/UL (ref 4.5–11)
WBC #/AREA URNS HPF: ABNORMAL /HPF
YEAST #/AREA URNS HPF: ABNORMAL /HPF

## 2025-04-26 PROCEDURE — 96361 HYDRATE IV INFUSION ADD-ON: CPT

## 2025-04-26 PROCEDURE — 25000003 PHARM REV CODE 250: Mod: UD

## 2025-04-26 PROCEDURE — 36415 COLL VENOUS BLD VENIPUNCTURE: CPT

## 2025-04-26 PROCEDURE — 99284 EMERGENCY DEPT VISIT MOD MDM: CPT | Mod: 25

## 2025-04-26 PROCEDURE — 99284 EMERGENCY DEPT VISIT MOD MDM: CPT | Mod: ,,,

## 2025-04-26 PROCEDURE — 85025 COMPLETE CBC W/AUTO DIFF WBC: CPT

## 2025-04-26 PROCEDURE — 63600175 PHARM REV CODE 636 W HCPCS: Mod: UD

## 2025-04-26 PROCEDURE — 96374 THER/PROPH/DIAG INJ IV PUSH: CPT

## 2025-04-26 PROCEDURE — 80053 COMPREHEN METABOLIC PANEL: CPT

## 2025-04-26 PROCEDURE — 96372 THER/PROPH/DIAG INJ SC/IM: CPT

## 2025-04-26 PROCEDURE — 96375 TX/PRO/DX INJ NEW DRUG ADDON: CPT

## 2025-04-26 PROCEDURE — 81003 URINALYSIS AUTO W/O SCOPE: CPT

## 2025-04-26 RX ORDER — ONDANSETRON HYDROCHLORIDE 2 MG/ML
4 INJECTION, SOLUTION INTRAVENOUS
Status: COMPLETED | OUTPATIENT
Start: 2025-04-26 | End: 2025-04-26

## 2025-04-26 RX ORDER — HALOPERIDOL LACTATE 5 MG/ML
5 INJECTION, SOLUTION INTRAMUSCULAR
Status: COMPLETED | OUTPATIENT
Start: 2025-04-26 | End: 2025-04-26

## 2025-04-26 RX ADMIN — SODIUM CHLORIDE 1000 ML: 9 INJECTION, SOLUTION INTRAVENOUS at 01:04

## 2025-04-26 RX ADMIN — SODIUM CHLORIDE 1000 ML: 9 INJECTION, SOLUTION INTRAVENOUS at 12:04

## 2025-04-26 RX ADMIN — HUMAN INSULIN 6 UNITS: 100 INJECTION, SOLUTION SUBCUTANEOUS at 01:04

## 2025-04-26 RX ADMIN — HALOPERIDOL LACTATE 5 MG: 5 INJECTION, SOLUTION INTRAMUSCULAR at 01:04

## 2025-04-26 RX ADMIN — ONDANSETRON 4 MG: 2 INJECTION INTRAMUSCULAR; INTRAVENOUS at 12:04

## 2025-04-26 NOTE — ED PROVIDER NOTES
Encounter Date: 4/26/2025       History     Chief Complaint   Patient presents with    Hyperglycemia     Pt presents via EMS c/o hyperglycemia. She overslept this morning and was late taking her morning dose of insulin. Reports 500 glucose. She took her regular dose of insulin 1 hr PTA.     PW is a 56 y/o  female.    Patien thas a PMHx of Anxiety, Arthritis, Asthma, CHF, Lupus, Seizures, HTN, HLD, and T2DM.     Patient presents to the ED via EMS with c/o hyperglycemia. Patient stated that she checked her sugar at home and it was 500 mg/dL. CBG upon arrival is 499 mg/dL. Patient endorses some mild nausea, denies any chest pain, vomiting, and shortness of breath.         Review of patient's allergies indicates:   Allergen Reactions    Fish containing products Hives and Swelling    Penicillins Swelling     Localized swelling     Wasp venom Anaphylaxis    Hydroxyzine Hallucinations    Opioids - morphine analogues      Change in behavior    Toradol [ketorolac] Hives    Tramadol Hives    Zithromax [azithromycin] Other (See Comments)     Patient cannot remember what kind of reaction she had to this medication    Bactrim ds [sulfamethoxazole-trimethoprim] Nausea And Vomiting     Facial flush    Latex, natural rubber Rash     Past Medical History:   Diagnosis Date    Anxiety     Arthritis     Asthma     Cancer     CHF (congestive heart failure)     Chronic obstructive lung disease     Coronary artery disease     Depression     Diabetes mellitus     Diabetes mellitus, type 2     Encounter for blood transfusion     History of kidney stones     Hyperlipidemia     Hypertension     Lupus     Renal disorder     Seizures     Thyroid disease     Transfusion reaction      Past Surgical History:   Procedure Laterality Date    CARDIAC CATHETERIZATION Left 09/19/2020    Proximal left main stenosis; IVUS in the LAD and left main    CHOLECYSTECTOMY      CORONARY ARTERY BYPASS GRAFT  09/30/2020    CORONARY STENT PLACEMENT   11/09/2021    Everolimus Eluting Coronary Stent-MLAD;LAD    DIRECT LARYNGOSCOPY Bilateral 7/18/2023    Procedure: LARYNGOSCOPY, DIRECT;  Surgeon: Nazario Sloan MD;  Location: [facility] OR;  Service: ENT;  Laterality: Bilateral;    HYSTERECTOMY      INCISION AND DRAINAGE  06/02/2020    LEFT HEART CATHETERIZATION Left 11/09/2021    Procedure: Left heart cath;  Surgeon: Aureliano Giron MD;  Location: [facility] CATH LAB;  Service: Cardiology;  Laterality: Left;    TOTAL ABDOMINAL HYSTERECTOMY W/ BILATERAL SALPINGOOPHORECTOMY  2008    [place], [place]     TUBAL LIGATION      VOCAL FOLD LESION EXCISION Bilateral 7/18/2023    Procedure: EXCISION, LESION, VOCAL CORD;  Surgeon: Nazario Sloan MD;  Location: [facility] OR;  Service: ENT;  Laterality: Bilateral;     Family History   Problem Relation Name Age of Onset    Hypertension Mother Virginia Nichols     Diabetes Mother Virginia Nichols     Heart disease Mother Shantel Nichols     Cancer Mother Shantel Nichols     Bone cancer Father Eleno nichols     Lung cancer Father Eleno nichols     Cancer Father Eleno nichols     COPD Father Eleno nichols     Diabetes Sister      Heart disease Brother      Diabetes Brother      No Known Problems Daughter      Cushing syndrome Daughter      Ovarian cancer Maternal Grandmother      No Known Problems Maternal Grandfather      Heart attack Paternal Grandmother      No Known Problems Paternal Grandfather      No Known Problems Son       Social History[1]  Review of Systems   Constitutional: Negative.    HENT: Negative.     Eyes: Negative.    Respiratory: Negative.     Cardiovascular: Negative.    Gastrointestinal:  Positive for nausea.   Endocrine: Negative.    Genitourinary: Negative.    Musculoskeletal: Negative.    Skin: Negative.    Allergic/Immunologic: Negative.    Neurological: Negative.    Psychiatric/Behavioral: Negative.         Physical Exam     Initial Vitals [04/26/25 1207]   BP Pulse Resp Temp SpO2   138/71  82 18 99.9 °F (37.7 °C) (!) 93 %      MAP       --         Physical Exam    Nursing note and vitals reviewed.  Constitutional: Vital signs are normal. She appears well-developed and well-nourished. She is cooperative. She appears ill.   HENT:   Head: Normocephalic and atraumatic.   Cardiovascular:  Normal rate, regular rhythm, S1 normal, S2 normal, normal heart sounds, intact distal pulses and normal pulses.           Pulmonary/Chest: Effort normal and breath sounds normal.   Abdominal: Abdomen is soft and flat. Bowel sounds are normal. There is no abdominal tenderness. No hernia.     Neurological: She is alert and oriented to person, place, and time. She has normal strength and normal reflexes. She displays normal reflexes. No cranial nerve deficit or sensory deficit. She displays a negative Romberg sign. GCS eye subscore is 4. GCS verbal subscore is 5. GCS motor subscore is 6.   Skin: Skin is warm, dry and intact. Capillary refill takes less than 2 seconds. No rash noted.   Psychiatric: She has a normal mood and affect. Her speech is normal and behavior is normal. Judgment and thought content normal. Cognition and memory are normal.         Medical Screening Exam   See Full Note    ED Course   Procedures  Labs Reviewed   COMPREHENSIVE METABOLIC PANEL - Abnormal       Result Value    Sodium 134 (*)     Potassium 4.1      Chloride 96 (*)     CO2 27      Anion Gap 15      Glucose 509 (*)     BUN 15      Creatinine 1.45 (*)     BUN/Creatinine Ratio 10      Calcium 9.6      Total Protein 7.4      Albumin 4.1      Globulin 3.3      A/G Ratio 1.2      Bilirubin, Total 0.4      Alk Phos 128      ALT 26      AST 25      eGFR 43 (*)    URINALYSIS, REFLEX TO URINE CULTURE - Abnormal    Color, UA Yellow      Clarity, UA Clear      pH, UA 5.5      Leukocytes, UA Negative      Nitrites, UA Negative      Protein, UA Negative      Glucose, UA >=1000 (*)     Ketones, UA Negative      Urobilinogen, UA 0.2      Bilirubin, UA  Negative      Blood, UA Trace-Intact (*)     Specific Gravity, UA <=1.005     CBC WITH DIFFERENTIAL - Abnormal    WBC 8.09      RBC 5.20      Hemoglobin 15.7      Hematocrit 46.7      MCV 89.8      MCH 30.2      MCHC 33.6      RDW 13.6      Platelet Count 170      MPV 10.3      Neutrophils % 54.7      Lymphocytes % 39.1      Neutrophils, Abs 4.43      Lymphocytes, Absolute 3.16      Diff Type Auto      Monocytes % 5.6      Eosinophils % 0.1 (*)     Basophils % 0.5      Monocytes, Absolute 0.45      Eosinophils, Absolute 0.01      Basophils, Absolute 0.04     URINALYSIS, MICROSCOPIC - Abnormal    WBC, UA None Seen      RBC, UA 0-3      Yeast, UA Few (*)    CBC W/ AUTO DIFFERENTIAL    Narrative:     The following orders were created for panel order CBC auto differential.  Procedure                               Abnormality         Status                     ---------                               -----------         ------                     CBC with Differential[[id number]]       Abnormal            Final result                 Please view results for these tests on the individual orders.   POCT GLUCOSE MONITORING CONTINUOUS   POCT GLUCOSE MONITORING CONTINUOUS          Imaging Results              X-Ray Abdomen AP 1 View (KUB) (Final result)  Result time 04/26/25 13:35:18      Final result by Jin Valladares MD (04/26/25 13:35:18)                   Impression:      1. Nonobstructive bowel gas pattern.      Electronically signed by: Jin Valladares MD  Date:    04/26/2025  Time:    13:35               Narrative:    EXAMINATION:  XR ABDOMEN AP 1 VIEW    CLINICAL HISTORY:  Unspecified abdominal pain    TECHNIQUE:  AP View(s) of the abdomen was performed.    COMPARISON:  04/28/2024, CT 01/08/2025    FINDINGS:  Two views abdomen supine.    Air and stool is seen within the large bowel and projected over the rectum.  There is moderate stool throughout the colon.  No focally dilated small bowel loops.  There are no  calcifications to suggest nephrolithiasis or cholelithiasis.  Surgical change projects over the right upper quadrant.  No findings to suggest pneumatosis.  The osseous structures are intact.  The lower lung zones are clear.                                    X-Rays:   Independently Interpreted Readings:   Other Readings:  Reading Physician Reading Date Result Priority  Jin Valladares MD  [phone]  4/26/2025 STAT    Narrative & Impression  EXAMINATION:  XR ABDOMEN AP 1 VIEW     CLINICAL HISTORY:  Unspecified abdominal pain     TECHNIQUE:  AP View(s) of the abdomen was performed.     COMPARISON:  04/28/2024, CT 01/08/2025     FINDINGS:  Two views abdomen supine.     Air and stool is seen within the large bowel and projected over the rectum.  There is moderate stool throughout the colon.  No focally dilated small bowel loops.  There are no calcifications to suggest nephrolithiasis or cholelithiasis.  Surgical change projects over the right upper quadrant.  No findings to suggest pneumatosis.  The osseous structures are intact.  The lower lung zones are clear.     Impression:     1. Nonobstructive bowel gas pattern.        Electronically signed by:Jin Valladares MD  Date:                                            04/26/2025  Time:                                           13:35      Medications   sodium chloride 0.9% bolus 1,000 mL 1,000 mL (1,000 mLs Intravenous New Bag 4/26/25 1303)   sodium chloride 0.9% bolus 1,000 mL 1,000 mL (0 mLs Intravenous Stopped 4/26/25 1258)   ondansetron injection 4 mg (4 mg Intravenous Given 4/26/25 1215)   haloperidol lactate injection 5 mg (5 mg Intravenous Given 4/26/25 1305)   insulin regular injection 6 Units 0.06 mL (6 Units Subcutaneous Given 4/26/25 1305)     Medical Decision Making  PW is a 54 y/o  female.    Patien thas a PMHx of Anxiety, Arthritis, Asthma, CHF, Lupus, Seizures, HTN, HLD, and T2DM.     Patient presents to the ED via EMS with c/o  hyperglycemia. Patient stated that she checked her sugar at home and it was 500 mg/dL. CBG upon arrival is 499 mg/dL. Patient endorses some mild nausea, denies any chest pain, vomiting, and shortness of breath.           Amount and/or Complexity of Data Reviewed  Labs: ordered.  Radiology: ordered.    Risk  OTC drugs.  Prescription drug management.  Risk Details: T2DM  DKA  Abd pain  Hyperglycemia               ED Course as of 04/26/25 1351   Sat Apr 26, 2025   1211 POTC Glucose = 499 mg/dL [AC]   1346 Patient wishing to leave, does not want to wait for blood sugar to come back into normal range. Provider tried to  patient, unsuccessful.  [AC]      ED Course User Index  [AC] Boris Flores FNP                           Clinical Impression:   Final diagnoses:  [R10.9] Abdominal pain        ED Disposition Condition    Discharge Good          ED Prescriptions    None       Follow-up Information       Follow up With Specialties Details Why Contact Info    Oren Gage,  Family Medicine   [address]  [facility] [place] [address]  [phone]                   [1]   Social History  Tobacco Use    Smoking status: Every Day     Current packs/day: 0.50     Average packs/day: 0.5 packs/day for 38.3 years (19.2 ttl pk-yrs)     Types: Cigarettes     Start date: 1987     Passive exposure: Current    Smokeless tobacco: Never    Tobacco comments:     Patient was smoking cigarettes 2 packs per day for 10 years   Substance Use Topics    Alcohol use: Never    Drug use: Never        Boris Flores FNP  04/26/25 1351

## 2025-04-28 LAB
GLUCOSE SERPL-MCNC: 478 MG/DL (ref 70–105)
GLUCOSE SERPL-MCNC: 499 MG/DL (ref 70–105)

## 2025-05-01 DIAGNOSIS — G25.81 RESTLESS LEG SYNDROME: ICD-10-CM

## 2025-05-01 RX ORDER — ROPINIROLE 0.25 MG/1
0.25 TABLET, FILM COATED ORAL 3 TIMES DAILY
Qty: 90 TABLET | Refills: 2 | Status: SHIPPED | OUTPATIENT
Start: 2025-05-01

## 2025-05-08 ENCOUNTER — OFFICE VISIT (OUTPATIENT)
Dept: FAMILY MEDICINE | Facility: CLINIC | Age: 55
End: 2025-05-08
Payer: MEDICAID

## 2025-05-08 VITALS
TEMPERATURE: 98 F | HEIGHT: 60 IN | WEIGHT: 153 LBS | HEART RATE: 82 BPM | DIASTOLIC BLOOD PRESSURE: 66 MMHG | OXYGEN SATURATION: 95 % | RESPIRATION RATE: 18 BRPM | SYSTOLIC BLOOD PRESSURE: 113 MMHG | BODY MASS INDEX: 30.04 KG/M2

## 2025-05-08 DIAGNOSIS — E07.9 THYROID DISEASE: ICD-10-CM

## 2025-05-08 DIAGNOSIS — M54.41 ACUTE LEFT-SIDED LOW BACK PAIN WITH RIGHT-SIDED SCIATICA: ICD-10-CM

## 2025-05-08 DIAGNOSIS — E55.9 VITAMIN D DEFICIENCY: ICD-10-CM

## 2025-05-08 DIAGNOSIS — I10 PRIMARY HYPERTENSION: ICD-10-CM

## 2025-05-08 DIAGNOSIS — K59.00 CONSTIPATION, UNSPECIFIED CONSTIPATION TYPE: ICD-10-CM

## 2025-05-08 DIAGNOSIS — F41.9 ANXIETY: ICD-10-CM

## 2025-05-08 DIAGNOSIS — J44.1 COPD EXACERBATION: ICD-10-CM

## 2025-05-08 DIAGNOSIS — E11.65 TYPE 2 DIABETES MELLITUS WITH HYPERGLYCEMIA, WITHOUT LONG-TERM CURRENT USE OF INSULIN: Primary | ICD-10-CM

## 2025-05-08 DIAGNOSIS — E78.2 MIXED HYPERLIPIDEMIA: ICD-10-CM

## 2025-05-08 LAB
25(OH)D3 SERPL-MCNC: 45 NG/ML (ref 30–80)
ALBUMIN SERPL BCP-MCNC: 4.9 G/DL (ref 3.5–5)
ALBUMIN/GLOB SERPL: 1.2 {RATIO}
ALP SERPL-CCNC: 122 U/L (ref 40–150)
ALT SERPL W P-5'-P-CCNC: 26 U/L
ANION GAP SERPL CALCULATED.3IONS-SCNC: 17 MMOL/L (ref 7–16)
AST SERPL W P-5'-P-CCNC: 48 U/L (ref 11–45)
BASOPHILS # BLD AUTO: 0.07 K/UL (ref 0–0.2)
BASOPHILS NFR BLD AUTO: 0.7 % (ref 0–1)
BILIRUB SERPL-MCNC: 0.5 MG/DL
BILIRUB SERPL-MCNC: NEGATIVE MG/DL
BLOOD URINE, POC: ABNORMAL
BUN SERPL-MCNC: 14 MG/DL (ref 10–20)
BUN/CREAT SERPL: 12 (ref 6–20)
CALCIUM SERPL-MCNC: 10.5 MG/DL (ref 8.4–10.2)
CHLORIDE SERPL-SCNC: 99 MMOL/L (ref 98–107)
CHOLEST SERPL-MCNC: 175 MG/DL
CHOLEST/HDLC SERPL: 3.9 {RATIO}
CLARITY, UA: CLEAR
CO2 SERPL-SCNC: 26 MMOL/L (ref 22–29)
COLOR, UA: YELLOW
CREAT SERPL-MCNC: 1.17 MG/DL (ref 0.55–1.02)
DIFFERENTIAL METHOD BLD: ABNORMAL
EGFR (NO RACE VARIABLE) (RUSH/TITUS): 56 ML/MIN/1.73M2
EOSINOPHIL # BLD AUTO: 0 K/UL (ref 0–0.5)
EOSINOPHIL NFR BLD AUTO: 0 % (ref 1–4)
ERYTHROCYTE [DISTWIDTH] IN BLOOD BY AUTOMATED COUNT: 13.2 % (ref 11.5–14.5)
EST. AVERAGE GLUCOSE BLD GHB EST-MCNC: 344 MG/DL
GLOBULIN SER-MCNC: 4.2 G/DL (ref 2–4)
GLUCOSE SERPL-MCNC: 254 MG/DL (ref 74–100)
GLUCOSE SERPL-MCNC: 319 MG/DL (ref 70–110)
GLUCOSE UR QL STRIP: ABNORMAL
HBA1C MFR BLD HPLC: 13.6 %
HCT VFR BLD AUTO: 52.9 % (ref 38–47)
HDLC SERPL-MCNC: 45 MG/DL (ref 35–60)
HGB BLD-MCNC: 17.8 G/DL (ref 12–16)
IMM GRANULOCYTES # BLD AUTO: 0.04 K/UL (ref 0–0.04)
IMM GRANULOCYTES NFR BLD: 0.4 % (ref 0–0.4)
KETONES UR QL STRIP: NEGATIVE
LDLC SERPL CALC-MCNC: 62 MG/DL
LDLC/HDLC SERPL: 1.4 {RATIO}
LEUKOCYTE ESTERASE URINE, POC: NEGATIVE
LYMPHOCYTES # BLD AUTO: 3.33 K/UL (ref 1–4.8)
LYMPHOCYTES NFR BLD AUTO: 32.8 % (ref 27–41)
MCH RBC QN AUTO: 30.2 PG (ref 27–31)
MCHC RBC AUTO-ENTMCNC: 33.6 G/DL (ref 32–36)
MCV RBC AUTO: 89.7 FL (ref 80–96)
MONOCYTES # BLD AUTO: 0.6 K/UL (ref 0–0.8)
MONOCYTES NFR BLD AUTO: 5.9 % (ref 2–6)
MPC BLD CALC-MCNC: 10.8 FL (ref 9.4–12.4)
NEUTROPHILS # BLD AUTO: 6.11 K/UL (ref 1.8–7.7)
NEUTROPHILS NFR BLD AUTO: 60.2 % (ref 53–65)
NITRITE, POC UA: NEGATIVE
NONHDLC SERPL-MCNC: 130 MG/DL
NRBC # BLD AUTO: 0 X10E3/UL
NRBC, AUTO (.00): 0 %
PH, POC UA: 5.5
PLATELET # BLD AUTO: 207 K/UL (ref 150–400)
POTASSIUM SERPL-SCNC: 4.2 MMOL/L (ref 3.5–5.1)
PROT SERPL-MCNC: 9.1 G/DL (ref 6.4–8.3)
PROTEIN, POC: NEGATIVE
RBC # BLD AUTO: 5.9 M/UL (ref 4.2–5.4)
SODIUM SERPL-SCNC: 138 MMOL/L (ref 136–145)
SPECIFIC GRAVITY, POC UA: >=1.005
TRIGL SERPL-MCNC: 339 MG/DL (ref 37–140)
TSH SERPL DL<=0.005 MIU/L-ACNC: 1.62 UIU/ML (ref 0.35–4.94)
UROBILINOGEN, POC UA: ABNORMAL
VLDLC SERPL-MCNC: 68 MG/DL
WBC # BLD AUTO: 10.15 K/UL (ref 4.5–11)

## 2025-05-08 PROCEDURE — 80053 COMPREHEN METABOLIC PANEL: CPT | Mod: ,,, | Performed by: CLINICAL MEDICAL LABORATORY

## 2025-05-08 PROCEDURE — 3046F HEMOGLOBIN A1C LEVEL >9.0%: CPT | Mod: CPTII,,, | Performed by: FAMILY MEDICINE

## 2025-05-08 PROCEDURE — 85025 COMPLETE CBC W/AUTO DIFF WBC: CPT | Mod: ,,, | Performed by: CLINICAL MEDICAL LABORATORY

## 2025-05-08 PROCEDURE — 3008F BODY MASS INDEX DOCD: CPT | Mod: CPTII,,, | Performed by: FAMILY MEDICINE

## 2025-05-08 PROCEDURE — 82306 VITAMIN D 25 HYDROXY: CPT | Mod: ,,, | Performed by: CLINICAL MEDICAL LABORATORY

## 2025-05-08 PROCEDURE — 96372 THER/PROPH/DIAG INJ SC/IM: CPT | Mod: ,,, | Performed by: FAMILY MEDICINE

## 2025-05-08 PROCEDURE — 82962 GLUCOSE BLOOD TEST: CPT | Mod: RHCUB | Performed by: FAMILY MEDICINE

## 2025-05-08 PROCEDURE — 3078F DIAST BP <80 MM HG: CPT | Mod: CPTII,,, | Performed by: FAMILY MEDICINE

## 2025-05-08 PROCEDURE — 83036 HEMOGLOBIN GLYCOSYLATED A1C: CPT | Mod: ,,, | Performed by: CLINICAL MEDICAL LABORATORY

## 2025-05-08 PROCEDURE — 80061 LIPID PANEL: CPT | Mod: ,,, | Performed by: CLINICAL MEDICAL LABORATORY

## 2025-05-08 PROCEDURE — 84443 ASSAY THYROID STIM HORMONE: CPT | Mod: ,,, | Performed by: CLINICAL MEDICAL LABORATORY

## 2025-05-08 PROCEDURE — 99214 OFFICE O/P EST MOD 30 MIN: CPT | Mod: 25,,, | Performed by: FAMILY MEDICINE

## 2025-05-08 PROCEDURE — 3074F SYST BP LT 130 MM HG: CPT | Mod: CPTII,,, | Performed by: FAMILY MEDICINE

## 2025-05-08 RX ORDER — HYDROCODONE BITARTRATE AND ACETAMINOPHEN 7.5; 325 MG/1; MG/1
1 TABLET ORAL EVERY 6 HOURS PRN
Qty: 20 TABLET | Refills: 0 | Status: SHIPPED | OUTPATIENT
Start: 2025-05-08

## 2025-05-08 RX ORDER — LIDOCAINE 560 MG/1
1 PATCH PERCUTANEOUS; TOPICAL; TRANSDERMAL 2 TIMES DAILY
Qty: 30 PATCH | Refills: 2 | Status: SHIPPED | OUTPATIENT
Start: 2025-05-08

## 2025-05-08 RX ORDER — FLUTICASONE PROPIONATE 50 MCG
1 SPRAY, SUSPENSION (ML) NASAL DAILY
COMMUNITY

## 2025-05-08 RX ORDER — DAPAGLIFLOZIN 10 MG/1
10 TABLET, FILM COATED ORAL DAILY
Qty: 30 TABLET | Refills: 2 | Status: SHIPPED | OUTPATIENT
Start: 2025-05-08

## 2025-05-08 RX ORDER — METHOCARBAMOL 500 MG/1
500 TABLET, FILM COATED ORAL 4 TIMES DAILY
Qty: 40 TABLET | Refills: 0 | Status: SHIPPED | OUTPATIENT
Start: 2025-05-08 | End: 2025-05-18

## 2025-05-08 RX ORDER — BUSPIRONE HYDROCHLORIDE 15 MG/1
TABLET ORAL
Qty: 60 TABLET | Refills: 2 | Status: SHIPPED | OUTPATIENT
Start: 2025-05-08

## 2025-05-08 RX ORDER — METHYLPREDNISOLONE ACETATE 40 MG/ML
40 INJECTION, SUSPENSION INTRA-ARTICULAR; INTRALESIONAL; INTRAMUSCULAR; SOFT TISSUE
Status: COMPLETED | OUTPATIENT
Start: 2025-05-08 | End: 2025-05-08

## 2025-05-08 RX ORDER — ALBUTEROL SULFATE 5 MG/ML
2.5 SOLUTION RESPIRATORY (INHALATION) EVERY 6 HOURS PRN
COMMUNITY
End: 2025-05-08 | Stop reason: SDUPTHER

## 2025-05-08 RX ORDER — ALBUTEROL SULFATE 5 MG/ML
2.5 SOLUTION RESPIRATORY (INHALATION) EVERY 6 HOURS PRN
Qty: 150 ML | Refills: 5 | Status: SHIPPED | OUTPATIENT
Start: 2025-05-08

## 2025-05-08 RX ADMIN — METHYLPREDNISOLONE ACETATE 40 MG: 40 INJECTION, SUSPENSION INTRA-ARTICULAR; INTRALESIONAL; INTRAMUSCULAR; SOFT TISSUE at 03:05

## 2025-05-08 NOTE — PROGRESS NOTES
Oren Gage DO   [facility]  [address] [address] [place]  [address]      PATIENT NAME: Rosemarie Lane  : 1970  DATE: 25  MRN: [id number]      Billing Provider: Oren Gage DO  Level of Service:   Patient PCP Information       Provider PCP Type    Oren Gage DO General            Reason for Visit / Chief Complaint: Back Pain (Mrs. Lane is a 54 y/o female that presents today for back pain for 4-5 days. States she slipped and picked up a small dog within 30 mins of each other and that's when it started hurting. Pain in lower back that is a stabbing pain. Worse if she moves a certain way. ), Medication Refill (Needs refills on all her medications. Would also like to get a prescription of nystatin topical powder. Also needs rx for nebulizer machine. ), Health Maintenance (Foot Exam Never done/TETANUS VACCINE Never done/Mammogram Never done/Pneumococcal Vaccines (Age 50+)(1 of 2 - PCV) Never done/Shingles Vaccine(1 of 2) Never done/COVID-19 Vaccine( season) Never done/Hemoglobin A1c due on 2025/Colorectal Cancer Screening due on 2025/Diabetes Urine Screening due on 2025/Lipid Panel due on 2025/declines), and Memory Loss (States that when she was in last time was told he had early onset of Alzheimer's and states since she was in she is having more issues with it. States that family has noticed her forgetting more as well.  )       Update PCP  Update Chief Complaint         History of Present Illness / Problem Focused Workflow     Rosemarie Lane presents to the clinic with Back Pain (Mrs. Lane is a 54 y/o female that presents today for back pain for 4-5 days. States she slipped and picked up a small dog within 30 mins of each other and that's when it started hurting. Pain in lower back that is a stabbing pain. Worse if she moves a certain way. ), Medication Refill (Needs refills on all her medications. Would also like to get a prescription  of nystatin topical powder. Also needs rx for nebulizer machine. ), Health Maintenance (Foot Exam Never done/TETANUS VACCINE Never done/Mammogram Never done/Pneumococcal Vaccines (Age 50+)(1 of 2 - PCV) Never done/Shingles Vaccine(1 of 2) Never done/COVID-19 Vaccine(1 - 2024-25 season) Never done/Hemoglobin A1c due on 03/05/2025/Colorectal Cancer Screening due on 03/14/2025/Diabetes Urine Screening due on 05/09/2025/Lipid Panel due on 05/09/2025/declines), and Memory Loss (States that when she was in last time was told he had early onset of Alzheimer's and states since she was in she is having more issues with it. States that family has noticed her forgetting more as well.  )     Patient also complains of pain in her right side and in her back.  No loss of control of bowel or bladder function.  She denies numbness but is weak on the right leg.  Patient also has history of diabetes and neuropathy.  Additionally patient has hypertension but denies any chest pain shortness breath other than coughing and white sputum production.    Diabetes  She has type 2 diabetes mellitus. No MedicAlert identification noted. The initial diagnosis of diabetes was made 10 years ago. Hypoglycemia symptoms include confusion, dizziness, headaches, hunger, mood changes and nervousness/anxiousness. Pertinent negatives for hypoglycemia include no pallor, seizures, sleepiness, speech difficulty, sweats or tremors. Associated symptoms include fatigue, foot paresthesias, polydipsia, polyphagia, polyuria, weakness and weight loss. Pertinent negatives for diabetes include no blurred vision, no chest pain, no foot ulcerations and no visual change. Hypoglycemia complications include hospitalization and required assistance. Pertinent negatives for hypoglycemia complications include no blackouts, no nocturnal hypoglycemia and no required glucagon injection. Symptoms are stable. Diabetic complications include autonomic neuropathy and heart disease.  Pertinent negatives for diabetic complications include no CVA, nephropathy, peripheral neuropathy, PVD or retinopathy. Risk factors for coronary artery disease include dyslipidemia, family history, hypertension, stress and diabetes mellitus. Current diabetic treatment includes insulin injections and oral agent (monotherapy). She is compliant with treatment some of the time. She is currently taking insulin pre-breakfast and at bedtime. Insulin injections are given by patient. Rotation sites for injection include the abdominal wall. Her weight is fluctuating minimally. She is following a diabetic diet. When asked about meal planning, she reported none. She has not had a previous visit with a dietitian. She never participates in exercise. She monitors blood glucose at home 3-4 x per day. She monitors urine at home <1 x per month. Blood glucose monitoring compliance is good. Her home blood glucose trend is fluctuating minimally. She does not see a podiatrist.Eye exam is not current.       Review of Systems     Review of Systems   Constitutional:  Positive for fatigue and weight loss. Negative for activity change, appetite change, chills and fever.   HENT:  Negative for nasal congestion, ear discharge, ear pain, mouth dryness, mouth sores, postnasal drip, sinus pressure/congestion, sore throat and voice change.    Eyes:  Negative for blurred vision, pain, discharge, redness and itching.   Respiratory:  Positive for cough and shortness of breath. Negative for apnea.    Cardiovascular:  Negative for chest pain, palpitations and leg swelling.   Gastrointestinal:  Negative for abdominal distention, abdominal pain, anal bleeding, blood in stool, change in bowel habit, constipation, diarrhea, nausea and vomiting.   Endocrine: Positive for polydipsia, polyphagia and polyuria. Negative for cold intolerance and heat intolerance.   Genitourinary:  Negative for difficulty urinating, enuresis, frequency and hematuria.    Musculoskeletal:  Negative for arthralgias and back pain.   Integumentary:  Negative for pallor, breast mass, breast discharge and breast tenderness.   Allergic/Immunologic: Negative for environmental allergies and food allergies.   Neurological:  Positive for dizziness, weakness and headaches. Negative for tremors, seizures and speech difficulty.   Psychiatric/Behavioral:  Positive for confusion. The patient is nervous/anxious.    Breast: Negative for mass and tenderness      Medical / Social / Family History     Past Medical History:   Diagnosis Date    Anxiety     Arthritis     Asthma     Cancer     CHF (congestive heart failure)     Chronic obstructive lung disease     Coronary artery disease     Depression     Diabetes mellitus     Diabetes mellitus, type 2     Encounter for blood transfusion     History of kidney stones     Hyperlipidemia     Hypertension     Lupus     Renal disorder     Seizures     Thyroid disease     Transfusion reaction        Past Surgical History:   Procedure Laterality Date    CARDIAC CATHETERIZATION Left 09/19/2020    Proximal left main stenosis; IVUS in the LAD and left main    CHOLECYSTECTOMY      CORONARY ARTERY BYPASS GRAFT  09/30/2020    CORONARY STENT PLACEMENT  11/09/2021    Everolimus Eluting Coronary Stent-MLAD;LAD    DIRECT LARYNGOSCOPY Bilateral 7/18/2023    Procedure: LARYNGOSCOPY, DIRECT;  Surgeon: Nazario Sloan MD;  Location: [facility];  Service: ENT;  Laterality: Bilateral;    HYSTERECTOMY      INCISION AND DRAINAGE  06/02/2020    LEFT HEART CATHETERIZATION Left 11/09/2021    Procedure: Left heart cath;  Surgeon: Aureliano Giron MD;  Location: [facility] CATH LAB;  Service: Cardiology;  Laterality: Left;    TOTAL ABDOMINAL HYSTERECTOMY W/ BILATERAL SALPINGOOPHORECTOMY  2008    [place], [place]     TUBAL LIGATION      VOCAL FOLD LESION EXCISION Bilateral 7/18/2023    Procedure: EXCISION, LESION, VOCAL CORD;  Surgeon: Nazario Sloan MD;  Location: [facility]  ORTHO OR;  Service: ENT;  Laterality: Bilateral;       Social History  Ms.  reports that she has been smoking cigarettes. She started smoking about 38 years ago. She has a 19.2 pack-year smoking history. She has been exposed to tobacco smoke. She has never used smokeless tobacco. She reports current drug use. Drug: Marijuana. She reports that she does not drink alcohol.    Family History  Ms.'s family history includes Bone cancer in her father; COPD in her father; Cancer in her father and mother; Cushing syndrome in her daughter; Diabetes in her brother, mother, and sister; Heart attack in her paternal grandmother; Heart disease in her brother and mother; Hypertension in her mother; Lung cancer in her father; No Known Problems in her daughter, maternal grandfather, paternal grandfather, and son; Ovarian cancer in her maternal grandmother.    Medications and Allergies     Medications  Outpatient Medications Marked as Taking for the 5/8/25 encounter (Office Visit) with Oren Gage, DO   Medication Sig Dispense Refill    albuterol-ipratropium (DUO-NEB) 2.5 mg-0.5 mg/3 mL nebulizer solution Take 3 mLs by nebulization every 6 (six) hours while awake. Rescue 75 mL 0    aspirin (ECOTRIN) 81 MG EC tablet Take 1 tablet by mouth once daily.      atorvastatin (LIPITOR) 40 MG tablet Take 1 tablet (40 mg total) by mouth once daily. 30 tablet 2    cariprazine (VRAYLAR) 3 mg Cap Take 2 capsules (6 mg total) by mouth once daily. 60 capsule 2    fluticasone propionate (FLONASE) 50 mcg/actuation nasal spray 1 spray by Each Nostril route once daily.      hydroxychloroquine (PLAQUENIL) 200 mg tablet Take 1 tablet (200 mg total) by mouth 2 (two) times daily. 60 tablet 2    insulin glargine U-100, Lantus, (LANTUS SOLOSTAR U-100 INSULIN) 100 unit/mL (3 mL) InPn pen Inject 30 Units into the skin 2 (two) times a day. (Patient taking differently: Inject 20 Units into the skin 2 (two) times a day.) 6 mL 3    metoprolol succinate  (TOPROL-XL) 50 MG 24 hr tablet Take 1 tablet (50 mg total) by mouth once daily. 30 tablet 2    mirtazapine (REMERON) 30 MG tablet Take 1 tablet (30 mg total) by mouth once daily. 30 tablet 2    nicotine (NICODERM CQ) 21 mg/24 hr Place 1 patch onto the skin once daily. 30 patch 2    ondansetron (ZOFRAN-ODT) 8 MG TbDL Take 1 tablet (8 mg total) by mouth every 6 (six) hours as needed (nausea). 30 tablet 2    rOPINIRole (REQUIP) 0.25 MG tablet TAKE 1 TABLET BY MOUTH THREE TIMES DAILY. 90 tablet 2    [DISCONTINUED] albuterol (PROVENTIL) 5 mg/mL nebulizer solution Take 2.5 mg by nebulization every 6 (six) hours as needed for Wheezing. Rescue      [DISCONTINUED] busPIRone (BUSPAR) 15 MG tablet TAKE 1 TABLET BY MOUTH 2 TIMES A DAY AS DIRECTED 60 tablet 2    [DISCONTINUED] dapagliflozin propanediol (FARXIGA) 10 mg tablet Take 1 tablet (10 mg total) by mouth once daily. 30 tablet 2    [DISCONTINUED] gabapentin (NEURONTIN) 300 MG capsule TAKE 2 CAPSULES BY MOUTH 2 TIMES A DAY . 120 capsule 2    [DISCONTINUED] linaCLOtide (LINZESS) 145 mcg Cap capsule Take 145 mcg by mouth before breakfast.         Allergies  Review of patient's allergies indicates:   Allergen Reactions    Fish containing products Hives and Swelling    Penicillins Swelling     Localized swelling     Wasp venom Anaphylaxis    Hydroxyzine Hallucinations    Opioids - morphine analogues      Change in behavior    Toradol [ketorolac] Hives    Tramadol Hives    Zithromax [azithromycin] Other (See Comments)     Patient cannot remember what kind of reaction she had to this medication    Bactrim ds [sulfamethoxazole-trimethoprim] Nausea And Vomiting     Facial flush    Latex, natural rubber Rash       Physical Examination     Vitals:    05/08/25 1325   BP: 113/66   Pulse: 82   Resp: 18   Temp: 98.2 °F (36.8 °C)     Physical Exam  Vitals reviewed.   Constitutional:       General: She is not in acute distress.     Appearance: Normal appearance. She is obese.   HENT:       Head: Normocephalic and atraumatic.      Nose: Nose normal.      Mouth/Throat:      Mouth: Mucous membranes are moist.      Pharynx: Oropharynx is clear. No oropharyngeal exudate or posterior oropharyngeal erythema.   Eyes:      General: No scleral icterus.     Extraocular Movements: Extraocular movements intact.      Conjunctiva/sclera: Conjunctivae normal.      Pupils: Pupils are equal, round, and reactive to light.   Neck:      Vascular: No carotid bruit.   Cardiovascular:      Rate and Rhythm: Normal rate and regular rhythm.      Pulses: Normal pulses.      Heart sounds: Normal heart sounds. No murmur heard.     No gallop.   Pulmonary:      Effort: Pulmonary effort is normal.      Breath sounds: Wheezing present.   Abdominal:      General: Abdomen is flat. Bowel sounds are normal.      Palpations: Abdomen is soft.   Musculoskeletal:         General: Tenderness present. Normal range of motion.      Cervical back: Normal range of motion and neck supple.      Right lower leg: No edema.      Left lower leg: No edema.   Lymphadenopathy:      Cervical: No cervical adenopathy.   Skin:     General: Skin is warm and dry.      Capillary Refill: Capillary refill takes less than 2 seconds.      Coloration: Skin is not jaundiced.      Findings: No lesion.   Neurological:      General: No focal deficit present.      Mental Status: She is alert and oriented to person, place, and time. Mental status is at baseline.      Cranial Nerves: No cranial nerve deficit.      Sensory: No sensory deficit.      Motor: No weakness.      Coordination: Coordination normal.      Gait: Gait normal.      Deep Tendon Reflexes: Reflexes normal.   Psychiatric:         Mood and Affect: Mood normal.         Behavior: Behavior normal.         Thought Content: Thought content normal.         Judgment: Judgment normal.               Lab Results   Component Value Date    WBC 10.15 05/08/2025    HGB 17.8 (H) 05/08/2025    HCT 52.9 (H) 05/08/2025    MCV  89.7 05/08/2025     05/08/2025          Sodium   Date Value Ref Range Status   05/08/2025 138 136 - 145 mmol/L Final     Potassium   Date Value Ref Range Status   05/08/2025 4.2 3.5 - 5.1 mmol/L Final     Chloride   Date Value Ref Range Status   05/08/2025 99 98 - 107 mmol/L Final     CO2   Date Value Ref Range Status   05/08/2025 26 22 - 29 mmol/L Final     Glucose   Date Value Ref Range Status   05/08/2025 254 (H) 74 - 100 mg/dL Final     BUN   Date Value Ref Range Status   05/08/2025 14 10 - 20 mg/dL Final     Creatinine   Date Value Ref Range Status   05/08/2025 1.17 (H) 0.55 - 1.02 mg/dL Final     Calcium   Date Value Ref Range Status   05/08/2025 10.5 (H) 8.4 - 10.2 mg/dL Final     Total Protein   Date Value Ref Range Status   05/08/2025 9.1 (H) 6.4 - 8.3 g/dL Final     Albumin   Date Value Ref Range Status   05/08/2025 4.9 3.5 - 5.0 g/dL Final     Bilirubin, Total   Date Value Ref Range Status   05/08/2025 0.5 <=1.5 mg/dL Final     Alk Phos   Date Value Ref Range Status   05/08/2025 122 40 - 150 U/L Final     AST   Date Value Ref Range Status   05/08/2025 48 (H) 11 - 45 U/L Final     ALT   Date Value Ref Range Status   05/08/2025 26 <=55 U/L Final     Anion Gap   Date Value Ref Range Status   05/08/2025 17 (H) 7 - 16 mmol/L Final     eGFR    Date Value Ref Range Status   01/18/2021 62       eGFR   Date Value Ref Range Status   07/08/2022 70 >=60 mL/min/1.73m² Final      X-Ray Abdomen AP 1 View (KUB)  Narrative: EXAMINATION:  XR ABDOMEN AP 1 VIEW    CLINICAL HISTORY:  Unspecified abdominal pain    TECHNIQUE:  AP View(s) of the abdomen was performed.    COMPARISON:  04/28/2024, CT 01/08/2025    FINDINGS:  Two views abdomen supine.    Air and stool is seen within the large bowel and projected over the rectum.  There is moderate stool throughout the colon.  No focally dilated small bowel loops.  There are no calcifications to suggest nephrolithiasis or cholelithiasis.  Surgical change  projects over the right upper quadrant.  No findings to suggest pneumatosis.  The osseous structures are intact.  The lower lung zones are clear.  Impression: 1. Nonobstructive bowel gas pattern.    Electronically signed by: Jin Valladares MD  Date:    04/26/2025  Time:    13:35     Procedures   Lab Results   Component Value Date    CHOL 175 05/08/2025    CHOL 137 05/09/2024    CHOL 144 08/24/2023     Lab Results   Component Value Date    HDL 45 05/08/2025    HDL 40 05/09/2024    HDL 43 08/24/2023     Lab Results   Component Value Date    LDLCALC 62 05/08/2025    LDLCALC 70 05/09/2024    LDLCALC 44 08/24/2023     Lab Results   Component Value Date    TRIG 339 (H) 05/08/2025    TRIG 133 05/09/2024    TRIG 285 (H) 08/24/2023     Lab Results   Component Value Date    CHOLHDL 3.9 05/08/2025    CHOLHDL 3.4 05/09/2024    CHOLHDL 3.3 08/24/2023      Lab Results   Component Value Date    HGBA1C 13.6 (H) 05/08/2025      Lab Results   Component Value Date    TSH 1.623 05/08/2025    [id number] 8.3 08/24/2023      Assessment and Plan (including Health Maintenance)      Problem List  Smart Sets  Document Outside HM   :    Plan:         Health Maintenance Due   Topic Date Due    Foot Exam  Never done    TETANUS VACCINE  Never done    Mammogram  Never done    Pneumococcal Vaccines (Age 50+) (1 of 2 - PCV) Never done    Shingles Vaccine (1 of 2) Never done    COVID-19 Vaccine (1 - 2024-25 season) Never done    Colorectal Cancer Screening  03/14/2025    Diabetes Urine Screening  05/09/2025       Problem List Items Addressed This Visit       COPD exacerbation    Current Assessment & Plan   COPD exacerbation will treat with the Depo-Medrol injection and DuoNeb nebulizer treatments.  Also started the patient on Symbicort twice daily.  Do recommend flu shot as well as RSV vaccine         Relevant Medications    albuterol (PROVENTIL) 5 mg/mL nebulizer solution    Other Relevant Orders    NEBULIZER FOR HOME USE    NEBULIZER KIT (SUPPLIES)  FOR HOME USE    Diabetes mellitus - Primary    Current Assessment & Plan   Lab Results   Component Value Date    HGBA1C 13.6 (H) 05/08/2025    Patient has been poorly controlled.Farxiga 10 mg daily and continue her other meds.  Follow-up here in 3 months diet counseling today.  1500 calorie ADA diet.  May need to send her to an endocrinologist.         Relevant Medications    dapagliflozin propanediol (FARXIGA) 10 mg tablet    Other Relevant Orders    POCT Glucose, Hand-Held Device (Completed)    Hemoglobin A1C (Completed)    Ambulatory referral/consult to Home Health    Constipation    Current Assessment & Plan   Patient has constipation controlled with Linzess.  She is having bowel movements daily or at least every other day.         Relevant Medications    linaCLOtide (LINZESS) 145 mcg Cap capsule    Anxiety    Current Assessment & Plan   Currently on BuSpar and will not change her medication but can increase the dose.  Recommend counseling but.  Patient is not wanting counseling.  Follow-up 1 month         Relevant Medications    busPIRone (BUSPAR) 15 MG tablet    Hyperlipidemia    Current Assessment & Plan   LDL C at her last labs was 70.  Good control of her lipids.  Continue current medication.         Relevant Orders    Lipid Panel (Completed)    Hypertension    Current Assessment & Plan   Blood pressure is controlled.  Continue current medicines.  Check CBC CMP in his well as a thyroid profile.  Follow-up for this is every 3 months.         Relevant Orders    Comprehensive Metabolic Panel (Completed)    CBC Auto Differential (Completed)    POCT URINALYSIS W/O SCOPE (Completed)    Thyroid disease    Current Assessment & Plan   Lab Results   Component Value Date    TSH 1.623 05/08/2025    [id number] 8.3 08/24/2023    Clinically euthyroid.  Last TSH was within normal limits.  No change in medication.  Follow-up in 6 months.          Relevant Orders    TSH (Completed)    Acute left-sided low back pain with  right-sided sciatica    Overview   Si left         Current Assessment & Plan   Pain in her right leg which will controlled with the lidocaine patches as well as muscle relaxants.  Home health with [facility]         Relevant Medications    HYDROcodone-acetaminophen (NORCO) 7.5-325 mg per tablet    LIDOcaine 4 % PtMd     Other Visit Diagnoses         Vitamin D deficiency        Relevant Orders    Vitamin D (Completed)            Health Maintenance Topics with due status: Not Due       Topic Last Completion Date    Aspirin/Antiplatelet Therapy 01/02/2025    High Dose Statin 04/20/2025    Lipid Panel 05/08/2025    Hemoglobin A1c 05/08/2025    RSV Vaccine (Age 60+ and Pregnant patients) Not Due       Future Appointments   Date Time Provider Department Center   7/1/2025  1:30 PM Oren Gage DO [facility]   7/25/2025  8:30 AM Shamir Price DO [facility]        Follow up in about 4 weeks (around 6/5/2025).     Signature:  DO [facility]  [address] [place] [address]  [id number]    Date of encounter: 5/8/25

## 2025-05-23 NOTE — PROGRESS NOTES
Patient request new nebulizer machine and supplies as hers is old. Uses [facility]

## 2025-06-02 DIAGNOSIS — Z79.4 TYPE 2 DIABETES MELLITUS WITH HYPERGLYCEMIA, WITH LONG-TERM CURRENT USE OF INSULIN: ICD-10-CM

## 2025-06-02 DIAGNOSIS — E11.65 TYPE 2 DIABETES MELLITUS WITH HYPERGLYCEMIA, WITH LONG-TERM CURRENT USE OF INSULIN: ICD-10-CM

## 2025-06-03 RX ORDER — GABAPENTIN 300 MG/1
CAPSULE ORAL
Qty: 120 CAPSULE | Refills: 2 | Status: SHIPPED | OUTPATIENT
Start: 2025-06-03

## 2025-06-06 ENCOUNTER — PATIENT MESSAGE (OUTPATIENT)
Dept: FAMILY MEDICINE | Facility: CLINIC | Age: 55
End: 2025-06-06
Payer: MEDICAID

## 2025-06-16 NOTE — ASSESSMENT & PLAN NOTE
Currently on BuSpar and will not change her medication but can increase the dose.  Recommend counseling but.  Patient is not wanting counseling.  Follow-up 1 month

## 2025-06-16 NOTE — ASSESSMENT & PLAN NOTE
Blood pressure is controlled.  Continue current medicines.  Check CBC CMP in his well as a thyroid profile.  Follow-up for this is every 3 months.

## 2025-06-16 NOTE — ASSESSMENT & PLAN NOTE
COPD exacerbation will treat with the Depo-Medrol injection and DuoNeb nebulizer treatments.  Also started the patient on Symbicort twice daily.  Do recommend flu shot as well as RSV vaccine

## 2025-06-16 NOTE — ASSESSMENT & PLAN NOTE
Pain in her right leg which will controlled with the lidocaine patches as well as muscle relaxants.  Home health with [facility]

## 2025-06-16 NOTE — ASSESSMENT & PLAN NOTE
Lab Results   Component Value Date    TSH 1.623 05/08/2025    [id number] 8.3 08/24/2023    Clinically euthyroid.  Last TSH was within normal limits.  No change in medication.  Follow-up in 6 months.

## 2025-06-16 NOTE — ASSESSMENT & PLAN NOTE
Lab Results   Component Value Date    HGBA1C 13.6 (H) 05/08/2025    Patient has been poorly controlled.Farxiga 10 mg daily and continue her other meds.  Follow-up here in 3 months diet counseling today.  1500 calorie ADA diet.  May need to send her to an endocrinologist.

## 2025-06-20 ENCOUNTER — PATIENT MESSAGE (OUTPATIENT)
Dept: FAMILY MEDICINE | Facility: CLINIC | Age: 55
End: 2025-06-20
Payer: MEDICAID

## 2025-06-25 ENCOUNTER — TELEPHONE (OUTPATIENT)
Dept: FAMILY MEDICINE | Facility: CLINIC | Age: 55
End: 2025-06-25
Payer: MEDICAID

## 2025-06-25 NOTE — TELEPHONE ENCOUNTER
Copied from CRM #[id number]. Topic: Medications - Medication Refill  >> Jun 25, 2025  8:46 AM Madonna wrote:  Patient requesting refills for Robaxin to be sent to [facility] pharmacy.

## 2025-06-25 NOTE — TELEPHONE ENCOUNTER
Refill request sent to Dr. Gage to sign off on if he will give her the refill or not. Pt notified.

## 2025-07-01 ENCOUNTER — TELEPHONE (OUTPATIENT)
Dept: FAMILY MEDICINE | Facility: CLINIC | Age: 55
End: 2025-07-01
Payer: MEDICAID

## 2025-07-01 ENCOUNTER — OFFICE VISIT (OUTPATIENT)
Dept: FAMILY MEDICINE | Facility: CLINIC | Age: 55
End: 2025-07-01
Payer: MEDICAID

## 2025-07-01 VITALS
BODY MASS INDEX: 31.14 KG/M2 | OXYGEN SATURATION: 97 % | HEART RATE: 74 BPM | RESPIRATION RATE: 18 BRPM | TEMPERATURE: 98 F | HEIGHT: 60 IN | WEIGHT: 158.63 LBS | SYSTOLIC BLOOD PRESSURE: 117 MMHG | DIASTOLIC BLOOD PRESSURE: 72 MMHG

## 2025-07-01 DIAGNOSIS — J44.1 COPD EXACERBATION: ICD-10-CM

## 2025-07-01 DIAGNOSIS — E78.5 HYPERLIPIDEMIA, UNSPECIFIED HYPERLIPIDEMIA TYPE: ICD-10-CM

## 2025-07-01 DIAGNOSIS — E11.65 TYPE 2 DIABETES MELLITUS WITH HYPERGLYCEMIA, WITHOUT LONG-TERM CURRENT USE OF INSULIN: ICD-10-CM

## 2025-07-01 DIAGNOSIS — M54.41 ACUTE LEFT-SIDED LOW BACK PAIN WITH RIGHT-SIDED SCIATICA: ICD-10-CM

## 2025-07-01 DIAGNOSIS — Z12.11 SCREENING FOR COLORECTAL CANCER: Primary | ICD-10-CM

## 2025-07-01 DIAGNOSIS — I10 PRIMARY HYPERTENSION: ICD-10-CM

## 2025-07-01 DIAGNOSIS — F31.81 BIPOLAR 2 DISORDER, MAJOR DEPRESSIVE EPISODE: ICD-10-CM

## 2025-07-01 DIAGNOSIS — M32.9 SYSTEMIC LUPUS ERYTHEMATOSUS, UNSPECIFIED SLE TYPE, UNSPECIFIED ORGAN INVOLVEMENT STATUS: ICD-10-CM

## 2025-07-01 DIAGNOSIS — Z12.12 SCREENING FOR COLORECTAL CANCER: Primary | ICD-10-CM

## 2025-07-01 LAB
ALBUMIN SERPL BCP-MCNC: 4.1 G/DL (ref 3.5–5)
ALBUMIN/GLOB SERPL: 1.2 {RATIO}
ALP SERPL-CCNC: 86 U/L (ref 40–150)
ALT SERPL W P-5'-P-CCNC: 24 U/L
ANION GAP SERPL CALCULATED.3IONS-SCNC: 12 MMOL/L (ref 7–16)
AST SERPL W P-5'-P-CCNC: 50 U/L (ref 11–45)
BASOPHILS # BLD AUTO: 0.09 K/UL (ref 0–0.2)
BASOPHILS NFR BLD AUTO: 0.7 % (ref 0–1)
BILIRUB SERPL-MCNC: 0.4 MG/DL
BUN SERPL-MCNC: 12 MG/DL (ref 10–20)
BUN/CREAT SERPL: 11 (ref 6–20)
CALCIUM SERPL-MCNC: 8.7 MG/DL (ref 8.4–10.2)
CHLORIDE SERPL-SCNC: 92 MMOL/L (ref 98–107)
CHOLEST SERPL-MCNC: 135 MG/DL
CHOLEST/HDLC SERPL: 3.6 {RATIO}
CK SERPL-CCNC: 110 U/L (ref 29–168)
CO2 SERPL-SCNC: 26 MMOL/L (ref 22–29)
CREAT SERPL-MCNC: 1.09 MG/DL (ref 0.55–1.02)
CREAT UR-MCNC: 15 MG/DL (ref 15–325)
CRP SERPL HS-MCNC: 2.65 MG/L
DIFFERENTIAL METHOD BLD: ABNORMAL
EGFR (NO RACE VARIABLE) (RUSH/TITUS): 61 ML/MIN/1.73M2
EOSINOPHIL # BLD AUTO: 0 K/UL (ref 0–0.5)
EOSINOPHIL NFR BLD AUTO: 0 % (ref 1–4)
ERYTHROCYTE [DISTWIDTH] IN BLOOD BY AUTOMATED COUNT: 13.5 % (ref 11.5–14.5)
ERYTHROCYTE [SEDIMENTATION RATE] IN BLOOD BY WESTERGREN METHOD: 3 MM/HR (ref 0–30)
EST. AVERAGE GLUCOSE BLD GHB EST-MCNC: 246 MG/DL
GLOBULIN SER-MCNC: 3.4 G/DL (ref 2–4)
GLUCOSE SERPL-MCNC: 177 MG/DL (ref 74–100)
HBA1C MFR BLD HPLC: 10.2 %
HCT VFR BLD AUTO: 43.1 % (ref 38–47)
HDLC SERPL-MCNC: 37 MG/DL (ref 35–60)
HGB BLD-MCNC: 14.6 G/DL (ref 12–16)
IMM GRANULOCYTES # BLD AUTO: 0.07 K/UL (ref 0–0.04)
IMM GRANULOCYTES NFR BLD: 0.6 % (ref 0–0.4)
LDLC SERPL CALC-MCNC: 54 MG/DL
LDLC/HDLC SERPL: 1.5 {RATIO}
LYMPHOCYTES # BLD AUTO: 3.78 K/UL (ref 1–4.8)
LYMPHOCYTES NFR BLD AUTO: 30.3 % (ref 27–41)
MCH RBC QN AUTO: 30.7 PG (ref 27–31)
MCHC RBC AUTO-ENTMCNC: 33.9 G/DL (ref 32–36)
MCV RBC AUTO: 90.5 FL (ref 80–96)
MICROALBUMIN UR-MCNC: <0.5 MG/DL
MICROALBUMIN/CREAT RATIO PNL UR: NORMAL
MONOCYTES # BLD AUTO: 0.87 K/UL (ref 0–0.8)
MONOCYTES NFR BLD AUTO: 7 % (ref 2–6)
MPC BLD CALC-MCNC: 10.1 FL (ref 9.4–12.4)
NEUTROPHILS # BLD AUTO: 7.67 K/UL (ref 1.8–7.7)
NEUTROPHILS NFR BLD AUTO: 61.4 % (ref 53–65)
NONHDLC SERPL-MCNC: 98 MG/DL
NRBC # BLD AUTO: 0 X10E3/UL
NRBC, AUTO (.00): 0 %
PLATELET # BLD AUTO: 196 K/UL (ref 150–400)
POTASSIUM SERPL-SCNC: 4 MMOL/L (ref 3.5–5.1)
PROT SERPL-MCNC: 7.5 G/DL (ref 6.4–8.3)
RBC # BLD AUTO: 4.76 M/UL (ref 4.2–5.4)
RHEUMATOID FACT SER NEPH-ACNC: 23 IU/ML
SODIUM SERPL-SCNC: 126 MMOL/L (ref 136–145)
T4 SERPL-MCNC: 7 ΜG/DL (ref 4.9–11.7)
TRIGL SERPL-MCNC: 221 MG/DL (ref 37–140)
TSH SERPL DL<=0.005 MIU/L-ACNC: 1.24 UIU/ML (ref 0.35–4.94)
URATE SERPL-MCNC: 4.1 MG/DL (ref 2.6–6)
VLDLC SERPL-MCNC: 44 MG/DL
WBC # BLD AUTO: 12.48 K/UL (ref 4.5–11)

## 2025-07-01 PROCEDURE — 80061 LIPID PANEL: CPT | Mod: ,,, | Performed by: CLINICAL MEDICAL LABORATORY

## 2025-07-01 PROCEDURE — 84550 ASSAY OF BLOOD/URIC ACID: CPT | Mod: ,,, | Performed by: CLINICAL MEDICAL LABORATORY

## 2025-07-01 PROCEDURE — 1159F MED LIST DOCD IN RCRD: CPT | Mod: CPTII,,, | Performed by: FAMILY MEDICINE

## 2025-07-01 PROCEDURE — 86200 CCP ANTIBODY: CPT | Mod: ,,, | Performed by: CLINICAL MEDICAL LABORATORY

## 2025-07-01 PROCEDURE — 80053 COMPREHEN METABOLIC PANEL: CPT | Mod: ,,, | Performed by: CLINICAL MEDICAL LABORATORY

## 2025-07-01 PROCEDURE — 3074F SYST BP LT 130 MM HG: CPT | Mod: CPTII,,, | Performed by: FAMILY MEDICINE

## 2025-07-01 PROCEDURE — 84436 ASSAY OF TOTAL THYROXINE: CPT | Mod: ,,, | Performed by: CLINICAL MEDICAL LABORATORY

## 2025-07-01 PROCEDURE — 83036 HEMOGLOBIN GLYCOSYLATED A1C: CPT | Mod: ,,, | Performed by: CLINICAL MEDICAL LABORATORY

## 2025-07-01 PROCEDURE — 3008F BODY MASS INDEX DOCD: CPT | Mod: CPTII,,, | Performed by: FAMILY MEDICINE

## 2025-07-01 PROCEDURE — 99214 OFFICE O/P EST MOD 30 MIN: CPT | Mod: ,,, | Performed by: FAMILY MEDICINE

## 2025-07-01 PROCEDURE — 85025 COMPLETE CBC W/AUTO DIFF WBC: CPT | Mod: ,,, | Performed by: CLINICAL MEDICAL LABORATORY

## 2025-07-01 PROCEDURE — 86038 ANTINUCLEAR ANTIBODIES: CPT | Mod: ,,, | Performed by: CLINICAL MEDICAL LABORATORY

## 2025-07-01 PROCEDURE — 86431 RHEUMATOID FACTOR QUANT: CPT | Mod: ,,, | Performed by: CLINICAL MEDICAL LABORATORY

## 2025-07-01 PROCEDURE — 86141 C-REACTIVE PROTEIN HS: CPT | Mod: ,,, | Performed by: CLINICAL MEDICAL LABORATORY

## 2025-07-01 PROCEDURE — 82043 UR ALBUMIN QUANTITATIVE: CPT | Mod: ,,, | Performed by: CLINICAL MEDICAL LABORATORY

## 2025-07-01 PROCEDURE — 3078F DIAST BP <80 MM HG: CPT | Mod: CPTII,,, | Performed by: FAMILY MEDICINE

## 2025-07-01 PROCEDURE — 84443 ASSAY THYROID STIM HORMONE: CPT | Mod: ,,, | Performed by: CLINICAL MEDICAL LABORATORY

## 2025-07-01 PROCEDURE — 82550 ASSAY OF CK (CPK): CPT | Mod: ,,, | Performed by: CLINICAL MEDICAL LABORATORY

## 2025-07-01 PROCEDURE — 82570 ASSAY OF URINE CREATININE: CPT | Mod: ,,, | Performed by: CLINICAL MEDICAL LABORATORY

## 2025-07-01 PROCEDURE — 85651 RBC SED RATE NONAUTOMATED: CPT | Mod: ,,, | Performed by: CLINICAL MEDICAL LABORATORY

## 2025-07-01 PROCEDURE — 3046F HEMOGLOBIN A1C LEVEL >9.0%: CPT | Mod: CPTII,,, | Performed by: FAMILY MEDICINE

## 2025-07-01 RX ORDER — METHOCARBAMOL 500 MG/1
500 TABLET, FILM COATED ORAL 4 TIMES DAILY
Qty: 40 TABLET | Refills: 1 | Status: SHIPPED | OUTPATIENT
Start: 2025-07-01 | End: 2025-07-21

## 2025-07-01 RX ORDER — ATORVASTATIN CALCIUM 40 MG/1
40 TABLET, FILM COATED ORAL DAILY
Qty: 90 TABLET | Refills: 1 | Status: SHIPPED | OUTPATIENT
Start: 2025-07-01

## 2025-07-01 RX ORDER — DAPAGLIFLOZIN 10 MG/1
10 TABLET, FILM COATED ORAL
Qty: 30 TABLET | Refills: 2 | OUTPATIENT
Start: 2025-07-01

## 2025-07-01 RX ORDER — DAPAGLIFLOZIN 10 MG/1
10 TABLET, FILM COATED ORAL DAILY
Qty: 30 TABLET | Refills: 2 | Status: SHIPPED | OUTPATIENT
Start: 2025-07-01

## 2025-07-01 RX ORDER — ATORVASTATIN CALCIUM 40 MG/1
40 TABLET, FILM COATED ORAL
Qty: 30 TABLET | Refills: 2 | OUTPATIENT
Start: 2025-07-01

## 2025-07-01 RX ORDER — HYDROCODONE BITARTRATE AND ACETAMINOPHEN 7.5; 325 MG/1; MG/1
1 TABLET ORAL EVERY 6 HOURS PRN
Qty: 20 TABLET | Refills: 0 | Status: SHIPPED | OUTPATIENT
Start: 2025-07-01

## 2025-07-01 RX ORDER — FLUOXETINE HYDROCHLORIDE 40 MG/1
40 CAPSULE ORAL DAILY
COMMUNITY

## 2025-07-01 RX ORDER — PREDNISONE 10 MG/1
10 TABLET ORAL DAILY
Qty: 30 TABLET | Refills: 0 | Status: SHIPPED | OUTPATIENT
Start: 2025-07-01

## 2025-07-01 RX ORDER — HYDROXYCHLOROQUINE SULFATE 200 MG/1
200 TABLET, FILM COATED ORAL 2 TIMES DAILY
Qty: 60 TABLET | Refills: 2 | Status: SHIPPED | OUTPATIENT
Start: 2025-07-01

## 2025-07-01 NOTE — ASSESSMENT & PLAN NOTE
Pain is somewhat improved.  Will continue with pain patches as well as did give her some hydrocodone 7.5.  Did give her 20 in explained that we can only give small amounts at a time.  We will not refill large amounts of pain medicines other than her gabapentin.

## 2025-07-01 NOTE — PROGRESS NOTES
Oren Gage,    [facility]  [address]  [place] [address]     PATIENT NAME: Rosemarie Lane  : 1970  DATE: 25  MRN: [id number]        Chief complaint   Chief Complaint   Patient presents with    Fatigue     Ms. Lane presents today for fatigue. Started about a month ago. Just does not have any energy anymore    Obesity     Also complains of having issues with her weight going up and down for the last 3 months. Not trying to lose or gain weight at all.     Memory Loss     She is still having issues with memory loss. States feels like its getting worse than last time she was in.     Medication Refill     Needs several refills today. Also would like to see about getting back on robaxin.     Health Maintenance     Foot Exam Never done  TETANUS VACCINE Never done  Mammogram Never done  Pneumococcal Vaccines (Age 50+)(1 of 2 - PCV) Never done  Shingles Vaccine(1 of 2) Never done  COVID-19 Vaccine(1 - 2024-25 season) Never done  Colorectal Cancer Screening due on 2025  Diabetes Urine Screening due on 2025  Will get fitkit and urine test but does not want anything else done today       History of Present Illness    CHIEF COMPLAINT:  Patient presents with fatigue, muscle pain, and stiffness, likely due to a lupus flare-up that started a few days ago.    HPI:  Patient reports fatigue and muscle pain that began 2 days ago, which she believes is related to a lupus flare-up. She describes severe tiredness and notes that the pain is more in the muscles than in the joints, which is different from her usual lupus symptoms. She also reports feeling stiff. Patient has sleep disturbances, waking up frequently throughout the night. She takes mirtazapine, 2 tablets at night, which has helped with sleep to some extent. Patient mentions improved sleep quality the previous night.    Patient reports shortness of breath for 2 days, consistent with her usual COPD-related breathing issues. She  needs to sit up in bed to breathe at night and use oxygen, which occurs frequently throughout the night.    Patient had migraines after starting Luvox about 2 weeks ago for OCD treatment. Due to these side effects, her doctor discontinued Luvox and prescribed Prozac instead, which patient has not started yet.    Patient reports frequent urination at night, along with checking to ensure things are locked due to feelings of paranoia. Her eating habits are irregular, with fluctuating weight as a result.    Patient has frequent ringing in one ear. She also reports low back pain after attempting to lift her Joshua with its dog bed. The pain is localized to the very low back area.    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 These lesions itch, and patient admits to scratching them, which exacerbates the condition.    Patient denies nausea, vomiting, fever, chills, or coughing up sputum.    SOCIAL HISTORY:  Smoking: Half a pack a day, trying to quit      ROS:  General: -fever, -chills, +fatigue, -weight gain, -weight loss, +difficulty staying asleep, +appetite changes  Eyes: -vision changes, -redness, -discharge  ENT: -ear pain, -nasal congestion, -sore throat, +tinnitus  Cardiovascular: -chest pain, -palpitations, -lower extremity edema, +shortness of breath lying down  Respiratory: -cough, +shortness of breath  Gastrointestinal: -abdominal pain, -nausea, -vomiting, -diarrhea, -constipation, -blood in stool  Genitourinary: -dysuria, -hematuria, -frequency, +excessive urination  Musculoskeletal: -joint pain, +muscle pain, +muscle stiffness, +back pain  Skin: -rash, +lesion  Neurological: +headache, -dizziness, -numbness, -tingling, +migraines  Psychiatric: -anxiety, -depression, -sleep difficulty, +paranoia             Current Medications[1]   Past Medical History:   Diagnosis Date    Anxiety     Arthritis     Asthma     Cancer      CHF (congestive heart failure)     Chronic obstructive lung disease     Coronary artery disease     Depression     Diabetes mellitus     Diabetes mellitus, type 2     Encounter for blood transfusion     History of kidney stones     Hyperlipidemia     Hypertension     Lupus     Renal disorder     Seizures     Thyroid disease     Transfusion reaction       Family History   Problem Relation Name Age of Onset    Hypertension Mother Shantel Welch     Diabetes Mother Shantel Welch     Heart disease Mother Shantel Welch     Cancer Mother Shantel Welch     Bone cancer Father Eleno welch     Lung cancer Father Eleno welch     Cancer Father Eleno welch     COPD Father Eleno welch     Diabetes Sister Khadijah     Heart disease Brother Eleno     Diabetes Brother Eleno     No Known Problems Daughter      Cushing syndrome Daughter      Ovarian cancer Maternal Grandmother      No Known Problems Maternal Grandfather      Heart attack Paternal Grandmother      No Known Problems Paternal Grandfather      No Known Problems Son        Past Surgical History:   Procedure Laterality Date    CARDIAC CATHETERIZATION Left 09/19/2020    Proximal left main stenosis; IVUS in the LAD and left main    CARDIAC VALVE REPLACEMENT      CHOLECYSTECTOMY      CORONARY ARTERY BYPASS GRAFT  09/30/2020    CORONARY STENT PLACEMENT  11/09/2021    Everolimus Eluting Coronary Stent-MLAD;LAD    DIRECT LARYNGOSCOPY Bilateral 07/18/2023    Procedure: LARYNGOSCOPY, DIRECT;  Surgeon: Nazario Sloan MD;  Location: [facility] OR;  Service: ENT;  Laterality: Bilateral;    HYSTERECTOMY      INCISION AND DRAINAGE  06/02/2020    LEFT HEART CATHETERIZATION Left 11/09/2021    Procedure: Left heart cath;  Surgeon: Aureliano Giron MD;  Location: [facility] CATH LAB;  Service: Cardiology;  Laterality: Left;    TOTAL ABDOMINAL HYSTERECTOMY W/ BILATERAL SALPINGOOPHORECTOMY  2008    KIRSTY Lala     TUBAL LIGATION      VOCAL FOLD LESION EXCISION  Bilateral 07/18/2023    Procedure: EXCISION, LESION, VOCAL CORD;  Surgeon: Nazario Sloan MD;  Location: [facility];  Service: ENT;  Laterality: Bilateral;        Physical Exam  Vitals reviewed.   Constitutional:       General: She is not in acute distress.     Appearance: Normal appearance. She is normal weight.   HENT:      Head: Normocephalic and atraumatic.      Nose: Nose normal.      Mouth/Throat:      Mouth: Mucous membranes are moist.      Pharynx: Oropharynx is clear. No oropharyngeal exudate or posterior oropharyngeal erythema.   Eyes:      General: No scleral icterus.     Extraocular Movements: Extraocular movements intact.      Conjunctiva/sclera: Conjunctivae normal.      Pupils: Pupils are equal, round, and reactive to light.   Neck:      Vascular: No carotid bruit.   Cardiovascular:      Rate and Rhythm: Normal rate and regular rhythm.      Pulses: Normal pulses.      Heart sounds: Normal heart sounds. No murmur heard.     No gallop.   Pulmonary:      Effort: Pulmonary effort is normal.      Breath sounds: Wheezing present.   Abdominal:      General: Abdomen is flat. Bowel sounds are normal.      Palpations: Abdomen is soft.      Tenderness: There is no abdominal tenderness.   Musculoskeletal:         General: Tenderness present. Normal range of motion.      Cervical back: Normal range of motion and neck supple.      Right lower leg: No edema.      Left lower leg: No edema.      Comments: Lumbar spine tenderness left-sided.  Negative straight leg raising.   Lymphadenopathy:      Cervical: No cervical adenopathy.   Skin:     General: Skin is warm and dry.      Capillary Refill: Capillary refill takes less than 2 seconds.      Coloration: Skin is not jaundiced.      Findings: No lesion.   Neurological:      General: No focal deficit present.      Mental Status: She is alert and oriented to person, place, and time. Mental status is at baseline.      Cranial Nerves: No cranial nerve deficit.       Sensory: No sensory deficit.      Motor: No weakness.      Coordination: Coordination normal.      Gait: Gait normal.      Deep Tendon Reflexes: Reflexes normal.   Psychiatric:         Mood and Affect: Mood normal.         Behavior: Behavior normal.         Thought Content: Thought content normal.         Judgment: Judgment normal.        Objective   /72 (BP Location: Left arm, Patient Position: Sitting)   Pulse 74   Temp 98.2 °F (36.8 °C) (Oral)   Resp 18   Ht 5' (1.524 m)   Wt 71.9 kg (158 lb 9.6 oz)   SpO2 97%   BMI 30.97 kg/m²          LABS:   No results found for this or any previous visit (from the past 2 weeks).   CMP  Sodium   Date Value Ref Range Status   05/08/2025 138 136 - 145 mmol/L Final     Potassium   Date Value Ref Range Status   05/08/2025 4.2 3.5 - 5.1 mmol/L Final     Chloride   Date Value Ref Range Status   05/08/2025 99 98 - 107 mmol/L Final     CO2   Date Value Ref Range Status   05/08/2025 26 22 - 29 mmol/L Final     Glucose   Date Value Ref Range Status   05/08/2025 254 (H) 74 - 100 mg/dL Final     BUN   Date Value Ref Range Status   05/08/2025 14 10 - 20 mg/dL Final     Creatinine   Date Value Ref Range Status   05/08/2025 1.17 (H) 0.55 - 1.02 mg/dL Final     Calcium   Date Value Ref Range Status   05/08/2025 10.5 (H) 8.4 - 10.2 mg/dL Final     Total Protein   Date Value Ref Range Status   05/08/2025 9.1 (H) 6.4 - 8.3 g/dL Final     Albumin   Date Value Ref Range Status   05/08/2025 4.9 3.5 - 5.0 g/dL Final     Bilirubin, Total   Date Value Ref Range Status   05/08/2025 0.5 <=1.5 mg/dL Final     Alk Phos   Date Value Ref Range Status   05/08/2025 122 40 - 150 U/L Final     AST   Date Value Ref Range Status   05/08/2025 48 (H) 11 - 45 U/L Final     ALT   Date Value Ref Range Status   05/08/2025 26 <=55 U/L Final     Anion Gap   Date Value Ref Range Status   05/08/2025 17 (H) 7 - 16 mmol/L Final     eGFR   Date Value Ref Range Status   05/08/2025 56 (L) >=60 mL/min/1.73m2 Final  "    Comment:     Estimated GFR calculated using the CKD-EPI creatinine (2021) equation.      Lab Results   Component Value Date    HGBA1C 13.6 (H) 05/08/2025    HGBA1C 7.1 (H) 09/05/2024    HGBA1C 6.1 05/09/2024      Lab Results   Component Value Date    CHOL 175 05/08/2025    CHOL 137 05/09/2024    CHOL 144 08/24/2023      Lab Results   Component Value Date    HDL 45 05/08/2025    HDL 40 05/09/2024    HDL 43 08/24/2023     Lab Results   Component Value Date    LDLCALC 62 05/08/2025    LDLCALC 70 05/09/2024    LDLCALC 44 08/24/2023      Lab Results   Component Value Date    TRIG 339 (H) 05/08/2025    TRIG 133 05/09/2024    TRIG 285 (H) 08/24/2023     No results found for: "TOTALCHOLEST"  Lab Results   Component Value Date    NONHDLCHOL 130 05/08/2025    NONHDLCHOL 97 05/09/2024    NONHDLCHOL 101 08/24/2023     Lab Results   Component Value Date    CHOLHDL 3.9 05/08/2025    CHOLHDL 3.4 05/09/2024    CHOLHDL 3.3 08/24/2023        No images are attached to the encounter.     Assessment    Problem List Items Addressed This Visit       COPD exacerbation    COPD which is chronic and has been stable recently.  She is not having any shortness a breath sputum production.  She does have some orthopnea as well as dyspnea on exertion.  Will continue her albuterol inhalers as well as her prednisone 10 mg daily.  Follow-up in 3 months.  Recommend flu shot this year.         Diabetes mellitus    Recheck hemoglobin A1c as her last was 13.9         Relevant Medications    dapagliflozin propanediol (FARXIGA) 10 mg tablet    Other Relevant Orders    Microalbumin/creatinine urine ratio    Hemoglobin A1C    Bipolar 2 disorder, major depressive episode    Bipolar currently stable.  Patient had recently had her Remeron in increase to 2 at bedtime.  No change in treatment at this time.  She was also started on Prozac but has not started taking it just.  We advised her that she should start because her bipolar disease will get worse as she " does not.  Follow-up with Adilia.         Relevant Orders    CK    Systemic lupus erythematosus    Systemic lupus currently on Plaquenil 200 twice daily.  Patient had been seen in the pain clinic in normally gets her pain medication from them.  We have been filling her gabapentin from our office.  It occasionally give her some pain medicines.  Recommend she follow back with pain management.  Twenty Norco 7.5 mg were given today.  Patient was placed on prednisone tapering over 3 weeks.  Follow-up 1 month or p.r.n. follow-up rheumatology.         Relevant Medications    hydroxychloroquine (PLAQUENIL) 200 mg tablet    predniSONE (DELTASONE) 10 MG tablet    methocarbamoL (ROBAXIN) 500 MG Tab    Other Relevant Orders    CRP, High Sensitivity    NICHOLE EIA w/ Reflex to dsDNA/EVONNE    Rheumatoid Quantitative    Hyperlipidemia    Relevant Medications    atorvastatin (LIPITOR) 40 MG tablet    Other Relevant Orders    CBC Auto Differential    Comprehensive Metabolic Panel    Lipid Panel    TSH    Sedimentation Rate    Uric Acid    Cyclic Citrullinated Peptide Antibody, IgG    Hypertension    Hypertension well controlled no changes.         Relevant Orders    TSH    T4    Acute left-sided low back pain with right-sided sciatica    Pain is somewhat improved.  Will continue with pain patches as well as did give her some hydrocodone 7.5.  Did give her 20 in explained that we can only give small amounts at a time.  We will not refill large amounts of pain medicines other than her gabapentin.         Relevant Medications    HYDROcodone-acetaminophen (NORCO) 7.5-325 mg per tablet     Other Visit Diagnoses         Screening for colorectal cancer    -  Primary    Relevant Orders    Fecal Immunochemical Test (iFOBT)            Assessment & Plan    IMPRESSION:  - Assessed symptoms as likely lupus flare with muscle pain, possibly polymyalgia rheumatica.  - Noted discoid lupus flare with multiple lesions on back.  - Evaluated low back pain,  likely musculoskeletal in nature.  - Considered impact of recently started Luvox on muscle pain and migraines.  - Evaluated ear complaints, noted retracted eardrum but no active infection.    ## SYSTEMIC LUPUS ERYTHEMATOSUS:  - Evaluated patient's muscle pain and stiffness, which differs from usual joint pain associated with lupus.  - Noted shortness of breath, which is consistent with usual symptoms and likely due to lupus flaring rather than COPD or heart issues.  - Suspected lupus flare-up as the cause of symptoms, including muscle pain and discoid lupus lesions.  - Ordered labs to check liver function and CPK levels due to new medications.  - Prescribed a tapering dose of prednisone: 30 mg daily for 5 days, then 20 mg daily for 5 days, then 10 mg daily for 5 days, then 5 mg daily for 5 days.  - This steroid treatment should help with both the lupus flaring and associated shortness of breath.    ## DISCOID LUPUS ERYTHEMATOSUS:  - Monitored discoid lupus lesions on the patient's back, with 4 active lesions and scarring from 4 more.  - Confirmed discoid lupus flare-up.  - The tapering dose of steroids prescribed for systemic lupus will also help manage this discoid lupus flare-up.    ## CHRONIC OBSTRUCTIVE PULMONARY DISEASE (COPD):  - Monitored shortness of breath, which is consistent with COPD history.  - Patient needs to sit up in bed to breathe at night.  - Evaluated and confirmed this is consistent with existing COPD, not a new symptom.  - Educated the patient about the importance of smoking cessation for reducing risk of stroke, heart attack, and worsening COPD.    ## OBSESSIVE-COMPULSIVE DISORDER (OCD):  - Monitored OCD symptoms including checking behaviors and paranoia at night.  - Patient reports frequent nighttime awakening to urinate and check if things are locked.  - Discontinued Luvox due to adverse effects.  - Prescribed Prozac to manage mood, particularly in context of steroid treatment.  - Reviewed  medication plan and advised continuation of Prozac for paranoia management.    ## DRUG-INDUCED HEADACHE:  - Monitored migraines that developed after starting Luvox 2 weeks ago.  - Evaluated headaches and associated them with Luvox use.  - Explained potential connection between Luvox and muscle pain/migraines.  - Discontinued Luvox due to these adverse effects and prescribed Prozac as an alternative antidepressant.    ## LOW BACK PAIN:  - Monitored low back pain that developed after lifting a chihuahua.  - Evaluated and confirmed location and severity.  - The prescribed steroids for lupus should also help with low back pain.  - Continued hydrocodone for pain management, with instruction to use sparingly when pain is severe.    ## TINNITUS:  - Monitored ringing in the right ear.  - Evaluated ear and found retracted eardrum without infection.  - Suggested that the prescribed steroids may also help resolve tinnitus.    ## SLEEP DISORDER:  - Acknowledged sleep disturbances and discussed medication management.  - Continued mirtazapine as a sleep aid, 2 tablets at night.    ## NICOTINE DEPENDENCE:  - Monitored smoking habits: half a pack per day with attempts to quit using nicotine patch.  - Educated the patient about smoking risks   for stroke, heart attack, and worsening of COPD.  - Instructed the patient to reduce smoking gradually over 6 months, setting specific daily limits for each 2-week period, or quit cold turkey if possible.    ## INAPPROPRIATE DIET AND EATING HABITS:  - Monitored weight fluctuations due to inconsistent eating habits, including eating like a dog and binge eating at night.  - Explained risks of nighttime binge eating.  - Recommend 3 meals and 3 snacks per day instead of irregular eating patterns.  - Advised the patient to avoid nighttime binge eating.          This note was generated with the assistance of ambient listening technology. Verbal consent was obtained by the patient and accompanying  visitor(s) for the recording of patient appointment to facilitate this note. I attest to having reviewed and edited the generated note for accuracy, though some syntax or spelling errors may persist. Please contact the author of this note for any clarification.     Follow up in about 3 months (around 10/1/2025).        [1]   Current Outpatient Medications:     albuterol (PROVENTIL) 5 mg/mL nebulizer solution, Take 0.5 mLs (2.5 mg total) by nebulization every 6 (six) hours as needed for Wheezing. Rescue, Disp: 150 mL, Rfl: 5    albuterol-ipratropium (DUO-NEB) 2.5 mg-0.5 mg/3 mL nebulizer solution, Take 3 mLs by nebulization every 6 (six) hours while awake. Rescue, Disp: 75 mL, Rfl: 0    aspirin (ECOTRIN) 81 MG EC tablet, Take 1 tablet by mouth once daily., Disp: , Rfl:     busPIRone (BUSPAR) 15 MG tablet, TAKE 1 TABLET BY MOUTH 2 TIMES A DAY AS DIRECTED, Disp: 60 tablet, Rfl: 2    cariprazine (VRAYLAR) 3 mg Cap, Take 2 capsules (6 mg total) by mouth once daily., Disp: 60 capsule, Rfl: 2    FLUoxetine 40 MG capsule, Take 40 mg by mouth once daily., Disp: , Rfl:     fluticasone propionate (FLONASE) 50 mcg/actuation nasal spray, 1 spray by Each Nostril route once daily., Disp: , Rfl:     gabapentin (NEURONTIN) 300 MG capsule, TAKE 2 CAPSULES BY MOUTH 2 TIMES A DAY ., Disp: 120 capsule, Rfl: 2    insulin glargine U-100, Lantus, (LANTUS SOLOSTAR U-100 INSULIN) 100 unit/mL (3 mL) InPn pen, Inject 30 Units into the skin 2 (two) times a day. (Patient taking differently: Inject 20 Units into the skin 2 (two) times a day.), Disp: 6 mL, Rfl: 3    LIDOcaine 4 % PtMd, Apply 1 patch topically 2 (two) times a day., Disp: 30 patch, Rfl: 2    linaCLOtide (LINZESS) 145 mcg Cap capsule, Take 1 capsule (145 mcg total) by mouth before breakfast., Disp: 30 capsule, Rfl: 2    metoprolol succinate (TOPROL-XL) 50 MG 24 hr tablet, Take 1 tablet (50 mg total) by mouth once daily., Disp: 30 tablet, Rfl: 2    mirtazapine (REMERON) 30 MG tablet,  Take 1 tablet (30 mg total) by mouth once daily., Disp: 30 tablet, Rfl: 2    nicotine (NICODERM CQ) 21 mg/24 hr, Place 1 patch onto the skin once daily., Disp: 30 patch, Rfl: 2    ondansetron (ZOFRAN-ODT) 8 MG TbDL, Take 1 tablet (8 mg total) by mouth every 6 (six) hours as needed (nausea)., Disp: 30 tablet, Rfl: 2    rOPINIRole (REQUIP) 0.25 MG tablet, TAKE 1 TABLET BY MOUTH THREE TIMES DAILY., Disp: 90 tablet, Rfl: 2    atorvastatin (LIPITOR) 40 MG tablet, Take 1 tablet (40 mg total) by mouth once daily., Disp: 90 tablet, Rfl: 1    dapagliflozin propanediol (FARXIGA) 10 mg tablet, Take 1 tablet (10 mg total) by mouth once daily., Disp: 30 tablet, Rfl: 2    HYDROcodone-acetaminophen (NORCO) 7.5-325 mg per tablet, Take 1 tablet by mouth every 6 (six) hours as needed for Pain., Disp: 20 tablet, Rfl: 0    hydroxychloroquine (PLAQUENIL) 200 mg tablet, Take 1 tablet (200 mg total) by mouth 2 (two) times daily., Disp: 60 tablet, Rfl: 2    methocarbamoL (ROBAXIN) 500 MG Tab, Take 1 tablet (500 mg total) by mouth 4 (four) times daily. for 20 days, Disp: 40 tablet, Rfl: 1    predniSONE (DELTASONE) 10 MG tablet, Take 1 tablet (10 mg total) by mouth once daily. 3 daily for 5 days then 2 daily for 5 days then 1 daily for 5 days then 1/2 daily for 5 days, Disp: 30 tablet, Rfl: 0

## 2025-07-01 NOTE — TELEPHONE ENCOUNTER
Spoke with dr xiong and okay to change, confirmed with naina castañeda [facility]

## 2025-07-01 NOTE — ASSESSMENT & PLAN NOTE
COPD which is chronic and has been stable recently.  She is not having any shortness a breath sputum production.  She does have some orthopnea as well as dyspnea on exertion.  Will continue her albuterol inhalers as well as her prednisone 10 mg daily.  Follow-up in 3 months.  Recommend flu shot this year.

## 2025-07-01 NOTE — ASSESSMENT & PLAN NOTE
Bipolar currently stable.  Patient had recently had her Remeron in increase to 2 at bedtime.  No change in treatment at this time.  She was also started on Prozac but has not started taking it just.  We advised her that she should start because her bipolar disease will get worse as she does not.  Follow-up with Adilia.

## 2025-07-01 NOTE — TELEPHONE ENCOUNTER
Copied from CRM #[id number]. Topic: Medications - Pharmacy  >> Jul 1, 2025  2:34 PM Effie wrote:  Kylah with [facility] needs clarification on Prednisone and Robaxin. Qty not matching directions. Please call her [phone].

## 2025-07-01 NOTE — ASSESSMENT & PLAN NOTE
Systemic lupus currently on Plaquenil 200 twice daily.  Patient had been seen in the pain clinic in normally gets her pain medication from them.  We have been filling her gabapentin from our office.  It occasionally give her some pain medicines.  Recommend she follow back with pain management.  Twenty Norco 7.5 mg were given today.  Patient was placed on prednisone tapering over 3 weeks.  Follow-up 1 month or p.r.n. follow-up rheumatology.

## 2025-07-03 ENCOUNTER — TELEPHONE (OUTPATIENT)
Dept: FAMILY MEDICINE | Facility: CLINIC | Age: 55
End: 2025-07-03
Payer: MEDICAID

## 2025-07-03 LAB
ANA SER QL: NEGATIVE
CCP AB SER IA-ACNC: <16 UNITS

## 2025-07-03 NOTE — TELEPHONE ENCOUNTER
Attempted to return pt's call about her labs. I will get Dr. Gage to look over them and call her back this afternoon when he views them and makes recommendations/orders

## 2025-07-07 RX ORDER — ONDANSETRON 8 MG/1
8 TABLET, ORALLY DISINTEGRATING ORAL EVERY 6 HOURS PRN
Qty: 30 TABLET | Refills: 2 | Status: SHIPPED | OUTPATIENT
Start: 2025-07-07

## 2025-07-24 ENCOUNTER — PATIENT MESSAGE (OUTPATIENT)
Dept: FAMILY MEDICINE | Facility: CLINIC | Age: 55
End: 2025-07-24
Payer: MEDICAID

## 2025-07-24 RX ORDER — FLUOXETINE 10 MG/1
10 CAPSULE ORAL DAILY
COMMUNITY
Start: 2025-07-01

## 2025-07-24 NOTE — TELEPHONE ENCOUNTER
Copied from CRM #[id number]. Topic: Medications - Medication Question  >> Jul 24, 2025 10:51 AM Carole wrote:  Who Called: Rosemarie Lane    Medication Question    Preferred Method of Contact: Phone Call  Patient's Preferred Phone Number on File: [phone]     Additional Information: Fluoxetine 10 mg patient would like to add this to her medication list. She received this from [employer] and wants to be sure it shows on her medication list.    Medication is on pt chart will send her a [employer] message confirming

## 2025-07-27 RX ORDER — ONDANSETRON 8 MG/1
8 TABLET, ORALLY DISINTEGRATING ORAL EVERY 6 HOURS PRN
Qty: 30 TABLET | Refills: 2 | Status: SHIPPED | OUTPATIENT
Start: 2025-07-27

## 2025-08-25 ENCOUNTER — OFFICE VISIT (OUTPATIENT)
Dept: FAMILY MEDICINE | Facility: CLINIC | Age: 55
End: 2025-08-25
Payer: MEDICAID

## 2025-08-25 VITALS
DIASTOLIC BLOOD PRESSURE: 62 MMHG | BODY MASS INDEX: 30.2 KG/M2 | WEIGHT: 153.81 LBS | OXYGEN SATURATION: 96 % | RESPIRATION RATE: 19 BRPM | TEMPERATURE: 100 F | HEIGHT: 60 IN | SYSTOLIC BLOOD PRESSURE: 121 MMHG | HEART RATE: 69 BPM

## 2025-08-25 DIAGNOSIS — K59.00 CONSTIPATION, UNSPECIFIED CONSTIPATION TYPE: ICD-10-CM

## 2025-08-25 DIAGNOSIS — M54.50 LOW BACK PAIN WITHOUT SCIATICA, UNSPECIFIED BACK PAIN LATERALITY, UNSPECIFIED CHRONICITY: ICD-10-CM

## 2025-08-25 DIAGNOSIS — E11.65 TYPE 2 DIABETES MELLITUS WITH HYPERGLYCEMIA, WITHOUT LONG-TERM CURRENT USE OF INSULIN: ICD-10-CM

## 2025-08-25 DIAGNOSIS — I10 HYPERTENSION, UNSPECIFIED TYPE: ICD-10-CM

## 2025-08-25 DIAGNOSIS — F41.9 ANXIETY: ICD-10-CM

## 2025-08-25 DIAGNOSIS — J40 BRONCHITIS: ICD-10-CM

## 2025-08-25 DIAGNOSIS — B35.6 TINEA CRURIS: Primary | ICD-10-CM

## 2025-08-25 PROCEDURE — 3046F HEMOGLOBIN A1C LEVEL >9.0%: CPT | Mod: CPTII,,, | Performed by: FAMILY MEDICINE

## 2025-08-25 PROCEDURE — 1159F MED LIST DOCD IN RCRD: CPT | Mod: CPTII,,, | Performed by: FAMILY MEDICINE

## 2025-08-25 PROCEDURE — 99214 OFFICE O/P EST MOD 30 MIN: CPT | Mod: ,,, | Performed by: FAMILY MEDICINE

## 2025-08-25 PROCEDURE — 3078F DIAST BP <80 MM HG: CPT | Mod: CPTII,,, | Performed by: FAMILY MEDICINE

## 2025-08-25 PROCEDURE — 3008F BODY MASS INDEX DOCD: CPT | Mod: CPTII,,, | Performed by: FAMILY MEDICINE

## 2025-08-25 PROCEDURE — 3074F SYST BP LT 130 MM HG: CPT | Mod: CPTII,,, | Performed by: FAMILY MEDICINE

## 2025-08-25 RX ORDER — DOXYCYCLINE 100 MG/1
100 CAPSULE ORAL 2 TIMES DAILY
Qty: 14 CAPSULE | Refills: 0 | Status: SHIPPED | OUTPATIENT
Start: 2025-08-25

## 2025-08-25 RX ORDER — METOPROLOL SUCCINATE 50 MG/1
50 TABLET, EXTENDED RELEASE ORAL DAILY
Qty: 30 TABLET | Refills: 2 | Status: SHIPPED | OUTPATIENT
Start: 2025-08-25

## 2025-08-25 RX ORDER — METHOCARBAMOL 500 MG/1
500 TABLET, FILM COATED ORAL 4 TIMES DAILY
COMMUNITY
Start: 2025-08-01 | End: 2025-08-25 | Stop reason: SDUPTHER

## 2025-08-25 RX ORDER — METHOCARBAMOL 500 MG/1
500 TABLET, FILM COATED ORAL 4 TIMES DAILY
Qty: 90 TABLET | Refills: 1 | Status: SHIPPED | OUTPATIENT
Start: 2025-08-25

## 2025-08-25 RX ORDER — DAPAGLIFLOZIN 10 MG/1
10 TABLET, FILM COATED ORAL DAILY
Qty: 30 TABLET | Refills: 2 | Status: SHIPPED | OUTPATIENT
Start: 2025-08-25

## 2025-08-25 RX ORDER — BUSPIRONE HYDROCHLORIDE 15 MG/1
TABLET ORAL
Qty: 60 TABLET | Refills: 2 | Status: SHIPPED | OUTPATIENT
Start: 2025-08-25

## 2025-08-25 RX ORDER — FLUCONAZOLE 100 MG/1
100 TABLET ORAL DAILY
Qty: 14 TABLET | Refills: 0 | Status: SHIPPED | OUTPATIENT
Start: 2025-08-25 | End: 2025-09-24

## 2025-08-25 RX ORDER — KETOCONAZOLE 20 MG/G
CREAM TOPICAL DAILY
Qty: 60 G | Refills: 5 | Status: SHIPPED | OUTPATIENT
Start: 2025-08-25

## 2025-08-26 ENCOUNTER — TELEPHONE (OUTPATIENT)
Dept: FAMILY MEDICINE | Facility: CLINIC | Age: 55
End: 2025-08-26
Payer: MEDICAID

## 2025-09-02 DIAGNOSIS — E11.65 TYPE 2 DIABETES MELLITUS WITH HYPERGLYCEMIA, WITH LONG-TERM CURRENT USE OF INSULIN: ICD-10-CM

## 2025-09-02 DIAGNOSIS — Z79.4 TYPE 2 DIABETES MELLITUS WITH HYPERGLYCEMIA, WITH LONG-TERM CURRENT USE OF INSULIN: ICD-10-CM

## 2025-09-02 DIAGNOSIS — G25.81 RESTLESS LEG SYNDROME: ICD-10-CM

## 2025-09-02 DIAGNOSIS — I10 HYPERTENSION, UNSPECIFIED TYPE: ICD-10-CM

## 2025-09-02 RX ORDER — METOPROLOL SUCCINATE 50 MG/1
50 TABLET, EXTENDED RELEASE ORAL DAILY
Qty: 30 TABLET | Refills: 2 | OUTPATIENT
Start: 2025-09-02

## 2025-09-02 RX ORDER — ROPINIROLE 0.25 MG/1
0.25 TABLET, FILM COATED ORAL 3 TIMES DAILY
Qty: 90 TABLET | Refills: 2 | OUTPATIENT
Start: 2025-09-02

## 2025-09-02 RX ORDER — GABAPENTIN 300 MG/1
600 CAPSULE ORAL 2 TIMES DAILY
Qty: 120 CAPSULE | Refills: 2 | OUTPATIENT
Start: 2025-09-02

## (undated) DEVICE — BAG-A-JET FLUID DISPENSER SYSTEM

## (undated) DEVICE — GLOVE BIOGEL SKINSENSE PI 7.5

## (undated) DEVICE — DRESSING IV TEGADERM 10X12CM

## (undated) DEVICE — SHEATH INTRODUCER 6FR 10CM X 0.038 PINNACLE

## (undated) DEVICE — STOPCOCK 3-WAY ROTATING

## (undated) DEVICE — SET IV EXTENSION 42IN W/2 PORT CLEARLINK

## (undated) DEVICE — APPLICATOR CHLORAPREP LITE ORANGE 10.5ML STERILE

## (undated) DEVICE — GLOVE SURGICAL PROTEXIS PI SIZE 7

## (undated) DEVICE — POSITIONER ULNAR NERVE

## (undated) DEVICE — GLOVE SURGICAL PROTEXIS PI SIZE 6.5

## (undated) DEVICE — CDS ANGIOGRAPHY PACK

## (undated) DEVICE — POSITIONER HEAD DONUT 9IN FOAM

## (undated) DEVICE — DEVICE INFLATION BLUE DIAMOND IN7112

## (undated) DEVICE — SYRINGE 150CC INJECTABLE POWER

## (undated) DEVICE — TOWEL OR STERILE BLUE 4/PK 20PK/CS

## (undated) DEVICE — Device

## (undated) DEVICE — KIT MICROINTRODUCER 4FR MINI STICK II

## (undated) DEVICE — PACK ECLIPSE BASIC III SURG

## (undated) DEVICE — DEVICE MYNX GRIP VASCULAR CLOSURE(MINIMUM OF 10 EACH)

## (undated) DEVICE — GUIDEWIRE RUNTHROUGH NS 180CM CORONARY

## (undated) DEVICE — TOWEL OR DISP STRL BLUE 4/PK

## (undated) DEVICE — SET IV PRIMARY ALARIS (PRIMARY)

## (undated) DEVICE — CATH IMPULSE 5FR FR4

## (undated) DEVICE — CATH IMPULSE 5FR IM

## (undated) DEVICE — SYSTEM BACK STOP CLOSED WASTE

## (undated) DEVICE — DEVICE BLEEDBACK CONTROL VALVE COPILOT

## (undated) DEVICE — TUBING CAPNOLINE PLUS SMART 02 CONNECTOR(ORDER 65110)

## (undated) DEVICE — ISOVUE 370 100ML

## (undated) DEVICE — PAD CURAD NONADH 3X4IN

## (undated) DEVICE — SOL NACL IRR 1000ML BTL

## (undated) DEVICE — BALLOON MINI TREK RX 2.0 X 15

## (undated) DEVICE — KIT INTRAOPERATIVE ULTRASOUND PROBE COVER 6INX96IN

## (undated) DEVICE — GUIDEWIRE FLOPPY WHOLEY 260 CM EXCHANGE

## (undated) DEVICE — TUBE SUCTION MEDI-VAC STERILE

## (undated) DEVICE — CATH IMPULSE 5FR FL4

## (undated) DEVICE — SENSOR PULSE OX ADULT

## (undated) DEVICE — CATH IMPULSE 5FR PIGTAIL